# Patient Record
Sex: FEMALE | Race: WHITE | Employment: OTHER | URBAN - METROPOLITAN AREA
[De-identification: names, ages, dates, MRNs, and addresses within clinical notes are randomized per-mention and may not be internally consistent; named-entity substitution may affect disease eponyms.]

---

## 2017-02-06 ENCOUNTER — HOSPITAL ENCOUNTER (OUTPATIENT)
Dept: OTHER | Age: 77
Discharge: HOME | End: 2017-02-06
Attending: INTERNAL MEDICINE | Admitting: INTERNAL MEDICINE

## 2017-02-06 LAB
A/G RATIO: 1.5 (ref 1.1–2.2)
ALBUMIN SERPL-MCNC: 4 G/DL (ref 3.4–5)
ALP BLD-CCNC: 87 U/L (ref 40–129)
ALT SERPL-CCNC: 11 U/L (ref 10–40)
ANION GAP SERPL CALCULATED.3IONS-SCNC: 15 MMOL/L (ref 3–16)
AST SERPL-CCNC: 18 U/L (ref 15–37)
BILIRUB SERPL-MCNC: 0.5 MG/DL (ref 0–1)
BUN BLDV-MCNC: 17 MG/DL (ref 7–20)
CALCIUM SERPL-MCNC: 9.2 MG/DL (ref 8.3–10.6)
CHLORIDE BLD-SCNC: 97 MMOL/L (ref 99–110)
CHOLESTEROL, TOTAL: 186 MG/DL (ref 0–199)
CO2: 26 MMOL/L (ref 21–32)
CREAT SERPL-MCNC: 0.8 MG/DL (ref 0.6–1.2)
GFR AFRICAN AMERICAN: >60
GFR NON-AFRICAN AMERICAN: >60
GLOBULIN: 2.6 G/DL
GLUCOSE BLD-MCNC: 113 MG/DL (ref 70–99)
HDLC SERPL-MCNC: 78 MG/DL (ref 40–60)
LDL CHOLESTEROL CALCULATED: 90 MG/DL
POTASSIUM SERPL-SCNC: 4.1 MMOL/L (ref 3.5–5.1)
SODIUM BLD-SCNC: 138 MMOL/L (ref 136–145)
TOTAL PROTEIN: 6.6 G/DL (ref 6.4–8.2)
TRIGL SERPL-MCNC: 90 MG/DL (ref 0–150)
VLDLC SERPL CALC-MCNC: 18 MG/DL

## 2017-02-07 LAB
ESTIMATED AVERAGE GLUCOSE: 114 MG/DL
HBA1C MFR BLD: 5.6 %

## 2017-02-20 ENCOUNTER — HOSPITAL ENCOUNTER (OUTPATIENT)
Dept: MAMMOGRAPHY | Age: 77
Discharge: HOME | End: 2017-02-20
Attending: INTERNAL MEDICINE | Admitting: INTERNAL MEDICINE

## 2017-02-20 DIAGNOSIS — Z12.31 ENCOUNTER FOR SCREENING MAMMOGRAM FOR BREAST CANCER: ICD-10-CM

## 2017-02-20 DIAGNOSIS — Z80.3 FAMILY HISTORY OF MALIGNANT NEOPLASM OF BREAST: ICD-10-CM

## 2017-03-13 ENCOUNTER — OFFICE VISIT (OUTPATIENT)
Dept: NEUROLOGY | Age: 77
End: 2017-03-13

## 2017-03-13 VITALS
HEART RATE: 63 BPM | BODY MASS INDEX: 38.71 KG/M2 | WEIGHT: 192 LBS | HEIGHT: 59 IN | DIASTOLIC BLOOD PRESSURE: 82 MMHG | SYSTOLIC BLOOD PRESSURE: 128 MMHG

## 2017-03-13 DIAGNOSIS — G25.0 BENIGN ESSENTIAL TREMOR: Primary | ICD-10-CM

## 2017-03-13 PROCEDURE — G8427 DOCREV CUR MEDS BY ELIG CLIN: HCPCS | Performed by: PSYCHIATRY & NEUROLOGY

## 2017-03-13 PROCEDURE — 99213 OFFICE O/P EST LOW 20 MIN: CPT | Performed by: PSYCHIATRY & NEUROLOGY

## 2017-03-13 PROCEDURE — 4040F PNEUMOC VAC/ADMIN/RCVD: CPT | Performed by: PSYCHIATRY & NEUROLOGY

## 2017-03-13 PROCEDURE — G8400 PT W/DXA NO RESULTS DOC: HCPCS | Performed by: PSYCHIATRY & NEUROLOGY

## 2017-03-13 PROCEDURE — G8419 CALC BMI OUT NRM PARAM NOF/U: HCPCS | Performed by: PSYCHIATRY & NEUROLOGY

## 2017-03-13 PROCEDURE — 1036F TOBACCO NON-USER: CPT | Performed by: PSYCHIATRY & NEUROLOGY

## 2017-03-13 PROCEDURE — 1090F PRES/ABSN URINE INCON ASSESS: CPT | Performed by: PSYCHIATRY & NEUROLOGY

## 2017-03-13 PROCEDURE — 1123F ACP DISCUSS/DSCN MKR DOCD: CPT | Performed by: PSYCHIATRY & NEUROLOGY

## 2017-03-13 PROCEDURE — G8484 FLU IMMUNIZE NO ADMIN: HCPCS | Performed by: PSYCHIATRY & NEUROLOGY

## 2017-03-13 RX ORDER — PRIMIDONE 50 MG/1
TABLET ORAL
Qty: 60 TABLET | Refills: 5 | Status: SHIPPED | OUTPATIENT
Start: 2017-03-13 | End: 2017-09-05 | Stop reason: SDUPTHER

## 2017-06-12 ENCOUNTER — HOSPITAL ENCOUNTER (OUTPATIENT)
Dept: OTHER | Age: 77
Discharge: HOME | End: 2017-06-12
Attending: INTERNAL MEDICINE | Admitting: INTERNAL MEDICINE

## 2017-06-12 LAB
ANION GAP SERPL CALCULATED.3IONS-SCNC: 14 MMOL/L (ref 3–16)
BASOPHILS ABSOLUTE: 0.1 K/UL (ref 0–0.2)
BASOPHILS RELATIVE PERCENT: 0.9 %
BUN BLDV-MCNC: 21 MG/DL (ref 7–20)
CALCIUM SERPL-MCNC: 9.4 MG/DL (ref 8.3–10.6)
CHLORIDE BLD-SCNC: 100 MMOL/L (ref 99–110)
CHOLESTEROL, TOTAL: 188 MG/DL (ref 0–199)
CO2: 28 MMOL/L (ref 21–32)
CREAT SERPL-MCNC: 0.7 MG/DL (ref 0.6–1.2)
EOSINOPHILS ABSOLUTE: 0.4 K/UL (ref 0–0.6)
EOSINOPHILS RELATIVE PERCENT: 6.2 %
GFR AFRICAN AMERICAN: >60
GFR NON-AFRICAN AMERICAN: >60
GLUCOSE BLD-MCNC: 110 MG/DL (ref 70–99)
HCT VFR BLD CALC: 43.3 % (ref 36–48)
HDLC SERPL-MCNC: 73 MG/DL (ref 40–60)
HEMOGLOBIN: 14.3 G/DL (ref 12–16)
LDL CHOLESTEROL CALCULATED: 96 MG/DL
LYMPHOCYTES ABSOLUTE: 1.4 K/UL (ref 1–5.1)
LYMPHOCYTES RELATIVE PERCENT: 21.2 %
MCH RBC QN AUTO: 32.2 PG (ref 26–34)
MCHC RBC AUTO-ENTMCNC: 33.1 G/DL (ref 31–36)
MCV RBC AUTO: 97.3 FL (ref 80–100)
MONOCYTES ABSOLUTE: 0.5 K/UL (ref 0–1.3)
MONOCYTES RELATIVE PERCENT: 7.1 %
NEUTROPHILS ABSOLUTE: 4.4 K/UL (ref 1.7–7.7)
NEUTROPHILS RELATIVE PERCENT: 64.6 %
PDW BLD-RTO: 13.8 % (ref 12.4–15.4)
PLATELET # BLD: 215 K/UL (ref 135–450)
PMV BLD AUTO: 10 FL (ref 5–10.5)
POTASSIUM SERPL-SCNC: 4.2 MMOL/L (ref 3.5–5.1)
RBC # BLD: 4.46 M/UL (ref 4–5.2)
SODIUM BLD-SCNC: 142 MMOL/L (ref 136–145)
TRIGL SERPL-MCNC: 95 MG/DL (ref 0–150)
VLDLC SERPL CALC-MCNC: 19 MG/DL
WBC # BLD: 6.8 K/UL (ref 4–11)

## 2017-09-05 ENCOUNTER — OFFICE VISIT (OUTPATIENT)
Dept: NEUROLOGY | Age: 77
End: 2017-09-05

## 2017-09-05 ENCOUNTER — OFFICE VISIT (OUTPATIENT)
Dept: ENT CLINIC | Age: 77
End: 2017-09-05

## 2017-09-05 VITALS
WEIGHT: 192 LBS | HEART RATE: 66 BPM | BODY MASS INDEX: 38.71 KG/M2 | DIASTOLIC BLOOD PRESSURE: 77 MMHG | SYSTOLIC BLOOD PRESSURE: 143 MMHG | HEIGHT: 59 IN

## 2017-09-05 VITALS
DIASTOLIC BLOOD PRESSURE: 85 MMHG | SYSTOLIC BLOOD PRESSURE: 131 MMHG | HEART RATE: 68 BPM | HEIGHT: 59 IN | BODY MASS INDEX: 39.31 KG/M2 | WEIGHT: 195 LBS

## 2017-09-05 DIAGNOSIS — R20.0 NUMBNESS AND TINGLING: Primary | ICD-10-CM

## 2017-09-05 DIAGNOSIS — G56.21 ULNAR NEUROPATHY OF RIGHT UPPER EXTREMITY: ICD-10-CM

## 2017-09-05 DIAGNOSIS — R20.2 NUMBNESS AND TINGLING: Primary | ICD-10-CM

## 2017-09-05 DIAGNOSIS — J34.89 SINUS PRESSURE: Primary | ICD-10-CM

## 2017-09-05 DIAGNOSIS — G25.0 BENIGN ESSENTIAL TREMOR: ICD-10-CM

## 2017-09-05 PROCEDURE — 99213 OFFICE O/P EST LOW 20 MIN: CPT | Performed by: PSYCHIATRY & NEUROLOGY

## 2017-09-05 PROCEDURE — G8417 CALC BMI ABV UP PARAM F/U: HCPCS | Performed by: OTOLARYNGOLOGY

## 2017-09-05 PROCEDURE — 1123F ACP DISCUSS/DSCN MKR DOCD: CPT | Performed by: PSYCHIATRY & NEUROLOGY

## 2017-09-05 PROCEDURE — 1036F TOBACCO NON-USER: CPT | Performed by: OTOLARYNGOLOGY

## 2017-09-05 PROCEDURE — 99213 OFFICE O/P EST LOW 20 MIN: CPT | Performed by: OTOLARYNGOLOGY

## 2017-09-05 PROCEDURE — 4040F PNEUMOC VAC/ADMIN/RCVD: CPT | Performed by: PSYCHIATRY & NEUROLOGY

## 2017-09-05 PROCEDURE — G8427 DOCREV CUR MEDS BY ELIG CLIN: HCPCS | Performed by: OTOLARYNGOLOGY

## 2017-09-05 PROCEDURE — 1090F PRES/ABSN URINE INCON ASSESS: CPT | Performed by: OTOLARYNGOLOGY

## 2017-09-05 PROCEDURE — 4040F PNEUMOC VAC/ADMIN/RCVD: CPT | Performed by: OTOLARYNGOLOGY

## 2017-09-05 PROCEDURE — G8427 DOCREV CUR MEDS BY ELIG CLIN: HCPCS | Performed by: PSYCHIATRY & NEUROLOGY

## 2017-09-05 PROCEDURE — G8417 CALC BMI ABV UP PARAM F/U: HCPCS | Performed by: PSYCHIATRY & NEUROLOGY

## 2017-09-05 PROCEDURE — 1090F PRES/ABSN URINE INCON ASSESS: CPT | Performed by: PSYCHIATRY & NEUROLOGY

## 2017-09-05 PROCEDURE — G8400 PT W/DXA NO RESULTS DOC: HCPCS | Performed by: OTOLARYNGOLOGY

## 2017-09-05 PROCEDURE — 1036F TOBACCO NON-USER: CPT | Performed by: PSYCHIATRY & NEUROLOGY

## 2017-09-05 PROCEDURE — G8400 PT W/DXA NO RESULTS DOC: HCPCS | Performed by: PSYCHIATRY & NEUROLOGY

## 2017-09-05 PROCEDURE — 1123F ACP DISCUSS/DSCN MKR DOCD: CPT | Performed by: OTOLARYNGOLOGY

## 2017-09-05 RX ORDER — PRIMIDONE 50 MG/1
TABLET ORAL
Qty: 60 TABLET | Refills: 5 | Status: SHIPPED | OUTPATIENT
Start: 2017-09-05 | End: 2018-03-02 | Stop reason: SDUPTHER

## 2017-09-05 RX ORDER — MOMETASONE FUROATE 50 UG/1
1 SPRAY, METERED NASAL DAILY
Qty: 1 INHALER | Refills: 1 | Status: SHIPPED | OUTPATIENT
Start: 2017-09-05 | End: 2017-10-05

## 2017-10-13 ENCOUNTER — OFFICE VISIT (OUTPATIENT)
Dept: ENT CLINIC | Age: 77
End: 2017-10-13

## 2017-10-13 VITALS
HEART RATE: 68 BPM | WEIGHT: 192 LBS | SYSTOLIC BLOOD PRESSURE: 156 MMHG | DIASTOLIC BLOOD PRESSURE: 84 MMHG | BODY MASS INDEX: 38.71 KG/M2 | HEIGHT: 59 IN

## 2017-10-13 DIAGNOSIS — M26.69 TMJ CAPSULITIS: Primary | ICD-10-CM

## 2017-10-13 DIAGNOSIS — M79.18 MYOFASCIAL PAIN: Primary | ICD-10-CM

## 2017-10-13 PROCEDURE — 1036F TOBACCO NON-USER: CPT | Performed by: OTOLARYNGOLOGY

## 2017-10-13 PROCEDURE — 99213 OFFICE O/P EST LOW 20 MIN: CPT | Performed by: OTOLARYNGOLOGY

## 2017-10-13 PROCEDURE — G8427 DOCREV CUR MEDS BY ELIG CLIN: HCPCS | Performed by: OTOLARYNGOLOGY

## 2017-10-13 PROCEDURE — 4040F PNEUMOC VAC/ADMIN/RCVD: CPT | Performed by: OTOLARYNGOLOGY

## 2017-10-13 PROCEDURE — 1090F PRES/ABSN URINE INCON ASSESS: CPT | Performed by: OTOLARYNGOLOGY

## 2017-10-13 PROCEDURE — G8484 FLU IMMUNIZE NO ADMIN: HCPCS | Performed by: OTOLARYNGOLOGY

## 2017-10-13 PROCEDURE — 1036F TOBACCO NON-USER: CPT | Performed by: AUDIOLOGIST

## 2017-10-13 PROCEDURE — 1123F ACP DISCUSS/DSCN MKR DOCD: CPT | Performed by: OTOLARYNGOLOGY

## 2017-10-13 PROCEDURE — G8417 CALC BMI ABV UP PARAM F/U: HCPCS | Performed by: OTOLARYNGOLOGY

## 2017-10-13 PROCEDURE — G8400 PT W/DXA NO RESULTS DOC: HCPCS | Performed by: OTOLARYNGOLOGY

## 2017-10-13 NOTE — PROGRESS NOTES
distress    Impression:  No suggestion of current allergic rhinitis    Myofascial discomfort seems most likely    Plan:  Diclofenac 75 mg b.i.d.       Try to keep jaw and neck muscles relaxed  May apply warm compresses intermittently  Take the anti-inflammatory medication as prescribed  Consider a bite guard, either a sports model or a custom made device  Have your dentist check your jaw alignment  Ear drops are generally not helpful for the ear discomfort associated with your jaw joint

## 2017-12-12 ENCOUNTER — HOSPITAL ENCOUNTER (OUTPATIENT)
Dept: OTHER | Age: 77
Discharge: HOME | End: 2017-12-12
Attending: INTERNAL MEDICINE | Admitting: INTERNAL MEDICINE

## 2017-12-12 LAB
A/G RATIO: 1.8 (ref 1.1–2.2)
ALBUMIN SERPL-MCNC: 4.2 G/DL (ref 3.4–5)
ALP BLD-CCNC: 77 U/L (ref 40–129)
ALT SERPL-CCNC: 11 U/L (ref 10–40)
ANION GAP SERPL CALCULATED.3IONS-SCNC: 15 MMOL/L (ref 3–16)
AST SERPL-CCNC: 18 U/L (ref 15–37)
BASOPHILS ABSOLUTE: 0.1 K/UL (ref 0–0.2)
BASOPHILS RELATIVE PERCENT: 0.9 %
BILIRUB SERPL-MCNC: 0.7 MG/DL (ref 0–1)
BUN BLDV-MCNC: 21 MG/DL (ref 7–20)
CALCIUM SERPL-MCNC: 9.4 MG/DL (ref 8.3–10.6)
CHLORIDE BLD-SCNC: 98 MMOL/L (ref 99–110)
CHOLESTEROL, TOTAL: 183 MG/DL (ref 0–199)
CO2: 27 MMOL/L (ref 21–32)
CREAT SERPL-MCNC: 0.7 MG/DL (ref 0.6–1.2)
EOSINOPHILS ABSOLUTE: 0.4 K/UL (ref 0–0.6)
EOSINOPHILS RELATIVE PERCENT: 6 %
GFR AFRICAN AMERICAN: >60
GFR NON-AFRICAN AMERICAN: >60
GLOBULIN: 2.4 G/DL
GLUCOSE BLD-MCNC: 106 MG/DL (ref 70–99)
HCT VFR BLD CALC: 42 % (ref 36–48)
HDLC SERPL-MCNC: 72 MG/DL (ref 40–60)
HEMOGLOBIN: 14.1 G/DL (ref 12–16)
LDL CHOLESTEROL CALCULATED: 93 MG/DL
LYMPHOCYTES ABSOLUTE: 1.2 K/UL (ref 1–5.1)
LYMPHOCYTES RELATIVE PERCENT: 17.4 %
MCH RBC QN AUTO: 32.5 PG (ref 26–34)
MCHC RBC AUTO-ENTMCNC: 33.5 G/DL (ref 31–36)
MCV RBC AUTO: 97.1 FL (ref 80–100)
MONOCYTES ABSOLUTE: 0.5 K/UL (ref 0–1.3)
MONOCYTES RELATIVE PERCENT: 6.9 %
NEUTROPHILS ABSOLUTE: 4.7 K/UL (ref 1.7–7.7)
NEUTROPHILS RELATIVE PERCENT: 68.8 %
PDW BLD-RTO: 13.5 % (ref 12.4–15.4)
PLATELET # BLD: 196 K/UL (ref 135–450)
PMV BLD AUTO: 9.9 FL (ref 5–10.5)
POTASSIUM SERPL-SCNC: 3.8 MMOL/L (ref 3.5–5.1)
RBC # BLD: 4.33 M/UL (ref 4–5.2)
SODIUM BLD-SCNC: 140 MMOL/L (ref 136–145)
TOTAL PROTEIN: 6.6 G/DL (ref 6.4–8.2)
TRIGL SERPL-MCNC: 92 MG/DL (ref 0–150)
VLDLC SERPL CALC-MCNC: 18 MG/DL
WBC # BLD: 6.8 K/UL (ref 4–11)

## 2018-03-02 ENCOUNTER — OFFICE VISIT (OUTPATIENT)
Dept: NEUROLOGY | Age: 78
End: 2018-03-02

## 2018-03-02 VITALS
HEART RATE: 62 BPM | WEIGHT: 191.8 LBS | DIASTOLIC BLOOD PRESSURE: 67 MMHG | BODY MASS INDEX: 38.67 KG/M2 | SYSTOLIC BLOOD PRESSURE: 115 MMHG | HEIGHT: 59 IN

## 2018-03-02 DIAGNOSIS — G25.0 BENIGN ESSENTIAL TREMOR: ICD-10-CM

## 2018-03-02 PROCEDURE — 1090F PRES/ABSN URINE INCON ASSESS: CPT | Performed by: PSYCHIATRY & NEUROLOGY

## 2018-03-02 PROCEDURE — G8484 FLU IMMUNIZE NO ADMIN: HCPCS | Performed by: PSYCHIATRY & NEUROLOGY

## 2018-03-02 PROCEDURE — 4040F PNEUMOC VAC/ADMIN/RCVD: CPT | Performed by: PSYCHIATRY & NEUROLOGY

## 2018-03-02 PROCEDURE — 99213 OFFICE O/P EST LOW 20 MIN: CPT | Performed by: PSYCHIATRY & NEUROLOGY

## 2018-03-02 PROCEDURE — G8400 PT W/DXA NO RESULTS DOC: HCPCS | Performed by: PSYCHIATRY & NEUROLOGY

## 2018-03-02 PROCEDURE — 1123F ACP DISCUSS/DSCN MKR DOCD: CPT | Performed by: PSYCHIATRY & NEUROLOGY

## 2018-03-02 PROCEDURE — 1036F TOBACCO NON-USER: CPT | Performed by: PSYCHIATRY & NEUROLOGY

## 2018-03-02 PROCEDURE — G8427 DOCREV CUR MEDS BY ELIG CLIN: HCPCS | Performed by: PSYCHIATRY & NEUROLOGY

## 2018-03-02 PROCEDURE — G8417 CALC BMI ABV UP PARAM F/U: HCPCS | Performed by: PSYCHIATRY & NEUROLOGY

## 2018-03-02 RX ORDER — OMEPRAZOLE 20 MG/1
20 CAPSULE, DELAYED RELEASE ORAL DAILY
COMMUNITY

## 2018-03-02 RX ORDER — CELECOXIB 200 MG/1
200 CAPSULE ORAL
COMMUNITY

## 2018-03-02 RX ORDER — PRIMIDONE 50 MG/1
TABLET ORAL
Qty: 60 TABLET | Refills: 5 | Status: SHIPPED | OUTPATIENT
Start: 2018-03-02 | End: 2018-09-04 | Stop reason: SDUPTHER

## 2018-04-06 ENCOUNTER — HOSPITAL ENCOUNTER (OUTPATIENT)
Dept: GENERAL RADIOLOGY | Age: 78
Discharge: HOME | End: 2018-04-06
Admitting: INTERNAL MEDICINE

## 2018-04-06 DIAGNOSIS — Z80.3 FAMILY HISTORY OF BREAST CANCER IN MOTHER: ICD-10-CM

## 2018-04-06 DIAGNOSIS — Z12.31 ENCOUNTER FOR SCREENING MAMMOGRAM FOR BREAST CANCER: ICD-10-CM

## 2018-04-06 DIAGNOSIS — M81.0 AGE-RELATED OSTEOPOROSIS WITHOUT CURRENT PATHOLOGICAL FRACTURE: ICD-10-CM

## 2018-04-06 DIAGNOSIS — Z13.820 ENCOUNTER FOR IMAGING TO ASSESS OSTEOPOROSIS: ICD-10-CM

## 2018-05-08 ENCOUNTER — HOSPITAL ENCOUNTER (OUTPATIENT)
Dept: OTHER | Age: 78
Discharge: HOME | End: 2018-05-08
Attending: INTERNAL MEDICINE | Admitting: INTERNAL MEDICINE

## 2018-05-08 LAB
A/G RATIO: 1.8 (ref 1.1–2.2)
ALBUMIN SERPL-MCNC: 4.3 G/DL (ref 3.4–5)
ALP BLD-CCNC: 85 U/L (ref 40–129)
ALT SERPL-CCNC: 13 U/L (ref 10–40)
ANION GAP SERPL CALCULATED.3IONS-SCNC: 13 MMOL/L (ref 3–16)
AST SERPL-CCNC: 19 U/L (ref 15–37)
BASOPHILS ABSOLUTE: 0.1 K/UL (ref 0–0.2)
BASOPHILS RELATIVE PERCENT: 0.8 %
BILIRUB SERPL-MCNC: 0.6 MG/DL (ref 0–1)
BUN BLDV-MCNC: 23 MG/DL (ref 7–20)
CALCIUM SERPL-MCNC: 9.1 MG/DL (ref 8.3–10.6)
CHLORIDE BLD-SCNC: 96 MMOL/L (ref 99–110)
CHOLESTEROL, TOTAL: 196 MG/DL (ref 0–199)
CO2: 29 MMOL/L (ref 21–32)
CREAT SERPL-MCNC: 0.6 MG/DL (ref 0.6–1.2)
EOSINOPHILS ABSOLUTE: 0.3 K/UL (ref 0–0.6)
EOSINOPHILS RELATIVE PERCENT: 5.3 %
GFR AFRICAN AMERICAN: >60
GFR NON-AFRICAN AMERICAN: >60
GLOBULIN: 2.4 G/DL
GLUCOSE BLD-MCNC: 118 MG/DL (ref 70–99)
HCT VFR BLD CALC: 42.3 % (ref 36–48)
HDLC SERPL-MCNC: 73 MG/DL (ref 40–60)
HEMOGLOBIN: 14.5 G/DL (ref 12–16)
LDL CHOLESTEROL CALCULATED: 108 MG/DL
LYMPHOCYTES ABSOLUTE: 1 K/UL (ref 1–5.1)
LYMPHOCYTES RELATIVE PERCENT: 14.7 %
MCH RBC QN AUTO: 32.6 PG (ref 26–34)
MCHC RBC AUTO-ENTMCNC: 34.2 G/DL (ref 31–36)
MCV RBC AUTO: 95.4 FL (ref 80–100)
MONOCYTES ABSOLUTE: 0.4 K/UL (ref 0–1.3)
MONOCYTES RELATIVE PERCENT: 6.3 %
NEUTROPHILS ABSOLUTE: 4.8 K/UL (ref 1.7–7.7)
NEUTROPHILS RELATIVE PERCENT: 72.9 %
PDW BLD-RTO: 13.5 % (ref 12.4–15.4)
PLATELET # BLD: 216 K/UL (ref 135–450)
PMV BLD AUTO: 9.6 FL (ref 5–10.5)
POTASSIUM SERPL-SCNC: 4.1 MMOL/L (ref 3.5–5.1)
RBC # BLD: 4.44 M/UL (ref 4–5.2)
SODIUM BLD-SCNC: 138 MMOL/L (ref 136–145)
TOTAL PROTEIN: 6.7 G/DL (ref 6.4–8.2)
TRIGL SERPL-MCNC: 76 MG/DL (ref 0–150)
VLDLC SERPL CALC-MCNC: 15 MG/DL
WBC # BLD: 6.6 K/UL (ref 4–11)

## 2018-05-10 LAB
ESTIMATED AVERAGE GLUCOSE: 108.3 MG/DL
HBA1C MFR BLD: 5.4 %

## 2018-09-04 ENCOUNTER — OFFICE VISIT (OUTPATIENT)
Dept: NEUROLOGY | Age: 78
End: 2018-09-04

## 2018-09-04 VITALS
BODY MASS INDEX: 38.51 KG/M2 | SYSTOLIC BLOOD PRESSURE: 125 MMHG | HEART RATE: 62 BPM | DIASTOLIC BLOOD PRESSURE: 65 MMHG | HEIGHT: 59 IN | WEIGHT: 191 LBS

## 2018-09-04 DIAGNOSIS — G25.0 BENIGN ESSENTIAL TREMOR: ICD-10-CM

## 2018-09-04 PROCEDURE — 1123F ACP DISCUSS/DSCN MKR DOCD: CPT | Performed by: PSYCHIATRY & NEUROLOGY

## 2018-09-04 PROCEDURE — 4040F PNEUMOC VAC/ADMIN/RCVD: CPT | Performed by: PSYCHIATRY & NEUROLOGY

## 2018-09-04 PROCEDURE — 1090F PRES/ABSN URINE INCON ASSESS: CPT | Performed by: PSYCHIATRY & NEUROLOGY

## 2018-09-04 PROCEDURE — 99213 OFFICE O/P EST LOW 20 MIN: CPT | Performed by: PSYCHIATRY & NEUROLOGY

## 2018-09-04 PROCEDURE — G8427 DOCREV CUR MEDS BY ELIG CLIN: HCPCS | Performed by: PSYCHIATRY & NEUROLOGY

## 2018-09-04 PROCEDURE — G8399 PT W/DXA RESULTS DOCUMENT: HCPCS | Performed by: PSYCHIATRY & NEUROLOGY

## 2018-09-04 PROCEDURE — G8417 CALC BMI ABV UP PARAM F/U: HCPCS | Performed by: PSYCHIATRY & NEUROLOGY

## 2018-09-04 PROCEDURE — 1101F PT FALLS ASSESS-DOCD LE1/YR: CPT | Performed by: PSYCHIATRY & NEUROLOGY

## 2018-09-04 PROCEDURE — 1036F TOBACCO NON-USER: CPT | Performed by: PSYCHIATRY & NEUROLOGY

## 2018-09-04 RX ORDER — PRIMIDONE 50 MG/1
TABLET ORAL
Qty: 180 TABLET | Refills: 1 | Status: SHIPPED | OUTPATIENT
Start: 2018-09-04 | End: 2019-03-07 | Stop reason: SDUPTHER

## 2018-09-04 NOTE — PROGRESS NOTES
no acute distress  Patient is awake, alert and oriented x3. Speech is normal.  Pupils are equal round reacting to light. Extraocular movements intact. Face symmetrical. Tongue midline. Motor exam shows normal symmetrical strength. Deep tendon reflexes normal. Plantar reflexes downgoing. Sensory exam normal. Coordination normal. Gait normal. No carotid bruit. No neck stiffness. Data :  LABS:  General Labs:    CBC:   Lab Results   Component Value Date    WBC 6.6 05/08/2018    RBC 4.44 05/08/2018    HGB 14.5 05/08/2018    HCT 42.3 05/08/2018    MCV 95.4 05/08/2018    MCH 32.6 05/08/2018    MCHC 34.2 05/08/2018    RDW 13.5 05/08/2018     05/08/2018    MPV 9.6 05/08/2018     BMP:    Lab Results   Component Value Date     05/08/2018    K 4.1 05/08/2018    CL 96 05/08/2018    CO2 29 05/08/2018    BUN 23 05/08/2018    LABALBU 4.3 05/08/2018    CREATININE 0.6 05/08/2018    CALCIUM 9.1 05/08/2018    GFRAA >60 05/08/2018    GFRAA >60 03/29/2013    LABGLOM >60 05/08/2018    GLUCOSE 118 05/08/2018       Impression :  Benign essential tremor, Relatively stable  I suspect that patient may have an ulnar nerve entrapment at the Right elbow But this seems to be stable    Plan :  Continue primidone  50 mg by mouth twice a day  Discussed with patient about the possible ulnar nerve entrapment at the elbow  Recommended that she try to avoid putting pressure on the ulnar nerve at the elbow  If the symptoms get worse I will do EMG and nerve conduction study of the right arm  Return in 6 months    Please note a portion of  this chart was generated using dragon dictation software. Although every effort was made to ensure the accuracy of this automated transcription, some errors in transcription may have occurred.

## 2019-01-18 ENCOUNTER — HOSPITAL ENCOUNTER (OUTPATIENT)
Age: 79
Discharge: HOME | End: 2019-01-18
Payer: MEDICARE

## 2019-01-18 LAB
A/G RATIO: 2 (ref 1.1–2.2)
ALBUMIN SERPL-MCNC: 4.5 G/DL (ref 3.4–5)
ALP BLD-CCNC: 84 U/L (ref 40–129)
ALT SERPL-CCNC: 14 U/L (ref 10–40)
ANION GAP SERPL CALCULATED.3IONS-SCNC: 13 MMOL/L (ref 3–16)
AST SERPL-CCNC: 23 U/L (ref 15–37)
BASOPHILS ABSOLUTE: 0.1 K/UL (ref 0–0.2)
BASOPHILS RELATIVE PERCENT: 0.9 %
BILIRUB SERPL-MCNC: 0.6 MG/DL (ref 0–1)
BUN BLDV-MCNC: 19 MG/DL (ref 7–20)
CALCIUM SERPL-MCNC: 9.4 MG/DL (ref 8.3–10.6)
CHLORIDE BLD-SCNC: 100 MMOL/L (ref 99–110)
CHOLESTEROL, TOTAL: 173 MG/DL (ref 0–199)
CO2: 26 MMOL/L (ref 21–32)
CREAT SERPL-MCNC: 0.6 MG/DL (ref 0.6–1.2)
EOSINOPHILS ABSOLUTE: 0.3 K/UL (ref 0–0.6)
EOSINOPHILS RELATIVE PERCENT: 5.4 %
ESTIMATED AVERAGE GLUCOSE: 105.4 MG/DL
GFR AFRICAN AMERICAN: >60
GFR NON-AFRICAN AMERICAN: >60
GLOBULIN: 2.2 G/DL
GLUCOSE BLD-MCNC: 107 MG/DL (ref 70–99)
HBA1C MFR BLD: 5.3 %
HCT VFR BLD CALC: 41.4 % (ref 36–48)
HDLC SERPL-MCNC: 66 MG/DL (ref 40–60)
HEMOGLOBIN: 14.2 G/DL (ref 12–16)
LDL CHOLESTEROL CALCULATED: 93 MG/DL
LYMPHOCYTES ABSOLUTE: 1.2 K/UL (ref 1–5.1)
LYMPHOCYTES RELATIVE PERCENT: 20 %
MCH RBC QN AUTO: 31.9 PG (ref 26–34)
MCHC RBC AUTO-ENTMCNC: 34.2 G/DL (ref 31–36)
MCV RBC AUTO: 93.2 FL (ref 80–100)
MONOCYTES ABSOLUTE: 0.4 K/UL (ref 0–1.3)
MONOCYTES RELATIVE PERCENT: 7.3 %
NEUTROPHILS ABSOLUTE: 3.8 K/UL (ref 1.7–7.7)
NEUTROPHILS RELATIVE PERCENT: 66.4 %
PDW BLD-RTO: 13.5 % (ref 12.4–15.4)
PLATELET # BLD: 201 K/UL (ref 135–450)
PMV BLD AUTO: 9.7 FL (ref 5–10.5)
POTASSIUM SERPL-SCNC: 3.9 MMOL/L (ref 3.5–5.1)
RBC # BLD: 4.45 M/UL (ref 4–5.2)
SODIUM BLD-SCNC: 139 MMOL/L (ref 136–145)
TOTAL PROTEIN: 6.7 G/DL (ref 6.4–8.2)
TRIGL SERPL-MCNC: 72 MG/DL (ref 0–150)
VLDLC SERPL CALC-MCNC: 14 MG/DL
WBC # BLD: 5.8 K/UL (ref 4–11)

## 2019-01-18 PROCEDURE — 85025 COMPLETE CBC W/AUTO DIFF WBC: CPT

## 2019-01-18 PROCEDURE — 80061 LIPID PANEL: CPT

## 2019-01-18 PROCEDURE — 80053 COMPREHEN METABOLIC PANEL: CPT

## 2019-01-18 PROCEDURE — 36415 COLL VENOUS BLD VENIPUNCTURE: CPT

## 2019-01-18 PROCEDURE — 83036 HEMOGLOBIN GLYCOSYLATED A1C: CPT

## 2019-03-07 DIAGNOSIS — G25.0 BENIGN ESSENTIAL TREMOR: ICD-10-CM

## 2019-03-07 RX ORDER — PRIMIDONE 50 MG/1
TABLET ORAL
Qty: 180 TABLET | Refills: 0 | Status: SHIPPED | OUTPATIENT
Start: 2019-03-07 | End: 2019-04-15 | Stop reason: SDUPTHER

## 2019-04-15 ENCOUNTER — OFFICE VISIT (OUTPATIENT)
Dept: NEUROLOGY | Age: 79
End: 2019-04-15
Payer: MEDICARE

## 2019-04-15 VITALS
SYSTOLIC BLOOD PRESSURE: 119 MMHG | DIASTOLIC BLOOD PRESSURE: 69 MMHG | HEART RATE: 65 BPM | WEIGHT: 184 LBS | BODY MASS INDEX: 37.16 KG/M2

## 2019-04-15 DIAGNOSIS — G25.0 BENIGN ESSENTIAL TREMOR: ICD-10-CM

## 2019-04-15 PROCEDURE — 99213 OFFICE O/P EST LOW 20 MIN: CPT | Performed by: PSYCHIATRY & NEUROLOGY

## 2019-04-15 PROCEDURE — 1036F TOBACCO NON-USER: CPT | Performed by: PSYCHIATRY & NEUROLOGY

## 2019-04-15 PROCEDURE — 4040F PNEUMOC VAC/ADMIN/RCVD: CPT | Performed by: PSYCHIATRY & NEUROLOGY

## 2019-04-15 PROCEDURE — 1123F ACP DISCUSS/DSCN MKR DOCD: CPT | Performed by: PSYCHIATRY & NEUROLOGY

## 2019-04-15 PROCEDURE — G8417 CALC BMI ABV UP PARAM F/U: HCPCS | Performed by: PSYCHIATRY & NEUROLOGY

## 2019-04-15 PROCEDURE — 1090F PRES/ABSN URINE INCON ASSESS: CPT | Performed by: PSYCHIATRY & NEUROLOGY

## 2019-04-15 PROCEDURE — G8399 PT W/DXA RESULTS DOCUMENT: HCPCS | Performed by: PSYCHIATRY & NEUROLOGY

## 2019-04-15 PROCEDURE — G8427 DOCREV CUR MEDS BY ELIG CLIN: HCPCS | Performed by: PSYCHIATRY & NEUROLOGY

## 2019-04-15 RX ORDER — PRIMIDONE 50 MG/1
TABLET ORAL
Qty: 180 TABLET | Refills: 1 | Status: SHIPPED | OUTPATIENT
Start: 2019-04-15 | End: 2019-12-09 | Stop reason: SDUPTHER

## 2019-04-15 RX ORDER — GABAPENTIN 300 MG/1
100 CAPSULE ORAL NIGHTLY
COMMUNITY

## 2019-04-15 NOTE — PROGRESS NOTES
activity: None   Lifestyle    Physical activity:     Days per week: None     Minutes per session: None    Stress: None   Relationships    Social connections:     Talks on phone: None     Gets together: None     Attends Yarsani service: None     Active member of club or organization: None     Attends meetings of clubs or organizations: None     Relationship status: None    Intimate partner violence:     Fear of current or ex partner: None     Emotionally abused: None     Physically abused: None     Forced sexual activity: None   Other Topics Concern    None   Social History Narrative    None        Objective:  Exam:  /69   Pulse 65   Wt 184 lb (83.5 kg)   BMI 37.16 kg/m²   This is a well-nourished patient in no acute distress  Patient is awake, alert and oriented x3. Speech is normal.  Pupils are equal round reacting to light. Extraocular movements intact. Face symmetrical. Tongue midline. Motor exam shows normal symmetrical strength. Deep tendon reflexes normal. Plantar reflexes downgoing. Sensory exam normal. Coordination normal. Gait normal. No carotid bruit. No neck stiffness. Data :  LABS:  General Labs:    CBC:   Lab Results   Component Value Date    WBC 5.8 01/18/2019    RBC 4.45 01/18/2019    HGB 14.2 01/18/2019    HCT 41.4 01/18/2019    MCV 93.2 01/18/2019    MCH 31.9 01/18/2019    MCHC 34.2 01/18/2019    RDW 13.5 01/18/2019     01/18/2019    MPV 9.7 01/18/2019     BMP:    Lab Results   Component Value Date     01/18/2019    K 3.9 01/18/2019     01/18/2019    CO2 26 01/18/2019    BUN 19 01/18/2019    LABALBU 4.5 01/18/2019    CREATININE 0.6 01/18/2019    CALCIUM 9.4 01/18/2019    GFRAA >60 01/18/2019    GFRAA >60 03/29/2013    LABGLOM >60 01/18/2019    GLUCOSE 107 01/18/2019       Impression :  Benign essential tremor, Relatively stable  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     Plan :  Continue primidone  50 mg by mouth twice a day  Discussed with patient about the possible ulnar nerve entrapment at the elbow  Recommended that she try to avoid putting pressure on the ulnar nerve at the elbow  If the symptoms get worse I will do EMG and nerve conduction study of the right arm  Return in 6 months    Please note a portion of  this chart was generated using dragon dictation software. Although every effort was made to ensure the accuracy of this automated transcription, some errors in transcription may have occurred.

## 2019-04-15 NOTE — PATIENT INSTRUCTIONS
Please call with any questions or concerns:   SSJASMIN[facility]  @ [phone]. LAB RESULTS:  Please obtain any labs or diagnostic tests as discussed today. You should call the office to check the results. Please allow  3 to 7 days for us to get these results. MEDICATION LIST:  Please bring an accurate list of your medications to every visit. APPOINTMENT CONFIRMATION:  We will call you the day before your scheduled appointment to confirm. If we are unable to reach you, you MUST call back by the end of the day to confirm the appointment or we may be forced to cancel.

## 2019-05-06 ENCOUNTER — HOSPITAL ENCOUNTER (OUTPATIENT)
Dept: WOMENS IMAGING | Age: 79
Discharge: HOME | End: 2019-05-06
Payer: MEDICARE

## 2019-05-06 DIAGNOSIS — Z12.31 BREAST CANCER SCREENING BY MAMMOGRAM: ICD-10-CM

## 2019-05-06 PROCEDURE — 77063 BREAST TOMOSYNTHESIS BI: CPT

## 2019-05-31 ENCOUNTER — HOSPITAL ENCOUNTER (OUTPATIENT)
Age: 79
Discharge: HOME | End: 2019-05-31
Payer: MEDICARE

## 2019-05-31 LAB
A/G RATIO: 1.7 (ref 1.1–2.2)
ALBUMIN SERPL-MCNC: 4.5 G/DL (ref 3.4–5)
ALP BLD-CCNC: 89 U/L (ref 40–129)
ALT SERPL-CCNC: 14 U/L (ref 10–40)
ANION GAP SERPL CALCULATED.3IONS-SCNC: 14 MMOL/L (ref 3–16)
AST SERPL-CCNC: 20 U/L (ref 15–37)
BASOPHILS ABSOLUTE: 0.1 K/UL (ref 0–0.2)
BASOPHILS RELATIVE PERCENT: 0.9 %
BILIRUB SERPL-MCNC: 0.5 MG/DL (ref 0–1)
BUN BLDV-MCNC: 21 MG/DL (ref 7–20)
CALCIUM SERPL-MCNC: 9.6 MG/DL (ref 8.3–10.6)
CHLORIDE BLD-SCNC: 97 MMOL/L (ref 99–110)
CHOLESTEROL, TOTAL: 192 MG/DL (ref 0–199)
CO2: 27 MMOL/L (ref 21–32)
CREAT SERPL-MCNC: 0.7 MG/DL (ref 0.6–1.2)
EOSINOPHILS ABSOLUTE: 0.4 K/UL (ref 0–0.6)
EOSINOPHILS RELATIVE PERCENT: 6.3 %
ESTIMATED AVERAGE GLUCOSE: 114 MG/DL
GFR AFRICAN AMERICAN: >60
GFR NON-AFRICAN AMERICAN: >60
GLOBULIN: 2.6 G/DL
GLUCOSE BLD-MCNC: 115 MG/DL (ref 70–99)
HBA1C MFR BLD: 5.6 %
HCT VFR BLD CALC: 41.1 % (ref 36–48)
HDLC SERPL-MCNC: 72 MG/DL (ref 40–60)
HEMOGLOBIN: 13.9 G/DL (ref 12–16)
LDL CHOLESTEROL CALCULATED: 100 MG/DL
LYMPHOCYTES ABSOLUTE: 1.4 K/UL (ref 1–5.1)
LYMPHOCYTES RELATIVE PERCENT: 21.3 %
MCH RBC QN AUTO: 32.1 PG (ref 26–34)
MCHC RBC AUTO-ENTMCNC: 33.8 G/DL (ref 31–36)
MCV RBC AUTO: 94.9 FL (ref 80–100)
MONOCYTES ABSOLUTE: 0.4 K/UL (ref 0–1.3)
MONOCYTES RELATIVE PERCENT: 6.8 %
NEUTROPHILS ABSOLUTE: 4.1 K/UL (ref 1.7–7.7)
NEUTROPHILS RELATIVE PERCENT: 64.7 %
PDW BLD-RTO: 13.9 % (ref 12.4–15.4)
PLATELET # BLD: 223 K/UL (ref 135–450)
PMV BLD AUTO: 9.6 FL (ref 5–10.5)
POTASSIUM SERPL-SCNC: 4.1 MMOL/L (ref 3.5–5.1)
RBC # BLD: 4.33 M/UL (ref 4–5.2)
SODIUM BLD-SCNC: 138 MMOL/L (ref 136–145)
TOTAL PROTEIN: 7.1 G/DL (ref 6.4–8.2)
TRIGL SERPL-MCNC: 100 MG/DL (ref 0–150)
VLDLC SERPL CALC-MCNC: 20 MG/DL
WBC # BLD: 6.4 K/UL (ref 4–11)

## 2019-05-31 PROCEDURE — 36415 COLL VENOUS BLD VENIPUNCTURE: CPT

## 2019-05-31 PROCEDURE — 80061 LIPID PANEL: CPT

## 2019-05-31 PROCEDURE — 85025 COMPLETE CBC W/AUTO DIFF WBC: CPT

## 2019-05-31 PROCEDURE — 83036 HEMOGLOBIN GLYCOSYLATED A1C: CPT

## 2019-05-31 PROCEDURE — 80053 COMPREHEN METABOLIC PANEL: CPT

## 2019-10-07 ENCOUNTER — OFFICE VISIT (OUTPATIENT)
Dept: NEUROLOGY | Age: 79
End: 2019-10-07
Payer: MEDICARE

## 2019-10-07 VITALS
WEIGHT: 188 LBS | SYSTOLIC BLOOD PRESSURE: 107 MMHG | HEART RATE: 61 BPM | BODY MASS INDEX: 37.97 KG/M2 | DIASTOLIC BLOOD PRESSURE: 57 MMHG

## 2019-10-07 DIAGNOSIS — G25.0 BENIGN ESSENTIAL TREMOR: Primary | ICD-10-CM

## 2019-10-07 PROCEDURE — G8417 CALC BMI ABV UP PARAM F/U: HCPCS | Performed by: PSYCHIATRY & NEUROLOGY

## 2019-10-07 PROCEDURE — 99213 OFFICE O/P EST LOW 20 MIN: CPT | Performed by: PSYCHIATRY & NEUROLOGY

## 2019-10-07 PROCEDURE — G8484 FLU IMMUNIZE NO ADMIN: HCPCS | Performed by: PSYCHIATRY & NEUROLOGY

## 2019-10-07 PROCEDURE — 1090F PRES/ABSN URINE INCON ASSESS: CPT | Performed by: PSYCHIATRY & NEUROLOGY

## 2019-10-07 PROCEDURE — G8399 PT W/DXA RESULTS DOCUMENT: HCPCS | Performed by: PSYCHIATRY & NEUROLOGY

## 2019-10-07 PROCEDURE — G8427 DOCREV CUR MEDS BY ELIG CLIN: HCPCS | Performed by: PSYCHIATRY & NEUROLOGY

## 2019-10-07 PROCEDURE — 4040F PNEUMOC VAC/ADMIN/RCVD: CPT | Performed by: PSYCHIATRY & NEUROLOGY

## 2019-10-07 PROCEDURE — 1123F ACP DISCUSS/DSCN MKR DOCD: CPT | Performed by: PSYCHIATRY & NEUROLOGY

## 2019-10-07 PROCEDURE — 1036F TOBACCO NON-USER: CPT | Performed by: PSYCHIATRY & NEUROLOGY

## 2019-11-12 ENCOUNTER — PROCEDURE VISIT (OUTPATIENT)
Dept: AUDIOLOGY | Age: 79
End: 2019-11-12
Payer: MEDICARE

## 2019-11-12 ENCOUNTER — OFFICE VISIT (OUTPATIENT)
Dept: ENT CLINIC | Age: 79
End: 2019-11-12
Payer: MEDICARE

## 2019-11-12 VITALS — DIASTOLIC BLOOD PRESSURE: 72 MMHG | HEART RATE: 67 BPM | SYSTOLIC BLOOD PRESSURE: 122 MMHG | OXYGEN SATURATION: 95 %

## 2019-11-12 DIAGNOSIS — H90.3 SENSORINEURAL HEARING LOSS (SNHL) OF BOTH EARS: Primary | ICD-10-CM

## 2019-11-12 DIAGNOSIS — H90.3 BILATERAL SENSORINEURAL HEARING LOSS: Primary | ICD-10-CM

## 2019-11-12 PROCEDURE — 1090F PRES/ABSN URINE INCON ASSESS: CPT | Performed by: OTOLARYNGOLOGY

## 2019-11-12 PROCEDURE — 1123F ACP DISCUSS/DSCN MKR DOCD: CPT | Performed by: OTOLARYNGOLOGY

## 2019-11-12 PROCEDURE — G8484 FLU IMMUNIZE NO ADMIN: HCPCS | Performed by: OTOLARYNGOLOGY

## 2019-11-12 PROCEDURE — G8417 CALC BMI ABV UP PARAM F/U: HCPCS | Performed by: OTOLARYNGOLOGY

## 2019-11-12 PROCEDURE — 99213 OFFICE O/P EST LOW 20 MIN: CPT | Performed by: OTOLARYNGOLOGY

## 2019-11-12 PROCEDURE — G8399 PT W/DXA RESULTS DOCUMENT: HCPCS | Performed by: OTOLARYNGOLOGY

## 2019-11-12 PROCEDURE — 4040F PNEUMOC VAC/ADMIN/RCVD: CPT | Performed by: OTOLARYNGOLOGY

## 2019-11-12 PROCEDURE — 1036F TOBACCO NON-USER: CPT | Performed by: OTOLARYNGOLOGY

## 2019-11-12 PROCEDURE — 92556 SPEECH AUDIOMETRY COMPLETE: CPT | Performed by: AUDIOLOGIST

## 2019-11-12 PROCEDURE — G8427 DOCREV CUR MEDS BY ELIG CLIN: HCPCS | Performed by: OTOLARYNGOLOGY

## 2019-12-09 DIAGNOSIS — G25.0 BENIGN ESSENTIAL TREMOR: ICD-10-CM

## 2019-12-09 RX ORDER — PRIMIDONE 50 MG/1
TABLET ORAL
Qty: 180 TABLET | Refills: 2 | Status: SHIPPED | OUTPATIENT
Start: 2019-12-09 | End: 2019-12-11 | Stop reason: SDUPTHER

## 2019-12-11 DIAGNOSIS — G25.0 BENIGN ESSENTIAL TREMOR: ICD-10-CM

## 2019-12-11 RX ORDER — PRIMIDONE 50 MG/1
TABLET ORAL
Qty: 180 TABLET | Refills: 2 | Status: SHIPPED | OUTPATIENT
Start: 2019-12-11 | End: 2020-09-09

## 2020-02-06 ENCOUNTER — HOSPITAL ENCOUNTER (OUTPATIENT)
Age: 80
Discharge: HOME | End: 2020-02-06
Payer: MEDICARE

## 2020-02-06 LAB
A/G RATIO: 1.7 (ref 1.1–2.2)
ALBUMIN SERPL-MCNC: 4.3 G/DL (ref 3.4–5)
ALP BLD-CCNC: 80 U/L (ref 40–129)
ALT SERPL-CCNC: 14 U/L (ref 10–40)
ANION GAP SERPL CALCULATED.3IONS-SCNC: 13 MMOL/L (ref 3–16)
AST SERPL-CCNC: 20 U/L (ref 15–37)
BASOPHILS ABSOLUTE: 0.1 K/UL (ref 0–0.2)
BASOPHILS RELATIVE PERCENT: 0.9 %
BILIRUB SERPL-MCNC: 0.5 MG/DL (ref 0–1)
BUN BLDV-MCNC: 24 MG/DL (ref 7–20)
CALCIUM SERPL-MCNC: 9.6 MG/DL (ref 8.3–10.6)
CHLORIDE BLD-SCNC: 96 MMOL/L (ref 99–110)
CHOLESTEROL, TOTAL: 199 MG/DL (ref 0–199)
CO2: 29 MMOL/L (ref 21–32)
CREAT SERPL-MCNC: 0.7 MG/DL (ref 0.6–1.2)
EOSINOPHILS ABSOLUTE: 0.4 K/UL (ref 0–0.6)
EOSINOPHILS RELATIVE PERCENT: 6.9 %
GFR AFRICAN AMERICAN: >60
GFR NON-AFRICAN AMERICAN: >60
GLOBULIN: 2.5 G/DL
GLUCOSE BLD-MCNC: 111 MG/DL (ref 70–99)
HCT VFR BLD CALC: 41.4 % (ref 36–48)
HDLC SERPL-MCNC: 75 MG/DL (ref 40–60)
HEMOGLOBIN: 14.2 G/DL (ref 12–16)
LDL CHOLESTEROL CALCULATED: 102 MG/DL
LYMPHOCYTES ABSOLUTE: 1.1 K/UL (ref 1–5.1)
LYMPHOCYTES RELATIVE PERCENT: 19.3 %
MCH RBC QN AUTO: 32.6 PG (ref 26–34)
MCHC RBC AUTO-ENTMCNC: 34.4 G/DL (ref 31–36)
MCV RBC AUTO: 94.9 FL (ref 80–100)
MONOCYTES ABSOLUTE: 0.4 K/UL (ref 0–1.3)
MONOCYTES RELATIVE PERCENT: 7.4 %
NEUTROPHILS ABSOLUTE: 3.7 K/UL (ref 1.7–7.7)
NEUTROPHILS RELATIVE PERCENT: 65.5 %
PDW BLD-RTO: 13.2 % (ref 12.4–15.4)
PLATELET # BLD: 194 K/UL (ref 135–450)
PMV BLD AUTO: 9.6 FL (ref 5–10.5)
POTASSIUM SERPL-SCNC: 4.1 MMOL/L (ref 3.5–5.1)
RBC # BLD: 4.36 M/UL (ref 4–5.2)
SODIUM BLD-SCNC: 138 MMOL/L (ref 136–145)
TOTAL PROTEIN: 6.8 G/DL (ref 6.4–8.2)
TRIGL SERPL-MCNC: 109 MG/DL (ref 0–150)
VLDLC SERPL CALC-MCNC: 22 MG/DL
WBC # BLD: 5.7 K/UL (ref 4–11)

## 2020-02-06 PROCEDURE — 80061 LIPID PANEL: CPT

## 2020-02-06 PROCEDURE — 36415 COLL VENOUS BLD VENIPUNCTURE: CPT

## 2020-02-06 PROCEDURE — 80053 COMPREHEN METABOLIC PANEL: CPT

## 2020-02-06 PROCEDURE — 85025 COMPLETE CBC W/AUTO DIFF WBC: CPT

## 2020-07-06 ENCOUNTER — OFFICE VISIT (OUTPATIENT)
Dept: NEUROLOGY | Age: 80
End: 2020-07-06
Payer: MEDICARE

## 2020-07-06 VITALS
HEART RATE: 69 BPM | BODY MASS INDEX: 36.49 KG/M2 | DIASTOLIC BLOOD PRESSURE: 58 MMHG | WEIGHT: 181 LBS | SYSTOLIC BLOOD PRESSURE: 116 MMHG | HEIGHT: 59 IN

## 2020-07-06 PROCEDURE — 1090F PRES/ABSN URINE INCON ASSESS: CPT | Performed by: PSYCHIATRY & NEUROLOGY

## 2020-07-06 PROCEDURE — 99213 OFFICE O/P EST LOW 20 MIN: CPT | Performed by: PSYCHIATRY & NEUROLOGY

## 2020-07-06 PROCEDURE — 4040F PNEUMOC VAC/ADMIN/RCVD: CPT | Performed by: PSYCHIATRY & NEUROLOGY

## 2020-07-06 PROCEDURE — 1036F TOBACCO NON-USER: CPT | Performed by: PSYCHIATRY & NEUROLOGY

## 2020-07-06 PROCEDURE — 1123F ACP DISCUSS/DSCN MKR DOCD: CPT | Performed by: PSYCHIATRY & NEUROLOGY

## 2020-07-06 PROCEDURE — G8399 PT W/DXA RESULTS DOCUMENT: HCPCS | Performed by: PSYCHIATRY & NEUROLOGY

## 2020-07-06 PROCEDURE — G8417 CALC BMI ABV UP PARAM F/U: HCPCS | Performed by: PSYCHIATRY & NEUROLOGY

## 2020-07-06 PROCEDURE — G8427 DOCREV CUR MEDS BY ELIG CLIN: HCPCS | Performed by: PSYCHIATRY & NEUROLOGY

## 2020-07-06 RX ORDER — LORAZEPAM 0.5 MG/1
TABLET ORAL
COMMUNITY

## 2020-07-06 NOTE — PROGRESS NOTES
Eloy Edmondson   Neurology followup    Subjective:   CC/HP  History was obtained from the patient. Interval history:  Patient states that she is doing well. Her tremors are reasonably controlled. She still has occasional aching pain in both arms especially at night. However it is not any worse than before. Background history:  Patient has benign essential tremors  She has been under some stress as well   No major side effects of medication  Patient states that she gets numbness in the right hand especially in the fourth and fifth digits on and off. The symptoms awaken her up at night. This has been present for the last few months.       REVIEW OF SYSTEMS    Constitutional:  []   Chills   [x]  Fatigue   []  Fevers   []  Malaise   [x]  Weight loss     [] Denies all of the above    Respiratory:   []  Cough    [x]  Shortness of breath         [] Denies all of the above     Cardiovascular:   []  Chest pain    []  Exertional chest pressure/discomfort           [] Palpitations    []  Syncope     [x] Denies all of the above        Past Medical History:   Diagnosis Date    Arthritis     Easy bruising     Hyperlipidemia     Hypertension     Nausea & vomiting      Family History   Problem Relation Age of Onset    Cancer Mother     Cancer Father     Diabetes Father     Heart Disease Father     Diabetes Sister     Heart Disease Sister      Social History     Socioeconomic History    Marital status:      Spouse name: Not on file    Number of children: Not on file    Years of education: Not on file    Highest education level: Not on file   Occupational History    Not on file   Social Needs    Financial resource strain: Not on file    Food insecurity     Worry: Not on file     Inability: Not on file    Transportation needs     Medical: Not on file     Non-medical: Not on file   Tobacco Use    Smoking status: Passive Smoke Exposure - Never Smoker    Smokeless tobacco: Never Used   Substance and Sexual Activity    Alcohol use: Yes     Alcohol/week: 0.0 standard drinks     Comment: with meals 1-2 GLASSES OF WINE PER WEEK    Drug use: No    Sexual activity: Not on file   Lifestyle    Physical activity     Days per week: Not on file     Minutes per session: Not on file    Stress: Not on file   Relationships    Social connections     Talks on phone: Not on file     Gets together: Not on file     Attends [employer] service: Not on file     Active member of club or organization: Not on file     Attends meetings of clubs or organizations: Not on file     Relationship status: Not on file    Intimate partner violence     Fear of current or ex partner: Not on file     Emotionally abused: Not on file     Physically abused: Not on file     Forced sexual activity: Not on file   Other Topics Concern    Not on file   Social History Narrative    Not on file        Objective:  Exam:  Ht 4' 11\" (1.499 m)   Wt 181 lb (82.1 kg)   BMI 36.56 kg/m²   This is a well-nourished patient in no acute distress  Patient is awake, alert and oriented x3. Speech is normal.  Pupils are equal round reacting to light. Extraocular movements intact. Face symmetrical. Tongue midline. Motor exam shows normal symmetrical strength. Deep tendon reflexes normal. Plantar reflexes downgoing. Sensory exam normal. Coordination normal. Gait normal. No carotid bruit. No neck stiffness.         Data :  LABS:  General Labs:    CBC:   Lab Results   Component Value Date    WBC 5.7 02/06/2020    RBC 4.36 02/06/2020    HGB 14.2 02/06/2020    HCT 41.4 02/06/2020    MCV 94.9 02/06/2020    MCH 32.6 02/06/2020    MCHC 34.4 02/06/2020    RDW 13.2 02/06/2020     02/06/2020    MPV 9.6 02/06/2020     BMP:    Lab Results   Component Value Date     02/06/2020    K 4.1 02/06/2020    CL 96 02/06/2020    CO2 29 02/06/2020    BUN 24 02/06/2020    LABALBU 4.3 02/06/2020    CREATININE 0.7 02/06/2020    CALCIUM 9.6 02/06/2020    GFRAA >60 02/06/2020 GFRAA >60 03/29/2013    LABGLOM >60 02/06/2020    GLUCOSE 111 02/06/2020       Impression :  Benign essential tremor, stable and doing well. 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 Plan :  Continue primidone  50 mg by mouth twice a day  Discussed with patient about the possible ulnar nerve entrapment at the elbow  Recommended that she try to avoid putting pressure on the ulnar nerve at the elbow  If the symptoms get worse I will do EMG and nerve conduction study of the right arm  Return in 6 months    Please note a portion of  this chart was generated using dragon dictation software. Although every effort was made to ensure the accuracy of this automated transcription, some errors in transcription may have occurred.

## 2021-01-29 ENCOUNTER — IMMUNIZATION (OUTPATIENT)
Dept: PRIMARY CARE CLINIC | Age: 81
End: 2021-01-29
Payer: MEDICARE

## 2021-01-29 PROCEDURE — 0001A COVID-19, PFIZER VACCINE 30MCG/0.3ML DOSE: CPT | Performed by: NURSE PRACTITIONER

## 2021-01-29 PROCEDURE — 91300 COVID-19, PFIZER VACCINE 30MCG/0.3ML DOSE: CPT | Performed by: NURSE PRACTITIONER

## 2021-02-26 ENCOUNTER — IMMUNIZATION (OUTPATIENT)
Dept: PRIMARY CARE CLINIC | Age: 81
End: 2021-02-26
Payer: MEDICARE

## 2021-02-26 PROCEDURE — 91300 COVID-19, PFIZER VACCINE 30MCG/0.3ML DOSE: CPT | Performed by: FAMILY MEDICINE

## 2021-02-26 PROCEDURE — 0002A PR IMM ADMN SARSCOV2 30MCG/0.3ML DIL RECON 2ND DOSE: CPT | Performed by: FAMILY MEDICINE

## 2021-05-26 ENCOUNTER — OFFICE VISIT (OUTPATIENT)
Dept: ENT CLINIC | Age: 81
End: 2021-05-26
Payer: MEDICARE

## 2021-05-26 VITALS — HEART RATE: 71 BPM | TEMPERATURE: 89.9 F | DIASTOLIC BLOOD PRESSURE: 71 MMHG | SYSTOLIC BLOOD PRESSURE: 133 MMHG

## 2021-05-26 DIAGNOSIS — H69.93 DISORDER OF BOTH EUSTACHIAN TUBES: ICD-10-CM

## 2021-05-26 DIAGNOSIS — J30.9 ALLERGIC SINUSITIS: ICD-10-CM

## 2021-05-26 DIAGNOSIS — J32.9 RECURRENT SINUSITIS: Primary | ICD-10-CM

## 2021-05-26 PROCEDURE — G8427 DOCREV CUR MEDS BY ELIG CLIN: HCPCS | Performed by: OTOLARYNGOLOGY

## 2021-05-26 PROCEDURE — 1090F PRES/ABSN URINE INCON ASSESS: CPT | Performed by: OTOLARYNGOLOGY

## 2021-05-26 PROCEDURE — 4040F PNEUMOC VAC/ADMIN/RCVD: CPT | Performed by: OTOLARYNGOLOGY

## 2021-05-26 PROCEDURE — 99213 OFFICE O/P EST LOW 20 MIN: CPT | Performed by: OTOLARYNGOLOGY

## 2021-05-26 PROCEDURE — 1036F TOBACCO NON-USER: CPT | Performed by: OTOLARYNGOLOGY

## 2021-05-26 PROCEDURE — G8399 PT W/DXA RESULTS DOCUMENT: HCPCS | Performed by: OTOLARYNGOLOGY

## 2021-05-26 PROCEDURE — G8417 CALC BMI ABV UP PARAM F/U: HCPCS | Performed by: OTOLARYNGOLOGY

## 2021-05-26 PROCEDURE — 1123F ACP DISCUSS/DSCN MKR DOCD: CPT | Performed by: OTOLARYNGOLOGY

## 2021-05-26 RX ORDER — METHYLPREDNISOLONE 4 MG/1
4 TABLET ORAL SEE ADMIN INSTRUCTIONS
Qty: 1 KIT | Refills: 0 | Status: SHIPPED | OUTPATIENT
Start: 2021-05-26

## 2021-05-26 RX ORDER — AZITHROMYCIN 250 MG/1
TABLET, FILM COATED ORAL
Qty: 1 PACKET | Refills: 0 | Status: SHIPPED | OUTPATIENT
Start: 2021-05-26

## 2021-05-26 NOTE — PROGRESS NOTES
Over the course the past few weeks, patient has been noticing sinus congestion which is resulting in some pressure sensation under her right ear unassociated with actual further loss of hearing although she is having some loss on a chronic basis. No change in tinnitus is been noted. She does use a walker but is not having any true vertigo. She has had no fever. She has been using Flonase nasal spray on a almost regular basis. She has had no fever. Currently, she is in no obvious acute distress. Ear examination reveals normal-appearing tympanic membranes and ear canals bilaterally. There is no evidence of fluid or inflammation within the middle ear space on either side. Oral examination is unremarkable. The neck is free of adenopathy, mass, thyroid enlargement. Nasal mucosa treated with cotton pledgets  impregnated with Afrin and lidocaine placed in middle meatuses against turbinates. Pledgets left in place for five minutes and removed enabling enhanced visualization. The exam revealed positive edema of mucosa. it was positive for erythema indicative of infection. There was not evidence of deviation of the septum which was not significant. There were not polyps present. .There were not other masses present. The turbinates were not enlarged beyond normal.  Findings seem most indicative of sinus infection with associated allergy causing eustachian tube dysfunction to explain her ear problems. Currently, we will start her on azithromycin along with a Medrol Dosepak. She is instructed to use her Flonase on a daily basis for at least the next 2 weeks. She will contact the office if no improvement occurs. 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

## 2021-06-09 ENCOUNTER — OFFICE VISIT (OUTPATIENT)
Dept: ENT CLINIC | Age: 81
End: 2021-06-09
Payer: MEDICARE

## 2021-06-09 VITALS
SYSTOLIC BLOOD PRESSURE: 167 MMHG | BODY MASS INDEX: 36.49 KG/M2 | WEIGHT: 181 LBS | HEART RATE: 68 BPM | OXYGEN SATURATION: 94 % | HEIGHT: 59 IN | DIASTOLIC BLOOD PRESSURE: 77 MMHG

## 2021-06-09 DIAGNOSIS — M54.2 NECK PAIN: Primary | ICD-10-CM

## 2021-06-09 DIAGNOSIS — R22.1 NECK MASS: ICD-10-CM

## 2021-06-09 PROCEDURE — G8417 CALC BMI ABV UP PARAM F/U: HCPCS | Performed by: OTOLARYNGOLOGY

## 2021-06-09 PROCEDURE — G8399 PT W/DXA RESULTS DOCUMENT: HCPCS | Performed by: OTOLARYNGOLOGY

## 2021-06-09 PROCEDURE — 4040F PNEUMOC VAC/ADMIN/RCVD: CPT | Performed by: OTOLARYNGOLOGY

## 2021-06-09 PROCEDURE — 1090F PRES/ABSN URINE INCON ASSESS: CPT | Performed by: OTOLARYNGOLOGY

## 2021-06-09 PROCEDURE — G8427 DOCREV CUR MEDS BY ELIG CLIN: HCPCS | Performed by: OTOLARYNGOLOGY

## 2021-06-09 PROCEDURE — 99213 OFFICE O/P EST LOW 20 MIN: CPT | Performed by: OTOLARYNGOLOGY

## 2021-06-09 PROCEDURE — 1123F ACP DISCUSS/DSCN MKR DOCD: CPT | Performed by: OTOLARYNGOLOGY

## 2021-06-09 PROCEDURE — 1036F TOBACCO NON-USER: CPT | Performed by: OTOLARYNGOLOGY

## 2021-06-09 NOTE — PROGRESS NOTES
Patient relates that despite all therapeutic medications given, she continues to have discomfort in the neck area on the right side right overlying the sternocleidomastoid muscle. This does not seem to change with movement. She has had no fever. She denies any ear discomfort or trouble swallowing or pain in the throat. She has had no voice change. Currently, she appears in no obvious acute distress. Voice seems normal.  She is swallowing appropriately and has had no evidence of stridor. Ear examination continues to reveal normal-appearing tympanic membranes and ear canals bilaterally. No TMJ discomfort is noted and there is no mastoid tenderness present. Oral examination is unremarkable. The nasal mucosa is normal as well. Indirect laryngoscopy reveals normal appearance to the vocal cords and entire larynx as well as the hypopharynx. The neck is free of any obvious adenopathy or mass and there is no thyroid enlargement. In view of the continued complaint, the next best option is to have a sonogram to evaluate for possibility of nodes in the substernocleidomastoid muscle area.

## 2021-06-11 ENCOUNTER — HOSPITAL ENCOUNTER (OUTPATIENT)
Dept: ULTRASOUND IMAGING | Age: 81
Discharge: HOME | End: 2021-06-11
Payer: MEDICARE

## 2021-06-11 DIAGNOSIS — R22.1 NECK MASS: ICD-10-CM

## 2021-06-11 DIAGNOSIS — M54.2 NECK PAIN: ICD-10-CM

## 2021-06-11 PROCEDURE — 76536 US EXAM OF HEAD AND NECK: CPT

## 2021-06-14 ENCOUNTER — TELEPHONE (OUTPATIENT)
Dept: ENT CLINIC | Age: 81
End: 2021-06-14

## 2021-06-14 NOTE — TELEPHONE ENCOUNTER
I have explained the patient the negative findings on the ultrasound testing of the neck. I suggested maybe she will need to make another appointment.

## 2021-06-17 ENCOUNTER — OFFICE VISIT (OUTPATIENT)
Dept: ENT CLINIC | Age: 81
End: 2021-06-17
Payer: MEDICARE

## 2021-06-17 VITALS — DIASTOLIC BLOOD PRESSURE: 89 MMHG | TEMPERATURE: 97.1 F | SYSTOLIC BLOOD PRESSURE: 150 MMHG | HEART RATE: 62 BPM

## 2021-06-17 DIAGNOSIS — M26.609 TMJ (TEMPOROMANDIBULAR JOINT DISORDER): ICD-10-CM

## 2021-06-17 DIAGNOSIS — M79.2 NEURITIS: Primary | ICD-10-CM

## 2021-06-17 PROCEDURE — 99213 OFFICE O/P EST LOW 20 MIN: CPT | Performed by: OTOLARYNGOLOGY

## 2021-06-17 PROCEDURE — G8399 PT W/DXA RESULTS DOCUMENT: HCPCS | Performed by: OTOLARYNGOLOGY

## 2021-06-17 PROCEDURE — 1123F ACP DISCUSS/DSCN MKR DOCD: CPT | Performed by: OTOLARYNGOLOGY

## 2021-06-17 PROCEDURE — 1090F PRES/ABSN URINE INCON ASSESS: CPT | Performed by: OTOLARYNGOLOGY

## 2021-06-17 PROCEDURE — 1036F TOBACCO NON-USER: CPT | Performed by: OTOLARYNGOLOGY

## 2021-06-17 PROCEDURE — G8427 DOCREV CUR MEDS BY ELIG CLIN: HCPCS | Performed by: OTOLARYNGOLOGY

## 2021-06-17 PROCEDURE — G8417 CALC BMI ABV UP PARAM F/U: HCPCS | Performed by: OTOLARYNGOLOGY

## 2021-06-17 PROCEDURE — 4040F PNEUMOC VAC/ADMIN/RCVD: CPT | Performed by: OTOLARYNGOLOGY

## 2021-06-17 RX ORDER — MELOXICAM 15 MG/1
15 TABLET ORAL DAILY
Qty: 15 TABLET | Refills: 1 | Status: SHIPPED | OUTPATIENT
Start: 2021-06-17 | End: 2021-07-12

## 2021-06-17 RX ORDER — METHOCARBAMOL 750 MG/1
750 TABLET, FILM COATED ORAL 2 TIMES DAILY
Qty: 20 TABLET | Refills: 0 | Status: SHIPPED | OUTPATIENT
Start: 2021-06-17 | End: 2021-06-27

## 2021-06-17 NOTE — PROGRESS NOTES
Patient is still having a pain which she now describes as at the right edge of her auditory canal on the right extending into the area of the temporomandibular joint and around the right side of her face. It does not seem to be changing with opening and closing her mouth. She has had no fever. Sonogram of the neck was totally unremarkable. Currently, she is in no obvious acute distress. Ear examination reveals normal-appearing tympanic membranes and ear canals bilaterally. There is obvious tenderness to the temporomandibular joint area on the right side. No other abnormalities within the parotid or other areas of the neck is noted. The neck remains free of any adenopathy, mass, thyroid enlargement. The nasal mucosa is relatively normal other than some allergy. Oral examination is unremarkable but I can see some sliding of her mouth and she is upon closure. 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 We will continue her Robaxin on a twice daily dose basis and stop her Celebrex but try Mobic. She will apply local heat. She will contact the office in 2 weeks to determine her response.

## 2021-06-28 ENCOUNTER — TELEPHONE (OUTPATIENT)
Dept: ENT CLINIC | Age: 81
End: 2021-06-28

## 2021-06-28 NOTE — TELEPHONE ENCOUNTER
Patient calling and asking does Dr Gris Patel want her to take her methocarbamol 750  ,   Or to take meloxicam 15 mg ,   For her pain in her neck, and behind her ear    She takes Celebrex every day,   And she is asking if you want her to stop taking the Celebrex while she is on one of these medications  ,    Please advise      WG

## 2021-06-28 NOTE — TELEPHONE ENCOUNTER
If she takes the Celebrex, it is okay to take methocarbamol.   If she wishes to try the meloxicam, she will need to stop the Celebrex

## 2021-07-12 RX ORDER — MELOXICAM 15 MG/1
15 TABLET ORAL DAILY
Qty: 15 TABLET | Refills: 1 | Status: SHIPPED | OUTPATIENT
Start: 2021-07-12

## 2021-08-11 ENCOUNTER — HOSPITAL ENCOUNTER (OUTPATIENT)
Dept: WOMENS IMAGING | Age: 81
Discharge: HOME | End: 2021-08-11
Payer: MEDICARE

## 2021-08-11 VITALS — WEIGHT: 176 LBS | BODY MASS INDEX: 35.48 KG/M2 | HEIGHT: 59 IN

## 2021-08-11 DIAGNOSIS — Z12.31 BREAST CANCER SCREENING BY MAMMOGRAM: ICD-10-CM

## 2021-08-11 PROCEDURE — 77063 BREAST TOMOSYNTHESIS BI: CPT

## 2021-08-19 ENCOUNTER — HOSPITAL ENCOUNTER (OUTPATIENT)
Dept: PHYSICAL THERAPY | Age: 81
Setting detail: THERAPIES SERIES
Discharge: HOME | End: 2021-08-19
Payer: MEDICARE

## 2021-08-19 PROCEDURE — 97161 PT EVAL LOW COMPLEX 20 MIN: CPT

## 2021-08-19 PROCEDURE — 97110 THERAPEUTIC EXERCISES: CPT

## 2021-08-19 PROCEDURE — 97530 THERAPEUTIC ACTIVITIES: CPT

## 2021-08-19 NOTE — FLOWSHEET NOTE
[address] [facility]  Phone: [phone]   Fax: [phone]    Physical Therapy Daily Treatment Note  Date:  2021    Patient Name:  Montana Garza    :  1940  MRN: [id number]  Medical/Treatment Diagnosis Information:  Diagnosis: cervical disc disease, R TMJ  Treatment Diagnosis: pt with painful myofascial changes, impaired posture including forward head posture, reduced postural strengths. hypomobility t/o spine, especially at upper cerv spine  Insurance/Certification information:  PT Insurance Information: medicare  Physician Information:  Referring Practitioner: Dr. Aline Paula of care signed (Y/N): []  Yes [x]  No     Date of Patient follow up with Physician:      Progress Report: []  Yes  [x]  No     Progress report due (10 Rx/or 30 days whichever is less): visit #52 or     Recertification due (POC duration/ or 90 days whichever is less): visit #12 or     Visit # Insurance Allowable Auth required?  Date Range    Med nec []  Yes  [x]  No NA       Units approved Units used Date Range   NA NA NA       Latex Allergy:  [x]NO      []YES  Preferred Language for Healthcare:   [x]English       []other:    Functional Scale:        Date assessed:   NDI raw score = 18, dysfunction =36 %, taken at initial eval       Pain level: 3-4/10     SUBJECTIVE:  See eval    OBJECTIVE: See eval      RESTRICTIONS/PRECAUTIONS:osteopososis/osteopenia; has lost 3 friends and her  and brother int he last 15 months, joint replacements ( B knees, R ankle, B shoudlers): x 5  Spinal cord stimulator put in by Dr. Avani Finch - recently had new battery put in .        Exercises/Interventions:     Therapeutic Exercises (72820) Resistance / level Sets/sec Reps Notes                               Seated:  cerv AROM   SB  rot     10x5\" B  10x5\" B                                  Therapeutic Activities (41283)                                          Neuromuscular Re-ed (62917)       Postural ed Upright sitting                                         Manual Intervention (69206)       Gentle manual - STM/cerv distraction Next session                                              Modalities:     Pt. Education:  8/19patient educated on diagnosis, prognosis and expectations for rehab  -all patient questions were answered    Home Exercise Program:  8/19 cerv AROM SB/ROT      Therapeutic Exercise and NMR EXR  [] (20043) Provided verbal/tactile cueing for activities related to strengthening, flexibility, endurance, ROM for improvements in  [] LE / Lumbar: LE, proximal hip, and core control with self care, mobility, lifting, ambulation. [] UE / Cervical: cervical, postural, scapular, scapulothoracic and UE control with self care, reaching, carrying, lifting, house/yardwork, driving, computer work.  [] (53836) Provided verbal/tactile cueing for activities related to improving balance, coordination, kinesthetic sense, posture, motor skill, proprioception to assist with   [] LE / lumbar: LE, proximal hip, and core control in self care, mobility, lifting, ambulation and eccentric single leg control. [] UE / cervical: cervical, scapular, scapulothoracic and UE control with self care, reaching, carrying, lifting, house/yardwork, driving, computer work.   [] (36103) Therapist is in constant attendance of 2 or more patients providing skilled therapy interventions, but not providing any significant amount of measurable one-on-one time to either patient, for improvements in  [] LE / lumbar: LE, proximal hip, and core control in self care, mobility, lifting, ambulation and eccentric single leg control. [] UE / cervical: cervical, scapular, scapulothoracic and UE control with self care, reaching, carrying, lifting, house/yardwork, driving, computer work.      NMR and Therapeutic Activities:    [] (99010 or 23612) Provided verbal/tactile cueing for activities related to improving balance, coordination, kinesthetic sense, posture, motor skill, proprioception and motor activation to allow for proper function of   [] LE: / Lumbar core, proximal hip and LE with self care and ADLs  [] UE / Cervical: cervical, postural, scapular, scapulothoracic and UE control with self care, carrying, lifting, driving, computer work.   [] (48313) Gait Re-education- Provided training and instruction to the patient for proper LE, core and proximal hip recruitment and positioning and eccentric body weight control with ambulation re-education including up and down stairs     Home Management Training / Self Care:  [] (77002) Provided self-care/home management training related to activities of daily living and compensatory training, and/or use of adaptive equipment for improvement with: ADLs and compensatory training, meal preparation, safety procedures and instruction in use of adaptive equipment, including bathing, grooming, dressing, personal hygiene, basic household cleaning and chores.      Home Exercise Program:    [x] (29381) Reviewed/Progressed HEP activities related to strengthening, flexibility, endurance, ROM of   [] LE / Lumbar: core, proximal hip and LE for functional self-care, mobility, lifting and ambulation/stair navigation   [] UE / Cervical: cervical, postural, scapular, scapulothoracic and UE control with self care, reaching, carrying, lifting, house/yardwork, driving, computer work  [] (44324)Reviewed/Progressed HEP activities related to improving balance, coordination, kinesthetic sense, posture, motor skill, proprioception of   [] LE: core, proximal hip and LE for self care, mobility, lifting, and ambulation/stair navigation    [] UE / Cervical: cervical, postural,  scapular, scapulothoracic and UE control with self care, reaching, carrying, lifting, house/yardwork, driving, computer work    Manual Treatments:  PROM / STM / Oscillations-Mobs:  G-I, II, III, IV (PA's, Inf., Post.)  [] (82083) Provided score of 10% or less on the  NDI  to assist with reaching prior level of function. []? Progressing: []? Met: []? Not Met: []? Adjusted  2. Patient will demonstrate increased AROM  to [facility], to allow for proper joint functioning to allow pt to resume turning head while driving without increase in symptoms. []? Progressing: []? Met: []? Not Met: []? Adjusted  3. Patient will demonstrate increased Strength by 1/2 mmt grade  patients Functional Deficits to allow pt to resume amb with/without RW prn for amb in community without increase in symptoms. []? Progressing: []? Met: []? Not Met: []? Adjusted  4. Patient will return to functional activities including falling asleep without difficulty without increased symptoms or restriction. []? Progressing: []? Met: []? Not Met: []? Adjusted     Overall Progression Towards Functional goals/ Treatment Progress Update:  [] Patient is progressing as expected towards functional goals listed. [] Progression is slowed due to complexities/Impairments listed. [] Progression has been slowed due to co-morbidities.   [x] Plan just implemented, too soon to assess goals progression <30days   [] Goals require adjustment due to lack of progress  [] Patient is not progressing as expected and requires additional follow up with physician  [] Other    Persisting Functional Limitations/Impairments:  [x]Sleeping []Sitting               []Standing []Transfers        []Walking []Kneeling               []Stairs []Squatting / bending   [x]ADLs []Reaching  [x]Lifting  [x]Housework  [x]Driving []Job related tasks  []Sports/Recreation []Other:        ASSESSMENT:  See eval  Treatment/Activity Tolerance:  [] Patient able to complete tx [] Patient limited by fatigue  [] Patient limited by pain  [] Patient limited by other medical complications  [] Other:     Prognosis: [] Good [] Fair  [] Poor    Patient Requires Follow-up: [x] Yes  [] No    PLAN:  strength, ROM/flexibility, posture and body mechs, manual, MOC, HEP, pt education     Frequency/Duration: 2 days per week for 6  Weeks:  Interventions:  [x]? Therapeutic exercise including:strength, ROM, flexibility  [x]? NMR activation and proprioception including postural re-education  [x]? Manual therapy as indicated to include: IASTM, STM, PROM, Gr I-IV mobilizations, manipulation. [x]? Modalities as needed that may include: thermal agents, E-stim, Biofeedback, US, iontophoresis as indicated  [x]? Patient education on joint protection, postural re-education, activity modification, progression of HEP. Plan for next treatment session:    PLAN: See eval. PT 2x / week for6 weeks. [] Continue per plan of care [] Alter current plan (see comments)  [x] Plan of care initiated [] Hold pending MD visit [] Discharge    Electronically signed by: Brian King, PT PT, DPT    Note: If patient does not return for scheduled/ recommended follow up visits, this note will serve as a discharge from care along with most recent update on progress.

## 2021-08-19 NOTE — PLAN OF CARE
[facility]  Outpatient Physical Therapy, [address] [facility], [address]  Phone: [phone]   Fax: [phone]                                                       Physical Therapy Certification    Dear Referring Practitioner: Dr. Lolis Marie,    We had the pleasure of evaluating the following patient for physical therapy services at [address] [place] [address]. A summary of our findings can be found in the initial assessment below. This includes our plan of care. If you have any questions or concerns regarding these findings, please do not hesitate to contact me at the office phone number checked above. Thank you for the referral.       Physician Signature:_______________________________Date:__________________  By signing above (or electronic signature), therapists plan is approved by physician      Patient: Sandi Hemphill   : 1940   MRN: [id number]  Referring Physician: Referring Practitioner: Dr. Lolis Marie      Evaluation Date: 2021      Medical Diagnosis Information:  Diagnosis: cervical disc disease, R TMJ   Treatment Diagnosis: pt with painful myofascial changes, impaired posture including forward head posture, reduced postural strengths. hypomobility t/o spine, especially at upper cerv spine                                         Insurance information: PT Insurance Information: medicare      Preferred Language for Healthcare:   [x]English       []Other:    C-SSRS Triggered by Intake questionnaire (Past 2 wk assessment ):   [x] No, Questionnaire did not trigger screening.   [] Yes, Patient intake triggered C-SSRS Screening     [] Completed, no further action required.    [] Completed, PCP notified via Epic    SUBJECTIVE: wonders if it is a result of tripping over laundry basket, but not sure    ONSET: 21    Current Level of Function: pain with ADLs- trouble with sleeping, driving, turning head  Prior Level of Function: Prior to this injury / incident, pt was independent with ADLs and IADLs    Living Status: lives with her son who works long hours. 3 steps to navigate has chair lift  Occupation/School: retired    PAIN:  Pain Scale: 3-4/10 neck and R ear. Scalp gets itchy  Easing factors: avoid turning head  Provocative factors: turning head    Functional Outcome: NDI raw score = 18, dysfunction =36 %, taken at initial eval    Precautions/ Contra-indications: has lost 3 friends and her  and brother int he last 13 months, joint replacements ( B knees, R ankle, B shoudlers): x 5  Spinal cord stimulator put in by Dr. Angela Tobar - recently had new battery put in . Latex Allergy:  [x]NO      []YES  Relevant Medical History:    [x] Patient history, allergies, meds reviewed. Medical chart reviewed. See intake form. Review Of Systems (ROS):  [x]Performed Review of systems (Integumentary, CardioPulmonary, Neurological) by intake and observation. Intake form has been scanned into medical record. Patient has been instructed to contact their primary care physician regarding ROS issues if not already being addressed at this time.       Co-morbidities/Complexities (which will affect course of rehabilitation): vaccinated for covid  []None        []Hx of COVID   Arthritic conditions   []Rheumatoid arthritis (M05.9)  [x]Osteoarthritis (M19.91)  []Gout   Cardiovascular conditions   [x]Hypertension (I10)  []Hyperlipidemia (E78.5)  []Angina pectoris (I20)  []Atherosclerosis (I70)  []Pacemaker  []Hx of CABG/stent/  cardiac surgeries   Musculoskeletal conditions   []Disc pathology   []Congenital spine pathologies   [x]Osteoporosis (M81.8)  [x]Osteopenia (M85.8)  []Scoliosis       Endocrine conditions   []Hypothyroid (E03.9)  []Hyperthyroid Gastrointestinal conditions   []Constipation (M74.97)   Metabolic conditions   []Morbid obesity (E66.01)  []Diabetes type 1(E10.65) or 2 (E11.65)   []Neuropathy (G60.9) Cardio/Pulmonary conditions   []Asthma (J45)  []Coughing   []COPD (J44.9)  []CHF  []A-fib   Psychological Disorders  [x]Anxiety (F41.9)  [x]Depression (F32.9)   []Other:   Developmental Disorders  []Autism (F84.0)  []CP (G80)  []Down Syndrome (Q90.9)  []Developmental delay     Neurological conditions  []Prior Stroke (I69.30)  []Parkinson's (G20)  []Encephalopathy (G93.40)  []MS (G35)  []Post-polio (G14)  []SCI  []TBI  []ALS Other conditions  []Fibromyalgia (M79.7)  []Vertigo  []Syncope  []Kidney Failure  []Cancer      []currently undergoing                treatment  []Pregnancy  []Incontinence   Prior surgeries  []involved limb  []previous spinal surgery  [] section birth  []hysterectomy  []bowel / bladder surgery  []other relevant surgeries   []Other:                OBJECTIVE:   Posture: forward head, increased thoracic kyphosis    Gait: pt amb with RW    BALANCE: needs RW for amb longer than household distances    Palpation: painful myofascial changes R>L UT, SCM, levator, scalenes, supraspinatus, infraspinatus, teres minor    Dermatomes: WFL B      ROM  Comments   Lumbar Flex     Lumbar Ext     Lumbar SB     Lumbar Rot          Cervical flex WFL Pain at endrange   Cervical ext [facility]    Cervical SB R SB 7 deg   LSB 22 deg  Painful  painful   Cervical Rot R rot 40 deg painful  L rot 35 deg painful                ROM RIGHT AROM RIGHT  PROM LEFT AROM LEFT  PROM Comments           LE           Hip Flexion        Hip Abd        Hip ER        Hip IR        Hip Extension        Knee Ext        Knee Flex        DF        PF        Ankle INV        Ankle Ever                UE          Shoulder flex [facility]  WFL     Shoulder AB [facility]     Shoulder ER        Shoulder IR                                Flexibility        Upper trap stiff  stiff     Hamstring (90/90)        Gluteus param        Piriformis        Rectus femoris        Hip flexors  ITB        gastroc        soleus          Joint mobility: []Normal    [x]Hypo upper cerv   []Hyper        Strength / Myotomes RIGHT LEFT Comments   Multifidus      Transverse Ab      LE      Hip Flexors (L1-2)      Hip Abductors      Hip Extensors      Hip Internal Rotators      Hip External Rotators      Quads (L2-4)      Hamstrings             Ankle Plantarflexion (S1-2)      Ankle Dorsiflexion (L4-5)      Ankle Inversion      Ankle Eversion (S1-2)      Great Toe Extension (L5)                              Strength / Myotomes RIGHT LEFT    Cervical Flexion (C1-2)      Cervical Side-bending (C3)      Shoulder Shrug (C4)      Shoulder Flex 4 4    Shoulder Scap      Shoulder Abduction (C5) 4- 4    Shoulder ER      Shoulder IR      Biceps (C6)      Triceps (C7)      Wrist Extension (C6)      Wrist Flexion (C7)           Thumb Abduction (C8)     Finger Abduction (T1)                  Barriers to/and or personal factors that will affect rehab potential:              [x]Age  []Sex    []Smoker              [x]Motivation/Lack of Motivation                        []Co-Morbidities              []Cognitive Function, education/learning barriers              [x]Environmental, home barriers              []profession/work barriers  []past PT/medical experience  []other:  Justification:    Falls Risk Assessment (30 days):   [x] Falls Risk assessed and no intervention required. [] Falls Risk assessed and Patient requires intervention due to being higher risk   TUG score (>12s at risk):     [] Falls education provided, including         ASSESSMENT: pt with painful myofascial changes, impaired posture including forward head posture, reduced postural strengths. hypomobility t/o spine, especially at upper cerv spine. Pt will benefit from skilled PT services to restore PLOF.     Functional Impairments:  Cervical/upper quarter:   [x]Noted cervical/thoracic/GHJ joint hypomobility   []Noted cervical/thoracic/GHJ joint hypermobility   [x]Decreased cervical/UE functional ROM   [x]Noted Headache pain aggravated by neck movements with/without dizziness   []Abnormal reflexes/sensation/myotomal/dermatomal deficits   [x]Decreased DCF control or ability to hold head up   [x]Decreased RC/scapular/core strength and neuromuscular control    []Decreased UE functional strength   [x] Postural impairments:forward head position   []other:      Functional Activity Limitations (from functional questionnaire and intake)   []Reduced ability to tolerate prolonged functional positions   [x]Reduced ability or difficulty with changes of positions or transfers between positions   [x]Reduced ability to maintain good posture and demonstrate good body mechanics with sitting, bending, and lifting   [x]Reduced ability to sleep   [x] Reduced ability or tolerance with driving and/or computer work   []Reduced ability to perform lifting, reaching, carrying tasks   []Reduced ability to squat   []Reduced ability to forward bend   []Reduced ability to ambulate prolonged functional periods/distances/surfaces   []Reduced ability to ascend/descend stairs   []Reduced ability to concentrate    []Reduced ability to tolerate any impact through UE or spine   []other:     Participation Restrictions   [x]Reduced participation in self care activities   [x]Reduced participation in home management activities   [x]Reduced participation in work activities   [x]Reduced participation in social activities. [x]Reduced participation in sport/recreational activities.     Classification:    Cervical/ upper quarter  Classification/Subgrouping:   []signs/symptoms consistent with neck pain with mobility deficits     []signs/symptoms consistent with neck pain with movement coordinated impairments    []signs/symptoms consistent with neck pain with radiating pain    [x]signs/symptoms consistent with neck pain with headaches (cervicogenic)    []Signs/symptoms consistent with nerve root involvement including myotome & dermatome dysfunction   [x]sign/symptoms consistent with facet dysfunction of cervical and thoracic spine    []signs/symptoms consistent suggesting central cord compression/UMN syndromes   []signs/symptoms consistent with discogenic cervical pain   []signs/symptoms consistent with rib dysfunction   [x]signs/symptoms consistent with postural dysfunction   []signs/symptoms consistent with shoulder pathology    []signs/symptoms consistent with post-surgical status including decreased ROM, strength and function. []signs/symptoms consistent with pathology which may benefit from Dry Needling   []signs/symptoms which may limit the use of advanced manual therapy techniques: (Hypertension, recent trauma, intolerance to end range positions, prior TIA, visual issues, UE myotomes loss )       Prognosis/Rehab Potential:      []Excellent   [x]Good    []Fair   []Poor    Tolerance of evaluation/treatment:    []Excellent   [x]Good    []Fair   []Poor     Physical Therapy Evaluation Complexity Justification  [x] A history of present problem with:  [] no personal factors and/or comorbidities that impact the plan of care;  [x]1-2 personal factors and/or comorbidities that impact the plan of care  []3 personal factors and/or comorbidities that impact the plan of care  [x] An examination of body systems using standardized tests and measures addressing any of the following: body structures and functions (impairments), activity limitations, and/or participation restrictions;:  [x] a total of 1-2 or more elements   [] a total of 3 or more elements   [] a total of 4 or more elements   [x] A clinical presentation with:  [x] stable and/or uncomplicated characteristics   [] evolving clinical presentation with changing characteristics  [] unstable and unpredictable characteristics;   [x] Clinical decision making of [x] low [] moderate, [] high complexity using standardized patient assessment instrument and/or measurable assessment of functional outcome.     [x] EVAL (LOW) [address] (typically 15 minutes face-to-face)  [] EVAL (MOD) [address] (typically 30 minutes face-to-face)  [] EVAL (HIGH) [address] (typically 45 minutes face-to-face)  [] RE-EVAL     HEP instruction: Written HEP instructions provided and reviewed    PLAN:  strength, ROM/flexibility, posture and body mechs, manual, MOC, HEP, pt education    Frequency/Duration: 2 days per week for 6  Weeks:  Interventions:  [x]  Therapeutic exercise including:strength, ROM, flexibility  [x]  NMR activation and proprioception including postural re-education  [x]  Manual therapy as indicated to include: IASTM, STM, PROM, Gr I-IV mobilizations, manipulation. [x]  Modalities as needed that may include: thermal agents, E-stim, Biofeedback, US, iontophoresis as indicated  [x]  Patient education on joint protection, postural re-education, activity modification, progression of HEP. AQUATIC THERAPY    GOALS:  Patient stated goal: less pain  [] Progressing: [] Met: [] Not Met: [] Adjusted    Therapist goals for Patient:   Short Term Goals: To be achieved in: 2 weeks  1. Independent in HEP and progression per patient tolerance, in order to prevent re-injury. [] Progressing: [] Met: [] Not Met: [] Adjusted  2. Patient will have a decrease in pain to facilitate improvement in movement, function, and ADLs as indicated by improvement with respect to Functional Deficits. [] Progressing: [] Met: [] Not Met: [] Adjusted    Long Term Goals: To be achieved in: 6 weeks  1. Disability index score of 10% or less on the  NDI  to assist with reaching prior level of function. [] Progressing: [] Met: [] Not Met: [] Adjusted  2. Patient will demonstrate increased AROM  to [facility], to allow for proper joint functioning to allow pt to resume turning head while driving without increase in symptoms. [] Progressing: [] Met: [] Not Met: [] Adjusted  3.  Patient will demonstrate increased Strength by 1/2 mmt grade  patients Functional Deficits to allow pt to resume amb with/without RW prn for amb in community without increase in symptoms. [] Progressing: [] Met: [] Not Met: [] Adjusted  4. Patient will return to functional activities including falling asleep without difficulty without increased symptoms or restriction.    [] Progressing: [] Met: [] Not Met: [] Adjusted    Electronically signed by:  Cato Bamberger, PT  [id number] DPT

## 2021-08-25 ENCOUNTER — HOSPITAL ENCOUNTER (OUTPATIENT)
Dept: PHYSICAL THERAPY | Age: 81
Setting detail: THERAPIES SERIES
Discharge: HOME | End: 2021-08-25
Payer: MEDICARE

## 2021-08-25 PROCEDURE — 97110 THERAPEUTIC EXERCISES: CPT

## 2021-08-25 NOTE — FLOWSHEET NOTE
[facility] Therapy  Phone: [phone]   Fax: [phone]    Physical Therapy Daily Treatment Note  Date:  2021    Patient Name:  Montserrat Hauser    :  1940  MRN: [id number]  Medical/Treatment Diagnosis Information:  Diagnosis: cervical disc disease, R TMJ  Treatment Diagnosis: pt with painful myofascial changes, impaired posture including forward head posture, reduced postural strengths. hypomobility t/o spine, especially at upper cerv spine  Insurance/Certification information:  PT Insurance Information: medicare  Physician Information:  Referring Practitioner: Dr. Walker [facility] of care signed (Y/N): []  Yes [x]  No     Date of Patient follow up with Physician:      Progress Report: []  Yes  [x]  No     Progress report due (10 Rx/or 30 days whichever is less): visit #97 or     Recertification due (POC duration/ or 90 days whichever is less): visit #12 or     Visit # Insurance Allowable Auth required? Date Range    Med nec []  Yes  [x]  No NA       Units approved Units used Date Range   NA NA NA       Latex Allergy:  [x]NO      []YES  Preferred Language for Healthcare:   [x]English       []other:    Functional Scale:        Date assessed:   NDI raw score = 18, dysfunction =36 %, taken at initial eval       Pain level: 4/10 with cervical rotation, not constant pain     SUBJECTIVE:  Pt reports she had to carry her groceries into the house yesterday and it took a while but went pretty good. It took more energy than she thought, but she was able to use [employer] to order groceries. OBJECTIVE:  - amb with RW this date. Meenu Vogt is too tall, but it does not lower further. Increased tissue tension in R SCM and scalenes.       RESTRICTIONS/PRECAUTIONS:osteopososis/osteopenia; has lost 3 friends and her  and brother int he last 17 months, joint replacements ( B knees, R ankle, B shoudlers): x 5  Spinal cord stimulator (lumbar) put in by (28039) Therapist is in constant attendance of 2 or more patients providing skilled therapy interventions, but not providing any significant amount of measurable one-on-one time to either patient, for improvements in  [] LE / lumbar: LE, proximal hip, and core control in self care, mobility, lifting, ambulation and eccentric single leg control. [] UE / cervical: cervical, scapular, scapulothoracic and UE control with self care, reaching, carrying, lifting, house/yardwork, driving, computer work. NMR and Therapeutic Activities:    [] (39766 or 29400) Provided verbal/tactile cueing for activities related to improving balance, coordination, kinesthetic sense, posture, motor skill, proprioception and motor activation to allow for proper function of   [] LE: / Lumbar core, proximal hip and LE with self care and ADLs  [] UE / Cervical: cervical, postural, scapular, scapulothoracic and UE control with self care, carrying, lifting, driving, computer work.   [] (62658) Gait Re-education- Provided training and instruction to the patient for proper LE, core and proximal hip recruitment and positioning and eccentric body weight control with ambulation re-education including up and down stairs     Home Management Training / Self Care:  [] (25213) Provided self-care/home management training related to activities of daily living and compensatory training, and/or use of adaptive equipment for improvement with: ADLs and compensatory training, meal preparation, safety procedures and instruction in use of adaptive equipment, including bathing, grooming, dressing, personal hygiene, basic household cleaning and chores.      Home Exercise Program:    [x] (00196) Reviewed/Progressed HEP activities related to strengthening, flexibility, endurance, ROM of   [] LE / Lumbar: core, proximal hip and LE for functional self-care, mobility, lifting and ambulation/stair navigation   [] UE / Cervical: cervical, postural, scapular, scapulothoracic and UE control with self care, reaching, carrying, lifting, house/yardwork, driving, computer work  [] (01960)Reviewed/Progressed HEP activities related to improving balance, coordination, kinesthetic sense, posture, motor skill, proprioception of   [] LE: core, proximal hip and LE for self care, mobility, lifting, and ambulation/stair navigation    [] UE / Cervical: cervical, postural,  scapular, scapulothoracic and UE control with self care, reaching, carrying, lifting, house/yardwork, driving, computer work    Manual Treatments:  PROM / STM / Oscillations-Mobs:  G-I, II, III, IV (PA's, Inf., Post.)  [] (19982) Provided manual therapy to mobilize LE, proximal hip and/or LS spine soft tissue/joints for the purpose of modulating pain, promoting relaxation,  increasing ROM, reducing/eliminating soft tissue swelling/inflammation/restriction, improving soft tissue extensibility and allowing for proper ROM for normal function with   [] LE / lumbar: self care, mobility, lifting and ambulation. [] UE / Cervical: self care, reaching, carrying, lifting, house/yardwork, driving, computer work. Modalities:  [] (03967) Vasopneumatic compression: Utilized vasopneumatic compression to decrease edema / swelling for the purpose of improving mobility and quad tone / recruitment which will allow for increased overall function including but not limited to self-care, transfers, ambulation, and ascending / descending stairs.        Charges:  Timed Code Treatment Minutes: 40   Total Treatment Minutes: 50     [] EVAL - LOW (32378)   [] EVAL - MOD (28247)  [] EVAL - HIGH (06340)  [] RE-EVAL (77795)  [x] JESSICA(77613) x   3    [] Ionto  [] NMR (15793) x       [] Vaso  [] Manual (61535) x       [] Ultrasound  [] TA x  1      [] Mech Traction (66029)  [] Aquatic Therapy x     [] ES (un) (15763):   [] Home Management Training x  [] ES(attended) (30450)   [] Dry Needling 1-2 muscles (34967):  [] Dry Needling 3+ muscles (093072)  [] Group:      [] Other:          GOALS:  Patient stated goal: less pain  []? Progressing: []? Met: []? Not Met: []? Adjusted     Therapist goals for Patient:   Short Term Goals: To be achieved in: 2 weeks  1. Independent in HEP and progression per patient tolerance, in order to prevent re-injury. []? Progressing: []? Met: []? Not Met: []? Adjusted  2. Patient will have a decrease in pain to facilitate improvement in movement, function, and ADLs as indicated by improvement with respect to Functional Deficits. []? Progressing: []? Met: []? Not Met: []? Adjusted     Long Term Goals: To be achieved in: 6 weeks  1. Disability index score of 10% or less on the  NDI  to assist with reaching prior level of function. []? Progressing: []? Met: []? Not Met: []? Adjusted  2. Patient will demonstrate increased AROM  to [facility], to allow for proper joint functioning to allow pt to resume turning head while driving without increase in symptoms. []? Progressing: []? Met: []? Not Met: []? Adjusted  3. Patient will demonstrate increased Strength by 1/2 mmt grade  patients Functional Deficits to allow pt to resume amb with/without RW prn for amb in community without increase in symptoms. []? Progressing: []? Met: []? Not Met: []? Adjusted  4. Patient will return to functional activities including falling asleep without difficulty without increased symptoms or restriction. []? Progressing: []? Met: []? Not Met: []? Adjusted     Overall Progression Towards Functional goals/ Treatment Progress Update:  [] Patient is progressing as expected towards functional goals listed. [] Progression is slowed due to complexities/Impairments listed. [] Progression has been slowed due to co-morbidities.   [x] Plan just implemented, too soon to assess goals progression <30days   [] Goals require adjustment due to lack of progress  [] Patient is not progressing as expected and requires additional follow up with physician  [] Other    Persisting Functional Limitations/Impairments:  [x]Sleeping []Sitting               []Standing []Transfers        []Walking []Kneeling               []Stairs []Squatting / bending   [x]ADLs []Reaching  [x]Lifting  [x]Housework  [x]Driving []Job related tasks  []Sports/Recreation []Other:        ASSESSMENT:  Initiated arm bike, ROM, and scapular/postural strengthening this date. Pt educated on strengthening scapular mm to improve posture and decrease cervical pain. Pt with increased tissue tension in R SCM and scalenes and would benefit from manual STM NV to decrease pain and tissue tension and improve ROM. Treatment/Activity Tolerance:  [] Patient able to complete tx [] Patient limited by fatigue  [] Patient limited by pain  [] Patient limited by other medical complications  [] Other:     Prognosis: [] Good [] Fair  [] Poor    Patient Requires Follow-up: [x] Yes  [] No    PLAN:  strength, ROM/flexibility, posture and body mechs, manual, MOC, HEP, pt education     Frequency/Duration: 2 days per week for 6  Weeks:  Interventions:  [x]? Therapeutic exercise including:strength, ROM, flexibility  [x]? NMR activation and proprioception including postural re-education  [x]? Manual therapy as indicated to include: IASTM, STM, PROM, Gr I-IV mobilizations, manipulation. [x]? Modalities as needed that may include: thermal agents, E-stim, Biofeedback, US, iontophoresis as indicated  [x]? Patient education on joint protection, postural re-education, activity modification, progression of HEP. Plan for next treatment session:    PLAN: See eval. PT 2x / week for6 weeks.    [x] Continue per plan of care [] Alter current plan (see comments)  [] Plan of care initiated [] Hold pending MD visit [] Discharge    Electronically signed by: Shekhar Saini, PT, DPT    Note: If patient does not return for scheduled/ recommended follow up visits, this note will serve as a discharge from care along with most recent update on progress.

## 2021-08-27 ENCOUNTER — HOSPITAL ENCOUNTER (OUTPATIENT)
Dept: PHYSICAL THERAPY | Age: 81
Setting detail: THERAPIES SERIES
Discharge: HOME | End: 2021-08-27
Payer: MEDICARE

## 2021-08-27 PROCEDURE — 97140 MANUAL THERAPY 1/> REGIONS: CPT

## 2021-08-27 PROCEDURE — 97110 THERAPEUTIC EXERCISES: CPT

## 2021-08-27 NOTE — FLOWSHEET NOTE
[address] [facility]  Phone: [phone]   Fax: [phone]    Physical Therapy Daily Treatment Note  Date:  2021    Patient Name:  Erica Kent    :  1940  MRN: [id number]  Medical/Treatment Diagnosis Information:  Diagnosis: cervical disc disease, R TMJ  Treatment Diagnosis: pt with painful myofascial changes, impaired posture including forward head posture, reduced postural strengths. hypomobility t/o spine, especially at upper cerv spine  Insurance/Certification information:  PT Insurance Information: medicare  Physician Information:  Referring Practitioner: Dr. Aline Paula of care signed (Y/N): []  Yes [x]  No     Date of Patient follow up with Physician:      Progress Report: []  Yes  [x]  No     Progress report due (10 Rx/or 30 days whichever is less): visit #59 or     Recertification due (POC duration/ or 90 days whichever is less): visit #12 or     Visit # Insurance Allowable Auth required? Date Range   3/12 Med nec []  Yes  [x]  No NA       Units approved Units used Date Range   NA NA NA       Latex Allergy:  [x]NO      []YES  Preferred Language for Healthcare:   [x]English       []other:    Functional Scale:        Date assessed:   NDI raw score = 18, dysfunction =36 %, taken at initial eval       Pain level: 5-6/10     SUBJECTIVE:  Pt reports when laying down, the back of her head hasn't been as achy. Today she has pain in the L ear and down into the neck. OBJECTIVE:  - amb with RW this date. Derek Machuca is too tall, but it does not lower further. Increased tissue tension in R SCM and scalenes.       RESTRICTIONS/PRECAUTIONS:osteopososis/osteopenia; has lost 3 friends and her  and brother int he last 17 months, joint replacements ( B knees, R ankle, B shoudlers): x 5  Spinal cord stimulator (lumbar) put in by Dr. Anders Graf - recently had new battery put in.        Exercises/Interventions:     Therapeutic Exercises (31401) Resistance / level Sets/sec Reps Notes   Arm bike   5 min fwd      UT stretch      Chin tuck   Scap squeeze      Pulleys - flexion   5 sec  10 B  Completed to promote thoracic ext    Seated:  cerv AROM   SB  rot       10x5\" B      Seated TB  High row   Mid row  LPD    Talbot  Talbot  Peach   2   2  2   10  10   10                          Therapeutic Activities (77398)                                          Neuromuscular Re-ed (07009)       Postural ed                                          Manual Intervention (58226)       Gentle manual - STM/cerv distraction: gentle traction, STM to R UT (proximal), external TMJ mm   12 min  Done on reclined mat table                                          Modalities:   8/27- cervical MHP on reclined mat table x 15 min   8/25 - cervical MHP seated in chair at end of session x 10 min     Pt. Education:  8/19patient educated on diagnosis, prognosis and expectations for rehab  -all patient questions were answered    Home Exercise Program:  8/19 cerv AROM SB/ROT      Therapeutic Exercise and NMR EXR  [] (31080) Provided verbal/tactile cueing for activities related to strengthening, flexibility, endurance, ROM for improvements in  [] LE / Lumbar: LE, proximal hip, and core control with self care, mobility, lifting, ambulation. [] UE / Cervical: cervical, postural, scapular, scapulothoracic and UE control with self care, reaching, carrying, lifting, house/yardwork, driving, computer work.  [] (76869) Provided verbal/tactile cueing for activities related to improving balance, coordination, kinesthetic sense, posture, motor skill, proprioception to assist with   [] LE / lumbar: LE, proximal hip, and core control in self care, mobility, lifting, ambulation and eccentric single leg control.    [] UE / cervical: cervical, scapular, scapulothoracic and UE control with self care, reaching, carrying, lifting, house/yardwork, driving, computer work.   [] (96862) Therapist is in constant attendance of 2 or more patients providing skilled therapy interventions, but not providing any significant amount of measurable one-on-one time to either patient, for improvements in  [] LE / lumbar: LE, proximal hip, and core control in self care, mobility, lifting, ambulation and eccentric single leg control. [] UE / cervical: cervical, scapular, scapulothoracic and UE control with self care, reaching, carrying, lifting, house/yardwork, driving, computer work. NMR and Therapeutic Activities:    [] (31264 or 51361) Provided verbal/tactile cueing for activities related to improving balance, coordination, kinesthetic sense, posture, motor skill, proprioception and motor activation to allow for proper function of   [] LE: / Lumbar core, proximal hip and LE with self care and ADLs  [] UE / Cervical: cervical, postural, scapular, scapulothoracic and UE control with self care, carrying, lifting, driving, computer work.   [] (54191) Gait Re-education- Provided training and instruction to the patient for proper LE, core and proximal hip recruitment and positioning and eccentric body weight control with ambulation re-education including up and down stairs     Home Management Training / Self Care:  [] (05838) Provided self-care/home management training related to activities of daily living and compensatory training, and/or use of adaptive equipment for improvement with: ADLs and compensatory training, meal preparation, safety procedures and instruction in use of adaptive equipment, including bathing, grooming, dressing, personal hygiene, basic household cleaning and chores.      Home Exercise Program:    [x] (58122) Reviewed/Progressed HEP activities related to strengthening, flexibility, endurance, ROM of   [] LE / Lumbar: core, proximal hip and LE for functional self-care, mobility, lifting and ambulation/stair navigation   [] UE / Cervical: cervical, postural, scapular, scapulothoracic and UE control with self care, reaching, carrying, lifting, house/yardwork, driving, computer work  [] (60942)Reviewed/Progressed HEP activities related to improving balance, coordination, kinesthetic sense, posture, motor skill, proprioception of   [] LE: core, proximal hip and LE for self care, mobility, lifting, and ambulation/stair navigation    [] UE / Cervical: cervical, postural,  scapular, scapulothoracic and UE control with self care, reaching, carrying, lifting, house/yardwork, driving, computer work    Manual Treatments:  PROM / STM / Oscillations-Mobs:  G-I, II, III, IV (PA's, Inf., Post.)  [] (34978) Provided manual therapy to mobilize LE, proximal hip and/or LS spine soft tissue/joints for the purpose of modulating pain, promoting relaxation,  increasing ROM, reducing/eliminating soft tissue swelling/inflammation/restriction, improving soft tissue extensibility and allowing for proper ROM for normal function with   [] LE / lumbar: self care, mobility, lifting and ambulation. [] UE / Cervical: self care, reaching, carrying, lifting, house/yardwork, driving, computer work. Modalities:  [] (20115) Vasopneumatic compression: Utilized vasopneumatic compression to decrease edema / swelling for the purpose of improving mobility and quad tone / recruitment which will allow for increased overall function including but not limited to self-care, transfers, ambulation, and ascending / descending stairs.        Charges:  Timed Code Treatment Minutes: 42   Total Treatment Minutes: 57     [] EVAL - LOW (38293)   [] EVAL - MOD (06794)  [] EVAL - HIGH (41195)  [] RE-EVAL (99232)  [x] HJ(80321) x   2    [] Ionto  [] NMR (69361) x       [] Vaso  [x] Manual (71128) x 1      [] Ultrasound  [] TA x        [] Mech Traction (55959)  [] Aquatic Therapy x      [] ES (un) (09584):   [] Home Management Training x  [] ES(attended) (26452)   [] Dry Needling 1-2 muscles (34470):  [] Dry Needling 3+ muscles (216072)  [] Group:      [] Other:    GOALS:  Patient stated goal: less pain  []? Progressing: []? Met: []? Not Met: []? Adjusted     Therapist goals for Patient:   Short Term Goals: To be achieved in: 2 weeks  1. Independent in HEP and progression per patient tolerance, in order to prevent re-injury. []? Progressing: []? Met: []? Not Met: []? Adjusted  2. Patient will have a decrease in pain to facilitate improvement in movement, function, and ADLs as indicated by improvement with respect to Functional Deficits. []? Progressing: []? Met: []? Not Met: []? Adjusted     Long Term Goals: To be achieved in: 6 weeks  1. Disability index score of 10% or less on the  NDI  to assist with reaching prior level of function. []? Progressing: []? Met: []? Not Met: []? Adjusted  2. Patient will demonstrate increased AROM  to [facility], to allow for proper joint functioning to allow pt to resume turning head while driving without increase in symptoms. []? Progressing: []? Met: []? Not Met: []? Adjusted  3. Patient will demonstrate increased Strength by 1/2 mmt grade  patients Functional Deficits to allow pt to resume amb with/without RW prn for amb in community without increase in symptoms. []? Progressing: []? Met: []? Not Met: []? Adjusted  4. Patient will return to functional activities including falling asleep without difficulty without increased symptoms or restriction. []? Progressing: []? Met: []? Not Met: []? Adjusted     Overall Progression Towards Functional goals/ Treatment Progress Update:  [] Patient is progressing as expected towards functional goals listed. [] Progression is slowed due to complexities/Impairments listed. [] Progression has been slowed due to co-morbidities.   [x] Plan just implemented, too soon to assess goals progression <30days   [] Goals require adjustment due to lack of progress  [] Patient is not progressing as expected and requires additional follow up with physician  [] Other    Persisting Functional Limitations/Impairments:  [x]Sleeping []Sitting               []Standing []Transfers        []Walking []Kneeling               []Stairs []Squatting / bending   [x]ADLs []Reaching  [x]Lifting  [x]Housework  [x]Driving []Job related tasks  []Sports/Recreation []Other:        ASSESSMENT:   Pt with increased muscular tightness in the R UT and cervical paraspinals. Pt also tender to palpation along external TMJ joint line. Continue with postural strengthening and flexibility as tolerated. Treatment/Activity Tolerance:  [] Patient able to complete tx [] Patient limited by fatigue  [] Patient limited by pain  [] Patient limited by other medical complications  [] Other:     Prognosis: [] Good [] Fair  [] Poor    Patient Requires Follow-up: [x] Yes  [] No    PLAN:  strength, ROM/flexibility, posture and body mechs, manual, MOC, HEP, pt education     Frequency/Duration: 2 days per week for 6  Weeks:  Interventions:  [x]? Therapeutic exercise including:strength, ROM, flexibility  [x]? NMR activation and proprioception including postural re-education  [x]? Manual therapy as indicated to include: IASTM, STM, PROM, Gr I-IV mobilizations, manipulation. [x]? Modalities as needed that may include: thermal agents, E-stim, Biofeedback, US, iontophoresis as indicated  [x]? Patient education on joint protection, postural re-education, activity modification, progression of HEP. Plan for next treatment session:    PLAN: See eval. PT 2x / week for6 weeks. [x] Continue per plan of care [] Alter current plan (see comments)  [] Plan of care initiated [] Hold pending MD visit [] Discharge    Electronically signed by: Ute Sheets PT, DPT    Note: If patient does not return for scheduled/ recommended follow up visits, this note will serve as a discharge from care along with most recent update on progress.

## 2021-08-31 ENCOUNTER — HOSPITAL ENCOUNTER (OUTPATIENT)
Dept: PHYSICAL THERAPY | Age: 81
Setting detail: THERAPIES SERIES
Discharge: HOME | End: 2021-08-31
Payer: MEDICARE

## 2021-08-31 PROCEDURE — 97110 THERAPEUTIC EXERCISES: CPT

## 2021-08-31 PROCEDURE — 97140 MANUAL THERAPY 1/> REGIONS: CPT

## 2021-08-31 NOTE — FLOWSHEET NOTE
[address] [facility]  Phone: [phone]   Fax: [phone]    Physical Therapy Daily Treatment Note  Date:  2021    Patient Name:  Luke Tate    :  1940  MRN: [id number]  Medical/Treatment Diagnosis Information:  Diagnosis: cervical disc disease, R TMJ  Treatment Diagnosis: pt with painful myofascial changes, impaired posture including forward head posture, reduced postural strengths. hypomobility t/o spine, especially at upper cerv spine  Insurance/Certification information:  PT Insurance Information: medicare  Physician Information:  Referring Practitioner: Dr. Watts Na of care signed (Y/N): []  Yes [x]  No     Date of Patient follow up with Physician:      Progress Report: []  Yes  [x]  No     Progress report due (10 Rx/or 30 days whichever is less): visit #57 or 3/91    Recertification due (POC duration/ or 90 days whichever is less): visit #12 or     Visit # Insurance Allowable Auth required? Date Range    Med nec []  Yes  [x]  No NA       Units approved Units used Date Range   NA NA NA       Latex Allergy:  [x]NO      []YES  Preferred Language for Healthcare:   [x]English       []other:    Functional Scale:        Date assessed:   NDI raw score = 18, dysfunction =36 %, taken at initial eval       Pain level: 6/10 when she turns her head      SUBJECTIVE:  Pt reports soreness this date. Reports she felt good during therapy Friday, but had increased soreness Saturday. States no pain when she keeps her head straight ahead and increased pain when she turns her head. OBJECTIVE:    - pt reports she has a spot that is iching and burning on her back. Skin inspection - small, red oval on R scapular region with smooth borders. Pt reports she has a dermatology appointment within the next 2 weeks.  - amb with RW this date. Asa Randolph is too tall, but it does not lower further.  Increased tissue tension in R SCM and amelia. RESTRICTIONS/PRECAUTIONS:osteopososis/osteopenia; has lost 3 friends and her  and brother int he last 17 months, joint replacements ( B knees, R ankle, B shoudlers): x 5  Spinal cord stimulator (lumbar) put in by Dr. Fan Humphries - recently had new battery put in.        Exercises/Interventions:     Therapeutic Exercises (29251) Resistance / level Sets/sec Reps Notes   Arm bike   3 min fwd      UT stretch      Chin tuck   Scap squeeze   2 sec  10    Pulleys - flexion   5 sec  10 B  Completed to promote thoracic ext    Seated:  cerv AROM   SB  rot             Seated TB  High row   Mid row  LPD    Donley  Donley  Donley    2   2  2   10  10   10                          Therapeutic Activities (64521)                                          Neuromuscular Re-ed (67840)       Postural ed                                          Manual Intervention (01896)       Gentle manual -  STM to B UT (proximal and distal) and levator,    10 min  8/27 Done on reclined mat table  8/30 completed seated in chair                                           Modalities:   8/31- cervical MHP seated in chair at end of session x 12 min   8/27- cervical MHP on reclined mat table x 15 min   8/25 - cervical MHP seated in chair at end of session x 10 min     Pt. Education:  8/31 - Pt educated on ABCDs of melanoma and importance of skin checks as she reports concern about itching spot on her back. Pt has dermatology appointment in 2 wks. 8/19 patient educated on diagnosis, prognosis and expectations for rehab  -all patient questions were answered    Home Exercise Program:  8/19 cerv AROM SB/ROT      Therapeutic Exercise and NMR EXR  [] (07145) Provided verbal/tactile cueing for activities related to strengthening, flexibility, endurance, ROM for improvements in  [] LE / Lumbar: LE, proximal hip, and core control with self care, mobility, lifting, ambulation.   [] UE / Cervical: cervical, postural, scapular, scapulothoracic and UE control with self care, reaching, carrying, lifting, house/yardwork, driving, computer work.  [] (14371) Provided verbal/tactile cueing for activities related to improving balance, coordination, kinesthetic sense, posture, motor skill, proprioception to assist with   [] LE / lumbar: LE, proximal hip, and core control in self care, mobility, lifting, ambulation and eccentric single leg control. [] UE / cervical: cervical, scapular, scapulothoracic and UE control with self care, reaching, carrying, lifting, house/yardwork, driving, computer work.   [] (77883) Therapist is in constant attendance of 2 or more patients providing skilled therapy interventions, but not providing any significant amount of measurable one-on-one time to either patient, for improvements in  [] LE / lumbar: LE, proximal hip, and core control in self care, mobility, lifting, ambulation and eccentric single leg control. [] UE / cervical: cervical, scapular, scapulothoracic and UE control with self care, reaching, carrying, lifting, house/yardwork, driving, computer work.      NMR and Therapeutic Activities:    [] (61198 or 60160) Provided verbal/tactile cueing for activities related to improving balance, coordination, kinesthetic sense, posture, motor skill, proprioception and motor activation to allow for proper function of   [] LE: / Lumbar core, proximal hip and LE with self care and ADLs  [] UE / Cervical: cervical, postural, scapular, scapulothoracic and UE control with self care, carrying, lifting, driving, computer work.   [] (09778) Gait Re-education- Provided training and instruction to the patient for proper LE, core and proximal hip recruitment and positioning and eccentric body weight control with ambulation re-education including up and down stairs     Home Management Training / Self Care:  [] (48631) Provided self-care/home management training related to activities of daily living and compensatory training, and/or use of adaptive equipment for improvement with: ADLs and compensatory training, meal preparation, safety procedures and instruction in use of adaptive equipment, including bathing, grooming, dressing, personal hygiene, basic household cleaning and chores. Home Exercise Program:    [x] (56886) Reviewed/Progressed HEP activities related to strengthening, flexibility, endurance, ROM of   [] LE / Lumbar: core, proximal hip and LE for functional self-care, mobility, lifting and ambulation/stair navigation   [] UE / Cervical: cervical, postural, scapular, scapulothoracic and UE control with self care, reaching, carrying, lifting, house/yardwork, driving, computer work  [] (51805)Reviewed/Progressed HEP activities related to improving balance, coordination, kinesthetic sense, posture, motor skill, proprioception of   [] LE: core, proximal hip and LE for self care, mobility, lifting, and ambulation/stair navigation    [] UE / Cervical: cervical, postural,  scapular, scapulothoracic and UE control with self care, reaching, carrying, lifting, house/yardwork, driving, computer work    Manual Treatments:  PROM / STM / Oscillations-Mobs:  G-I, II, III, IV (PA's, Inf., Post.)  [] (83991) Provided manual therapy to mobilize LE, proximal hip and/or LS spine soft tissue/joints for the purpose of modulating pain, promoting relaxation,  increasing ROM, reducing/eliminating soft tissue swelling/inflammation/restriction, improving soft tissue extensibility and allowing for proper ROM for normal function with   [] LE / lumbar: self care, mobility, lifting and ambulation. [] UE / Cervical: self care, reaching, carrying, lifting, house/yardwork, driving, computer work.      Modalities:  [] (67965) Vasopneumatic compression: Utilized vasopneumatic compression to decrease edema / swelling for the purpose of improving mobility and quad tone / recruitment which will allow for increased overall function including but not limited to self-care, transfers, ambulation, and ascending / descending stairs. Charges:  Timed Code Treatment Minutes: 40   Total Treatment Minutes: 52     [] EVAL - LOW (33476)   [] EVAL - MOD (27991)  [] EVAL - HIGH (45947)  [] RE-EVAL (08124)  [x] QX(01800) x   2    [] Ionto  [] NMR (86723) x       [] Vaso  [x] Manual (97312) x 1      [] Ultrasound  [] TA x        [] Mech Traction (36172)  [] Aquatic Therapy x      [] ES (un) (13655):   [] Home Management Training x  [] ES(attended) (01432)   [] Dry Needling 1-2 muscles (58654):  [] Dry Needling 3+ muscles (646337)  [] Group:      [] Other:          GOALS:  Patient stated goal: less pain  []? Progressing: []? Met: []? Not Met: []? Adjusted     Therapist goals for Patient:   Short Term Goals: To be achieved in: 2 weeks  1. Independent in HEP and progression per patient tolerance, in order to prevent re-injury. []? Progressing: []? Met: []? Not Met: []? Adjusted  2. Patient will have a decrease in pain to facilitate improvement in movement, function, and ADLs as indicated by improvement with respect to Functional Deficits. []? Progressing: []? Met: []? Not Met: []? Adjusted     Long Term Goals: To be achieved in: 6 weeks  1. Disability index score of 10% or less on the  NDI  to assist with reaching prior level of function. []? Progressing: []? Met: []? Not Met: []? Adjusted  2. Patient will demonstrate increased AROM  to [facility], to allow for proper joint functioning to allow pt to resume turning head while driving without increase in symptoms. []? Progressing: []? Met: []? Not Met: []? Adjusted  3. Patient will demonstrate increased Strength by 1/2 mmt grade  patients Functional Deficits to allow pt to resume amb with/without RW prn for amb in community without increase in symptoms. []? Progressing: []? Met: []? Not Met: []? Adjusted  4. Patient will return to functional activities including falling asleep without difficulty without increased symptoms or restriction.    []? Progressing: []? Met: []? Not Met: []? Adjusted     Overall Progression Towards Functional goals/ Treatment Progress Update:  [] Patient is progressing as expected towards functional goals listed. [] Progression is slowed due to complexities/Impairments listed. [] Progression has been slowed due to co-morbidities. [x] Plan just implemented, too soon to assess goals progression <30days   [] Goals require adjustment due to lack of progress  [] Patient is not progressing as expected and requires additional follow up with physician  [] Other    Persisting Functional Limitations/Impairments:  [x]Sleeping []Sitting               []Standing []Transfers        []Walking []Kneeling               []Stairs []Squatting / bending   [x]ADLs []Reaching  [x]Lifting  [x]Housework  [x]Driving []Job related tasks  []Sports/Recreation []Other:        ASSESSMENT:   Gentle manual therapy completed seated in chair this date and she reports soreness after LV and dizziness after sitting up from reclined position. Increased resistance with TB exercises this date. Pt required less VC for appropriate exercise technique. Pt will continue to benefit from OP PT to improve posture and decrease pain with cervical AROM to return to PLOF without limitations or risk for further injury. Treatment/Activity Tolerance:  [x] Patient able to complete tx [] Patient limited by fatigue  [] Patient limited by pain  [] Patient limited by other medical complications  [] Other:     Prognosis: [x] Good [] Fair  [] Poor    Patient Requires Follow-up: [x] Yes  [] No    PLAN:  strength, ROM/flexibility, posture and body mechs, manual, MOC, HEP, pt education     Frequency/Duration: 2 days per week for 6  Weeks:  Interventions:  [x]? Therapeutic exercise including:strength, ROM, flexibility  [x]? NMR activation and proprioception including postural re-education  [x]?   Manual therapy as indicated to include: IASTM, STM, PROM, Gr I-IV mobilizations, manipulation. [x]? Modalities as needed that may include: thermal agents, E-stim, Biofeedback, US, iontophoresis as indicated  [x]? Patient education on joint protection, postural re-education, activity modification, progression of HEP. Plan for next treatment session:    PLAN: See eval. PT 2x / week for6 weeks. [x] Continue per plan of care [] Alter current plan (see comments)  [] Plan of care initiated [] Hold pending MD visit [] Discharge    Electronically signed by: Tari Santillan, PT, DPT    Note: If patient does not return for scheduled/ recommended follow up visits, this note will serve as a discharge from care along with most recent update on progress.

## 2021-09-02 ENCOUNTER — HOSPITAL ENCOUNTER (OUTPATIENT)
Dept: PHYSICAL THERAPY | Age: 81
Setting detail: THERAPIES SERIES
Discharge: HOME | End: 2021-09-02
Payer: MEDICARE

## 2021-09-02 PROCEDURE — 97140 MANUAL THERAPY 1/> REGIONS: CPT

## 2021-09-02 PROCEDURE — 97110 THERAPEUTIC EXERCISES: CPT

## 2021-09-02 NOTE — FLOWSHEET NOTE
[address] [facility] Physical Therapy  Phone: [phone]   Fax: [phone]    Physical Therapy Daily Treatment Note  Date:  2021    Patient Name:  Rachael Gruber    :  1940  MRN: [id number]  Medical/Treatment Diagnosis Information:  Diagnosis: cervical disc disease, R TMJ  Treatment Diagnosis: pt with painful myofascial changes, impaired posture including forward head posture, reduced postural strengths. hypomobility t/o spine, especially at upper cerv spine  Insurance/Certification information:  PT Insurance Information: medicare  Physician Information:  Referring Practitioner: Dr. Saeed Membreno of care signed (Y/N): []  Yes [x]  No     Date of Patient follow up with Physician:      Progress Report: []  Yes  [x]  No     Progress report due (10 Rx/or 30 days whichever is less): visit #41 or     Recertification due (POC duration/ or 90 days whichever is less): visit #12 or     Visit # Insurance Allowable Auth required? Date Range    Med nec []  Yes  [x]  No NA       Units approved Units used Date Range   NA NA NA       Latex Allergy:  [x]NO      []YES  Preferred Language for Healthcare:   [x]English       []other:    Functional Scale:        Date assessed:  NDI raw score = 18, dysfunction =36 %, taken at initial eval       Pain level: 6/10 when she turns her head      SUBJECTIVE: Pt reports pain in her R ear. States she is tired from having to park far and walking in. Reports she wasn't as sore after therapy Tuesday and feels she can turn her head further. OBJECTIVE:    - pt reports she has a spot that is iching and burning on her back. Skin inspection - small, red oval on R scapular region with smooth borders. Pt reports she has a dermatology appointment within the next 2 weeks.  - amb with RW this date. Billie Mckeon is too tall, but it does not lower further.  Increased tissue tension in R SCM and amelia. RESTRICTIONS/PRECAUTIONS:osteopososis/osteopenia; has lost 3 friends and her  and brother int he last 17 months, joint replacements ( B knees, R ankle, B shoudlers): x 5  Spinal cord stimulator (lumbar) put in by Dr. Mabel Correa - recently had new battery put in.        Exercises/Interventions:     Therapeutic Exercises (15824) Resistance / level Sets/sec Reps Notes   Arm bike   2.5 min fwd/2.5 min back      UT stretch   30 sec  2 B     Chin tuck   Scap squeeze        Pulleys - flexion   5 sec  10 B  Completed to promote thoracic ext    Seated:  cerv AROM   SB  rot       10x5\" B      Seated TB  High row   Mid row  LPD    Juab  Juab  Juab    2   2  2   10  10   10                          Therapeutic Activities (85154)                                          Neuromuscular Re-ed (80909)       Postural ed                                          Manual Intervention (47523)       Gentle manual - STM/cerv distraction: gentle traction, STM to B UT (proximal and distal) and levator, SOR    12 min  8/27 Done on reclined mat table  8/30 completed seated in chair                                           Modalities:   9/2 - cervical MHP seated in chair at end of session x 15 min   8/31- cervical MHP seated in chair at end of session x 12 min   8/27- cervical MHP on reclined mat table x 15 min   8/25 - cervical MHP seated in chair at end of session x 10 min     Pt. Education:  8/31 - Pt educated on ABCDs of melanoma and importance of skin checks as she reports concern about itching spot on her back. Pt has dermatology appointment in 2 wks.    8/19 patient educated on diagnosis, prognosis and expectations for rehab  -all patient questions were answered    Home Exercise Program:  8/19 cerv AROM SB/ROT      Therapeutic Exercise and NMR EXR  [] (56307) Provided verbal/tactile cueing for activities related to strengthening, flexibility, endurance, ROM for improvements in  [] LE / Lumbar: LE, proximal hip, and core control with self care, mobility, lifting, ambulation. [] UE / Cervical: cervical, postural, scapular, scapulothoracic and UE control with self care, reaching, carrying, lifting, house/yardwork, driving, computer work.  [] (46838) Provided verbal/tactile cueing for activities related to improving balance, coordination, kinesthetic sense, posture, motor skill, proprioception to assist with   [] LE / lumbar: LE, proximal hip, and core control in self care, mobility, lifting, ambulation and eccentric single leg control. [] UE / cervical: cervical, scapular, scapulothoracic and UE control with self care, reaching, carrying, lifting, house/yardwork, driving, computer work.   [] (07350) Therapist is in constant attendance of 2 or more patients providing skilled therapy interventions, but not providing any significant amount of measurable one-on-one time to either patient, for improvements in  [] LE / lumbar: LE, proximal hip, and core control in self care, mobility, lifting, ambulation and eccentric single leg control. [] UE / cervical: cervical, scapular, scapulothoracic and UE control with self care, reaching, carrying, lifting, house/yardwork, driving, computer work.      NMR and Therapeutic Activities:    [] (36326 or 41264) Provided verbal/tactile cueing for activities related to improving balance, coordination, kinesthetic sense, posture, motor skill, proprioception and motor activation to allow for proper function of   [] LE: / Lumbar core, proximal hip and LE with self care and ADLs  [] UE / Cervical: cervical, postural, scapular, scapulothoracic and UE control with self care, carrying, lifting, driving, computer work.   [] (18612) Gait Re-education- Provided training and instruction to the patient for proper LE, core and proximal hip recruitment and positioning and eccentric body weight control with ambulation re-education including up and down stairs     Home Management Training / Self Care:  [] (52768) Provided self-care/home management training related to activities of daily living and compensatory training, and/or use of adaptive equipment for improvement with: ADLs and compensatory training, meal preparation, safety procedures and instruction in use of adaptive equipment, including bathing, grooming, dressing, personal hygiene, basic household cleaning and chores. Home Exercise Program:    [x] (59400) Reviewed/Progressed HEP activities related to strengthening, flexibility, endurance, ROM of   [] LE / Lumbar: core, proximal hip and LE for functional self-care, mobility, lifting and ambulation/stair navigation   [] UE / Cervical: cervical, postural, scapular, scapulothoracic and UE control with self care, reaching, carrying, lifting, house/yardwork, driving, computer work  [] (40708)Reviewed/Progressed HEP activities related to improving balance, coordination, kinesthetic sense, posture, motor skill, proprioception of   [] LE: core, proximal hip and LE for self care, mobility, lifting, and ambulation/stair navigation    [] UE / Cervical: cervical, postural,  scapular, scapulothoracic and UE control with self care, reaching, carrying, lifting, house/yardwork, driving, computer work    Manual Treatments:  PROM / STM / Oscillations-Mobs:  G-I, II, III, IV (PA's, Inf., Post.)  [] (46294) Provided manual therapy to mobilize LE, proximal hip and/or LS spine soft tissue/joints for the purpose of modulating pain, promoting relaxation,  increasing ROM, reducing/eliminating soft tissue swelling/inflammation/restriction, improving soft tissue extensibility and allowing for proper ROM for normal function with   [] LE / lumbar: self care, mobility, lifting and ambulation. [] UE / Cervical: self care, reaching, carrying, lifting, house/yardwork, driving, computer work.      Modalities:  [] (43273) Vasopneumatic compression: Utilized vasopneumatic compression to decrease edema / swelling for the purpose of improving mobility and quad tone / recruitment which will allow for increased overall function including but not limited to self-care, transfers, ambulation, and ascending / descending stairs. Charges:  Timed Code Treatment Minutes: 43   Total Treatment Minutes: 58     [] EVAL - LOW (14052)   [] EVAL - MOD (82159)  [] EVAL - HIGH (74804)  [] RE-EVAL (16998)  [x] VU(79464) x   2    [] Ionto  [] NMR (49537) x       [] Vaso  [x] Manual (71414) x 1      [] Ultrasound  [] TA x        [] Mech Traction (89271)  [] Aquatic Therapy x      [] ES (un) (84541):   [] Home Management Training x  [] ES(attended) (82393)   [] Dry Needling 1-2 muscles (09390):  [] Dry Needling 3+ muscles (121426)  [] Group:      [] Other:          GOALS:  Patient stated goal: less pain  []? Progressing: []? Met: []? Not Met: []? Adjusted     Therapist goals for Patient:   Short Term Goals: To be achieved in: 2 weeks  1. Independent in HEP and progression per patient tolerance, in order to prevent re-injury. []? Progressing: []? Met: []? Not Met: []? Adjusted  2. Patient will have a decrease in pain to facilitate improvement in movement, function, and ADLs as indicated by improvement with respect to Functional Deficits. []? Progressing: []? Met: []? Not Met: []? Adjusted     Long Term Goals: To be achieved in: 6 weeks  1. Disability index score of 10% or less on the  NDI  to assist with reaching prior level of function. []? Progressing: []? Met: []? Not Met: []? Adjusted  2. Patient will demonstrate increased AROM  to [facility], to allow for proper joint functioning to allow pt to resume turning head while driving without increase in symptoms. []? Progressing: []? Met: []? Not Met: []? Adjusted  3. Patient will demonstrate increased Strength by 1/2 mmt grade  patients Functional Deficits to allow pt to resume amb with/without RW prn for amb in community without increase in symptoms. []? Progressing: []? Met: []? Not Met: []? Adjusted  4.  Patient will return to functional activities including falling asleep without difficulty without increased symptoms or restriction. []? Progressing: []? Met: []? Not Met: []? Adjusted     Overall Progression Towards Functional goals/ Treatment Progress Update:  [] Patient is progressing as expected towards functional goals listed. [] Progression is slowed due to complexities/Impairments listed. [] Progression has been slowed due to co-morbidities. [x] Plan just implemented, too soon to assess goals progression <30days   [] Goals require adjustment due to lack of progress  [] Patient is not progressing as expected and requires additional follow up with physician  [] Other    Persisting Functional Limitations/Impairments:  [x]Sleeping []Sitting               []Standing []Transfers        []Walking []Kneeling               []Stairs []Squatting / bending   [x]ADLs []Reaching  [x]Lifting  [x]Housework  [x]Driving []Job related tasks  []Sports/Recreation []Other:        ASSESSMENT:   Gentle STM, SOR, and traction completed this date. Pt reports feeling good at the end of the session and that her cervical rotation has increased and is less painful. Pt demonstrated good exercise technique with TB exercises without cues. Pt required verbal and tactile cues for appropriate technique with UT stretch. Plan to continue to progress postural strengthening per pt tolerance. Treatment/Activity Tolerance:  [x] Patient able to complete tx [] Patient limited by fatigue  [] Patient limited by pain  [] Patient limited by other medical complications  [] Other:     Prognosis: [x] Good [] Fair  [] Poor    Patient Requires Follow-up: [x] Yes  [] No    PLAN:  strength, ROM/flexibility, posture and body mechs, manual, MOC, HEP, pt education     Frequency/Duration: 2 days per week for 6  Weeks:  Interventions:  [x]? Therapeutic exercise including:strength, ROM, flexibility  [x]?   NMR activation and proprioception including postural re-education  [x]? Manual therapy as indicated to include: IASTM, STM, PROM, Gr I-IV mobilizations, manipulation. [x]? Modalities as needed that may include: thermal agents, E-stim, Biofeedback, US, iontophoresis as indicated  [x]? Patient education on joint protection, postural re-education, activity modification, progression of HEP. Plan for next treatment session:    PLAN: See eval. PT 2x / week for6 weeks. [x] Continue per plan of care [] Alter current plan (see comments)  [] Plan of care initiated [] Hold pending MD visit [] Discharge    Electronically signed by: Lashon Sanchez, PT, DPT    Note: If patient does not return for scheduled/ recommended follow up visits, this note will serve as a discharge from care along with most recent update on progress.

## 2021-09-07 ENCOUNTER — HOSPITAL ENCOUNTER (OUTPATIENT)
Dept: PHYSICAL THERAPY | Age: 81
Setting detail: THERAPIES SERIES
Discharge: HOME | End: 2021-09-07
Payer: MEDICARE

## 2021-09-07 PROCEDURE — 97140 MANUAL THERAPY 1/> REGIONS: CPT

## 2021-09-07 PROCEDURE — 97110 THERAPEUTIC EXERCISES: CPT

## 2021-09-07 NOTE — FLOWSHEET NOTE
[address] [facility] Physical Therapy  Phone: [phone]   Fax: [phone]    Physical Therapy Daily Treatment Note  Date:  2021    Patient Name:  Chuck Carias    :  1940  MRN: [id number]  Medical/Treatment Diagnosis Information:  Diagnosis: cervical disc disease, R TMJ  Treatment Diagnosis: pt with painful myofascial changes, impaired posture including forward head posture, reduced postural strengths. hypomobility t/o spine, especially at upper cerv spine  Insurance/Certification information:  PT Insurance Information: medicare  Physician Information:  Referring Practitioner: Dr. Yony Fisher of care signed (Y/N): []  Yes [x]  No     Date of Patient follow up with Physician:      Progress Report: []  Yes  [x]  No     Progress report due (10 Rx/or 30 days whichever is less): visit #91 or     Recertification due (POC duration/ or 90 days whichever is less): visit #12 or     Visit # Insurance Allowable Auth required? Date Range    Med nec []  Yes  [x]  No NA       Units approved Units used Date Range   NA NA NA       Latex Allergy:  [x]NO      []YES  Preferred Language for Healthcare:   [x]English       []other:    Functional Scale:        Date assessed:  NDI raw score = 18, dysfunction =36 %, taken at initial eval       Pain level: 4-5/10 R ear      SUBJECTIVE: Reports she continues to have pain in her R ear. She was stressed about her spinal cord stimulator running low on battery, but she charged it and is more relaxed, so the headache is going away. States she has been putting her purse over the side of her RW and it is making it easier to move around. OBJECTIVE:    - pt reports she has a spot that is iching and burning on her back. Skin inspection - small, red oval on R scapular region with smooth borders. Pt reports she has a dermatology appointment within the next 2 weeks.  - amb with RW this date.  Dixie Oakes is too tall, but it does not lower further. Increased tissue tension in R SCM and scalenes. RESTRICTIONS/PRECAUTIONS:osteopososis/osteopenia; has lost 3 friends and her  and brother int he last 17 months, joint replacements ( B knees, R ankle, B shoudlers): x 5  Spinal cord stimulator (lumbar) put in by Dr. Jatinder Delvalle - recently had new battery put in.        Exercises/Interventions:     Therapeutic Exercises (84761) Resistance / level Sets/sec Reps Notes   Arm bike   3 min fwd/2 min back      UT stretch   30 sec  2 B     Chin tuck   Scap squeeze   2 sec  2 sec  10   10     Pulleys - flexion   5 sec  10 B  Completed to promote thoracic ext    Seated:  cerv AROM   SB  rot       10x5\" B      Seated TB  High row   Mid row  LPD    [place]    2   2  2   10  10   10                          Therapeutic Activities (26600)                                          Neuromuscular Re-ed (66945)       Postural ed                                          Manual Intervention ([address] [facility])       Gentle manual - STMSTM to B UT (proximal and distal) and levator, STM to R SCM,     8 min  8/27 Done on reclined mat table  9/7, 8/30 completed seated in chair                                           Modalities:   9/7, 9/2 - cervical MHP seated in chair at end of session x 15 min   8/31- cervical MHP seated in chair at end of session x 12 min   8/27- cervical MHP on reclined mat table x 15 min   8/25 - cervical MHP seated in chair at end of session x 10 min     Pt. Education:  8/31 - Pt educated on ABCDs of melanoma and importance of skin checks as she reports concern about itching spot on her back. Pt has dermatology appointment in 2 wks.    8/19 patient educated on diagnosis, prognosis and expectations for rehab  -all patient questions were answered    Home Exercise Program:  8/19 cerv AROM SB/ROT      Therapeutic Exercise and NMR EXR  [] (78345) Provided verbal/tactile cueing for activities related to strengthening, flexibility, endurance, ROM for improvements in  [] LE / Lumbar: LE, proximal hip, and core control with self care, mobility, lifting, ambulation. [] UE / Cervical: cervical, postural, scapular, scapulothoracic and UE control with self care, reaching, carrying, lifting, house/yardwork, driving, computer work.  [] (55046) Provided verbal/tactile cueing for activities related to improving balance, coordination, kinesthetic sense, posture, motor skill, proprioception to assist with   [] LE / lumbar: LE, proximal hip, and core control in self care, mobility, lifting, ambulation and eccentric single leg control. [] UE / cervical: cervical, scapular, scapulothoracic and UE control with self care, reaching, carrying, lifting, house/yardwork, driving, computer work.   [] (60334) Therapist is in constant attendance of 2 or more patients providing skilled therapy interventions, but not providing any significant amount of measurable one-on-one time to either patient, for improvements in  [] LE / lumbar: LE, proximal hip, and core control in self care, mobility, lifting, ambulation and eccentric single leg control. [] UE / cervical: cervical, scapular, scapulothoracic and UE control with self care, reaching, carrying, lifting, house/yardwork, driving, computer work.      NMR and Therapeutic Activities:    [] (44971 or 93114) Provided verbal/tactile cueing for activities related to improving balance, coordination, kinesthetic sense, posture, motor skill, proprioception and motor activation to allow for proper function of   [] LE: / Lumbar core, proximal hip and LE with self care and ADLs  [] UE / Cervical: cervical, postural, scapular, scapulothoracic and UE control with self care, carrying, lifting, driving, computer work.   [] (54622) Gait Re-education- Provided training and instruction to the patient for proper LE, core and proximal hip recruitment and positioning and eccentric body weight control with ambulation re-education including up and down stairs     Home Management Training / Self Care:  [] (58892) Provided self-care/home management training related to activities of daily living and compensatory training, and/or use of adaptive equipment for improvement with: ADLs and compensatory training, meal preparation, safety procedures and instruction in use of adaptive equipment, including bathing, grooming, dressing, personal hygiene, basic household cleaning and chores. Home Exercise Program:    [x] (34289) Reviewed/Progressed HEP activities related to strengthening, flexibility, endurance, ROM of   [] LE / Lumbar: core, proximal hip and LE for functional self-care, mobility, lifting and ambulation/stair navigation   [] UE / Cervical: cervical, postural, scapular, scapulothoracic and UE control with self care, reaching, carrying, lifting, house/yardwork, driving, computer work  [] (08931)Reviewed/Progressed HEP activities related to improving balance, coordination, kinesthetic sense, posture, motor skill, proprioception of   [] LE: core, proximal hip and LE for self care, mobility, lifting, and ambulation/stair navigation    [] UE / Cervical: cervical, postural,  scapular, scapulothoracic and UE control with self care, reaching, carrying, lifting, house/yardwork, driving, computer work    Manual Treatments:  PROM / STM / Oscillations-Mobs:  G-I, II, III, IV (PA's, Inf., Post.)  [] (02081) Provided manual therapy to mobilize LE, proximal hip and/or LS spine soft tissue/joints for the purpose of modulating pain, promoting relaxation,  increasing ROM, reducing/eliminating soft tissue swelling/inflammation/restriction, improving soft tissue extensibility and allowing for proper ROM for normal function with   [] LE / lumbar: self care, mobility, lifting and ambulation. [] UE / Cervical: self care, reaching, carrying, lifting, house/yardwork, driving, computer work.      Modalities:  [] (60629) Vasopneumatic compression: Utilized vasopneumatic compression Progressing: []? Met: []? Not Met: []? Adjusted  4. Patient will return to functional activities including falling asleep without difficulty without increased symptoms or restriction. []? Progressing: []? Met: []? Not Met: []? Adjusted     Overall Progression Towards Functional goals/ Treatment Progress Update:  [] Patient is progressing as expected towards functional goals listed. [] Progression is slowed due to complexities/Impairments listed. [] Progression has been slowed due to co-morbidities. [x] Plan just implemented, too soon to assess goals progression <30days   [] Goals require adjustment due to lack of progress  [] Patient is not progressing as expected and requires additional follow up with physician  [] Other    Persisting Functional Limitations/Impairments:  [x]Sleeping []Sitting               []Standing []Transfers        []Walking []Kneeling               []Stairs []Squatting / bending   [x]ADLs []Reaching  [x]Lifting  [x]Housework  [x]Driving []Job related tasks  []Sports/Recreation []Other:        ASSESSMENT:   Pt continues to report R ear pain and states that she has increased soreness after therapy but then feels better. Pt states ear pain is reproduced with palpation of R SCM. Gentle STM completed this date. Plan to progress TB exercises NV per pt tolerance. Pt will continue to benefit from postural strengthening and education to decrease pain and tissue tension to return to PLOF without limitations. Treatment/Activity Tolerance:  [x] Patient able to complete tx [] Patient limited by fatigue  [] Patient limited by pain  [] Patient limited by other medical complications  [] Other:     Prognosis: [x] Good [] Fair  [] Poor    Patient Requires Follow-up: [x] Yes  [] No    PLAN:  strength, ROM/flexibility, posture and body mechs, manual, MOC, HEP, pt education     Frequency/Duration: 2 days per week for 6  Weeks:  Interventions:  [x]?   Therapeutic exercise including:strength, ROM, flexibility  [x]? NMR activation and proprioception including postural re-education  [x]? Manual therapy as indicated to include: IASTM, STM, PROM, Gr I-IV mobilizations, manipulation. [x]? Modalities as needed that may include: thermal agents, E-stim, Biofeedback, US, iontophoresis as indicated  [x]? Patient education on joint protection, postural re-education, activity modification, progression of HEP. Plan for next treatment session:    PLAN: See eval. PT 2x / week for6 weeks. [x] Continue per plan of care [] Alter current plan (see comments)  [] Plan of care initiated [] Hold pending MD visit [] Discharge    Electronically signed by: Trenton Cameron, PT, DPT    Note: If patient does not return for scheduled/ recommended follow up visits, this note will serve as a discharge from care along with most recent update on progress.

## 2021-09-09 ENCOUNTER — HOSPITAL ENCOUNTER (OUTPATIENT)
Dept: PHYSICAL THERAPY | Age: 81
Setting detail: THERAPIES SERIES
Discharge: HOME | End: 2021-09-09
Payer: MEDICARE

## 2021-09-09 PROCEDURE — 97140 MANUAL THERAPY 1/> REGIONS: CPT

## 2021-09-09 PROCEDURE — 97110 THERAPEUTIC EXERCISES: CPT

## 2021-09-09 NOTE — FLOWSHEET NOTE
[address] [facility] Physical Therapy  Phone: [phone]   Fax: [phone]    Physical Therapy Daily Treatment Note  Date:  2021    Patient Name:  Lanita Schwab    :  1940  MRN: [id number]  Medical/Treatment Diagnosis Information:  Diagnosis: cervical disc disease, R TMJ  Treatment Diagnosis: pt with painful myofascial changes, impaired posture including forward head posture, reduced postural strengths. hypomobility t/o spine, especially at upper cerv spine  Insurance/Certification information:  PT Insurance Information: medicare  Physician Information:  Referring Practitioner: Dr. Shlomo Ojeda of care signed (Y/N): []  Yes [x]  No     Date of Patient follow up with Physician:      Progress Report: []  Yes  [x]  No     Progress report due (10 Rx/or 30 days whichever is less): visit #28 or     Recertification due (POC duration/ or 90 days whichever is less): visit #12 or     Visit # Insurance Allowable Auth required? Date Range    Med nec []  Yes  [x]  No NA       Units approved Units used Date Range   NA NA NA       Latex Allergy:  [x]NO      []YES  Preferred Language for Healthcare:   [x]English       []other:    Functional Scale:        Date assessed:  NDI raw score = 18, dysfunction =36 %, taken at initial eval       Pain level: 4/10 R ear      SUBJECTIVE: Pt reports her ear pain is on and off. She had increased pain with chin tucks yesterday. Thinks she is more sore from carrying her groceries into her house yesterday. OBJECTIVE:    - Pt with no clicking or popping in TMJ, palpation of R SCM increases ear pain    - pt reports she has a spot that is iching and burning on her back. Skin inspection - small, red oval on R scapular region with smooth borders. Pt reports she has a dermatology appointment within the next 2 weeks.  - amb with RW this date. Jaelyn Marcial is too tall, but it does not lower further.  Increased tissue tension in R SCM and scalenes. RESTRICTIONS/PRECAUTIONS:osteopososis/osteopenia; has lost 3 friends and her  and brother int he last 17 months, joint replacements ( B knees, R ankle, B shoudlers): x 5  Spinal cord stimulator (lumbar) put in by Dr. Eleanor Mcgee - recently had new battery put in.        Exercises/Interventions:     Therapeutic Exercises (52294) Resistance / level Sets/sec Reps Notes   Arm bike   2.5 min fwd/2.5 min back      UT stretch   30 sec  2 B     Chin tuck   Scap squeeze      Pulleys - flexion   5 sec  10 B  Completed to promote thoracic ext    Seated:  cerv AROM   SB  rot       10x10\" B      Seated TB  High row   Mid row  LPD    Green   Green   Green   2   2  2   10  10   10                          Therapeutic Activities (64733)                                          Neuromuscular Re-ed (61696)       Postural ed                                          Manual Intervention ([address] [facility])       Gentle manual - STMSTM and TPR to B UT L>R and levator, L erector spinae, STM to R SCM and masseter,     10 min  8/27 Done on reclined mat table  9/7, 8/30 completed seated in chair                                           Modalities:   9/9, 9/7, 9/2 - cervical MHP seated in chair at end of session x 15 min   8/31- cervical MHP seated in chair at end of session x 12 min   8/27- cervical MHP on reclined mat table x 15 min   8/25 - cervical MHP seated in chair at end of session x 10 min     Pt. Education:  8/31 - Pt educated on ABCDs of melanoma and importance of skin checks as she reports concern about itching spot on her back. Pt has dermatology appointment in 2 wks.    8/19 patient educated on diagnosis, prognosis and expectations for rehab  -all patient questions were answered    Home Exercise Program:  8/19 cerv AROM SB/ROT      Therapeutic Exercise and NMR EXR  [] (16284) Provided verbal/tactile cueing for activities related to strengthening, flexibility, endurance, ROM for improvements in  [] LE / Lumbar: LE, proximal hip, and core control with self care, mobility, lifting, ambulation. [] UE / Cervical: cervical, postural, scapular, scapulothoracic and UE control with self care, reaching, carrying, lifting, house/yardwork, driving, computer work.  [] (55779) Provided verbal/tactile cueing for activities related to improving balance, coordination, kinesthetic sense, posture, motor skill, proprioception to assist with   [] LE / lumbar: LE, proximal hip, and core control in self care, mobility, lifting, ambulation and eccentric single leg control. [] UE / cervical: cervical, scapular, scapulothoracic and UE control with self care, reaching, carrying, lifting, house/yardwork, driving, computer work.   [] (13376) Therapist is in constant attendance of 2 or more patients providing skilled therapy interventions, but not providing any significant amount of measurable one-on-one time to either patient, for improvements in  [] LE / lumbar: LE, proximal hip, and core control in self care, mobility, lifting, ambulation and eccentric single leg control. [] UE / cervical: cervical, scapular, scapulothoracic and UE control with self care, reaching, carrying, lifting, house/yardwork, driving, computer work.      NMR and Therapeutic Activities:    [] (45144 or 55868) Provided verbal/tactile cueing for activities related to improving balance, coordination, kinesthetic sense, posture, motor skill, proprioception and motor activation to allow for proper function of   [] LE: / Lumbar core, proximal hip and LE with self care and ADLs  [] UE / Cervical: cervical, postural, scapular, scapulothoracic and UE control with self care, carrying, lifting, driving, computer work.   [] (21100) Gait Re-education- Provided training and instruction to the patient for proper LE, core and proximal hip recruitment and positioning and eccentric body weight control with ambulation re-education including up and down stairs     Home Management Training / Self Care:  [] (22671) Provided self-care/home management training related to activities of daily living and compensatory training, and/or use of adaptive equipment for improvement with: ADLs and compensatory training, meal preparation, safety procedures and instruction in use of adaptive equipment, including bathing, grooming, dressing, personal hygiene, basic household cleaning and chores. Home Exercise Program:    [x] (60388) Reviewed/Progressed HEP activities related to strengthening, flexibility, endurance, ROM of   [] LE / Lumbar: core, proximal hip and LE for functional self-care, mobility, lifting and ambulation/stair navigation   [] UE / Cervical: cervical, postural, scapular, scapulothoracic and UE control with self care, reaching, carrying, lifting, house/yardwork, driving, computer work  [] (40602)Reviewed/Progressed HEP activities related to improving balance, coordination, kinesthetic sense, posture, motor skill, proprioception of   [] LE: core, proximal hip and LE for self care, mobility, lifting, and ambulation/stair navigation    [] UE / Cervical: cervical, postural,  scapular, scapulothoracic and UE control with self care, reaching, carrying, lifting, house/yardwork, driving, computer work    Manual Treatments:  PROM / STM / Oscillations-Mobs:  G-I, II, III, IV (PA's, Inf., Post.)  [] (94387) Provided manual therapy to mobilize LE, proximal hip and/or LS spine soft tissue/joints for the purpose of modulating pain, promoting relaxation,  increasing ROM, reducing/eliminating soft tissue swelling/inflammation/restriction, improving soft tissue extensibility and allowing for proper ROM for normal function with   [] LE / lumbar: self care, mobility, lifting and ambulation. [] UE / Cervical: self care, reaching, carrying, lifting, house/yardwork, driving, computer work.      Modalities:  [] (44229) Vasopneumatic compression: Utilized vasopneumatic compression to decrease edema / swelling for the purpose Patient will return to functional activities including falling asleep without difficulty without increased symptoms or restriction. []? Progressing: []? Met: []? Not Met: []? Adjusted     Overall Progression Towards Functional goals/ Treatment Progress Update:  [] Patient is progressing as expected towards functional goals listed. [] Progression is slowed due to complexities/Impairments listed. [] Progression has been slowed due to co-morbidities. [x] Plan just implemented, too soon to assess goals progression <30days   [] Goals require adjustment due to lack of progress  [] Patient is not progressing as expected and requires additional follow up with physician  [] Other    Persisting Functional Limitations/Impairments:  [x]Sleeping []Sitting               []Standing []Transfers        []Walking []Kneeling               []Stairs []Squatting / bending   [x]ADLs []Reaching  [x]Lifting  [x]Housework  [x]Driving []Job related tasks  []Sports/Recreation []Other:        ASSESSMENT:  Pt with decreased pain and tissue tension this date. STM primarily on L side this date and pain decreased after manual therapy. Pt reports ear pain is on and off and currently has no ear pain. Increased resistance with TB exercises this date. Pt reported increased difficulty but no increased pain. Plan to continue to progress postural strengthening and cervical ROM per pt tolerance. Treatment/Activity Tolerance:  [x] Patient able to complete tx [] Patient limited by fatigue  [] Patient limited by pain  [] Patient limited by other medical complications  [] Other:     Prognosis: [x] Good [] Fair  [] Poor    Patient Requires Follow-up: [x] Yes  [] No    PLAN:  strength, ROM/flexibility, posture and body mechs, manual, MOC, HEP, pt education     Frequency/Duration: 2 days per week for 6  Weeks:  Interventions:  [x]? Therapeutic exercise including:strength, ROM, flexibility  [x]?   NMR activation and proprioception including postural re-education  [x]? Manual therapy as indicated to include: IASTM, STM, PROM, Gr I-IV mobilizations, manipulation. [x]? Modalities as needed that may include: thermal agents, E-stim, Biofeedback, US, iontophoresis as indicated  [x]? Patient education on joint protection, postural re-education, activity modification, progression of HEP. Plan for next treatment session:    PLAN: See eval. PT 2x / week for 6 weeks. [x] Continue per plan of care [] Alter current plan (see comments)  [] Plan of care initiated [] Hold pending MD visit [] Discharge    Electronically signed by: Jose Miguel Nowak, PT, DPT    Note: If patient does not return for scheduled/ recommended follow up visits, this note will serve as a discharge from care along with most recent update on progress.

## 2021-09-14 ENCOUNTER — HOSPITAL ENCOUNTER (OUTPATIENT)
Dept: PHYSICAL THERAPY | Age: 81
Setting detail: THERAPIES SERIES
Discharge: HOME | End: 2021-09-14
Payer: MEDICARE

## 2021-09-14 PROCEDURE — 97110 THERAPEUTIC EXERCISES: CPT

## 2021-09-14 PROCEDURE — 97530 THERAPEUTIC ACTIVITIES: CPT

## 2021-09-14 PROCEDURE — 97140 MANUAL THERAPY 1/> REGIONS: CPT

## 2021-09-14 NOTE — FLOWSHEET NOTE
[address] [facility] Physical Therapy  Phone: [phone]   Fax: [phone]    Physical Therapy Daily Treatment Note  Date:  2021    Patient Name:  Susana Diaz    :  1940  MRN: [id number]  Medical/Treatment Diagnosis Information:  Diagnosis: cervical disc disease, R TMJ  Treatment Diagnosis: pt with painful myofascial changes, impaired posture including forward head posture, reduced postural strengths. hypomobility t/o spine, especially at upper cerv spine  Insurance/Certification information:  PT Insurance Information: medicare  Physician Information:  Referring Practitioner: Dr. Mukund Champagne of care signed (Y/N): []  Yes [x]  No     Date of Patient follow up with Physician:      Progress Report: []  Yes  [x]  No     Progress report due (10 Rx/or 30 days whichever is less): visit #64 or     Recertification due (POC duration/ or 90 days whichever is less): visit #12 or     Visit # Insurance Allowable Auth required? Date Range    Med nec []  Yes  [x]  No NA       Units approved Units used Date Range   NA NA NA       Latex Allergy:  [x]NO      []YES  Preferred Language for Healthcare:   [x]English       []other:    Functional Scale:        Date assessed:  NDI raw score = 18, dysfunction =36 %, taken at initial eval       Pain level: 3-4/10 R ear      SUBJECTIVE:  Intermittent pain still gets pain in neck and face/ear. Thinks PT tx is helping. Reports sleeping is good. Cn turn  to the right better    OBJECTIVE:    pt reports slouched posture with excessive cerv flex when she watches TV. Palpation: Myofascial pain R>L UT, scalenes. SCM and masseter WFL B.    cerv AROM  R L  SB  40 25  Rot  60 50     - Pt with no clicking or popping in TMJ, palpation of R SCM increases ear pain    - pt reports she has a spot that is iching and burning on her back. Skin inspection - small, red oval on R scapular region with smooth borders.  Pt reports she has a dermatology appointment within the next 2 weeks. 8/25 - amb with RW this date. Carlito Laurent is too tall, but it does not lower further. Increased tissue tension in R SCM and scalenes. RESTRICTIONS/PRECAUTIONS:osteopososis/osteopenia; has lost 3 friends and her  and brother int he last 17 months, joint replacements ( B knees, R ankle, B shoudlers): x 5  Spinal cord stimulator (lumbar) put in by Dr. Avani Finch - recently had new battery put in.        Exercises/Interventions:     Therapeutic Exercises (93381) Resistance / level Sets/sec Reps Notes   Arm bike   2.5 min fwd/2.5 min back      UT stretch   30 sec  2 B     Chin tuck   Scap squeeze      Pulleys - flexion   5 sec  10 B  Completed to promote thoracic ext    Seated:    cerv AROM   SB  Rot  UT stretches  scalene stretches Sitting with pillows and towel roll at lumbar spine to provide upright sitting posture with good support    10x10\" B  3x30\"  3x30\"      Seated TB  High row   Mid row  LPD    Green   Green   Green   2   2  2   10  10   10                          Therapeutic Activities (66720)       Instruct in upright sitting posture for watching TV 10'                                  Neuromuscular Re-ed (66129)       Postural ed                                          Manual Intervention (38374)       Gentle manual - STM IASTM and TPR to B UT L>R and levator, L erector spinae, STM to R SCM and masseter,     10 min  8/27 Done on reclined mat table  9/7, 8/30 completed seated in chair                                           Modalities:   9/14 9/9, 9/7, 9/2 - cervical MHP seated in chair at end of session x 15 min   8/31- cervical MHP seated in chair at end of session x 12 min   8/27- cervical MHP on reclined mat table x 15 min   8/25 - cervical MHP seated in chair at end of session x 10 min     Pt. Education:  8/31 - Pt educated on ABCDs of melanoma and importance of skin checks as she reports concern about itching spot on her back.  Pt has dermatology ADLs  [] UE / Cervical: cervical, postural, scapular, scapulothoracic and UE control with self care, carrying, lifting, driving, computer work.   [] (47568) Gait Re-education- Provided training and instruction to the patient for proper LE, core and proximal hip recruitment and positioning and eccentric body weight control with ambulation re-education including up and down stairs     Home Management Training / Self Care:  [] (80067) Provided self-care/home management training related to activities of daily living and compensatory training, and/or use of adaptive equipment for improvement with: ADLs and compensatory training, meal preparation, safety procedures and instruction in use of adaptive equipment, including bathing, grooming, dressing, personal hygiene, basic household cleaning and chores.      Home Exercise Program:    [x] (15951) Reviewed/Progressed HEP activities related to strengthening, flexibility, endurance, ROM of   [] LE / Lumbar: core, proximal hip and LE for functional self-care, mobility, lifting and ambulation/stair navigation   [] UE / Cervical: cervical, postural, scapular, scapulothoracic and UE control with self care, reaching, carrying, lifting, house/yardwork, driving, computer work  [] (19894)Reviewed/Progressed HEP activities related to improving balance, coordination, kinesthetic sense, posture, motor skill, proprioception of   [] LE: core, proximal hip and LE for self care, mobility, lifting, and ambulation/stair navigation    [] UE / Cervical: cervical, postural,  scapular, scapulothoracic and UE control with self care, reaching, carrying, lifting, house/yardwork, driving, computer work    Manual Treatments:  PROM / STM / Oscillations-Mobs:  G-I, II, III, IV (PA's, Inf., Post.)  [] (67297) Provided manual therapy to mobilize LE, proximal hip and/or LS spine soft tissue/joints for the purpose of modulating pain, promoting relaxation,  increasing ROM, reducing/eliminating soft tissue swelling/inflammation/restriction, improving soft tissue extensibility and allowing for proper ROM for normal function with   [] LE / lumbar: self care, mobility, lifting and ambulation. [] UE / Cervical: self care, reaching, carrying, lifting, house/yardwork, driving, computer work. Modalities:  [] (98635) Vasopneumatic compression: Utilized vasopneumatic compression to decrease edema / swelling for the purpose of improving mobility and quad tone / recruitment which will allow for increased overall function including but not limited to self-care, transfers, ambulation, and ascending / descending stairs. Charges:  Timed Code Treatment Minutes: 45   Total Treatment Minutes: 55     [] EVAL - LOW (66097)   [] EVAL - MOD (42444)  [] EVAL - HIGH (39224)  [] RE-EVAL (34222)  [x] KZ(33958) x   1    [] Ionto  [] NMR (53821) x       [] Vaso  [x] Manual (81021) x 1      [] Ultrasound  [x] TA x   1     [] Mech Traction (84771)  [] Aquatic Therapy x      [] ES (un) (42192):   [] Home Management Training x  [] ES(attended) (53581)   [] Dry Needling 1-2 muscles (27786):  [] Dry Needling 3+ muscles (118903)  [] Group:      [] Other:          GOALS:  Patient stated goal: less pain  []? Progressing: []? Met: []? Not Met: []? Adjusted     Therapist goals for Patient:   Short Term Goals: To be achieved in: 2 weeks  1. Independent in HEP and progression per patient tolerance, in order to prevent re-injury. []? Progressing: []? Met: []? Not Met: []? Adjusted  2. Patient will have a decrease in pain to facilitate improvement in movement, function, and ADLs as indicated by improvement with respect to Functional Deficits. []? Progressing: []? Met: []? Not Met: []? Adjusted     Long Term Goals: To be achieved in: 6 weeks  1. Disability index score of 10% or less on the  NDI  to assist with reaching prior level of function. []? Progressing: []? Met: []? Not Met: []? Adjusted  2.  Patient will demonstrate increased AROM  to WFL, to allow for proper joint functioning to allow pt to resume turning head while driving without increase in symptoms. []? Progressing: []? Met: []? Not Met: []? Adjusted  3. Patient will demonstrate increased Strength by 1/2 mmt grade  patients Functional Deficits to allow pt to resume amb with/without RW prn for amb in community without increase in symptoms. []? Progressing: []? Met: []? Not Met: []? Adjusted  4. Patient will return to functional activities including falling asleep without difficulty without increased symptoms or restriction. []? Progressing: []? Met: []? Not Met: []? Adjusted     Overall Progression Towards Functional goals/ Treatment Progress Update:  [] Patient is progressing as expected towards functional goals listed. [] Progression is slowed due to complexities/Impairments listed. [] Progression has been slowed due to co-morbidities. [x] Plan just implemented, too soon to assess goals progression <30days   [] Goals require adjustment due to lack of progress  [] Patient is not progressing as expected and requires additional follow up with physician  [] Other    Persisting Functional Limitations/Impairments:  [x]Sleeping []Sitting               []Standing []Transfers        []Walking []Kneeling               []Stairs []Squatting / bending   [x]ADLs []Reaching  [x]Lifting  [x]Housework  [x]Driving []Job related tasks  []Sports/Recreation []Other:        ASSESSMENT:   9/14 needs improved postures, especially for TV watching   9/9 Pt with decreased pain and tissue tension this date. STM primarily on L side this date and pain decreased after manual therapy. Pt reports ear pain is on and off and currently has no ear pain. Increased resistance with TB exercises this date. Pt reported increased difficulty but no increased pain. Plan to continue to progress postural strengthening and cervical ROM per pt tolerance.      Treatment/Activity Tolerance:  [x] Patient able to complete tx [] Patient limited by fatigue  [] Patient limited by pain  [] Patient limited by other medical complications  [] Other:     Prognosis: [x] Good [] Fair  [] Poor    Patient Requires Follow-up: [x] Yes  [] No    PLAN:  strength, ROM/flexibility, posture and body mechs, manual, MOC, HEP, pt education     Frequency/Duration: 2 days per week for 6  Weeks:  Interventions:  [x]? Therapeutic exercise including:strength, ROM, flexibility  [x]? NMR activation and proprioception including postural re-education  [x]? Manual therapy as indicated to include: IASTM, STM, PROM, Gr I-IV mobilizations, manipulation. [x]? Modalities as needed that may include: thermal agents, E-stim, Biofeedback, US, iontophoresis as indicated  [x]? Patient education on joint protection, postural re-education, activity modification, progression of HEP. Plan for next treatment session:    PLAN: See eval. PT 2x / week for 6 weeks. [x] Continue per plan of care [] Alter current plan (see comments)  [] Plan of care initiated [] Hold pending MD visit [] Discharge    Electronically signed by: Kenna Tellez, PT, DPT    Note: If patient does not return for scheduled/ recommended follow up visits, this note will serve as a discharge from care along with most recent update on progress.

## 2021-09-21 ENCOUNTER — HOSPITAL ENCOUNTER (OUTPATIENT)
Dept: PHYSICAL THERAPY | Age: 81
Setting detail: THERAPIES SERIES
Discharge: HOME | End: 2021-09-21
Payer: MEDICARE

## 2021-09-21 PROCEDURE — 97530 THERAPEUTIC ACTIVITIES: CPT

## 2021-09-21 PROCEDURE — 97110 THERAPEUTIC EXERCISES: CPT

## 2021-09-21 PROCEDURE — 97140 MANUAL THERAPY 1/> REGIONS: CPT

## 2021-09-21 NOTE — PLAN OF CARE
[facility]  Phone: [phone]   Fax: [phone]  Physical Therapy Re-Certification Plan of Care    Dear Dr. Mulugeta Washington  ,    We had the pleasure of treating the following patient for physical therapy services at [facility]. A summary of our findings can be found in the updated assessment below. This includes our plan of care. If you have any questions or concerns regarding these findings, please do not hesitate to contact me at the office phone number checked above. Thank you for the referral.     Physician Signature:________________________________Date:__________________  By signing above (or electronic signature), therapist's plan is approved by physician      Functional Outcome:                                         Neck Disability Index Raw Score: 6  Neck Disability Score %: 12        Overall Response to Treatment:   [x]Patient is responding well to treatment and improvement is noted with regards  to goals   []Patient should continue to improve in reasonable time if they continue HEP   []Patient has plateaued and is no longer responding to skilled PT intervention    []Patient is getting worse and would benefit from return to referring MD   []Patient unable to adhere to initial POC   [x]Other:  O:   Posture: improving but cont to need improved computer ergonomics and increased consistency of upright sitting with appropriate support - avoiding prolonged cerv flex. Patient reports significant eye strain wit reading and computer work. Palpate: myofascial pain/tenderness: R>L UT, proximal SCM, scalenes, levator, rhomboids  Observe: pt has difficulty keeping UT/levator relaxed with OH activities     cerv AROM  R L  SB   20 21  Rot   53 48    A: pt improving - less pain, less disability, improved mobility.  Postures have improved but pt will cont to benefit from improved postures, especially for reading and computer work. Eye strain may contribute to some of pt's neck/face/head pain when she reads and works on computer. Date range of Visits 21 - 21  Total Visits:     Recommendation:    [x]Continue PT2x / wk for 5 weeks additional 10 visits               []Hold PT, pending MD visit      Physical Therapy Daily Treatment Note  Date:  2021    Patient Name:  Macy Groves    :  1940  MRN: [id number]  Medical/Treatment Diagnosis Information:  Diagnosis: cervical disc disease, R TMJ  Treatment Diagnosis: pt with painful myofascial changes, impaired posture including forward head posture, reduced postural strengths. hypomobility t/o spine, especially at upper cerv spine  Insurance/Certification information:  PT Insurance Information: medicare  Physician Information:  Referring Practitioner: Dr. Mando Siddiqui of care signed (Y/N): []  Yes [x]  No     Date of Patient follow up with Physician:      Progress Report: []  Yes  [x]  No     Progress report due (10 Rx/or 30 days whichever is less): visit #88 or     Recertification due (POC duration/ or 90 days whichever is less): visit #12 or     Visit # Insurance Allowable Auth required? Date Range    Med nec []  Yes  [x]  No NA       Units approved Units used Date Range   NA NA NA       Latex Allergy:  [x]NO      []YES  Preferred Language for Healthcare:   [x]English       []other:    Functional Scale:        Date assessed:  NDI raw score = 18, dysfunction =36 %, taken at initial eval       Pain level: 3-4/10 R ear      SUBJECTIVE:   increasing mobility without increased pain. Reports she is still taking the mm relaxer and reports she took Mikhail Rockers when she was stressed and noticed increased neck mm pain And it seemed to help. States her son hopes to get hernia surgery soon. Intermittent R ear pain with combing her hair      Intermittent pain still gets pain in neck and face/ear. Thinks PT tx is helping.   Reports sleeping is good.  Cn turn  to the right better    OBJECTIVE:   9/14 pt reports slouched posture with excessive cerv flex when she watches TV. Palpation: Myofascial pain R>L UT, scalenes. SCM and masseter WFL B.    cerv AROM  R L  SB  40 25  Rot  60 50    9/9 - Pt with no clicking or popping in TMJ, palpation of R SCM increases ear pain   8/31 - pt reports she has a spot that is iching and burning on her back. Skin inspection - small, red oval on R scapular region with smooth borders. Pt reports she has a dermatology appointment within the next 2 weeks. 8/25 - amb with RW this date. Velasquez Mccollum is too tall, but it does not lower further. Increased tissue tension in R SCM and scalenes. RESTRICTIONS/PRECAUTIONS:osteopososis/osteopenia; has lost 3 friends and her  and brother int he last 17 months, joint replacements ( B knees, R ankle, B shoudlers): x 5. Lives with her son who has multiple medical issues including COPD and heart issues.   Spinal cord stimulator (lumbar) put in by Dr. George Soto - recently had new battery put in.        Exercises/Interventions:     Therapeutic Exercises (34106) Resistance / level Sets/sec Reps Notes   Arm bike   2.5 min fwd/2.5 min back      UT stretch   30 sec  2 B     Chin tuck   Scap squeeze   2 sec 10    Pulleys - flexion   5 sec  10 B  Completed to promote thoracic ext    Seated:    cerv AROM   SB  Rot  UT stretches  scalene stretches Sitting with pillows and towel roll at lumbar spine to provide upright sitting posture with good support    10x10\" B  3x30\"  3x30\"      Seated TB  High row   Mid row  LPD    Green   Green   Green   2   2  2   10  10   10                          Therapeutic Activities (84004)       Instruct in upright sitting posture for watching TV Review and instruct in computer ergonomics  10'                                  Neuromuscular Re-ed (28087)       Postural ed                                          Manual Intervention (94182)       Gentle manual - STM IASTM to B UT, levator, rhomboids, scalenes, SCM        8 min  8/27 Done on reclined mat table  9/21, 9/7, 8/30 completed seated in chair                                           Modalities:   9/14 9/9, 9/7, 9/2 - cervical MHP seated in chair at end of session x 15 min   8/31- cervical MHP seated in chair at end of session x 12 min   8/27- cervical MHP on reclined mat table x 15 min   8/25 - cervical MHP seated in chair at end of session x 10 min     Pt. Education:  8/31 - Pt educated on ABCDs of melanoma and importance of skin checks as she reports concern about itching spot on her back. Pt has dermatology appointment in 2 wks. 8/19 patient educated on diagnosis, prognosis and expectations for rehab  -all patient questions were answered    Home Exercise Program:  9/21  computer ergonomics  9/14 upright psoture for TV viewing  8/19 cerv AROM SB/ROT      Therapeutic Exercise and NMR EXR  [] (94962) Provided verbal/tactile cueing for activities related to strengthening, flexibility, endurance, ROM for improvements in  [] LE / Lumbar: LE, proximal hip, and core control with self care, mobility, lifting, ambulation. [] UE / Cervical: cervical, postural, scapular, scapulothoracic and UE control with self care, reaching, carrying, lifting, house/yardwork, driving, computer work.  [] (06085) Provided verbal/tactile cueing for activities related to improving balance, coordination, kinesthetic sense, posture, motor skill, proprioception to assist with   [] LE / lumbar: LE, proximal hip, and core control in self care, mobility, lifting, ambulation and eccentric single leg control.    [] UE / cervical: cervical, scapular, scapulothoracic and UE control with self care, reaching, carrying, lifting, house/yardwork, driving, computer work.   [] (43387) Therapist is in constant attendance of 2 or more patients providing skilled therapy interventions, but not providing any significant amount of measurable one-on-one time to either patient, for improvements in  [] LE / lumbar: LE, proximal hip, and core control in self care, mobility, lifting, ambulation and eccentric single leg control. [] UE / cervical: cervical, scapular, scapulothoracic and UE control with self care, reaching, carrying, lifting, house/yardwork, driving, computer work. NMR and Therapeutic Activities:    [] (38973 or 50211) Provided verbal/tactile cueing for activities related to improving balance, coordination, kinesthetic sense, posture, motor skill, proprioception and motor activation to allow for proper function of   [] LE: / Lumbar core, proximal hip and LE with self care and ADLs  [] UE / Cervical: cervical, postural, scapular, scapulothoracic and UE control with self care, carrying, lifting, driving, computer work.   [] (86086) Gait Re-education- Provided training and instruction to the patient for proper LE, core and proximal hip recruitment and positioning and eccentric body weight control with ambulation re-education including up and down stairs     Home Management Training / Self Care:  [] (23094) Provided self-care/home management training related to activities of daily living and compensatory training, and/or use of adaptive equipment for improvement with: ADLs and compensatory training, meal preparation, safety procedures and instruction in use of adaptive equipment, including bathing, grooming, dressing, personal hygiene, basic household cleaning and chores.      Home Exercise Program:    [x] (19993) Reviewed/Progressed HEP activities related to strengthening, flexibility, endurance, ROM of   [] LE / Lumbar: core, proximal hip and LE for functional self-care, mobility, lifting and ambulation/stair navigation   [] UE / Cervical: cervical, postural, scapular, scapulothoracic and UE control with self care, reaching, carrying, lifting, house/yardwork, driving, computer work  [] (21078)Reviewed/Progressed HEP activities related to improving balance, coordination, kinesthetic sense, posture, motor skill, proprioception of   [] LE: core, proximal hip and LE for self care, mobility, lifting, and ambulation/stair navigation    [] UE / Cervical: cervical, postural,  scapular, scapulothoracic and UE control with self care, reaching, carrying, lifting, house/yardwork, driving, computer work    Manual Treatments:  PROM / STM / Oscillations-Mobs:  G-I, II, III, IV (PA's, Inf., Post.)  [] (15287) Provided manual therapy to mobilize LE, proximal hip and/or LS spine soft tissue/joints for the purpose of modulating pain, promoting relaxation,  increasing ROM, reducing/eliminating soft tissue swelling/inflammation/restriction, improving soft tissue extensibility and allowing for proper ROM for normal function with   [] LE / lumbar: self care, mobility, lifting and ambulation. [] UE / Cervical: self care, reaching, carrying, lifting, house/yardwork, driving, computer work. Modalities:  [] (79696) Vasopneumatic compression: Utilized vasopneumatic compression to decrease edema / swelling for the purpose of improving mobility and quad tone / recruitment which will allow for increased overall function including but not limited to self-care, transfers, ambulation, and ascending / descending stairs. Charges:  Timed Code Treatment Minutes: 45   Total Treatment Minutes: 45     [] EVAL - LOW (02463)   [] EVAL - MOD (23491)  [] EVAL - HIGH (99151)  [] RE-EVAL (23838)  [x] WX(52125) x   1    [] Ionto  [] NMR (68339) x       [] Vaso  [x] Manual (33255) x 1      [] Ultrasound  [x] TA x   1     [] Mech Traction (96790)  [] Aquatic Therapy x      [] ES (un) (43929):   [] Home Management Training x  [] ES(attended) (33663)   [] Dry Needling 1-2 muscles (19315):  [] Dry Needling 3+ muscles (074834)  [] Group:      [] Other:          GOALS:  Patient stated goal: less pain  []? Progressing: []? Met: []? Not Met: []? Adjusted     Therapist goals for Patient:   Short Term Goals:  To be achieved in: 2 weeks  1. Independent in HEP and progression per patient tolerance, in order to prevent re-injury. []? Progressing: []? Met: []? Not Met: []? Adjusted  2. Patient will have a decrease in pain to facilitate improvement in movement, function, and ADLs as indicated by improvement with respect to Functional Deficits. []? Progressing: []? Met: []? Not Met: []? Adjusted     Long Term Goals: To be achieved in: 6 weeks  1. Disability index score of 10% or less on the  NDI  to assist with reaching prior level of function. [x]? Progressing: []? Met: []? Not Met: []? Adjusted  2. Patient will demonstrate increased AROM  to [email or web address], to allow for proper joint functioning to allow pt to resume turning head while driving without increase in symptoms. [x]? Progressing: []? Met: []? Not Met: []? Adjusted  3. Patient will demonstrate increased Strength by 1/2 mmt grade  patients Functional Deficits to allow pt to resume amb with/without RW prn for amb in community without increase in symptoms. [x]? Progressing: []? Met: []? Not Met: []? Adjusted  4. Patient will return to functional activities including falling asleep without difficulty without increased symptoms or restriction. [x]? Progressing: []? Met: []? Not Met: []? Adjusted     Overall Progression Towards Functional goals/ Treatment Progress Update:  [] Patient is progressing as expected towards functional goals listed. [] Progression is slowed due to complexities/Impairments listed. [] Progression has been slowed due to co-morbidities.   [x] Plan just implemented, too soon to assess goals progression <30days   [] Goals require adjustment due to lack of progress  [] Patient is not progressing as expected and requires additional follow up with physician  [] Other    Persisting Functional Limitations/Impairments:  [x]Sleeping []Sitting               []Standing []Transfers        []Walking []Kneeling               []Stairs []Squatting / bending   [x]ADLs []Reaching  [x]Lifting  [x]Housework  [x]Driving []Job related tasks  []Sports/Recreation []Other:        ASSESSMENT: 9/21 see PT POC above    9/14 needs improved postures, especially for TV watching   9/9 Pt with decreased pain and tissue tension this date. STM primarily on L side this date and pain decreased after manual therapy. Pt reports ear pain is on and off and currently has no ear pain. Increased resistance with TB exercises this date. Pt reported increased difficulty but no increased pain. Plan to continue to progress postural strengthening and cervical ROM per pt tolerance. Treatment/Activity Tolerance:  [x] Patient able to complete tx [] Patient limited by fatigue  [] Patient limited by pain  [] Patient limited by other medical complications  [] Other:     Prognosis: [x] Good [] Fair  [] Poor    Patient Requires Follow-up: [x] Yes  [] No    PLAN:  strength, ROM/flexibility, posture and body mechs, manual, MOC, HEP, pt education     Frequency/Duration: 2 days per week for 6  Weeks:  Interventions:  [x]? Therapeutic exercise including:strength, ROM, flexibility  [x]? NMR activation and proprioception including postural re-education  [x]? Manual therapy as indicated to include: IASTM, STM, PROM, Gr I-IV mobilizations, manipulation. [x]? Modalities as needed that may include: thermal agents, E-stim, Biofeedback, US, iontophoresis as indicated  [x]? Patient education on joint protection, postural re-education, activity modification, progression of HEP. Plan for next treatment session:    PLAN: See eval. PT 2x / week for 6 weeks.    [x] Continue per plan of care [] Alter current plan (see comments)  [] Plan of care initiated [] Hold pending MD visit [] Discharge    Electronically signed by: Nathan Liu, PT, DPT    Note: If patient does not return for scheduled/ recommended follow up visits, this note will serve as a discharge from care along with most recent update on progress.

## 2021-09-23 ENCOUNTER — HOSPITAL ENCOUNTER (OUTPATIENT)
Dept: PHYSICAL THERAPY | Age: 81
Setting detail: THERAPIES SERIES
Discharge: HOME | End: 2021-09-23
Payer: MEDICARE

## 2021-09-23 PROCEDURE — 97110 THERAPEUTIC EXERCISES: CPT

## 2021-09-23 PROCEDURE — 97140 MANUAL THERAPY 1/> REGIONS: CPT

## 2021-09-23 PROCEDURE — 97530 THERAPEUTIC ACTIVITIES: CPT

## 2021-09-23 NOTE — FLOWSHEET NOTE
[address] [facility] Physical Therapy  Phone: [phone]   Fax: [phone]  Physical Therapy Re-Certification Plan of Care    Physical Therapy Daily Treatment Note  Date:  2021    Patient Name:  Sandi Hemphill    :  1940  MRN: [id number]  Medical/Treatment Diagnosis Information:  Diagnosis: cervical disc disease, R TMJ  Treatment Diagnosis: pt with painful myofascial changes, impaired posture including forward head posture, reduced postural strengths. hypomobility t/o spine, especially at upper cerv spine  Insurance/Certification information:  PT Insurance Information: medicare  Physician Information:  Referring Practitioner: Dr. Reagan Waterman of care signed (Y/N): []  Yes [x]  No     Date of Patient follow up with Physician:      Progress Report: []  Yes  [x]  No     Progress report due (10 Rx/or 30 days whichever is less): visit #67 or 3/48    Recertification due (POC duration/ or 90 days whichever is less): visit #12 or     Visit # Insurance Allowable Auth required? Date Range   10/12 Med nec []  Yes  [x]  No NA       Units approved Units used Date Range   NA NA NA       Latex Allergy:  [x]NO      []YES  Preferred Language for Healthcare:   [x]English       []other:    Functional Scale:        Date assessed:  NDI raw score = 18, dysfunction =36 %, taken at initial eval       Pain level: 3-4/10 R ear      SUBJECTIVE:  : Patient reports that she is feeling well today and not having too much pain. Patient states that she is trying her best to create a more ergonomic home desk environment for her laptop. Patient states that she has not found a solution that she prefers yet and will coninue to try and find a solution that works for her. Pt asks re life alert systems for home use     increasing mobility without increased pain.  Reports she is still taking the mm relaxer and reports she took Noralee Roers when she was stressed and noticed increased neck mm pain And it seemed to help. States her son hopes to get hernia surgery soon. Intermittent R ear pain with combing her hair     9/14 Intermittent pain still gets pain in neck and face/ear. Thinks PT tx is helping. Reports sleeping is good. Cn turn  to the right better    OBJECTIVE: 9/23 Patient ambulates with AD 7VA, Patient ambulates with forward head, rounded shoulders, and kyphotic curve in T-spine. 9/14 pt reports slouched posture with excessive cerv flex when she watches TV. Palpation: Myofascial pain R>L UT, scalenes. SCM and masseter WFL B.    cerv AROM  R L  SB  40 25  Rot  60 50    9/9 - Pt with no clicking or popping in TMJ, palpation of R SCM increases ear pain   8/31 - pt reports she has a spot that is iching and burning on her back. Skin inspection - small, red oval on R scapular region with smooth borders. Pt reports she has a dermatology appointment within the next 2 weeks. 8/25 - amb with RW this date. Meenu Vogt is too tall, but it does not lower further. Increased tissue tension in R SCM and scalenes. RESTRICTIONS/PRECAUTIONS:osteopososis/osteopenia; has lost 3 friends and her  and brother int he last 17 months, joint replacements ( B knees, R ankle, B shoudlers): x 5. Lives with her son who has multiple medical issues including COPD and heart issues.   Spinal cord stimulator (lumbar) put in by Dr. Gladis Donovan - recently had new battery put in.        Exercises/Interventions:     Therapeutic Exercises (61405) Resistance / level Sets/sec Reps Notes   Arm bike   2.5 min fwd/2.5 min back      UT stretch      Chin tuck   Scap squeeze- T band peach   2 sec 10    Pulleys - flexion   5 sec  10 B  Completed to promote thoracic ext    Seated:    cerv AROM   SB  Rot  UT stretches  scalene stretches       Seated TB  High row   Mid row  LPD    Green   Green   Green   2   2  2   10  10   10                          Therapeutic Activities (85123)       Instruct in upright sitting posture for watching TV Review and instruct in computer ergonomics, appropriate desk set up x 5'                                    Neuromuscular Re-ed (10097)       Postural ed                                          Manual Intervention ([phone])       Gentle manual - STM   IASTM to B UT, levator, rhomboids, scalenes, SCM        8 min  8/27 Done on reclined mat table  9/21, 9/7, 8/30 completed seated in chair                                           Modalities:   9/23, 9/14 9/9, 9/7, 9/2 - cervical MHP seated in chair at end of session x 15 min   8/31- cervical MHP seated in chair at end of session x 12 min   8/27- cervical MHP on reclined mat table x 15 min   8/25 - cervical MHP seated in chair at end of session x 10 min     Pt. Education:  9/23: pt Educated re safety in home /fall risk reduction. At pt request d/w pt re life alert systems. Patient ed  on option of obtaining an apple watch to use as a \"life alert\" system. Patient likes this idea and state she will have her son help her to look into it.   8/31 - Pt educated on ABCDs of melanoma and importance of skin checks as she reports concern about itching spot on her back. Pt has dermatology appointment in 2 wks. 8/19 patient educated on diagnosis, prognosis and expectations for rehab  -all patient questions were answered    Home Exercise Program:  9/23 computer ergonomics and apple watch for fall risk reduction  9/21  computer ergonomics  9/14 upright psoture for TV viewing  8/19 cerv AROM SB/ROT      Therapeutic Exercise and NMR EXR  [] [phone]) Provided verbal/tactile cueing for activities related to strengthening, flexibility, endurance, ROM for improvements in  [] LE / Lumbar: LE, proximal hip, and core control with self care, mobility, lifting, ambulation.   [] UE / Cervical: cervical, postural, scapular, scapulothoracic and UE control with self care, reaching, carrying, lifting, house/yardwork, driving, computer work.  [] (48125) Provided verbal/tactile cueing for activities related to improving balance, coordination, kinesthetic sense, posture, motor skill, proprioception to assist with   [] LE / lumbar: LE, proximal hip, and core control in self care, mobility, lifting, ambulation and eccentric single leg control. [] UE / cervical: cervical, scapular, scapulothoracic and UE control with self care, reaching, carrying, lifting, house/yardwork, driving, computer work.   [] (02854) Therapist is in constant attendance of 2 or more patients providing skilled therapy interventions, but not providing any significant amount of measurable one-on-one time to either patient, for improvements in  [] LE / lumbar: LE, proximal hip, and core control in self care, mobility, lifting, ambulation and eccentric single leg control. [] UE / cervical: cervical, scapular, scapulothoracic and UE control with self care, reaching, carrying, lifting, house/yardwork, driving, computer work.       NMR and Therapeutic Activities:    [] (91065 or 64290) Provided verbal/tactile cueing for activities related to improving balance, coordination, kinesthetic sense, posture, motor skill, proprioception and motor activation to allow for proper function of   [] LE: / Lumbar core, proximal hip and LE with self care and ADLs  [] UE / Cervical: cervical, postural, scapular, scapulothoracic and UE control with self care, carrying, lifting, driving, computer work.   [] (37848) Gait Re-education- Provided training and instruction to the patient for proper LE, core and proximal hip recruitment and positioning and eccentric body weight control with ambulation re-education including up and down stairs     Home Management Training / Self Care:  [] (22470) Provided self-care/home management training related to activities of daily living and compensatory training, and/or use of adaptive equipment for improvement with: ADLs and compensatory training, meal preparation, safety procedures and instruction in use of adaptive equipment, including bathing, grooming, dressing, personal hygiene, basic household cleaning and chores. Home Exercise Program:    [x] (81602) Reviewed/Progressed HEP activities related to strengthening, flexibility, endurance, ROM of   [] LE / Lumbar: core, proximal hip and LE for functional self-care, mobility, lifting and ambulation/stair navigation   [] UE / Cervical: cervical, postural, scapular, scapulothoracic and UE control with self care, reaching, carrying, lifting, house/yardwork, driving, computer work  [] (53643)Reviewed/Progressed HEP activities related to improving balance, coordination, kinesthetic sense, posture, motor skill, proprioception of   [] LE: core, proximal hip and LE for self care, mobility, lifting, and ambulation/stair navigation    [] UE / Cervical: cervical, postural,  scapular, scapulothoracic and UE control with self care, reaching, carrying, lifting, house/yardwork, driving, computer work    Manual Treatments:  PROM / STM / Oscillations-Mobs:  G-I, II, III, IV (PA's, Inf., Post.)  [] (83522) Provided manual therapy to mobilize LE, proximal hip and/or LS spine soft tissue/joints for the purpose of modulating pain, promoting relaxation,  increasing ROM, reducing/eliminating soft tissue swelling/inflammation/restriction, improving soft tissue extensibility and allowing for proper ROM for normal function with   [] LE / lumbar: self care, mobility, lifting and ambulation. [] UE / Cervical: self care, reaching, carrying, lifting, house/yardwork, driving, computer work. Modalities:  [] (00747) Vasopneumatic compression: Utilized vasopneumatic compression to decrease edema / swelling for the purpose of improving mobility and quad tone / recruitment which will allow for increased overall function including but not limited to self-care, transfers, ambulation, and ascending / descending stairs.        Charges:  Timed Code Treatment Minutes: 45   Total Treatment Minutes: 45     [] EVAL - LOW (31185)   [] EVAL - MOD (56589)  [] EVAL - HIGH (08364)  [] RE-EVAL (90454)  [x] UC(42343) x   1    [] Ionto  [] NMR (50624) x       [] Vaso  [x] Manual (22211) x 1      [] Ultrasound  [x] TA x   1     [] Mech Traction (67074)  [] Aquatic Therapy x      [] ES (un) (70906):   [] Home Management Training x  [] ES(attended) (01783)   [] Dry Needling 1-2 muscles (12972):  [] Dry Needling 3+ muscles (191070)  [] Group:      [] Other:          GOALS:  Patient stated goal: less pain  []? Progressing: []? Met: []? Not Met: []? Adjusted     Therapist goals for Patient:   Short Term Goals: To be achieved in: 2 weeks  1. Independent in HEP and progression per patient tolerance, in order to prevent re-injury. []? Progressing: []? Met: []? Not Met: []? Adjusted  2. Patient will have a decrease in pain to facilitate improvement in movement, function, and ADLs as indicated by improvement with respect to Functional Deficits. []? Progressing: []? Met: []? Not Met: []? Adjusted     Long Term Goals: To be achieved in: 6 weeks  1. Disability index score of 10% or less on the  NDI  to assist with reaching prior level of function. [x]? Progressing: []? Met: []? Not Met: []? Adjusted  2. Patient will demonstrate increased AROM  to [facility], to allow for proper joint functioning to allow pt to resume turning head while driving without increase in symptoms. [x]? Progressing: []? Met: []? Not Met: []? Adjusted  3. Patient will demonstrate increased Strength by 1/2 mmt grade  patients Functional Deficits to allow pt to resume amb with/without RW prn for amb in community without increase in symptoms. [x]? Progressing: []? Met: []? Not Met: []? Adjusted  4. Patient will return to functional activities including falling asleep without difficulty without increased symptoms or restriction. [x]? Progressing: []? Met: []? Not Met: []?  Adjusted     Overall Progression Towards Functional goals/ Treatment Progress Update:  [] Patient is progressing as expected towards functional goals listed. [] Progression is slowed due to complexities/Impairments listed. [] Progression has been slowed due to co-morbidities. [x] Plan just implemented, too soon to assess goals progression <30days   [] Goals require adjustment due to lack of progress  [] Patient is not progressing as expected and requires additional follow up with physician  [] Other    Persisting Functional Limitations/Impairments:  [x]Sleeping []Sitting               []Standing []Transfers        []Walking []Kneeling               []Stairs []Squatting / bending   [x]ADLs []Reaching  [x]Lifting  [x]Housework  [x]Driving []Job related tasks  []Sports/Recreation []Other:        ASSESSMENT: 9/23: Patient continues to require VC and TC during exercises in order to squeeze scapula together. Patient has difficulty maintaining posture and is struggling to find home solutions to help her adjust her body mechanics to incorporate improved posture habits during her ADL's and IADLs. In addition, pt concerned re safety at home. Patient has noted tenderness and tightness in bilat UT, levator, rhomboids, scalenes, SCM. Patient reports pain relief with STM. Plan to continue to progress appropriate postural strengthening and appropriate motor programming to increase cervical ROM. 9/21 see PT POC above    9/14 needs improved postures, especially for TV watching   9/9 Pt with decreased pain and tissue tension this date. STM primarily on L side this date and pain decreased after manual therapy. Pt reports ear pain is on and off and currently has no ear pain. Increased resistance with TB exercises this date. Pt reported increased difficulty but no increased pain. Plan to continue to progress postural strengthening and cervical ROM per pt tolerance.      Treatment/Activity Tolerance:  [x] Patient able to complete tx [] Patient limited by fatigue  [] Patient limited by pain  [] Patient limited by other medical complications  [] Other:     Prognosis: [x] Good [] Fair  [] Poor    Patient Requires Follow-up: [x] Yes  [] No    PLAN:  strength, ROM/flexibility, posture and body mechs, manual, MOC, HEP, pt education     Frequency/Duration: 2 days per week for 6  Weeks:  Interventions:  [x]? Therapeutic exercise including:strength, ROM, flexibility  [x]? NMR activation and proprioception including postural re-education  [x]? Manual therapy as indicated to include: IASTM, STM, PROM, Gr I-IV mobilizations, manipulation. [x]? Modalities as needed that may include: thermal agents, E-stim, Biofeedback, US, iontophoresis as indicated  [x]? Patient education on joint protection, postural re-education, activity modification, progression of HEP. Plan for next treatment session:    PLAN: See eval. PT 2x / week for 6 weeks. [x] Continue per plan of care [] Alter current plan (see comments)  [] Plan of care initiated [] Hold pending MD visit [] Discharge    Electronically signed by: Bobby Barrientos, PT, DPT  Therapist was present, directed the patient's care, made skilled judgement, and was responsible for assessment and treatment of the patient. Jose Molina, SPT      Note: If patient does not return for scheduled/ recommended follow up visits, this note will serve as a discharge from care along with most recent update on progress.

## 2021-09-28 ENCOUNTER — HOSPITAL ENCOUNTER (OUTPATIENT)
Dept: PHYSICAL THERAPY | Age: 81
Setting detail: THERAPIES SERIES
Discharge: HOME | End: 2021-09-28
Payer: MEDICARE

## 2021-09-28 PROCEDURE — 97110 THERAPEUTIC EXERCISES: CPT

## 2021-09-28 PROCEDURE — 97140 MANUAL THERAPY 1/> REGIONS: CPT

## 2021-09-28 NOTE — FLOWSHEET NOTE
[facility]  Phone: [phone]   Fax: [phone]  Physical Therapy Re-Certification Plan of Care    Physical Therapy Daily Treatment Note  Date:  2021    Patient Name:  Manny Robledo    :  1940  MRN: [id number]  Medical/Treatment Diagnosis Information:  Diagnosis: cervical disc disease, R TMJ  Treatment Diagnosis: pt with painful myofascial changes, impaired posture including forward head posture, reduced postural strengths. hypomobility t/o spine, especially at upper cerv spine  Insurance/Certification information:  PT Insurance Information: medicare  Physician Information:  Referring Practitioner: Dr. Jamie Singh of care signed (Y/N): []  Yes [x]  No     Date of Patient follow up with Physician:      Progress Report: []  Yes  [x]  No     Progress report due (10 Rx/or 30 days whichever is less): visit #72 or     Recertification due (POC duration/ or 90 days whichever is less): visit #12 or     Visit # Insurance Allowable Auth required? Date Range    Med nec []  Yes  [x]  No NA       Units approved Units used Date Range   NA NA NA       Latex Allergy:  [x]NO      []YES  Preferred Language for Healthcare:   [x]English       []other:    Functional Scale:        Date assessed:  NDI raw score = 18, dysfunction =36 %, taken at initial eval       Pain level: 2/10      SUBJECTIVE:     - Pt states she has a lot going on with her son's health. He is supposed to get a stress test Saturday. Reports some ear pain when she woke up, but feels good now.   : Patient reports that she is feeling well today and not having too much pain. Patient states that she is trying her best to create a more ergonomic home desk environment for her laptop. Patient states that she has not found a solution that she prefers yet and will coninue to try and find a solution that works for her.  Pt asks re life alert systems for home use     increasing row   Mid row  LPD   Horizontal abd    Green   Green   Green  [place]    2   2  2     10  10   10   12                          Therapeutic Activities (95998)       Instruct in upright sitting posture for watching TV                                   Neuromuscular Re-ed (64964)       Postural ed                                          Manual Intervention (55680)       Gentle manual - STM   STM to L UT and rhomboids        10 min  8/27 Done on reclined mat table  9/21, 9/7, 8/30 completed seated in chair                                           Modalities:   9/28 - cervical MHP seated in chair at end of session x 10 min   9/23, 9/14 9/9, 9/7, 9/2 - cervical MHP seated in chair at end of session x 15 min   8/31- cervical MHP seated in chair at end of session x 12 min   8/27- cervical MHP on reclined mat table x 15 min   8/25 - cervical MHP seated in chair at end of session x 10 min     Pt. Education:  9/23: pt Educated re safety in home /fall risk reduction. At pt request d/w pt re life alert systems. Patient ed  on option of obtaining an apple watch to use as a \"life alert\" system. Patient likes this idea and state she will have her son help her to look into it.   8/31 - Pt educated on ABCDs of melanoma and importance of skin checks as she reports concern about itching spot on her back. Pt has dermatology appointment in 2 wks. 8/19 patient educated on diagnosis, prognosis and expectations for rehab  -all patient questions were answered    Home Exercise Program:  9/23 computer ergonomics and apple watch for fall risk reduction  9/21  computer ergonomics  9/14 upright psoture for TV viewing  8/19 cerv AROM SB/ROT      Therapeutic Exercise and NMR EXR  [] [phone]) Provided verbal/tactile cueing for activities related to strengthening, flexibility, endurance, ROM for improvements in  [] LE / Lumbar: LE, proximal hip, and core control with self care, mobility, lifting, ambulation.   [] UE / Cervical: cervical, postural, scapular, scapulothoracic and UE control with self care, reaching, carrying, lifting, house/yardwork, driving, computer work.  [] (54487) Provided verbal/tactile cueing for activities related to improving balance, coordination, kinesthetic sense, posture, motor skill, proprioception to assist with   [] LE / lumbar: LE, proximal hip, and core control in self care, mobility, lifting, ambulation and eccentric single leg control. [] UE / cervical: cervical, scapular, scapulothoracic and UE control with self care, reaching, carrying, lifting, house/yardwork, driving, computer work.   [] (10854) Therapist is in constant attendance of 2 or more patients providing skilled therapy interventions, but not providing any significant amount of measurable one-on-one time to either patient, for improvements in  [] LE / lumbar: LE, proximal hip, and core control in self care, mobility, lifting, ambulation and eccentric single leg control. [] UE / cervical: cervical, scapular, scapulothoracic and UE control with self care, reaching, carrying, lifting, house/yardwork, driving, computer work.       NMR and Therapeutic Activities:    [] (01364 or 34974) Provided verbal/tactile cueing for activities related to improving balance, coordination, kinesthetic sense, posture, motor skill, proprioception and motor activation to allow for proper function of   [] LE: / Lumbar core, proximal hip and LE with self care and ADLs  [] UE / Cervical: cervical, postural, scapular, scapulothoracic and UE control with self care, carrying, lifting, driving, computer work.   [] (19284) Gait Re-education- Provided training and instruction to the patient for proper LE, core and proximal hip recruitment and positioning and eccentric body weight control with ambulation re-education including up and down stairs     Home Management Training / Self Care:  [] (35071) Provided self-care/home management training related to activities of daily living and compensatory training, and/or use of adaptive equipment for improvement with: ADLs and compensatory training, meal preparation, safety procedures and instruction in use of adaptive equipment, including bathing, grooming, dressing, personal hygiene, basic household cleaning and chores. Home Exercise Program:    [x] (98211) Reviewed/Progressed HEP activities related to strengthening, flexibility, endurance, ROM of   [] LE / Lumbar: core, proximal hip and LE for functional self-care, mobility, lifting and ambulation/stair navigation   [] UE / Cervical: cervical, postural, scapular, scapulothoracic and UE control with self care, reaching, carrying, lifting, house/yardwork, driving, computer work  [] (70675)Reviewed/Progressed HEP activities related to improving balance, coordination, kinesthetic sense, posture, motor skill, proprioception of   [] LE: core, proximal hip and LE for self care, mobility, lifting, and ambulation/stair navigation    [] UE / Cervical: cervical, postural,  scapular, scapulothoracic and UE control with self care, reaching, carrying, lifting, house/yardwork, driving, computer work    Manual Treatments:  PROM / STM / Oscillations-Mobs:  G-I, II, III, IV (PA's, Inf., Post.)  [] (06960) Provided manual therapy to mobilize LE, proximal hip and/or LS spine soft tissue/joints for the purpose of modulating pain, promoting relaxation,  increasing ROM, reducing/eliminating soft tissue swelling/inflammation/restriction, improving soft tissue extensibility and allowing for proper ROM for normal function with   [] LE / lumbar: self care, mobility, lifting and ambulation. [] UE / Cervical: self care, reaching, carrying, lifting, house/yardwork, driving, computer work.      Modalities:  [] (01105) Vasopneumatic compression: Utilized vasopneumatic compression to decrease edema / swelling for the purpose of improving mobility and quad tone / recruitment which will allow for increased overall function including but not limited to self-care, transfers, ambulation, and ascending / descending stairs. Charges:  Timed Code Treatment Minutes: 43   Total Treatment Minutes: 53     [] EVAL - LOW (51937)   [] EVAL - MOD (35071)  [] EVAL - HIGH (78658)  [] RE-EVAL (04676)  [x] AI(25240) x   2    [] Ionto  [] NMR (87776) x       [] Vaso  [x] Manual (86478) x 1      [] Ultrasound  [] TA x   1     [] Mech Traction (81147)  [] Aquatic Therapy x      [] ES (un) (00076):   [] Home Management Training x  [] ES(attended) (45018)   [] Dry Needling 1-2 muscles (34337):  [] Dry Needling 3+ muscles (281707)  [] Group:      [] Other:          GOALS:  Patient stated goal: less pain  []? Progressing: []? Met: []? Not Met: []? Adjusted     Therapist goals for Patient:   Short Term Goals: To be achieved in: 2 weeks  1. Independent in HEP and progression per patient tolerance, in order to prevent re-injury. []? Progressing: []? Met: []? Not Met: []? Adjusted  2. Patient will have a decrease in pain to facilitate improvement in movement, function, and ADLs as indicated by improvement with respect to Functional Deficits. []? Progressing: []? Met: []? Not Met: []? Adjusted     Long Term Goals: To be achieved in: 6 weeks  1. Disability index score of 10% or less on the  NDI  to assist with reaching prior level of function. [x]? Progressing: []? Met: []? Not Met: []? Adjusted  2. Patient will demonstrate increased AROM  to [facility], to allow for proper joint functioning to allow pt to resume turning head while driving without increase in symptoms. [x]? Progressing: []? Met: []? Not Met: []? Adjusted  3. Patient will demonstrate increased Strength by 1/2 mmt grade  patients Functional Deficits to allow pt to resume amb with/without RW prn for amb in community without increase in symptoms. [x]? Progressing: []? Met: []? Not Met: []? Adjusted  4.  Patient will return to functional activities including falling asleep without difficulty without increased symptoms or restriction. [x]? Progressing: []? Met: []? Not Met: []? Adjusted     Overall Progression Towards Functional goals/ Treatment Progress Update:  [] Patient is progressing as expected towards functional goals listed. [] Progression is slowed due to complexities/Impairments listed. [] Progression has been slowed due to co-morbidities. [x] Plan just implemented, too soon to assess goals progression <30days   [] Goals require adjustment due to lack of progress  [] Patient is not progressing as expected and requires additional follow up with physician  [] Other    Persisting Functional Limitations/Impairments:  [x]Sleeping []Sitting               []Standing []Transfers        []Walking []Kneeling               []Stairs []Squatting / bending   [x]ADLs []Reaching  [x]Lifting  [x]Housework  [x]Driving []Job related tasks  []Sports/Recreation []Other:        ASSESSMENT:   9/28 - Pt with decreased pain and tissue tension this date. Pt requires VC for appropriate form with chin tuck. Pt demonstrates improved posture with TB exercises. Pt reports she is more aware of her posture during the day, but has pain when she sits on a pillow. Plan to continue to progress postural education and scapular strengthening to further decrease pain and return to PLOF without pain or limitations. 9/23: Patient continues to require VC and TC during exercises in order to squeeze scapula together. Patient has difficulty maintaining posture and is struggling to find home solutions to help her adjust her body mechanics to incorporate improved posture habits during her ADL's and IADLs. In addition, pt concerned re safety at home. Patient has noted tenderness and tightness in bilat UT, levator, rhomboids, scalenes, SCM. Patient reports pain relief with STM. Plan to continue to progress appropriate postural strengthening and appropriate motor programming to increase cervical ROM.        9/21 see PT POC above    9/14 needs improved postures, especially for TV watching   9/9 Pt with decreased pain and tissue tension this date. STM primarily on L side this date and pain decreased after manual therapy. Pt reports ear pain is on and off and currently has no ear pain. Increased resistance with TB exercises this date. Pt reported increased difficulty but no increased pain. Plan to continue to progress postural strengthening and cervical ROM per pt tolerance. Treatment/Activity Tolerance:  [x] Patient able to complete tx [] Patient limited by fatigue  [] Patient limited by pain  [] Patient limited by other medical complications  [] Other:     Prognosis: [x] Good [] Fair  [] Poor    Patient Requires Follow-up: [x] Yes  [] No    PLAN:  strength, ROM/flexibility, posture and body mechs, manual, MOC, HEP, pt education     Frequency/Duration: 2 days per week for 6  Weeks:  Interventions:  [x]? Therapeutic exercise including:strength, ROM, flexibility  [x]? NMR activation and proprioception including postural re-education  [x]? Manual therapy as indicated to include: IASTM, STM, PROM, Gr I-IV mobilizations, manipulation. [x]? Modalities as needed that may include: thermal agents, E-stim, Biofeedback, US, iontophoresis as indicated  [x]? Patient education on joint protection, postural re-education, activity modification, progression of HEP. Plan for next treatment session:    PLAN: See eval. PT 2x / week for 6 weeks. [x] Continue per plan of care [] Alter current plan (see comments)  [] Plan of care initiated [] Hold pending MD visit [] Discharge    Electronically signed by: Tito Soto PT, DPT  Therapist was present, directed the patient's care, made skilled judgement, and was responsible for assessment and treatment of the patient.        Jose Molina, SPT      Note: If patient does not return for scheduled/ recommended follow up visits, this note will serve as a discharge from care along with most recent update on progress.

## 2021-10-01 ENCOUNTER — HOSPITAL ENCOUNTER (OUTPATIENT)
Dept: PHYSICAL THERAPY | Age: 81
Setting detail: THERAPIES SERIES
Discharge: HOME | End: 2021-10-01
Payer: MEDICARE

## 2021-10-01 PROCEDURE — 97110 THERAPEUTIC EXERCISES: CPT

## 2021-10-01 PROCEDURE — 97140 MANUAL THERAPY 1/> REGIONS: CPT

## 2021-10-01 NOTE — FLOWSHEET NOTE
[address] [facility] Therapy  Phone: [phone]   Fax: [phone]  Physical Therapy Re-Certification Plan of Care    Physical Therapy Daily Treatment Note  Date:  10/1/2021    Patient Name:  Pepito Spaulding    :  1940  MRN: [id number]  Medical/Treatment Diagnosis Information:  Diagnosis: cervical disc disease, R TMJ  Treatment Diagnosis: pt with painful myofascial changes, impaired posture including forward head posture, reduced postural strengths. hypomobility t/o spine, especially at upper cerv spine  Insurance/Certification information:  PT Insurance Information: medicare  Physician Information:  Referring Practitioner: Dr. Reina Barton of care signed (Y/N): []  Yes [x]  No     Date of Patient follow up with Physician:      Progress Report: []  Yes  [x]  No     Progress report due (10 Rx/or 30 days whichever is less): visit #04 or     Recertification due (POC duration/ or 90 days whichever is less): visit #12 or     Visit # Insurance Allowable Auth required? Date Range    + 0/10 Med nec []  Yes  [x]  No NA       Units approved Units used Date Range   NA NA NA       Latex Allergy:  [x]NO      []YES  Preferred Language for Healthcare:   [x]English       []other:    Functional Scale:        Date assessed:  NDI raw score = 18, dysfunction =36 %, taken at initial eval       Pain level: 3/10      SUBJECTIVE:    10/1 pt reports her pain comes and goes but she continues to feel better. States her son is getting his stress test currently. Brought her calendar to schedule additional visits.  - Pt states she has a lot going on with her son's health. He is supposed to get a stress test Saturday. Reports some ear pain when she woke up, but feels good now.   : Patient reports that she is feeling well today and not having too much pain. Patient states that she is trying her best to create a more ergonomic home desk environment for her laptop.  Patient states that she has not found a solution that she prefers yet and will coninue to try and find a solution that works for her. Pt asks re life alert systems for home use    9/21 increasing mobility without increased pain. Reports she is still taking the mm relaxer and reports she took Imkala Battiest when she was stressed and noticed increased neck mm pain And it seemed to help. States her son hopes to get hernia surgery soon. Intermittent R ear pain with combing her hair     9/14 Intermittent pain still gets pain in neck and face/ear. Thinks PT tx is helping. Reports sleeping is good. Cn turn  to the right better    OBJECTIVE: 9/23 Patient ambulates with AD 2LP, Patient ambulates with forward head, rounded shoulders, and kyphotic curve in T-spine. 9/14 pt reports slouched posture with excessive cerv flex when she watches TV. Palpation: Myofascial pain R>L UT, scalenes. SCM and masseter WFL B.    cerv AROM  R L  SB  40 25  Rot  60 50    9/9 - Pt with no clicking or popping in TMJ, palpation of R SCM increases ear pain   8/31 - pt reports she has a spot that is iching and burning on her back. Skin inspection - small, red oval on R scapular region with smooth borders. Pt reports she has a dermatology appointment within the next 2 weeks. 8/25 - amb with RW this date. Katie Ware is too tall, but it does not lower further. Increased tissue tension in R SCM and scalenes. RESTRICTIONS/PRECAUTIONS:osteopososis/osteopenia; has lost 3 friends and her  and brother int he last 17 months, joint replacements ( B knees, R ankle, B shoudlers): x 5. Lives with her son who has multiple medical issues including COPD and heart issues.   Spinal cord stimulator (lumbar) put in by Dr. Opal Benton - recently had new battery put in.        Exercises/Interventions:     Therapeutic Exercises (06063) Resistance / level Sets/sec Reps Notes   Arm bike   2.5 min fwd/2.5 min back      UT stretch      Chin tuck - ball behind head   Scap squeeze   2 sec2 sec 10 10    Pulleys - flexion and and  5 sec  10 B each Completed to promote thoracic ext    Seated:    cerv AROM   SB  Rot  UT stretches  scalene stretches       Seated TB  High row   Mid row  LPD   Horizontal abd    Green   Green   Green  Peach    2   2  2  2   10  10   10   10                         Therapeutic Activities (95566)       Instruct in upright sitting posture for watching TV                                   Neuromuscular Re-ed (55654)       Postural ed                                          Manual Intervention (52686)       Gentle manual - STM   STM to R rhomboids       8 min  8/27 Done on reclined mat table  9/21, 9/7, 8/30 completed seated in chair                                           Modalities:   10/1, 9/28 - cervical MHP seated in chair at end of session x 10 min   9/23, 9/14 9/9, 9/7, 9/2 - cervical MHP seated in chair at end of session x 15 min   8/31- cervical MHP seated in chair at end of session x 12 min   8/27- cervical MHP on reclined mat table x 15 min   8/25 - cervical MHP seated in chair at end of session x 10 min     Pt. Education:  9/23: pt Educated re safety in home /fall risk reduction. At pt request d/w pt re life alert systems. Patient ed  on option of obtaining an apple watch to use as a \"life alert\" system. Patient likes this idea and state she will have her son help her to look into it.   8/31 - Pt educated on ABCDs of melanoma and importance of skin checks as she reports concern about itching spot on her back. Pt has dermatology appointment in 2 wks.    8/19 patient educated on diagnosis, prognosis and expectations for rehab  -all patient questions were answered    Home Exercise Program:  9/23 computer ergonomics and apple watch for fall risk reduction  9/21  computer ergonomics  9/14 upright psoture for TV viewing  8/19 cerv AROM SB/ROT      Therapeutic Exercise and NMR EXR  [] [phone]) Provided verbal/tactile cueing for activities related to strengthening, flexibility, endurance, ROM for improvements in  [] LE / Lumbar: LE, proximal hip, and core control with self care, mobility, lifting, ambulation. [] UE / Cervical: cervical, postural, scapular, scapulothoracic and UE control with self care, reaching, carrying, lifting, house/yardwork, driving, computer work.  [] (01867) Provided verbal/tactile cueing for activities related to improving balance, coordination, kinesthetic sense, posture, motor skill, proprioception to assist with   [] LE / lumbar: LE, proximal hip, and core control in self care, mobility, lifting, ambulation and eccentric single leg control. [] UE / cervical: cervical, scapular, scapulothoracic and UE control with self care, reaching, carrying, lifting, house/yardwork, driving, computer work.   [] (33240) Therapist is in constant attendance of 2 or more patients providing skilled therapy interventions, but not providing any significant amount of measurable one-on-one time to either patient, for improvements in  [] LE / lumbar: LE, proximal hip, and core control in self care, mobility, lifting, ambulation and eccentric single leg control. [] UE / cervical: cervical, scapular, scapulothoracic and UE control with self care, reaching, carrying, lifting, house/yardwork, driving, computer work.       NMR and Therapeutic Activities:    [] (79982 or 07442) Provided verbal/tactile cueing for activities related to improving balance, coordination, kinesthetic sense, posture, motor skill, proprioception and motor activation to allow for proper function of   [] LE: / Lumbar core, proximal hip and LE with self care and ADLs  [] UE / Cervical: cervical, postural, scapular, scapulothoracic and UE control with self care, carrying, lifting, driving, computer work.   [] (96376) Gait Re-education- Provided training and instruction to the patient for proper LE, core and proximal hip recruitment and positioning and eccentric body weight control with ambulation re-education including up and down stairs     Home Management Training / Self Care:  [] (52229) Provided self-care/home management training related to activities of daily living and compensatory training, and/or use of adaptive equipment for improvement with: ADLs and compensatory training, meal preparation, safety procedures and instruction in use of adaptive equipment, including bathing, grooming, dressing, personal hygiene, basic household cleaning and chores. Home Exercise Program:    [x] (77700) Reviewed/Progressed HEP activities related to strengthening, flexibility, endurance, ROM of   [] LE / Lumbar: core, proximal hip and LE for functional self-care, mobility, lifting and ambulation/stair navigation   [] UE / Cervical: cervical, postural, scapular, scapulothoracic and UE control with self care, reaching, carrying, lifting, house/yardwork, driving, computer work  [] (09867)Reviewed/Progressed HEP activities related to improving balance, coordination, kinesthetic sense, posture, motor skill, proprioception of   [] LE: core, proximal hip and LE for self care, mobility, lifting, and ambulation/stair navigation    [] UE / Cervical: cervical, postural,  scapular, scapulothoracic and UE control with self care, reaching, carrying, lifting, house/yardwork, driving, computer work    Manual Treatments:  PROM / STM / Oscillations-Mobs:  G-I, II, III, IV (PA's, Inf., Post.)  [] (69644) Provided manual therapy to mobilize LE, proximal hip and/or LS spine soft tissue/joints for the purpose of modulating pain, promoting relaxation,  increasing ROM, reducing/eliminating soft tissue swelling/inflammation/restriction, improving soft tissue extensibility and allowing for proper ROM for normal function with   [] LE / lumbar: self care, mobility, lifting and ambulation. [] UE / Cervical: self care, reaching, carrying, lifting, house/yardwork, driving, computer work.      Modalities:  [] (44390) Vasopneumatic compression: Utilized vasopneumatic compression to decrease edema / swelling for the purpose of improving mobility and quad tone / recruitment which will allow for increased overall function including but not limited to self-care, transfers, ambulation, and ascending / descending stairs. Charges:  Timed Code Treatment Minutes: 42   Total Treatment Minutes: 52     [] EVAL - LOW (31735)   [] EVAL - MOD (20168)  [] EVAL - HIGH (50039)  [] RE-EVAL (32028)  [x] OR(40951) x   2    [] Ionto  [] NMR (16444) x       [] Vaso  [x] Manual (60694) x 1      [] Ultrasound  [] TA x   1     [] Mech Traction (87331)  [] Aquatic Therapy x      [] ES (un) (52075):   [] Home Management Training x  [] ES(attended) (44462)   [] Dry Needling 1-2 muscles (27716):  [] Dry Needling 3+ muscles (694923)  [] Group:      [] Other:          GOALS:  Patient stated goal: less pain  []? Progressing: []? Met: []? Not Met: []? Adjusted     Therapist goals for Patient:   Short Term Goals: To be achieved in: 2 weeks  1. Independent in HEP and progression per patient tolerance, in order to prevent re-injury. []? Progressing: []? Met: []? Not Met: []? Adjusted  2. Patient will have a decrease in pain to facilitate improvement in movement, function, and ADLs as indicated by improvement with respect to Functional Deficits. []? Progressing: []? Met: []? Not Met: []? Adjusted     Long Term Goals: To be achieved in: 6 weeks  1. Disability index score of 10% or less on the  NDI  to assist with reaching prior level of function. [x]? Progressing: []? Met: []? Not Met: []? Adjusted  2. Patient will demonstrate increased AROM  to [facility], to allow for proper joint functioning to allow pt to resume turning head while driving without increase in symptoms. [x]? Progressing: []? Met: []? Not Met: []? Adjusted  3.  Patient will demonstrate increased Strength by 1/2 mmt grade  patients Functional Deficits to allow pt to resume amb with/without RW prn for amb in community without increase in symptoms. [x]? Progressing: []? Met: []? Not Met: []? Adjusted  4. Patient will return to functional activities including falling asleep without difficulty without increased symptoms or restriction. [x]? Progressing: []? Met: []? Not Met: []? Adjusted     Overall Progression Towards Functional goals/ Treatment Progress Update:  [] Patient is progressing as expected towards functional goals listed. [] Progression is slowed due to complexities/Impairments listed. [] Progression has been slowed due to co-morbidities. [x] Plan just implemented, too soon to assess goals progression <30days   [] Goals require adjustment due to lack of progress  [] Patient is not progressing as expected and requires additional follow up with physician  [] Other    Persisting Functional Limitations/Impairments:  [x]Sleeping []Sitting               []Standing []Transfers        []Walking []Kneeling               []Stairs []Squatting / bending   [x]ADLs []Reaching  [x]Lifting  [x]Housework  [x]Driving []Job related tasks  []Sports/Recreation []Other:        ASSESSMENT:   10/1 - Pt required verbal and tactile cues to deactivate UT with TB exercises this date. Pt reports no pain throughout treatment session. Pt reports she has been more aware of her posture throughout the day. Pt with less TP and tissue tension in UT mm this date. 9/28 - Pt with decreased pain and tissue tension this date. Pt requires VC for appropriate form with chin tuck. Pt demonstrates improved posture with TB exercises. Pt reports she is more aware of her posture during the day, but has pain when she sits on a pillow. Plan to continue to progress postural education and scapular strengthening to further decrease pain and return to PLOF without pain or limitations. 9/23: Patient continues to require VC and TC during exercises in order to squeeze scapula together.  Patient has difficulty maintaining posture and is struggling to find home solutions to help her adjust her body mechanics to incorporate improved posture habits during her ADL's and IADLs. In addition, pt concerned re safety at home. Patient has noted tenderness and tightness in bilat UT, levator, rhomboids, scalenes, SCM. Patient reports pain relief with STM. Plan to continue to progress appropriate postural strengthening and appropriate motor programming to increase cervical ROM. 9/21 see PT POC above    9/14 needs improved postures, especially for TV watching   9/9 Pt with decreased pain and tissue tension this date. STM primarily on L side this date and pain decreased after manual therapy. Pt reports ear pain is on and off and currently has no ear pain. Increased resistance with TB exercises this date. Pt reported increased difficulty but no increased pain. Plan to continue to progress postural strengthening and cervical ROM per pt tolerance. Treatment/Activity Tolerance:  [x] Patient able to complete tx [] Patient limited by fatigue  [] Patient limited by pain  [] Patient limited by other medical complications  [] Other:     Prognosis: [x] Good [] Fair  [] Poor    Patient Requires Follow-up: [x] Yes  [] No    PLAN:  strength, ROM/flexibility, posture and body mechs, manual, MOC, HEP, pt education     Frequency/Duration: 2 days per week for 6  Weeks:  Interventions:  [x]? Therapeutic exercise including:strength, ROM, flexibility  [x]? NMR activation and proprioception including postural re-education  [x]? Manual therapy as indicated to include: IASTM, STM, PROM, Gr I-IV mobilizations, manipulation. [x]? Modalities as needed that may include: thermal agents, E-stim, Biofeedback, US, iontophoresis as indicated  [x]? Patient education on joint protection, postural re-education, activity modification, progression of HEP. Plan for next treatment session:    PLAN: See eval. PT 2x / week for 6 weeks.    [x] Continue per plan of care [] Alter current plan (see comments)  [] Plan of care initiated [] Hold pending MD visit [] Discharge    Electronically signed by: Ghazala Weaver, PT, DPT  Therapist was present, directed the patient's care, made skilled judgement, and was responsible for assessment and treatment of the patient. Jose Molina, SPT      Note: If patient does not return for scheduled/ recommended follow up visits, this note will serve as a discharge from care along with most recent update on progress.

## 2021-10-04 ENCOUNTER — HOSPITAL ENCOUNTER (OUTPATIENT)
Dept: PHYSICAL THERAPY | Age: 81
Setting detail: THERAPIES SERIES
Discharge: HOME | End: 2021-10-04
Payer: MEDICARE

## 2021-10-04 PROCEDURE — 97110 THERAPEUTIC EXERCISES: CPT

## 2021-10-04 PROCEDURE — 97140 MANUAL THERAPY 1/> REGIONS: CPT

## 2021-10-04 NOTE — FLOWSHEET NOTE
[facility] Physical Therapy  Phone: [phone]   Fax: [phone]  Physical Therapy Re-Certification Plan of Care    Physical Therapy Daily Treatment Note  Date:  10/4/2021    Patient Name:  Lloyd Falcon    :  1940  MRN: [id number]  Medical/Treatment Diagnosis Information:  Diagnosis: cervical disc disease, R TMJ  Treatment Diagnosis: pt with painful myofascial changes, impaired posture including forward head posture, reduced postural strengths. hypomobility t/o spine, especially at upper cerv spine  Insurance/Certification information:  PT Insurance Information: medicare  Physician Information:  Referring Practitioner: Dr. [facility] of care signed (Y/N): []  Yes [x]  No     Date of Patient follow up with Physician:      Progress Report: []  Yes  [x]  No     Progress report due (10 Rx/or 30 days whichever is less): visit #51 or     Recertification due (POC duration/ or 90 days whichever is less): visit #12 or     Visit # Insurance Allowable Auth required? Date Range    + 1/10 Med nec []  Yes  [x]  No NA       Units approved Units used Date Range   NA NA NA       Latex Allergy:  [x]NO      []YES  Preferred Language for Healthcare:   [x]English       []other:    Functional Scale:        Date assessed:  NDI raw score = 18, dysfunction =36 %, taken at initial eval       Pain level: 4-5/10      SUBJECTIVE:    10/4 Pt reports she is more sore today. She cooked a pot of chili yesterday and thinks she may have over done it. 10/1 pt reports her pain comes and goes but she continues to feel better. States her son is getting his stress test currently. Brought her calendar to schedule additional visits.  - Pt states she has a lot going on with her son's health. He is supposed to get a stress test Saturday. Reports some ear pain when she woke up, but feels good now.   : Patient reports that she is feeling well today and not having too much pain. Patient states that she is trying her best to create a more ergonomic home desk environment for her laptop. Patient states that she has not found a solution that she prefers yet and will coninue to try and find a solution that works for her. Pt asks re life alert systems for home use    9/21 increasing mobility without increased pain. Reports she is still taking the mm relaxer and reports she took Lexx Freda when she was stressed and noticed increased neck mm pain And it seemed to help. States her son hopes to get hernia surgery soon. Intermittent R ear pain with combing her hair     9/14 Intermittent pain still gets pain in neck and face/ear. Thinks PT tx is helping. Reports sleeping is good. Cn turn  to the right better    OBJECTIVE: 9/23 Patient ambulates with AD 9MA, Patient ambulates with forward head, rounded shoulders, and kyphotic curve in T-spine. 9/14 pt reports slouched posture with excessive cerv flex when she watches TV. Palpation: Myofascial pain R>L UT, scalenes. SCM and masseter WFL B.    cerv AROM  R L  SB  40 25  Rot  60 50    9/9 - Pt with no clicking or popping in TMJ, palpation of R SCM increases ear pain   8/31 - pt reports she has a spot that is iching and burning on her back. Skin inspection - small, red oval on R scapular region with smooth borders. Pt reports she has a dermatology appointment within the next 2 weeks. 8/25 - amb with RW this date. Jose Ventura is too tall, but it does not lower further. Increased tissue tension in R SCM and scalenes. RESTRICTIONS/PRECAUTIONS:osteopososis/osteopenia; has lost 3 friends and her  and brother int he last 17 months, joint replacements ( B knees, R ankle, B shoudlers): x 5. Lives with her son who has multiple medical issues including COPD and heart issues.   Spinal cord stimulator (lumbar) put in by Dr. Mayra Bacbock - recently had new battery put in.        Exercises/Interventions:     Therapeutic Exercises (09553) Resistance / level Sets/sec Reps Notes   Arm bike   2.5 min fwd/2.5 min back      UT stretch   30 sec  2 B     Chin tuck -   Scap squeeze   2 sec2 sec 10 10    Pulleys - flexion and and  5 sec  10 B each Completed to promote thoracic ext    Seated:    cerv AROM   SB  Rot  UT stretches  scalene stretches 2 x 10x3\" B      Standing TB  High row   Mid row  LPD   Horizontal abd    Green   Green   Green  Peach    1   1  1  1   10  10   10   10    Seated thoracic extension   3 sec 10                   Therapeutic Activities (38103)       Instruct in upright sitting posture for watching TV                                   Neuromuscular Re-ed (79947)       Postural ed                                          Manual Intervention (23302)       Gentle manual - STM BUT mm and R SCM        8 min  8/27 Done on reclined mat table  9/21, 9/7, 8/30 completed seated in chair                                           Modalities:   10/4, 10/1, 9/28 - cervical MHP seated in chair at end of session x 10 min   9/23, 9/14 9/9, 9/7, 9/2 - cervical MHP seated in chair at end of session x 15 min   8/31- cervical MHP seated in chair at end of session x 12 min   8/27- cervical MHP on reclined mat table x 15 min   8/25 - cervical MHP seated in chair at end of session x 10 min     Pt. Education:  9/23: pt Educated re safety in home /fall risk reduction. At pt request d/w pt re life alert systems. Patient ed  on option of obtaining an apple watch to use as a \"life alert\" system. Patient likes this idea and state she will have her son help her to look into it.   8/31 - Pt educated on ABCDs of melanoma and importance of skin checks as she reports concern about itching spot on her back. Pt has dermatology appointment in 2 wks.    8/19 patient educated on diagnosis, prognosis and expectations for rehab  -all patient questions were answered    Home Exercise Program:  9/23 computer ergonomics and apple watch for fall risk reduction  9/21  computer ergonomics  9/14 upright psoture for TV viewing  8/19 cerv AROM SB/ROT      Therapeutic Exercise and NMR EXR  [] (22775) Provided verbal/tactile cueing for activities related to strengthening, flexibility, endurance, ROM for improvements in  [] LE / Lumbar: LE, proximal hip, and core control with self care, mobility, lifting, ambulation. [] UE / Cervical: cervical, postural, scapular, scapulothoracic and UE control with self care, reaching, carrying, lifting, house/yardwork, driving, computer work.  [] (21554) Provided verbal/tactile cueing for activities related to improving balance, coordination, kinesthetic sense, posture, motor skill, proprioception to assist with   [] LE / lumbar: LE, proximal hip, and core control in self care, mobility, lifting, ambulation and eccentric single leg control. [] UE / cervical: cervical, scapular, scapulothoracic and UE control with self care, reaching, carrying, lifting, house/yardwork, driving, computer work.   [] (51942) Therapist is in constant attendance of 2 or more patients providing skilled therapy interventions, but not providing any significant amount of measurable one-on-one time to either patient, for improvements in  [] LE / lumbar: LE, proximal hip, and core control in self care, mobility, lifting, ambulation and eccentric single leg control. [] UE / cervical: cervical, scapular, scapulothoracic and UE control with self care, reaching, carrying, lifting, house/yardwork, driving, computer work.       NMR and Therapeutic Activities:    [] (55923 or 97687) Provided verbal/tactile cueing for activities related to improving balance, coordination, kinesthetic sense, posture, motor skill, proprioception and motor activation to allow for proper function of   [] LE: / Lumbar core, proximal hip and LE with self care and ADLs  [] UE / Cervical: cervical, postural, scapular, scapulothoracic and UE control with self care, carrying, lifting, driving, computer work.   [] (16272) Gait Re-education- Provided training and instruction to the patient for proper LE, core and proximal hip recruitment and positioning and eccentric body weight control with ambulation re-education including up and down stairs     Home Management Training / Self Care:  [] (52334) Provided self-care/home management training related to activities of daily living and compensatory training, and/or use of adaptive equipment for improvement with: ADLs and compensatory training, meal preparation, safety procedures and instruction in use of adaptive equipment, including bathing, grooming, dressing, personal hygiene, basic household cleaning and chores.      Home Exercise Program:    [x] (14358) Reviewed/Progressed HEP activities related to strengthening, flexibility, endurance, ROM of   [] LE / Lumbar: core, proximal hip and LE for functional self-care, mobility, lifting and ambulation/stair navigation   [] UE / Cervical: cervical, postural, scapular, scapulothoracic and UE control with self care, reaching, carrying, lifting, house/yardwork, driving, computer work  [] (32963)Reviewed/Progressed HEP activities related to improving balance, coordination, kinesthetic sense, posture, motor skill, proprioception of   [] LE: core, proximal hip and LE for self care, mobility, lifting, and ambulation/stair navigation    [] UE / Cervical: cervical, postural,  scapular, scapulothoracic and UE control with self care, reaching, carrying, lifting, house/yardwork, driving, computer work    Manual Treatments:  PROM / STM / Oscillations-Mobs:  G-I, II, III, IV (PA's, Inf., Post.)  [] (35476) Provided manual therapy to mobilize LE, proximal hip and/or LS spine soft tissue/joints for the purpose of modulating pain, promoting relaxation,  increasing ROM, reducing/eliminating soft tissue swelling/inflammation/restriction, improving soft tissue extensibility and allowing for proper ROM for normal function with   [] LE / lumbar: self care, mobility, []? Adjusted  3. Patient will demonstrate increased Strength by 1/2 mmt grade  patients Functional Deficits to allow pt to resume amb with/without RW prn for amb in community without increase in symptoms. [x]? Progressing: []? Met: []? Not Met: []? Adjusted  4. Patient will return to functional activities including falling asleep without difficulty without increased symptoms or restriction. [x]? Progressing: []? Met: []? Not Met: []? Adjusted     Overall Progression Towards Functional goals/ Treatment Progress Update:  [] Patient is progressing as expected towards functional goals listed. [] Progression is slowed due to complexities/Impairments listed. [] Progression has been slowed due to co-morbidities. [x] Plan just implemented, too soon to assess goals progression <30days   [] Goals require adjustment due to lack of progress  [] Patient is not progressing as expected and requires additional follow up with physician  [] Other    Persisting Functional Limitations/Impairments:  [x]Sleeping []Sitting               []Standing []Transfers        []Walking []Kneeling               []Stairs []Squatting / bending   [x]ADLs []Reaching  [x]Lifting  [x]Housework  [x]Driving []Job related tasks  []Sports/Recreation []Other:        ASSESSMENT:   10/4 - Progressed TB exercises to standing this date. Decreased to 1 set of 10 due to pt fatigue in standing. Initiated thoracic ext over chair with tactile cue to ext through thoracic spine vs cervical spine. Pt required VC for chin tuck with cervical rotation. Reports decreased pain with chin tuck + rotation. Continue to progress as tolerated. 10/1 - Pt required verbal and tactile cues to deactivate UT with TB exercises this date. Pt reports no pain throughout treatment session. Pt reports she has been more aware of her posture throughout the day. Pt with less TP and tissue tension in UT mm this date. 9/28 - Pt with decreased pain and tissue tension this date.  Pt requires VC for appropriate form with chin tuck. Pt demonstrates improved posture with TB exercises. Pt reports she is more aware of her posture during the day, but has pain when she sits on a pillow. Plan to continue to progress postural education and scapular strengthening to further decrease pain and return to PLOF without pain or limitations. 9/23: Patient continues to require VC and TC during exercises in order to squeeze scapula together. Patient has difficulty maintaining posture and is struggling to find home solutions to help her adjust her body mechanics to incorporate improved posture habits during her ADL's and IADLs. In addition, pt concerned re safety at home. Patient has noted tenderness and tightness in bilat UT, levator, rhomboids, scalenes, SCM. Patient reports pain relief with STM. Plan to continue to progress appropriate postural strengthening and appropriate motor programming to increase cervical ROM. 9/21 see PT POC above    9/14 needs improved postures, especially for TV watching   9/9 Pt with decreased pain and tissue tension this date. STM primarily on L side this date and pain decreased after manual therapy. Pt reports ear pain is on and off and currently has no ear pain. Increased resistance with TB exercises this date. Pt reported increased difficulty but no increased pain. Plan to continue to progress postural strengthening and cervical ROM per pt tolerance. Treatment/Activity Tolerance:  [x] Patient able to complete tx [] Patient limited by fatigue  [] Patient limited by pain  [] Patient limited by other medical complications  [] Other:     Prognosis: [x] Good [] Fair  [] Poor    Patient Requires Follow-up: [x] Yes  [] No    PLAN:  strength, ROM/flexibility, posture and body mechs, manual, MOC, HEP, pt education     Frequency/Duration: 2 days per week for 6  Weeks:  Interventions:  [x]? Therapeutic exercise including:strength, ROM, flexibility  [x]?   NMR activation and proprioception including postural re-education  [x]? Manual therapy as indicated to include: IASTM, STM, PROM, Gr I-IV mobilizations, manipulation. [x]? Modalities as needed that may include: thermal agents, E-stim, Biofeedback, US, iontophoresis as indicated  [x]? Patient education on joint protection, postural re-education, activity modification, progression of HEP. Plan for next treatment session:    PLAN: See eval. PT 2x / week for 6 weeks. + 2x/wk for 5 wks  [x] Continue per plan of care [] Alter current plan (see comments)  [] Plan of care initiated [] Hold pending MD visit [] Discharge    Electronically signed by: Annette Amado PT, DPT      Note: If patient does not return for scheduled/ recommended follow up visits, this note will serve as a discharge from care along with most recent update on progress.

## 2021-10-06 ENCOUNTER — HOSPITAL ENCOUNTER (OUTPATIENT)
Dept: PHYSICAL THERAPY | Age: 81
Setting detail: THERAPIES SERIES
Discharge: HOME | End: 2021-10-06
Payer: MEDICARE

## 2021-10-06 PROCEDURE — 97140 MANUAL THERAPY 1/> REGIONS: CPT

## 2021-10-06 PROCEDURE — 97530 THERAPEUTIC ACTIVITIES: CPT

## 2021-10-06 PROCEDURE — 97110 THERAPEUTIC EXERCISES: CPT

## 2021-10-06 NOTE — FLOWSHEET NOTE
[facility] Therapy  Phone: [phone]   Fax: [phone]  Physical Therapy Re-Certification Plan of Care    Physical Therapy Daily Treatment Note  Date:  10/6/2021    Patient Name:  Olinda Khan    :  1940  MRN: [id number]  Medical/Treatment Diagnosis Information:  Diagnosis: cervical disc disease, R TMJ  Treatment Diagnosis: pt with painful myofascial changes, impaired posture including forward head posture, reduced postural strengths. hypomobility t/o spine, especially at upper cerv spine  Insurance/Certification information:  PT Insurance Information: medicare  Physician Information:  Referring Practitioner: Dr. Sharon Moran of care signed (Y/N): []  Yes [x]  No     Date of Patient follow up with Physician:      Progress Report: []  Yes  [x]  No     Progress report due (10 Rx/or 30 days whichever is less): visit #99 or     Recertification due (POC duration/ or 90 days whichever is less): visit #12 or     Visit # Insurance Allowable Auth required? Date Range    + 2/10 Med nec []  Yes  [x]  No NA       Units approved Units used Date Range   NA NA NA       Latex Allergy:  [x]NO      []YES  Preferred Language for Healthcare:   [x]English       []other:    Functional Scale:        Date assessed:  NDI raw score = 18, dysfunction =36 %, taken at initial eval       Pain level: 5-6/10      SUBJECTIVE:    10/6 - Pt reports more pain today in her ear and neck. States she carried her groceries on the opposite side and isn't sure what increased her pain. 10/4 Pt reports she is more sore today. She cooked a pot of chili yesterday and thinks she may have over done it. 10/1 pt reports her pain comes and goes but she continues to feel better. States her son is getting his stress test currently. Brought her calendar to schedule additional visits.  - Pt states she has a lot going on with her son's health.  He is supposed to get a stress test Saturday. Reports some ear pain when she woke up, but feels good now.   9/23: Patient reports that she is feeling well today and not having too much pain. Patient states that she is trying her best to create a more ergonomic home desk environment for her laptop. Patient states that she has not found a solution that she prefers yet and will coninue to try and find a solution that works for her. Pt asks re life alert systems for home use    9/21 increasing mobility without increased pain. Reports she is still taking the mm relaxer and reports she took Eamon Kym when she was stressed and noticed increased neck mm pain And it seemed to help. States her son hopes to get hernia surgery soon. Intermittent R ear pain with combing her hair     9/14 Intermittent pain still gets pain in neck and face/ear. Thinks PT tx is helping. Reports sleeping is good. Cn turn  to the right better    OBJECTIVE: 9/23 Patient ambulates with AD 2KS, Patient ambulates with forward head, rounded shoulders, and kyphotic curve in T-spine. 9/14 pt reports slouched posture with excessive cerv flex when she watches TV. Palpation: Myofascial pain R>L UT, scalenes. SCM and masseter WFL B.    cerv AROM  R L  SB  40 25  Rot  60 50    9/9 - Pt with no clicking or popping in TMJ, palpation of R SCM increases ear pain   8/31 - pt reports she has a spot that is iching and burning on her back. Skin inspection - small, red oval on R scapular region with smooth borders. Pt reports she has a dermatology appointment within the next 2 weeks. 8/25 - amb with RW this date. Keiko Rivas is too tall, but it does not lower further. Increased tissue tension in R SCM and scalenes. RESTRICTIONS/PRECAUTIONS:osteopososis/osteopenia; has lost 3 friends and her  and brother int he last 17 months, joint replacements ( B knees, R ankle, B shoudlers): x 5. Lives with her son who has multiple medical issues including COPD and heart issues.   Spinal cord stimulator (lumbar) put in by Dr. Diamond Post - recently had new battery put in.        Exercises/Interventions:     Therapeutic Exercises (98945) Resistance / level Sets/sec Reps Notes   Arm bike   2.5 min fwd/2.5 min back      UT stretch   30 sec  2 B     Chin tuck -   Scap squeeze   2 sec10    Pulleys - flexion and and  5 sec  10 B each Completed to promote thoracic ext    Seated:    cerv AROM   SB  Rot  UT stretches  scalene stretches  10x3\" B      Standing TB  High row   Mid row  LPD   Horizontal abd    Green   Green   Green     1   1  1     10  10   10       Seated thoracic extension   3 sec 10     SCM stretch   30 sec  2 R  L increased pain on R side           Therapeutic Activities (96474)       Instruct in upright sitting posture for watching TV       Standing reaching to shelf - first shelf  - flexion  - abd   1 pound  1 pound       10 B  10 B  Tactile and verbal cues to deactivate UT and use scapular mm to reach. CGA                         Neuromuscular Re-ed (24828)       Postural ed                                          Manual Intervention (74995)       Gentle manual - STM R UT mm and R SCM        8 min  8/27 Done on reclined mat table  9/21, 9/7, 8/30 completed seated in chair                                           Modalities:   10/6 - 1 cervical cold pack to cspine x 10 min at end of session   10/4, 10/1, 9/28 - cervical MHP seated in chair at end of session x 10 min   9/23, 9/14 9/9, 9/7, 9/2 - cervical MHP seated in chair at end of session x 15 min   8/31- cervical MHP seated in chair at end of session x 12 min   8/27- cervical MHP on reclined mat table x 15 min   8/25 - cervical MHP seated in chair at end of session x 10 min     Pt. Education:  9/23: pt Educated re safety in home /fall risk reduction. At pt request d/w pt re life alert systems. Patient ed  on option of obtaining an apple watch to use as a \"life alert\" system. Patient likes this idea and state she will have her son help her to look into it. 8/31 - Pt educated on ABCDs of melanoma and importance of skin checks as she reports concern about itching spot on her back. Pt has dermatology appointment in 2 wks. 8/19 patient educated on diagnosis, prognosis and expectations for rehab  -all patient questions were answered    Home Exercise Program:  9/23 computer ergonomics and apple watch for fall risk reduction  9/21  computer ergonomics  9/14 upright psoture for TV viewing  8/19 cerv AROM SB/ROT      Therapeutic Exercise and NMR EXR  [] ([id number]) Provided verbal/tactile cueing for activities related to strengthening, flexibility, endurance, ROM for improvements in  [] LE / Lumbar: LE, proximal hip, and core control with self care, mobility, lifting, ambulation. [] UE / Cervical: cervical, postural, scapular, scapulothoracic and UE control with self care, reaching, carrying, lifting, house/yardwork, driving, computer work.  [] (14408) Provided verbal/tactile cueing for activities related to improving balance, coordination, kinesthetic sense, posture, motor skill, proprioception to assist with   [] LE / lumbar: LE, proximal hip, and core control in self care, mobility, lifting, ambulation and eccentric single leg control. [] UE / cervical: cervical, scapular, scapulothoracic and UE control with self care, reaching, carrying, lifting, house/yardwork, driving, computer work.   [] (13948) Therapist is in constant attendance of 2 or more patients providing skilled therapy interventions, but not providing any significant amount of measurable one-on-one time to either patient, for improvements in  [] LE / lumbar: LE, proximal hip, and core control in self care, mobility, lifting, ambulation and eccentric single leg control. [] UE / cervical: cervical, scapular, scapulothoracic and UE control with self care, reaching, carrying, lifting, house/yardwork, driving, computer work.       NMR and Therapeutic Activities:    [] (53747 or 17679) Provided verbal/tactile cueing for activities related to improving balance, coordination, kinesthetic sense, posture, motor skill, proprioception and motor activation to allow for proper function of   [] LE: / Lumbar core, proximal hip and LE with self care and ADLs  [] UE / Cervical: cervical, postural, scapular, scapulothoracic and UE control with self care, carrying, lifting, driving, computer work.   [] (26492) Gait Re-education- Provided training and instruction to the patient for proper LE, core and proximal hip recruitment and positioning and eccentric body weight control with ambulation re-education including up and down stairs     Home Management Training / Self Care:  [] (46321) Provided self-care/home management training related to activities of daily living and compensatory training, and/or use of adaptive equipment for improvement with: ADLs and compensatory training, meal preparation, safety procedures and instruction in use of adaptive equipment, including bathing, grooming, dressing, personal hygiene, basic household cleaning and chores.      Home Exercise Program:    [x] (96068) Reviewed/Progressed HEP activities related to strengthening, flexibility, endurance, ROM of   [] LE / Lumbar: core, proximal hip and LE for functional self-care, mobility, lifting and ambulation/stair navigation   [] UE / Cervical: cervical, postural, scapular, scapulothoracic and UE control with self care, reaching, carrying, lifting, house/yardwork, driving, computer work  [] (00854)Reviewed/Progressed HEP activities related to improving balance, coordination, kinesthetic sense, posture, motor skill, proprioception of   [] LE: core, proximal hip and LE for self care, mobility, lifting, and ambulation/stair navigation    [] UE / Cervical: cervical, postural,  scapular, scapulothoracic and UE control with self care, reaching, carrying, lifting, house/yardwork, driving, computer work    Manual Treatments:  PROM / STM / Oscillations-Mobs:  G-I, II, III, IV (Philip, Inf., Post.)  [] (79355) Provided manual therapy to mobilize LE, proximal hip and/or LS spine soft tissue/joints for the purpose of modulating pain, promoting relaxation,  increasing ROM, reducing/eliminating soft tissue swelling/inflammation/restriction, improving soft tissue extensibility and allowing for proper ROM for normal function with   [] LE / lumbar: self care, mobility, lifting and ambulation. [] UE / Cervical: self care, reaching, carrying, lifting, house/yardwork, driving, computer work. Modalities:  [] (36202) Vasopneumatic compression: Utilized vasopneumatic compression to decrease edema / swelling for the purpose of improving mobility and quad tone / recruitment which will allow for increased overall function including but not limited to self-care, transfers, ambulation, and ascending / descending stairs. Charges:  Timed Code Treatment Minutes: 44   Total Treatment Minutes: 54     [] EVAL - LOW (52104)   [] EVAL - MOD (84346)  [] EVAL - HIGH (48857)  [] RE-EVAL (28369)  [x] ZS(72769) x   1    [] Ionto  [] NMR (82352) x       [] Vaso  [x] Manual (73566) x 1      [] Ultrasound  [x] TA x   1     [] Mech Traction (69360)  [] Aquatic Therapy x      [] ES (un) (05348):   [] Home Management Training x  [] ES(attended) (53401)   [] Dry Needling 1-2 muscles (60712):  [] Dry Needling 3+ muscles (051798)  [] Group:      [] Other:          GOALS:  Patient stated goal: less pain  []? Progressing: []? Met: []? Not Met: []? Adjusted     Therapist goals for Patient:   Short Term Goals: To be achieved in: 2 weeks  1. Independent in HEP and progression per patient tolerance, in order to prevent re-injury. []? Progressing: []? Met: []? Not Met: []? Adjusted  2. Patient will have a decrease in pain to facilitate improvement in movement, function, and ADLs as indicated by improvement with respect to Functional Deficits. []? Progressing: []? Met: []? Not Met: []?  Adjusted     Long Term Goals: To be achieved in: 6 weeks  1. Disability index score of 10% or less on the  NDI  to assist with reaching prior level of function. [x]? Progressing: []? Met: []? Not Met: []? Adjusted  2. Patient will demonstrate increased AROM  to [facility], to allow for proper joint functioning to allow pt to resume turning head while driving without increase in symptoms. [x]? Progressing: []? Met: []? Not Met: []? Adjusted  3. Patient will demonstrate increased Strength by 1/2 mmt grade  patients Functional Deficits to allow pt to resume amb with/without RW prn for amb in community without increase in symptoms. [x]? Progressing: []? Met: []? Not Met: []? Adjusted  4. Patient will return to functional activities including falling asleep without difficulty without increased symptoms or restriction. [x]? Progressing: []? Met: []? Not Met: []? Adjusted     Overall Progression Towards Functional goals/ Treatment Progress Update:  [] Patient is progressing as expected towards functional goals listed. [] Progression is slowed due to complexities/Impairments listed. [] Progression has been slowed due to co-morbidities. [x] Plan just implemented, too soon to assess goals progression <30days   [] Goals require adjustment due to lack of progress  [] Patient is not progressing as expected and requires additional follow up with physician  [] Other    Persisting Functional Limitations/Impairments:  [x]Sleeping []Sitting               []Standing []Transfers        []Walking []Kneeling               []Stairs []Squatting / bending   [x]ADLs []Reaching  [x]Lifting  [x]Housework  [x]Driving []Job related tasks  []Sports/Recreation []Other:        ASSESSMENT:   10/6 - Initiated reaching with 1 pound weight this date. Pt required verbal and tactile cues to deactivate UT and recruit scapular mm. Pt required less frequent rest breaks throughout treatment session this date.  Pt requested ice this date as she states sometimes it decreases her pain at home. Continue to progress standing postural strengthening and re-ed exercises per pt tolerance. 10/4 - Progressed TB exercises to standing this date. Decreased to 1 set of 10 due to pt fatigue in standing. Initiated thoracic ext over chair with tactile cue to ext through thoracic spine vs cervical spine. Pt required VC for chin tuck with cervical rotation. Reports decreased pain with chin tuck + rotation. Continue to progress as tolerated. 10/1 - Pt required verbal and tactile cues to deactivate UT with TB exercises this date. Pt reports no pain throughout treatment session. Pt reports she has been more aware of her posture throughout the day. Pt with less TP and tissue tension in UT mm this date. 9/28 - Pt with decreased pain and tissue tension this date. Pt requires VC for appropriate form with chin tuck. Pt demonstrates improved posture with TB exercises. Pt reports she is more aware of her posture during the day, but has pain when she sits on a pillow. Plan to continue to progress postural education and scapular strengthening to further decrease pain and return to PLOF without pain or limitations. 9/23: Patient continues to require VC and TC during exercises in order to squeeze scapula together. Patient has difficulty maintaining posture and is struggling to find home solutions to help her adjust her body mechanics to incorporate improved posture habits during her ADL's and IADLs. In addition, pt concerned re safety at home. Patient has noted tenderness and tightness in bilat UT, levator, rhomboids, scalenes, SCM. Patient reports pain relief with STM. Plan to continue to progress appropriate postural strengthening and appropriate motor programming to increase cervical ROM. 9/21 see PT POC above    9/14 needs improved postures, especially for TV watching   9/9 Pt with decreased pain and tissue tension this date.  STM primarily on L side this date and pain decreased after manual therapy. Pt reports ear pain is on and off and currently has no ear pain. Increased resistance with TB exercises this date. Pt reported increased difficulty but no increased pain. Plan to continue to progress postural strengthening and cervical ROM per pt tolerance. Treatment/Activity Tolerance:  [x] Patient able to complete tx [] Patient limited by fatigue  [] Patient limited by pain  [] Patient limited by other medical complications  [] Other:     Prognosis: [x] Good [] Fair  [] Poor    Patient Requires Follow-up: [x] Yes  [] No    PLAN:  strength, ROM/flexibility, posture and body mechs, manual, MOC, HEP, pt education     Frequency/Duration: 2 days per week for 6  Weeks:  Interventions:  [x]? Therapeutic exercise including:strength, ROM, flexibility  [x]? NMR activation and proprioception including postural re-education  [x]? Manual therapy as indicated to include: IASTM, STM, PROM, Gr I-IV mobilizations, manipulation. [x]? Modalities as needed that may include: thermal agents, E-stim, Biofeedback, US, iontophoresis as indicated  [x]? Patient education on joint protection, postural re-education, activity modification, progression of HEP. Plan for next treatment session:    PLAN: See eval. PT 2x / week for 6 weeks. + 2x/wk for 5 wks  [x] Continue per plan of care [] Alter current plan (see comments)  [] Plan of care initiated [] Hold pending MD visit [] Discharge    Electronically signed by: Alexandra Simental, PT, DPT      Note: If patient does not return for scheduled/ recommended follow up visits, this note will serve as a discharge from care along with most recent update on progress.

## 2021-10-12 ENCOUNTER — HOSPITAL ENCOUNTER (OUTPATIENT)
Dept: PHYSICAL THERAPY | Age: 81
Setting detail: THERAPIES SERIES
Discharge: HOME | End: 2021-10-12
Payer: MEDICARE

## 2021-10-12 PROCEDURE — 97140 MANUAL THERAPY 1/> REGIONS: CPT

## 2021-10-12 PROCEDURE — 97530 THERAPEUTIC ACTIVITIES: CPT

## 2021-10-12 PROCEDURE — 97110 THERAPEUTIC EXERCISES: CPT

## 2021-10-12 NOTE — FLOWSHEET NOTE
schedule additional visits. 9/28 - Pt states she has a lot going on with her son's health. He is supposed to get a stress test Saturday. Reports some ear pain when she woke up, but feels good now.   9/23: Patient reports that she is feeling well today and not having too much pain. Patient states that she is trying her best to create a more ergonomic home desk environment for her laptop. Patient states that she has not found a solution that she prefers yet and will coninue to try and find a solution that works for her. Pt asks re life alert systems for home use    9/21 increasing mobility without increased pain. Reports she is still taking the mm relaxer and reports she took Merla Furl when she was stressed and noticed increased neck mm pain And it seemed to help. States her son hopes to get hernia surgery soon. Intermittent R ear pain with combing her hair     9/14 Intermittent pain still gets pain in neck and face/ear. Thinks PT tx is helping. Reports sleeping is good. Cn turn  to the right better    OBJECTIVE: 9/23 Patient ambulates with AD 2PG, Patient ambulates with forward head, rounded shoulders, and kyphotic curve in T-spine. 9/14 pt reports slouched posture with excessive cerv flex when she watches TV. Palpation: Myofascial pain R>L UT, scalenes. SCM and masseter WFL B.    cerv AROM  R L  SB  40 25  Rot  60 50    9/9 - Pt with no clicking or popping in TMJ, palpation of R SCM increases ear pain   8/31 - pt reports she has a spot that is iching and burning on her back. Skin inspection - small, red oval on R scapular region with smooth borders. Pt reports she has a dermatology appointment within the next 2 weeks. 8/25 - amb with RW this date. Alma Rosa Luis is too tall, but it does not lower further. Increased tissue tension in R SCM and scalenes.       RESTRICTIONS/PRECAUTIONS:osteopososis/osteopenia; has lost 3 friends and her  and brother int he last 17 months, joint replacements ( B knees, R ankle, B shoudlers): x 5. Lives with her son who has multiple medical issues including COPD and heart issues. Spinal cord stimulator (lumbar) put in by Dr. Hugo Mckeon - recently had new battery put in.        Exercises/Interventions:     Therapeutic Exercises (53409) Resistance / level Sets/sec Reps Notes   Arm bike   2.5 min fwd/2.5 min back      UT stretch   30 sec  2 B     Chin tuck -   Scap squeeze   2 sec10    Pulleys - flexion and and  5 sec  10 B each Completed to promote thoracic ext    Seated:    cerv AROM   SB  Rot  UT stretches  scalene stretches  10x5\" B      Standing TB  High row   Mid row  LPD   Horizontal abd     Seated thoracic extension   3 sec 10     SCM stretch   L increased pain on R side           Therapeutic Activities (27292)       Instruct in upright sitting posture for watching TV       Standing reaching to shelf - second shelf  - flexion  - abd   1 pound R, no weight L  1 pound R, no weight L      10 B  10 B  Tactile and verbal cues to deactivate UT and use scapular mm to reach. CGA                         Neuromuscular Re-ed (57928)       Postural ed                                          Manual Intervention (00998)       Gentle manual - STM L UT mm and R SCM and L infraspinatus        12 min  8/27 Done on reclined mat table  9/21, 9/7, 8/30 completed seated in chair                                           Modalities:   10/12, 10/6 - 1 cervical cold pack to cspine x 10 min at end of session   10/4, 10/1, 9/28 - cervical MHP seated in chair at end of session x 10 min   9/23, 9/14 9/9, 9/7, 9/2 - cervical MHP seated in chair at end of session x 15 min   8/31- cervical MHP seated in chair at end of session x 12 min   8/27- cervical MHP on reclined mat table x 15 min   8/25 - cervical MHP seated in chair at end of session x 10 min     Pt. Education:  9/23: pt Educated re safety in home /fall risk reduction. At pt request d/w pt re life alert systems.   Patient ed  on option of obtaining an apple watch to use as a \"life alert\" system. Patient likes this idea and state she will have her son help her to look into it.   8/31 - Pt educated on ABCDs of melanoma and importance of skin checks as she reports concern about itching spot on her back. Pt has dermatology appointment in 2 wks. 8/19 patient educated on diagnosis, prognosis and expectations for rehab  -all patient questions were answered    Home Exercise Program:  9/23 computer ergonomics and apple watch for fall risk reduction  9/21  computer ergonomics  9/14 upright psoture for TV viewing  8/19 cerv AROM SB/ROT      Therapeutic Exercise and NMR EXR  [] ([id number]) Provided verbal/tactile cueing for activities related to strengthening, flexibility, endurance, ROM for improvements in  [] LE / Lumbar: LE, proximal hip, and core control with self care, mobility, lifting, ambulation. [] UE / Cervical: cervical, postural, scapular, scapulothoracic and UE control with self care, reaching, carrying, lifting, house/yardwork, driving, computer work.  [] (23371) Provided verbal/tactile cueing for activities related to improving balance, coordination, kinesthetic sense, posture, motor skill, proprioception to assist with   [] LE / lumbar: LE, proximal hip, and core control in self care, mobility, lifting, ambulation and eccentric single leg control. [] UE / cervical: cervical, scapular, scapulothoracic and UE control with self care, reaching, carrying, lifting, house/yardwork, driving, computer work.   [] (03925) Therapist is in constant attendance of 2 or more patients providing skilled therapy interventions, but not providing any significant amount of measurable one-on-one time to either patient, for improvements in  [] LE / lumbar: LE, proximal hip, and core control in self care, mobility, lifting, ambulation and eccentric single leg control.    [] UE / cervical: cervical, scapular, scapulothoracic and UE control with self care, reaching, carrying, lifting, house/yardwork, driving, computer work. NMR and Therapeutic Activities:    [] (33535 or 28879) Provided verbal/tactile cueing for activities related to improving balance, coordination, kinesthetic sense, posture, motor skill, proprioception and motor activation to allow for proper function of   [] LE: / Lumbar core, proximal hip and LE with self care and ADLs  [] UE / Cervical: cervical, postural, scapular, scapulothoracic and UE control with self care, carrying, lifting, driving, computer work.   [] (21092) Gait Re-education- Provided training and instruction to the patient for proper LE, core and proximal hip recruitment and positioning and eccentric body weight control with ambulation re-education including up and down stairs     Home Management Training / Self Care:  [] (76384) Provided self-care/home management training related to activities of daily living and compensatory training, and/or use of adaptive equipment for improvement with: ADLs and compensatory training, meal preparation, safety procedures and instruction in use of adaptive equipment, including bathing, grooming, dressing, personal hygiene, basic household cleaning and chores.      Home Exercise Program:    [x] (29728) Reviewed/Progressed HEP activities related to strengthening, flexibility, endurance, ROM of   [] LE / Lumbar: core, proximal hip and LE for functional self-care, mobility, lifting and ambulation/stair navigation   [] UE / Cervical: cervical, postural, scapular, scapulothoracic and UE control with self care, reaching, carrying, lifting, house/yardwork, driving, computer work  [] (87885)Reviewed/Progressed HEP activities related to improving balance, coordination, kinesthetic sense, posture, motor skill, proprioception of   [] LE: core, proximal hip and LE for self care, mobility, lifting, and ambulation/stair navigation    [] UE / Cervical: cervical, postural,  scapular, scapulothoracic and UE control with self care, reaching, carrying, lifting, house/yardwork, driving, computer work    Manual Treatments:  PROM / STM / Oscillations-Mobs:  G-I, II, III, IV (PA's, Inf., Post.)  [] (91041) Provided manual therapy to mobilize LE, proximal hip and/or LS spine soft tissue/joints for the purpose of modulating pain, promoting relaxation,  increasing ROM, reducing/eliminating soft tissue swelling/inflammation/restriction, improving soft tissue extensibility and allowing for proper ROM for normal function with   [] LE / lumbar: self care, mobility, lifting and ambulation. [] UE / Cervical: self care, reaching, carrying, lifting, house/yardwork, driving, computer work. Modalities:  [] (18386) Vasopneumatic compression: Utilized vasopneumatic compression to decrease edema / swelling for the purpose of improving mobility and quad tone / recruitment which will allow for increased overall function including but not limited to self-care, transfers, ambulation, and ascending / descending stairs. Charges:  Timed Code Treatment Minutes: 40   Total Treatment Minutes: 50     [] EVAL - LOW (99051)   [] EVAL - MOD (10772)  [] EVAL - HIGH (93732)  [] RE-EVAL (88623)  [x] RX(72825) x   1    [] Ionto  [] NMR (96299) x       [] Vaso  [x] Manual (93150) x 1      [] Ultrasound  [x] TA x   1     [] Mech Traction (33058)  [] Aquatic Therapy x      [] ES (un) (52571):   [] Home Management Training x  [] ES(attended) (96944)   [] Dry Needling 1-2 muscles (34616):  [] Dry Needling 3+ muscles (502680)  [] Group:      [] Other:          GOALS:  Patient stated goal: less pain  []? Progressing: []? Met: []? Not Met: []? Adjusted     Therapist goals for Patient:   Short Term Goals: To be achieved in: 2 weeks  1. Independent in HEP and progression per patient tolerance, in order to prevent re-injury. []? Progressing: []? Met: []? Not Met: []? Adjusted  2.  Patient will have a decrease in pain to facilitate improvement in movement, function, and ADLs as indicated by improvement with respect to Functional Deficits. []? Progressing: []? Met: []? Not Met: []? Adjusted     Long Term Goals: To be achieved in: 6 weeks  1. Disability index score of 10% or less on the  NDI  to assist with reaching prior level of function. [x]? Progressing: []? Met: []? Not Met: []? Adjusted  2. Patient will demonstrate increased AROM  to [facility], to allow for proper joint functioning to allow pt to resume turning head while driving without increase in symptoms. [x]? Progressing: []? Met: []? Not Met: []? Adjusted  3. Patient will demonstrate increased Strength by 1/2 mmt grade  patients Functional Deficits to allow pt to resume amb with/without RW prn for amb in community without increase in symptoms. [x]? Progressing: []? Met: []? Not Met: []? Adjusted  4. Patient will return to functional activities including falling asleep without difficulty without increased symptoms or restriction. [x]? Progressing: []? Met: []? Not Met: []? Adjusted     Overall Progression Towards Functional goals/ Treatment Progress Update:  [] Patient is progressing as expected towards functional goals listed. [] Progression is slowed due to complexities/Impairments listed. [] Progression has been slowed due to co-morbidities. [x] Plan just implemented, too soon to assess goals progression <30days   [] Goals require adjustment due to lack of progress  [] Patient is not progressing as expected and requires additional follow up with physician  [] Other    Persisting Functional Limitations/Impairments:  [x]Sleeping []Sitting               []Standing []Transfers        []Walking []Kneeling               []Stairs []Squatting / bending   [x]ADLs []Reaching  [x]Lifting  [x]Housework  [x]Driving []Job related tasks  []Sports/Recreation []Other:        ASSESSMENT:   10/12 - progressed reaching to second shelf this date.  Pt unable to reach second shelf with 1 pound weight, but demonstrated ability to touch shelf without weight. Pt with TP in L UT, L infraspinatus, and R SCM. Pt demonstrates improved posture with exercises and increased exercise tolerance. Continues to require seated rest breaks throughout session. 10/6 - Initiated reaching with 1 pound weight this date. Pt required verbal and tactile cues to deactivate UT and recruit scapular mm. Pt required less frequent rest breaks throughout treatment session this date. Pt requested ice this date as she states sometimes it decreases her pain at home. Continue to progress standing postural strengthening and re-ed exercises per pt tolerance. 10/4 - Progressed TB exercises to standing this date. Decreased to 1 set of 10 due to pt fatigue in standing. Initiated thoracic ext over chair with tactile cue to ext through thoracic spine vs cervical spine. Pt required VC for chin tuck with cervical rotation. Reports decreased pain with chin tuck + rotation. Continue to progress as tolerated. 10/1 - Pt required verbal and tactile cues to deactivate UT with TB exercises this date. Pt reports no pain throughout treatment session. Pt reports she has been more aware of her posture throughout the day. Pt with less TP and tissue tension in UT mm this date. 9/28 - Pt with decreased pain and tissue tension this date. Pt requires VC for appropriate form with chin tuck. Pt demonstrates improved posture with TB exercises. Pt reports she is more aware of her posture during the day, but has pain when she sits on a pillow. Plan to continue to progress postural education and scapular strengthening to further decrease pain and return to PLOF without pain or limitations. 9/23: Patient continues to require VC and TC during exercises in order to squeeze scapula together. Patient has difficulty maintaining posture and is struggling to find home solutions to help her adjust her body mechanics to incorporate improved posture habits during her ADL's and IADLs.  In addition, pt concerned re If patient does not return for scheduled/ recommended follow up visits, this note will serve as a discharge from care along with most recent update on progress.

## 2021-10-15 ENCOUNTER — HOSPITAL ENCOUNTER (OUTPATIENT)
Dept: PHYSICAL THERAPY | Age: 81
Setting detail: THERAPIES SERIES
Discharge: HOME | End: 2021-10-15
Payer: MEDICARE

## 2021-10-15 NOTE — PROGRESS NOTES
[address] [facility]    Physical Therapy  Cancellation/No-show Note  Patient Name:  Ronny Burns  :  1940   Date:  10/15/2021    Cancelled visits to date: 1  No-shows to date: 0    For today's appointment patient:  []  Cancelled   [x]  Rescheduled appointment - 10/15  []  No-show     Reason given by patient:  []  Patient ill  []  Conflicting appointment  []  No transportation    []  Conflict with work  []  No reason given  [x]  Other:     Comments:  Pt arrived 30 min late as she thought her appointment was at 3:00. Appointment rescheduled. Phone call information:   []  Phone call made today to patient at _ time at number provided:      []  Patient answered, conversation as follows:    []  Patient did not answer, message left as follows:  []  Phone call not made today  [x]  Phone call not needed - pt contacted us to cancel and provided reason for cancellation.      Electronically signed by:  James Rojas, PT, DPT

## 2021-10-19 ENCOUNTER — HOSPITAL ENCOUNTER (OUTPATIENT)
Dept: PHYSICAL THERAPY | Age: 81
Setting detail: THERAPIES SERIES
Discharge: HOME | End: 2021-10-19
Payer: MEDICARE

## 2021-10-19 PROCEDURE — 97140 MANUAL THERAPY 1/> REGIONS: CPT

## 2021-10-19 PROCEDURE — 97110 THERAPEUTIC EXERCISES: CPT

## 2021-10-19 NOTE — FLOWSHEET NOTE
[facility]  Outpatient Physical Therapy  Phone: [phone]   Fax: [phone]  Physical Therapy Re-Certification Plan of Care    Physical Therapy Daily Treatment Note  Date:  10/19/2021    Patient Name:  Leona Nelson    :  1940  MRN: [id number]  Medical/Treatment Diagnosis Information:  Diagnosis: cervical disc disease, R TMJ  Treatment Diagnosis: pt with painful myofascial changes, impaired posture including forward head posture, reduced postural strengths. hypomobility t/o spine, especially at upper cerv spine  Insurance/Certification information:  PT Insurance Information: medicare  Physician Information:  Referring Practitioner: Dr. Gladis Cruz of care signed (Y/N): []  Yes [x]  No     Date of Patient follow up with Physician:      Progress Report: []  Yes  [x]  No     Progress report due (10 Rx/or 30 days whichever is less): visit #35 or     Recertification due (POC duration/ or 90 days whichever is less): visit #12 or     Visit # Insurance Allowable Auth required? Date Range    + 4/10 Med nec []  Yes  [x]  No NA       Units approved Units used Date Range   NA NA NA       Latex Allergy:  [x]NO      []YES  Preferred Language for Healthcare:   [x]English       []other:    Functional Scale:        Date assessed:  NDI raw score = 18, dysfunction =36 %, taken at initial eval       Pain level: 4/10      SUBJECTIVE:    10/19 - Pt reports she almost fell this morning. She was reading the paper and went to sit in a rolling chair and the chair slipped. She caught herself but thinks this could be attributing to her pain. 10/12 - pt reports she is a little sore today. States her head and ear were bothering her  but that has since gone away. 10/6 - Pt reports more pain today in her ear and neck. States she carried her groceries on the opposite side and isn't sure what increased her pain. 10/4 Pt reports she is more sore today.  She cooked a pot of chili yesterday and thinks she may have over done it. 10/1 pt reports her pain comes and goes but she continues to feel better. States her son is getting his stress test currently. Brought her calendar to schedule additional visits. 9/28 - Pt states she has a lot going on with her son's health. He is supposed to get a stress test Saturday. Reports some ear pain when she woke up, but feels good now.   9/23: Patient reports that she is feeling well today and not having too much pain. Patient states that she is trying her best to create a more ergonomic home desk environment for her laptop. Patient states that she has not found a solution that she prefers yet and will coninue to try and find a solution that works for her. Pt asks re life alert systems for home use    9/21 increasing mobility without increased pain. Reports she is still taking the mm relaxer and reports she took Austine Comas when she was stressed and noticed increased neck mm pain And it seemed to help. States her son hopes to get hernia surgery soon. Intermittent R ear pain with combing her hair     9/14 Intermittent pain still gets pain in neck and face/ear. Thinks PT tx is helping. Reports sleeping is good. Cn turn  to the right better    OBJECTIVE: 9/23 Patient ambulates with AD 6PQ, Patient ambulates with forward head, rounded shoulders, and kyphotic curve in T-spine. 9/14 pt reports slouched posture with excessive cerv flex when she watches TV. Palpation: Myofascial pain R>L UT, scalenes. SCM and masseter WFL B.    cerv AROM  R L  SB  40 25  Rot  60 50    9/9 - Pt with no clicking or popping in TMJ, palpation of R SCM increases ear pain   8/31 - pt reports she has a spot that is iching and burning on her back. Skin inspection - small, red oval on R scapular region with smooth borders. Pt reports she has a dermatology appointment within the next 2 weeks. 8/25 - amb with RW this date. MuseAmi is too tall, but it does not lower further. Increased tissue tension in R SCM and scalenes. RESTRICTIONS/PRECAUTIONS:osteopososis/osteopenia; has lost 3 friends and her  and brother int he last 17 months, joint replacements ( B knees, R ankle, B shoudlers): x 5. Lives with her son who has multiple medical issues including COPD and heart issues. Spinal cord stimulator (lumbar) put in by Dr. Elaine Randall - recently had new battery put in.        Exercises/Interventions:     Therapeutic Exercises (92171) Resistance / level Sets/sec Reps Notes   Arm bike   2.5 min fwd/2.5 min back      UT stretch - HAND HOLDING CHAIR   30 sec  2 B     Chin tuck -   Scap squeeze   2 sec  2 sec  10   10     Pulleys - flexion and abd  5 sec  10 B each Completed to promote thoracic ext    Seated:    cerv AROM   SB  Rot  UT stretches  scalene stretches  10x5\" B      Standing TB  High row   Mid row  LPD   Horizontal abd - seated [place] [address]   Seated thoracic extension      SCM stretch   L increased pain on R side    LT wall slides  -flexion   - abd     10 B  10 B Manual cues to deactivate UT    Therapeutic Activities (33502)       Instruct in upright sitting posture for watching TV       Standing reaching to shelf - second shelf  - flexion  - abd Tactile and verbal cues to deactivate UT and use scapular mm to reach.  CGA                         Neuromuscular Re-ed (61020)       Postural ed                                          Manual Intervention (31164)       Gentle manual - STM B UT mm and R SCM        12 min  8/27 Done on reclined mat table  9/21, 9/7, 8/30 completed seated in chair                                           Modalities:   10/19, 10/12, 10/6 - 1 cervical cold pack to cspine x 10 min at end of session   10/4, 10/1, 9/28 - cervical MHP seated in chair at end of session x 10 min   9/23, 9/14 9/9, 9/7, 9/2 - cervical MHP seated in chair at end of session x 15 min   8/31- cervical MHP seated in chair at end of session x 12 min   8/27- cervical MHP on reclined mat table x 15 min   8/25 - cervical MHP seated in chair at end of session x 10 min     Pt. Education:  9/23: pt Educated re safety in home /fall risk reduction. At pt request d/w pt re life alert systems. Patient ed  on option of obtaining an apple watch to use as a \"life alert\" system. Patient likes this idea and state she will have her son help her to look into it.   8/31 - Pt educated on ABCDs of melanoma and importance of skin checks as she reports concern about itching spot on her back. Pt has dermatology appointment in 2 wks. 8/19 patient educated on diagnosis, prognosis and expectations for rehab  -all patient questions were answered    Home Exercise Program:  9/23 computer ergonomics and apple watch for fall risk reduction  9/21  computer ergonomics  9/14 upright psoture for TV viewing  8/19 cerv AROM SB/ROT      Therapeutic Exercise and NMR EXR  [] [phone]) Provided verbal/tactile cueing for activities related to strengthening, flexibility, endurance, ROM for improvements in  [] LE / Lumbar: LE, proximal hip, and core control with self care, mobility, lifting, ambulation. [] UE / Cervical: cervical, postural, scapular, scapulothoracic and UE control with self care, reaching, carrying, lifting, house/yardwork, driving, computer work.  [] (27082) Provided verbal/tactile cueing for activities related to improving balance, coordination, kinesthetic sense, posture, motor skill, proprioception to assist with   [] LE / lumbar: LE, proximal hip, and core control in self care, mobility, lifting, ambulation and eccentric single leg control.    [] UE / cervical: cervical, scapular, scapulothoracic and UE control with self care, reaching, carrying, lifting, house/yardwork, driving, computer work.   [] (41510) Therapist is in constant attendance of 2 or more patients providing skilled therapy interventions, but not providing any significant amount of measurable one-on-one time to either patient, for improvements in  [] motor skill, proprioception of   [] LE: core, proximal hip and LE for self care, mobility, lifting, and ambulation/stair navigation    [] UE / Cervical: cervical, postural,  scapular, scapulothoracic and UE control with self care, reaching, carrying, lifting, house/yardwork, driving, computer work    Manual Treatments:  PROM / STM / Oscillations-Mobs:  G-I, II, III, IV (PA's, Inf., Post.)  [] (25316) Provided manual therapy to mobilize LE, proximal hip and/or LS spine soft tissue/joints for the purpose of modulating pain, promoting relaxation,  increasing ROM, reducing/eliminating soft tissue swelling/inflammation/restriction, improving soft tissue extensibility and allowing for proper ROM for normal function with   [] LE / lumbar: self care, mobility, lifting and ambulation. [] UE / Cervical: self care, reaching, carrying, lifting, house/yardwork, driving, computer work. Modalities:  [] (02904) Vasopneumatic compression: Utilized vasopneumatic compression to decrease edema / swelling for the purpose of improving mobility and quad tone / recruitment which will allow for increased overall function including but not limited to self-care, transfers, ambulation, and ascending / descending stairs. Charges:  Timed Code Treatment Minutes: 42   Total Treatment Minutes: 52     [] EVAL - LOW (45235)   [] EVAL - MOD (93808)  [] EVAL - HIGH (13757)  [] RE-EVAL (55024)  [x] ZM(34419) x   2    [] Ionto  [] NMR (38714) x       [] Vaso  [x] Manual (86914) x 1      [] Ultrasound  [] TA x   1     [] Mech Traction (32660)  [] Aquatic Therapy x      [] ES (un) (75012):   [] Home Management Training x  [] ES(attended) (25766)   [] Dry Needling 1-2 muscles (32077):  [] Dry Needling 3+ muscles (537130)  [] Group:      [] Other:          GOALS:  Patient stated goal: less pain  []? Progressing: []? Met: []? Not Met: []? Adjusted     Therapist goals for Patient:   Short Term Goals: To be achieved in: 2 weeks  1.  Independent in HEP and progression per patient tolerance, in order to prevent re-injury. []? Progressing: []? Met: []? Not Met: []? Adjusted  2. Patient will have a decrease in pain to facilitate improvement in movement, function, and ADLs as indicated by improvement with respect to Functional Deficits. []? Progressing: []? Met: []? Not Met: []? Adjusted     Long Term Goals: To be achieved in: 6 weeks  1. Disability index score of 10% or less on the  NDI  to assist with reaching prior level of function. [x]? Progressing: []? Met: []? Not Met: []? Adjusted  2. Patient will demonstrate increased AROM  [facility], to allow for proper joint functioning to allow pt to resume turning head while driving without increase in symptoms. [x]? Progressing: []? Met: []? Not Met: []? Adjusted  3. Patient will demonstrate increased Strength by 1/2 mmt grade  patients Functional Deficits to allow pt to resume amb with/without RW prn for amb in community without increase in symptoms. [x]? Progressing: []? Met: []? Not Met: []? Adjusted  4. Patient will return to functional activities including falling asleep without difficulty without increased symptoms or restriction. [x]? Progressing: []? Met: []? Not Met: []? Adjusted     Overall Progression Towards Functional goals/ Treatment Progress Update:  [x] Patient is progressing as expected towards functional goals listed. [] Progression is slowed due to complexities/Impairments listed. [] Progression has been slowed due to co-morbidities.   [] Plan just implemented, too soon to assess goals progression <30days   [] Goals require adjustment due to lack of progress  [] Patient is not progressing as expected and requires additional follow up with physician  [] Other    Persisting Functional Limitations/Impairments:  [x]Sleeping []Sitting               []Standing []Transfers        []Walking []Kneeling               []Stairs []Squatting / bending   [x]ADLs []Reaching  [x]Lifting [x]Housework  [x]Driving []Job related tasks  []Sports/Recreation []Other:        ASSESSMENT:   10/19 - progressed TB shoulder ABD to orange band this date. Pt with TP in R SCM which decreased with manual therapy. Pt reports decreased pain at end of session. 10/12 - progressed reaching to second shelf this date. Pt unable to reach second shelf with 1 pound weight, but demonstrated ability to touch shelf without weight. Pt with TP in L UT, L infraspinatus, and R SCM. Pt demonstrates improved posture with exercises and increased exercise tolerance. Continues to require seated rest breaks throughout session. 10/6 - Initiated reaching with 1 pound weight this date. Pt required verbal and tactile cues to deactivate UT and recruit scapular mm. Pt required less frequent rest breaks throughout treatment session this date. Pt requested ice this date as she states sometimes it decreases her pain at home. Continue to progress standing postural strengthening and re-ed exercises per pt tolerance. 10/4 - Progressed TB exercises to standing this date. Decreased to 1 set of 10 due to pt fatigue in standing. Initiated thoracic ext over chair with tactile cue to ext through thoracic spine vs cervical spine. Pt required VC for chin tuck with cervical rotation. Reports decreased pain with chin tuck + rotation. Continue to progress as tolerated. 10/1 - Pt required verbal and tactile cues to deactivate UT with TB exercises this date. Pt reports no pain throughout treatment session. Pt reports she has been more aware of her posture throughout the day. Pt with less TP and tissue tension in UT mm this date. 9/28 - Pt with decreased pain and tissue tension this date. Pt requires VC for appropriate form with chin tuck. Pt demonstrates improved posture with TB exercises. Pt reports she is more aware of her posture during the day, but has pain when she sits on a pillow.  Plan to continue to progress postural education and scapular strengthening to further decrease pain and return to PLOF without pain or limitations. 9/23: Patient continues to require VC and TC during exercises in order to squeeze scapula together. Patient has difficulty maintaining posture and is struggling to find home solutions to help her adjust her body mechanics to incorporate improved posture habits during her ADL's and IADLs. In addition, pt concerned re safety at home. Patient has noted tenderness and tightness in bilat UT, levator, rhomboids, scalenes, SCM. Patient reports pain relief with STM. Plan to continue to progress appropriate postural strengthening and appropriate motor programming to increase cervical ROM. 9/21 see PT POC above    9/14 needs improved postures, especially for TV watching   9/9 Pt with decreased pain and tissue tension this date. STM primarily on L side this date and pain decreased after manual therapy. Pt reports ear pain is on and off and currently has no ear pain. Increased resistance with TB exercises this date. Pt reported increased difficulty but no increased pain. Plan to continue to progress postural strengthening and cervical ROM per pt tolerance. Treatment/Activity Tolerance:  [x] Patient able to complete tx  [] Patient limited by fatigue  [] Patient limited by pain  [] Patient limited by other medical complications  [] Other:     Prognosis: [x] Good [] Fair  [] Poor    Patient Requires Follow-up: [x] Yes  [] No    PLAN:  strength, ROM/flexibility, posture and body mechs, manual, MOC, HEP, pt education     Frequency/Duration: 2 days per week for 6  Weeks:  Interventions:  [x]? Therapeutic exercise including:strength, ROM, flexibility  [x]? NMR activation and proprioception including postural re-education  [x]? Manual therapy as indicated to include: IASTM, STM, PROM, Gr I-IV mobilizations, manipulation. [x]?   Modalities as needed that may include: thermal agents, E-stim, Biofeedback, US, iontophoresis as indicated  [x]? Patient education on joint protection, postural re-education, activity modification, progression of HEP. Plan for next treatment session:    PLAN: See eval. PT 2x / week for 6 weeks. + 2x/wk for 5 wks  [x] Continue per plan of care [] Alter current plan (see comments)  [] Plan of care initiated [] Hold pending MD visit [] Discharge    Electronically signed by: Michelle Martinez PT, DPT      Note: If patient does not return for scheduled/ recommended follow up visits, this note will serve as a discharge from care along with most recent update on progress.

## 2021-10-22 ENCOUNTER — HOSPITAL ENCOUNTER (OUTPATIENT)
Dept: PHYSICAL THERAPY | Age: 81
Setting detail: THERAPIES SERIES
Discharge: HOME | End: 2021-10-22
Payer: MEDICARE

## 2021-10-22 PROCEDURE — 97140 MANUAL THERAPY 1/> REGIONS: CPT

## 2021-10-22 PROCEDURE — 97110 THERAPEUTIC EXERCISES: CPT

## 2021-10-22 PROCEDURE — 97530 THERAPEUTIC ACTIVITIES: CPT

## 2021-10-22 NOTE — FLOWSHEET NOTE
[facility]  Outpatient Physical Therapy  Phone: [phone]   Fax: [phone]  Physical Therapy Re-Certification Plan of Care    Physical Therapy Daily Treatment Note  Date:  10/22/2021    Patient Name:  Everlena Litten    :  1940  MRN: [id number]  Medical/Treatment Diagnosis Information:  Diagnosis: cervical disc disease, R TMJ  Treatment Diagnosis: pt with painful myofascial changes, impaired posture including forward head posture, reduced postural strengths. hypomobility t/o spine, especially at upper cerv spine  Insurance/Certification information:  PT Insurance Information: medicare  Physician Information:  Referring Practitioner: Dr. Aline Paula of care signed (Y/N): []  Yes [x]  No     Date of Patient follow up with Physician:      Progress Report: []  Yes  [x]  No     Progress report due (10 Rx/or 30 days whichever is less): visit #02 or     Recertification due (POC duration/ or 90 days whichever is less): visit # or     Visit # Insurance Allowable Auth required? Date Range    + 5/10 Med nec []  Yes  [x]  No NA       Units approved Units used Date Range   NA NA NA       Latex Allergy:  [x]NO      []YES  Preferred Language for Healthcare:   [x]English       []other:    Functional Scale:        Date assessed:  NDI raw score = 18, dysfunction =36 %, taken at initial eval       Pain level: 5/10      SUBJECTIVE:    10/22 - Pt reports her pain typically gets better throughout the day. States she has some ear pain today. 10/19 - Pt reports she almost fell this morning. She was reading the paper and went to sit in a rolling chair and the chair slipped. She caught herself but thinks this could be attributing to her pain. 10/12 - pt reports she is a little sore today. States her head and ear were bothering her  but that has since gone away. 10/6 - Pt reports more pain today in her ear and neck.  States she carried her groceries on the opposite side and isn't sure what increased her pain. 10/4 Pt reports she is more sore today. She cooked a pot of chili yesterday and thinks she may have over done it. 10/1 pt reports her pain comes and goes but she continues to feel better. States her son is getting his stress test currently. Brought her calendar to schedule additional visits. 9/28 - Pt states she has a lot going on with her son's health. He is supposed to get a stress test Saturday. Reports some ear pain when she woke up, but feels good now.   9/23: Patient reports that she is feeling well today and not having too much pain. Patient states that she is trying her best to create a more ergonomic home desk environment for her laptop. Patient states that she has not found a solution that she prefers yet and will coninue to try and find a solution that works for her. Pt asks re life alert systems for home use    9/21 increasing mobility without increased pain. Reports she is still taking the mm relaxer and reports she took Scarlet Curio when she was stressed and noticed increased neck mm pain And it seemed to help. States her son hopes to get hernia surgery soon. Intermittent R ear pain with combing her hair     9/14 Intermittent pain still gets pain in neck and face/ear. Thinks PT tx is helping. Reports sleeping is good. Cn turn  to the right better    OBJECTIVE: 9/23 Patient ambulates with AD 4KO, Patient ambulates with forward head, rounded shoulders, and kyphotic curve in T-spine. 9/14 pt reports slouched posture with excessive cerv flex when she watches TV. Palpation: Myofascial pain R>L UT, scalenes. SCM and masseter WFL B.    cerv AROM  R L  SB  40 25  Rot  60 50    9/9 - Pt with no clicking or popping in TMJ, palpation of R SCM increases ear pain   8/31 - pt reports she has a spot that is iching and burning on her back. Skin inspection - small, red oval on R scapular region with smooth borders.  Pt reports she has a dermatology appointment within the next 2 weeks. 8/25 - amb with RW this date. Elvie Forrest is too tall, but it does not lower further. Increased tissue tension in R SCM and scalenes. RESTRICTIONS/PRECAUTIONS:osteopososis/osteopenia; has lost 3 friends and her  and brother int he last 17 months, joint replacements ( B knees, R ankle, B shoudlers): x 5. Lives with her son who has multiple medical issues including COPD and heart issues. Spinal cord stimulator (lumbar) put in by Dr. En Wallace - recently had new battery put in.        Exercises/Interventions:     Therapeutic Exercises (55459) Resistance / level Sets/sec Reps Notes   Arm bike   2 min fwd/2 min back      UT stretch - HAND HOLDING CHAIR   30 sec  2 B     Chin tuck -   Scap squeeze   2 sec  2 sec  10   10     Pulleys - flexion and abd  5 sec  10 B each Completed to promote thoracic ext    Seated:    cerv AROM   SB  Rot  UT stretches  scalene stretches  10x5\" B      Standing TB  High row   Mid row  LPD   Horizontal abd - seated  B ER - seated    [place] 2  10   Seated thoracic extension      SCM stretch   L increased pain on R side    LT wall slides  -flexion   - abd     10 B  10 B Manual cues to deactivate UT    Therapeutic Activities (46733)       Instruct in upright sitting posture for watching TV       Standing reaching to shelf - second shelf  - flexion  - abd 1 pound R, no weight L  1 pound R, no weight LTactile and verbal cues to deactivate UT and use scapular mm to reach.  CGA                         Neuromuscular Re-ed (19717)       Postural ed                                          Manual Intervention (33525)       Gentle manual - STM R UT mm and R SCM, and suboccipitals        10 min  8/27 Done on reclined mat table  9/21, 9/7, 8/30 completed seated in chair                                           Modalities:   10/22 - cervical MHP seated in chair at end of session x 10 min    10/19, 10/12, 10/6 - 1 cervical cold pack to cspine x 10 min at end of session   10/4, 10/1, 9/28 - cervical MHP seated in chair at end of session x 10 min   9/23, 9/14 9/9, 9/7, 9/2 - cervical MHP seated in chair at end of session x 15 min   8/31- cervical MHP seated in chair at end of session x 12 min   8/27- cervical MHP on reclined mat table x 15 min   8/25 - cervical MHP seated in chair at end of session x 10 min     Pt. Education:  9/23: pt Educated re safety in home /fall risk reduction. At pt request d/w pt re life alert systems. Patient ed  on option of obtaining an apple watch to use as a \"life alert\" system. Patient likes this idea and state she will have her son help her to look into it.   8/31 - Pt educated on ABCDs of melanoma and importance of skin checks as she reports concern about itching spot on her back. Pt has dermatology appointment in 2 wks. 8/19 patient educated on diagnosis, prognosis and expectations for rehab  -all patient questions were answered    Home Exercise Program:  9/23 computer ergonomics and apple watch for fall risk reduction  9/21  computer ergonomics  9/14 upright psoture for TV viewing  8/19 cerv AROM SB/ROT      Therapeutic Exercise and NMR EXR  [] ([address]) Provided verbal/tactile cueing for activities related to strengthening, flexibility, endurance, ROM for improvements in  [] LE / Lumbar: LE, proximal hip, and core control with self care, mobility, lifting, ambulation. [] UE / Cervical: cervical, postural, scapular, scapulothoracic and UE control with self care, reaching, carrying, lifting, house/yardwork, driving, computer work.  [] (36766) Provided verbal/tactile cueing for activities related to improving balance, coordination, kinesthetic sense, posture, motor skill, proprioception to assist with   [] LE / lumbar: LE, proximal hip, and core control in self care, mobility, lifting, ambulation and eccentric single leg control.    [] UE / cervical: cervical, scapular, scapulothoracic and UE control with self care, reaching, carrying, lifting, house/yardwork, driving, computer work.   [] (05466) Therapist is in constant attendance of 2 or more patients providing skilled therapy interventions, but not providing any significant amount of measurable one-on-one time to either patient, for improvements in  [] LE / lumbar: LE, proximal hip, and core control in self care, mobility, lifting, ambulation and eccentric single leg control. [] UE / cervical: cervical, scapular, scapulothoracic and UE control with self care, reaching, carrying, lifting, house/yardwork, driving, computer work. NMR and Therapeutic Activities:    [] (65771 or 08072) Provided verbal/tactile cueing for activities related to improving balance, coordination, kinesthetic sense, posture, motor skill, proprioception and motor activation to allow for proper function of   [] LE: / Lumbar core, proximal hip and LE with self care and ADLs  [] UE / Cervical: cervical, postural, scapular, scapulothoracic and UE control with self care, carrying, lifting, driving, computer work.   [] (72267) Gait Re-education- Provided training and instruction to the patient for proper LE, core and proximal hip recruitment and positioning and eccentric body weight control with ambulation re-education including up and down stairs     Home Management Training / Self Care:  [] (79237) Provided self-care/home management training related to activities of daily living and compensatory training, and/or use of adaptive equipment for improvement with: ADLs and compensatory training, meal preparation, safety procedures and instruction in use of adaptive equipment, including bathing, grooming, dressing, personal hygiene, basic household cleaning and chores.      Home Exercise Program:    [x] (21522) Reviewed/Progressed HEP activities related to strengthening, flexibility, endurance, ROM of   [] LE / Lumbar: core, proximal hip and LE for functional self-care, mobility, lifting and ambulation/stair navigation   [] UE / Cervical: cervical, postural, scapular, scapulothoracic and UE control with self care, reaching, carrying, lifting, house/yardwork, driving, computer work  [] (44247)Reviewed/Progressed HEP activities related to improving balance, coordination, kinesthetic sense, posture, motor skill, proprioception of   [] LE: core, proximal hip and LE for self care, mobility, lifting, and ambulation/stair navigation    [] UE / Cervical: cervical, postural,  scapular, scapulothoracic and UE control with self care, reaching, carrying, lifting, house/yardwork, driving, computer work    Manual Treatments:  PROM / STM / Oscillations-Mobs:  G-I, II, III, IV (PA's, Inf., Post.)  [] (92618) Provided manual therapy to mobilize LE, proximal hip and/or LS spine soft tissue/joints for the purpose of modulating pain, promoting relaxation,  increasing ROM, reducing/eliminating soft tissue swelling/inflammation/restriction, improving soft tissue extensibility and allowing for proper ROM for normal function with   [] LE / lumbar: self care, mobility, lifting and ambulation. [] UE / Cervical: self care, reaching, carrying, lifting, house/yardwork, driving, computer work. Modalities:  [] (16051) Vasopneumatic compression: Utilized vasopneumatic compression to decrease edema / swelling for the purpose of improving mobility and quad tone / recruitment which will allow for increased overall function including but not limited to self-care, transfers, ambulation, and ascending / descending stairs.        Charges:  Timed Code Treatment Minutes: 38   Total Treatment Minutes: 48     [] EVAL - LOW (70823)   [] EVAL - MOD (10757)  [] EVAL - HIGH (70644)  [] RE-EVAL (09549)  [x] MN(66656) x   1    [] Ionto  [] NMR (97858) x       [] Vaso  [x] Manual (86163) x 1      [] Ultrasound  [x] TA x   1     [] Mech Traction (28127)  [] Aquatic Therapy x      [] ES (un) (84602):   [] Home Management Training x  [] ES(attended) (37686)   [] Dry Needling 1-2 muscles (10092):  [] Dry with physician  [] Other    Persisting Functional Limitations/Impairments:  [x]Sleeping []Sitting               []Standing []Transfers        []Walking []Kneeling               []Stairs []Squatting / bending   [x]ADLs []Reaching  [x]Lifting  [x]Housework  [x]Driving []Job related tasks  []Sports/Recreation []Other:        ASSESSMENT:   10/22 - Pt with TP and increased tissue tension in suboccipitals, R UT, and R SCM. Pt reports feeling a good stretch with UT stretch. Initiated B ER with TB this date. Pt required VC to keep elbows bent. Pt required less frequent VC for upright trunk posture and demonstrated improved technique with chin tuck. Continue to progress per pt tolerance. 10/19 - progressed TB shoulder ABD to orange band this date. Pt with TP in R SCM which decreased with manual therapy. Pt reports decreased pain at end of session. 10/12 - progressed reaching to second shelf this date. Pt unable to reach second shelf with 1 pound weight, but demonstrated ability to touch shelf without weight. Pt with TP in L UT, L infraspinatus, and R SCM. Pt demonstrates improved posture with exercises and increased exercise tolerance. Continues to require seated rest breaks throughout session. 10/6 - Initiated reaching with 1 pound weight this date. Pt required verbal and tactile cues to deactivate UT and recruit scapular mm. Pt required less frequent rest breaks throughout treatment session this date. Pt requested ice this date as she states sometimes it decreases her pain at home. Continue to progress standing postural strengthening and re-ed exercises per pt tolerance. 10/4 - Progressed TB exercises to standing this date. Decreased to 1 set of 10 due to pt fatigue in standing. Initiated thoracic ext over chair with tactile cue to ext through thoracic spine vs cervical spine. Pt required VC for chin tuck with cervical rotation. Reports decreased pain with chin tuck + rotation.  Continue to progress as tolerated. 10/1 - Pt required verbal and tactile cues to deactivate UT with TB exercises this date. Pt reports no pain throughout treatment session. Pt reports she has been more aware of her posture throughout the day. Pt with less TP and tissue tension in UT mm this date. 9/28 - Pt with decreased pain and tissue tension this date. Pt requires VC for appropriate form with chin tuck. Pt demonstrates improved posture with TB exercises. Pt reports she is more aware of her posture during the day, but has pain when she sits on a pillow. Plan to continue to progress postural education and scapular strengthening to further decrease pain and return to PLOF without pain or limitations. 9/23: Patient continues to require VC and TC during exercises in order to squeeze scapula together. Patient has difficulty maintaining posture and is struggling to find home solutions to help her adjust her body mechanics to incorporate improved posture habits during her ADL's and IADLs. In addition, pt concerned re safety at home. Patient has noted tenderness and tightness in bilat UT, levator, rhomboids, scalenes, SCM. Patient reports pain relief with STM. Plan to continue to progress appropriate postural strengthening and appropriate motor programming to increase cervical ROM. 9/21 see PT POC above    9/14 needs improved postures, especially for TV watching   9/9 Pt with decreased pain and tissue tension this date. STM primarily on L side this date and pain decreased after manual therapy. Pt reports ear pain is on and off and currently has no ear pain. Increased resistance with TB exercises this date. Pt reported increased difficulty but no increased pain. Plan to continue to progress postural strengthening and cervical ROM per pt tolerance.      Treatment/Activity Tolerance:  [x] Patient able to complete tx  [] Patient limited by fatigue  [] Patient limited by pain  [] Patient limited by other medical complications  [] Other:     Prognosis: [x] Good [] Fair  [] Poor    Patient Requires Follow-up: [x] Yes  [] No    PLAN:  strength, ROM/flexibility, posture and body mechs, manual, MOC, HEP, pt education     Frequency/Duration: 2 days per week for 6  Weeks:  Interventions:  [x]? Therapeutic exercise including:strength, ROM, flexibility  [x]? NMR activation and proprioception including postural re-education  [x]? Manual therapy as indicated to include: IASTM, STM, PROM, Gr I-IV mobilizations, manipulation. [x]? Modalities as needed that may include: thermal agents, E-stim, Biofeedback, US, iontophoresis as indicated  [x]? Patient education on joint protection, postural re-education, activity modification, progression of HEP. Plan for next treatment session:    PLAN: See eval. PT 2x / week for 6 weeks. + 2x/wk for 5 wks  [x] Continue per plan of care [] Alter current plan (see comments)  [] Plan of care initiated [] Hold pending MD visit [] Discharge    Electronically signed by: Adrian Boyle, PT, DPT      Note: If patient does not return for scheduled/ recommended follow up visits, this note will serve as a discharge from care along with most recent update on progress.

## 2021-10-26 ENCOUNTER — HOSPITAL ENCOUNTER (OUTPATIENT)
Dept: PHYSICAL THERAPY | Age: 81
Setting detail: THERAPIES SERIES
Discharge: HOME | End: 2021-10-26
Payer: MEDICARE

## 2021-10-26 PROCEDURE — 97110 THERAPEUTIC EXERCISES: CPT

## 2021-10-26 PROCEDURE — 97530 THERAPEUTIC ACTIVITIES: CPT

## 2021-10-26 NOTE — PROGRESS NOTES
[facility]  Outpatient Physical Therapy  Phone: [phone]   Fax: [phone]  Physical Therapy Re-Certification Plan of Care    Physical Therapy Daily Treatment Note  Date:  10/26/2021    Patient Name:  Katlyn Morris    :  1940  MRN: [id number]  Medical/Treatment Diagnosis Information:  Diagnosis: cervical disc disease, R TMJ  Treatment Diagnosis: pt with painful myofascial changes, impaired posture including forward head posture, reduced postural strengths. hypomobility t/o spine, especially at upper cerv spine  Insurance/Certification information:  PT Insurance Information: medicare  Physician Information:  Referring Practitioner: Dr. Ana Osborne of care signed (Y/N): []  Yes [x]  No     Date of Patient follow up with Physician:      Progress Report: [x]  Yes  []  No     Progress report due (10 Rx/or 30 days whichever is less): visit #41 or     Recertification due (POC duration/ or 90 days whichever is less): visit #12 or     Visit # Insurance Allowable Auth required? Date Range    + 6/10 Med nec []  Yes  [x]  No NA       Units approved Units used Date Range   NA NA NA       Latex Allergy:  [x]NO      []YES  Preferred Language for Healthcare:   [x]English       []other:    Functional Scale:        Date assessed:  NDI raw score = 18, dysfunction =36 %, taken at initial eval  NDI raw score = 20; dysfunction = 40%    10/26/21       Pain level: 3/10      SUBJECTIVE:    10/26 - Pt reports she had no pain yesterday. States pain is a little higher today after driving a lot today. Had to park far away and she is tired. 10/22 - Pt reports her pain typically gets better throughout the day. States she has some ear pain today. 10/19 - Pt reports she almost fell this morning. She was reading the paper and went to sit in a rolling chair and the chair slipped. She caught herself but thinks this could be attributing to her pain.    10/12 - pt reports she is a little sore today. States her head and ear were bothering her Sunday but that has since gone away. 10/6 - Pt reports more pain today in her ear and neck. States she carried her groceries on the opposite side and isn't sure what increased her pain. 10/4 Pt reports she is more sore today. She cooked a pot of chili yesterday and thinks she may have over done it. 10/1 pt reports her pain comes and goes but she continues to feel better. States her son is getting his stress test currently. Brought her calendar to schedule additional visits. 9/28 - Pt states she has a lot going on with her son's health. He is supposed to get a stress test Saturday. Reports some ear pain when she woke up, but feels good now.   9/23: Patient reports that she is feeling well today and not having too much pain. Patient states that she is trying her best to create a more ergonomic home desk environment for her laptop. Patient states that she has not found a solution that she prefers yet and will coninue to try and find a solution that works for her. Pt asks re life alert systems for home use    9/21 increasing mobility without increased pain. Reports she is still taking the mm relaxer and reports she took Austine Comas when she was stressed and noticed increased neck mm pain And it seemed to help. States her son hopes to get hernia surgery soon. Intermittent R ear pain with combing her hair     9/14 Intermittent pain still gets pain in neck and face/ear. Thinks PT tx is helping. Reports sleeping is good. Cn turn  to the right better    OBJECTIVE:   10/26  cerv AROM  R L  SB  40 35  Rot  60 59    Strength / Myotomes RIGHT LEFT     Shoulder Flex 4+ 4+     Shoulder Abduction (C5) 4+ 4        9/23 Patient ambulates with AD 9XY, Patient ambulates with forward head, rounded shoulders, and kyphotic curve in T-spine. 9/14 pt reports slouched posture with excessive cerv flex when she watches TV. Palpation: Myofascial pain R>L UT, scalenes.   SCM and masseter WFL B.    cerv AROM  R L  SB  40 25  Rot  60 50    9/9 - Pt with no clicking or popping in TMJ, palpation of R SCM increases ear pain   8/31 - pt reports she has a spot that is iching and burning on her back. Skin inspection - small, red oval on R scapular region with smooth borders. Pt reports she has a dermatology appointment within the next 2 weeks. 8/25 - amb with RW this date. CME is too tall, but it does not lower further. Increased tissue tension in R SCM and scalenes. RESTRICTIONS/PRECAUTIONS:osteopososis/osteopenia; has lost 3 friends and her  and brother int he last 17 months, joint replacements ( B knees, R ankle, B shoudlers): x 5. Lives with her son who has multiple medical issues including COPD and heart issues. Spinal cord stimulator (lumbar) put in by Dr. Felicitas Kauffman - recently had new battery put in.        Exercises/Interventions:     Therapeutic Exercises (24205) Resistance / level Sets/sec Reps Notes   Arm bike        UT stretch - HAND HOLDING CHAIR   30 sec  2 B     Chin tuck -   Scap squeeze   Chin tuck + scap squeeze  2 sec  2 sec   10  10   10     Pulleys - flexion and abd  5 sec  10 B each Completed to promote thoracic ext    Seated:    cerv AROM   SB  Rot  UT stretches  scalene stretches       Standing TB  High row   Mid row  LPD   Horizontal abd - seated  B ER - seated    Green 1  10   Seated thoracic extension      SCM stretch   L increased pain on R side    LT wall slides  -flexion   - abd     10 B  10 B Manual cues to deactivate UT    Therapeutic Activities (19270)       Instruct in upright sitting posture for watching TV       Standing reaching to shelf - second shelf  - flexion  - abd 1 pound R, no weight L  1 pound R, no weight LTactile and verbal cues to deactivate UT and use scapular mm to reach.  CGA           Progress note completed this date - objective measures taken and discussed goals and progress made with therapy and plan for remaining visits  X 15 min Neuromuscular Re-ed (82269)       Postural ed                                          Manual Intervention (01468)       Gentle manual - STM B UT mm and R SCM,       5 min  8/27 Done on reclined mat table  9/21, 9/7, 8/30 completed seated in chair                                           Modalities:   10/26, 10/22 - cervical MHP seated in chair at end of session x 10 min    10/19, 10/12, 10/6 - 1 cervical cold pack to cspine x 10 min at end of session   10/4, 10/1, 9/28 - cervical MHP seated in chair at end of session x 10 min   9/23, 9/14 9/9, 9/7, 9/2 - cervical MHP seated in chair at end of session x 15 min   8/31- cervical MHP seated in chair at end of session x 12 min   8/27- cervical MHP on reclined mat table x 15 min   8/25 - cervical MHP seated in chair at end of session x 10 min     Pt. Education:  9/23: pt Educated re safety in home /fall risk reduction. At pt request d/w pt re life alert systems. Patient ed  on option of obtaining an apple watch to use as a \"life alert\" system. Patient likes this idea and state she will have her son help her to look into it.   8/31 - Pt educated on ABCDs of melanoma and importance of skin checks as she reports concern about itching spot on her back. Pt has dermatology appointment in 2 wks. 8/19 patient educated on diagnosis, prognosis and expectations for rehab  -all patient questions were answered    Home Exercise Program:  9/23 computer ergonomics and apple watch for fall risk reduction  9/21  computer ergonomics  9/14 upright psoture for TV viewing  8/19 cerv AROM SB/ROT      Therapeutic Exercise and NMR EXR  [] [phone]) Provided verbal/tactile cueing for activities related to strengthening, flexibility, endurance, ROM for improvements in  [] LE / Lumbar: LE, proximal hip, and core control with self care, mobility, lifting, ambulation.   [] UE / Cervical: cervical, postural, scapular, scapulothoracic and UE control with self care, reaching, carrying, lifting, house/yardwork, driving, computer work.  [] (33526) Provided verbal/tactile cueing for activities related to improving balance, coordination, kinesthetic sense, posture, motor skill, proprioception to assist with   [] LE / lumbar: LE, proximal hip, and core control in self care, mobility, lifting, ambulation and eccentric single leg control. [] UE / cervical: cervical, scapular, scapulothoracic and UE control with self care, reaching, carrying, lifting, house/yardwork, driving, computer work.   [] (68339) Therapist is in constant attendance of 2 or more patients providing skilled therapy interventions, but not providing any significant amount of measurable one-on-one time to either patient, for improvements in  [] LE / lumbar: LE, proximal hip, and core control in self care, mobility, lifting, ambulation and eccentric single leg control. [] UE / cervical: cervical, scapular, scapulothoracic and UE control with self care, reaching, carrying, lifting, house/yardwork, driving, computer work.       NMR and Therapeutic Activities:    [] (92665 or 51721) Provided verbal/tactile cueing for activities related to improving balance, coordination, kinesthetic sense, posture, motor skill, proprioception and motor activation to allow for proper function of   [] LE: / Lumbar core, proximal hip and LE with self care and ADLs  [] UE / Cervical: cervical, postural, scapular, scapulothoracic and UE control with self care, carrying, lifting, driving, computer work.   [] (27801) Gait Re-education- Provided training and instruction to the patient for proper LE, core and proximal hip recruitment and positioning and eccentric body weight control with ambulation re-education including up and down stairs     Home Management Training / Self Care:  [] (01641) Provided self-care/home management training related to activities of daily living and compensatory training, and/or use of adaptive equipment for improvement with: ADLs and compensatory training, meal preparation, safety procedures and instruction in use of adaptive equipment, including bathing, grooming, dressing, personal hygiene, basic household cleaning and chores. Home Exercise Program:    [x] (22537) Reviewed/Progressed HEP activities related to strengthening, flexibility, endurance, ROM of   [] LE / Lumbar: core, proximal hip and LE for functional self-care, mobility, lifting and ambulation/stair navigation   [] UE / Cervical: cervical, postural, scapular, scapulothoracic and UE control with self care, reaching, carrying, lifting, house/yardwork, driving, computer work  [] (56907)Reviewed/Progressed HEP activities related to improving balance, coordination, kinesthetic sense, posture, motor skill, proprioception of   [] LE: core, proximal hip and LE for self care, mobility, lifting, and ambulation/stair navigation    [] UE / Cervical: cervical, postural,  scapular, scapulothoracic and UE control with self care, reaching, carrying, lifting, house/yardwork, driving, computer work    Manual Treatments:  PROM / STM / Oscillations-Mobs:  G-I, II, III, IV (PA's, Inf., Post.)  [] (92296) Provided manual therapy to mobilize LE, proximal hip and/or LS spine soft tissue/joints for the purpose of modulating pain, promoting relaxation,  increasing ROM, reducing/eliminating soft tissue swelling/inflammation/restriction, improving soft tissue extensibility and allowing for proper ROM for normal function with   [] LE / lumbar: self care, mobility, lifting and ambulation. [] UE / Cervical: self care, reaching, carrying, lifting, house/yardwork, driving, computer work.      Modalities:  [] (70562) Vasopneumatic compression: Utilized vasopneumatic compression to decrease edema / swelling for the purpose of improving mobility and quad tone / recruitment which will allow for increased overall function including but not limited to self-care, transfers, ambulation, and ascending / descending stairs. Charges:  Timed Code Treatment Minutes: 40   Total Treatment Minutes: 50     [] EVAL - LOW (36327)   [] EVAL - MOD (31940)  [] EVAL - HIGH (26957)  [] RE-EVAL (31401)  [x] GL(88121) x   1    [] Ionto  [] NMR (96199) x       [] Vaso  [] Manual (78610) x 1      [] Ultrasound  [x] TA x   2     [] Mech Traction (18544)  [] Aquatic Therapy x      [] ES (un) (32681):   [] Home Management Training x  [] ES(attended) (89978)   [] Dry Needling 1-2 muscles (56055):  [] Dry Needling 3+ muscles (210910)  [] Group:      [] Other:          GOALS:  Patient stated goal: less pain  [x]? Progressing: []? Met: []? Not Met: []? Adjusted     Therapist goals for Patient:   Short Term Goals: To be achieved in: 2 weeks  1. Independent in HEP and progression per patient tolerance, in order to prevent re-injury. []? Progressing: [x]? Met: []? Not Met: []? Adjusted  2. Patient will have a decrease in pain to facilitate improvement in movement, function, and ADLs as indicated by improvement with respect to Functional Deficits. []? Progressing: [x]? Met: []? Not Met: []? Adjusted     Long Term Goals: To be achieved in: 6 weeks  1. Disability index score of 10% or less on the  NDI  to assist with reaching prior level of function. [x]? Progressing: []? Met: []? Not Met: []? Adjusted  2. Patient will demonstrate increased AROM  to [facility], to allow for proper joint functioning to allow pt to resume turning head while driving without increase in symptoms. [x]? Progressing: []? Met: []? Not Met: []? Adjusted  3. Patient will demonstrate increased Strength by 1/2 mmt grade  patients Functional Deficits to allow pt to resume amb with/without RW prn for amb in community without increase in symptoms. []? Progressing: [x]? Met: []? Not Met: []? Adjusted  4. Patient will return to functional activities including falling asleep without difficulty without increased symptoms or restriction. []? Progressing: [x]? Met: []?  Not Met: []? Adjusted     Overall Progression Towards Functional goals/ Treatment Progress Update:  [x] Patient is progressing as expected towards functional goals listed. [] Progression is slowed due to complexities/Impairments listed. [] Progression has been slowed due to co-morbidities. [] Plan just implemented, too soon to assess goals progression <30days   [] Goals require adjustment due to lack of progress  [] Patient is not progressing as expected and requires additional follow up with physician  [] Other    Persisting Functional Limitations/Impairments:  [x]Sleeping []Sitting               []Standing []Transfers        []Walking []Kneeling               []Stairs []Squatting / bending   [x]ADLs []Reaching  [x]Lifting  [x]Housework  [x]Driving []Job related tasks  []Sports/Recreation []Other:        ASSESSMENT:   10/26 - Progress note completed this date. Pt demonstrates improvements in L cervical SB and rotation AROM. Pt with increased B shoulder flexion and abd strength. Pt reports decreased pain and increased ability to sleep without pain or discomfort. Plan to focus remaining treatment sessions on postural strengthening and STM to return to PLOF without limitations or risk for further injury. 10/22 - Pt with TP and increased tissue tension in suboccipitals, R UT, and R SCM. Pt reports feeling a good stretch with UT stretch. Initiated B ER with TB this date. Pt required VC to keep elbows bent. Pt required less frequent VC for upright trunk posture and demonstrated improved technique with chin tuck. Continue to progress per pt tolerance. 10/19 - progressed TB shoulder ABD to orange band this date. Pt with TP in R SCM which decreased with manual therapy. Pt reports decreased pain at end of session. 10/12 - progressed reaching to second shelf this date. Pt unable to reach second shelf with 1 pound weight, but demonstrated ability to touch shelf without weight. Pt with TP in L UT, L infraspinatus, and R SCM. Pt demonstrates improved posture with exercises and increased exercise tolerance. Continues to require seated rest breaks throughout session. 10/6 - Initiated reaching with 1 pound weight this date. Pt required verbal and tactile cues to deactivate UT and recruit scapular mm. Pt required less frequent rest breaks throughout treatment session this date. Pt requested ice this date as she states sometimes it decreases her pain at home. Continue to progress standing postural strengthening and re-ed exercises per pt tolerance. 10/4 - Progressed TB exercises to standing this date. Decreased to 1 set of 10 due to pt fatigue in standing. Initiated thoracic ext over chair with tactile cue to ext through thoracic spine vs cervical spine. Pt required VC for chin tuck with cervical rotation. Reports decreased pain with chin tuck + rotation. Continue to progress as tolerated. 10/1 - Pt required verbal and tactile cues to deactivate UT with TB exercises this date. Pt reports no pain throughout treatment session. Pt reports she has been more aware of her posture throughout the day. Pt with less TP and tissue tension in UT mm this date. 9/28 - Pt with decreased pain and tissue tension this date. Pt requires VC for appropriate form with chin tuck. Pt demonstrates improved posture with TB exercises. Pt reports she is more aware of her posture during the day, but has pain when she sits on a pillow. Plan to continue to progress postural education and scapular strengthening to further decrease pain and return to PLOF without pain or limitations. 9/23: Patient continues to require VC and TC during exercises in order to squeeze scapula together. Patient has difficulty maintaining posture and is struggling to find home solutions to help her adjust her body mechanics to incorporate improved posture habits during her ADL's and IADLs. In addition, pt concerned re safety at home.   Patient has noted tenderness and tightness in bilat UT, levator, rhomboids, scalenes, SCM. Patient reports pain relief with STM. Plan to continue to progress appropriate postural strengthening and appropriate motor programming to increase cervical ROM. 9/21 see PT POC above    9/14 needs improved postures, especially for TV watching   9/9 Pt with decreased pain and tissue tension this date. STM primarily on L side this date and pain decreased after manual therapy. Pt reports ear pain is on and off and currently has no ear pain. Increased resistance with TB exercises this date. Pt reported increased difficulty but no increased pain. Plan to continue to progress postural strengthening and cervical ROM per pt tolerance. Treatment/Activity Tolerance:  [x] Patient able to complete tx  [] Patient limited by fatigue  [] Patient limited by pain  [] Patient limited by other medical complications  [] Other:     Prognosis: [x] Good [] Fair  [] Poor    Patient Requires Follow-up: [x] Yes  [] No    PLAN:  strength, ROM/flexibility, posture and body mechs, manual, MOC, HEP, pt education     Frequency/Duration: 2 days per week for 6  Weeks:  Interventions:  [x]? Therapeutic exercise including:strength, ROM, flexibility  [x]? NMR activation and proprioception including postural re-education  [x]? Manual therapy as indicated to include: IASTM, STM, PROM, Gr I-IV mobilizations, manipulation. [x]? Modalities as needed that may include: thermal agents, E-stim, Biofeedback, US, iontophoresis as indicated  [x]? Patient education on joint protection, postural re-education, activity modification, progression of HEP. Plan for next treatment session:    PLAN: See eval. PT 2x / week for 6 weeks.  + 2x/wk for 5 wks  [x] Continue per plan of care [] Alter current plan (see comments)  [] Plan of care initiated [] Hold pending MD visit [] Discharge    Electronically signed by: Scooby Recinos, PT, DPT      Note: If patient does not return for scheduled/ recommended follow up visits, this note will serve as a discharge from care along with most recent update on progress.

## 2021-10-29 ENCOUNTER — HOSPITAL ENCOUNTER (OUTPATIENT)
Dept: PHYSICAL THERAPY | Age: 81
Setting detail: THERAPIES SERIES
Discharge: HOME | End: 2021-10-29
Payer: MEDICARE

## 2021-10-29 PROCEDURE — 97110 THERAPEUTIC EXERCISES: CPT

## 2021-10-29 PROCEDURE — 97140 MANUAL THERAPY 1/> REGIONS: CPT

## 2021-10-29 NOTE — FLOWSHEET NOTE
[facility]  Outpatient Physical Therapy  Phone: [phone]   Fax: [phone]  Physical Therapy Re-Certification Plan of Care    Physical Therapy Daily Treatment Note  Date:  10/29/2021    Patient Name:  Cesar Coronado    :  1940  MRN: [id number]  Medical/Treatment Diagnosis Information:  Diagnosis: cervical disc disease, R TMJ  Treatment Diagnosis: pt with painful myofascial changes, impaired posture including forward head posture, reduced postural strengths. hypomobility t/o spine, especially at upper cerv spine  Insurance/Certification information:  PT Insurance Information: medicare  Physician Information:  Referring Practitioner: Dr. Ranjit Arteaga of care signed (Y/N): []  Yes [x]  No     Date of Patient follow up with Physician:      Progress Report: [x]  Yes  []  No     Progress report due (10 Rx/or 30 days whichever is less): visit #15 or     Recertification due (POC duration/ or 90 days whichever is less): visit #12 or     Visit # Insurance Allowable Auth required? Date Range    + 7/10 Med nec []  Yes  [x]  No NA       Units approved Units used Date Range   NA NA NA       Latex Allergy:  [x]NO      []YES  Preferred Language for Healthcare:   [x]English       []other:    Functional Scale:        Date assessed:  NDI raw score = 18, dysfunction =36 %, taken at initial eval  NDI raw score = 20; dysfunction = 40%    10/26/21       Pain level: 3/10      SUBJECTIVE:    10/29 - Pt reports her pain is less today. States she feels she can move her neck further. 10/26 - Pt reports she had no pain yesterday. States pain is a little higher today after driving a lot today. Had to park far away and she is tired. 10/22 - Pt reports her pain typically gets better throughout the day. States she has some ear pain today. 10/19 - Pt reports she almost fell this morning. She was reading the paper and went to sit in a rolling chair and the chair slipped.  She caught herself but thinks this could be attributing to her pain. 10/12 - pt reports she is a little sore today. States her head and ear were bothering her Sunday but that has since gone away. 10/6 - Pt reports more pain today in her ear and neck. States she carried her groceries on the opposite side and isn't sure what increased her pain. 10/4 Pt reports she is more sore today. She cooked a pot of chili yesterday and thinks she may have over done it. 10/1 pt reports her pain comes and goes but she continues to feel better. States her son is getting his stress test currently. Brought her calendar to schedule additional visits. 9/28 - Pt states she has a lot going on with her son's health. He is supposed to get a stress test Saturday. Reports some ear pain when she woke up, but feels good now.   9/23: Patient reports that she is feeling well today and not having too much pain. Patient states that she is trying her best to create a more ergonomic home desk environment for her laptop. Patient states that she has not found a solution that she prefers yet and will coninue to try and find a solution that works for her. Pt asks re life alert systems for home use    9/21 increasing mobility without increased pain. Reports she is still taking the mm relaxer and reports she took Jelly Host when she was stressed and noticed increased neck mm pain And it seemed to help. States her son hopes to get hernia surgery soon. Intermittent R ear pain with combing her hair     9/14 Intermittent pain still gets pain in neck and face/ear. Thinks PT tx is helping. Reports sleeping is good. Cn turn  to the right better    OBJECTIVE:   10/26  cerv AROM  R L  SB  40 35  Rot  60 59    Strength / Myotomes RIGHT LEFT     Shoulder Flex 4+ 4+     Shoulder Abduction (C5) 4+ 4        9/23 Patient ambulates with AD 5MD, Patient ambulates with forward head, rounded shoulders, and kyphotic curve in T-spine.      9/14 pt reports slouched posture with excessive cerv flex when she watches TV. Palpation: Myofascial pain R>L UT, scalenes. SCM and masseter WFL B.    cerv AROM  R L  SB  40 25  Rot  60 50    9/9 - Pt with no clicking or popping in TMJ, palpation of R SCM increases ear pain   8/31 - pt reports she has a spot that is iching and burning on her back. Skin inspection - small, red oval on R scapular region with smooth borders. Pt reports she has a dermatology appointment within the next 2 weeks. 8/25 - amb with RW this date. Lin Barcenas is too tall, but it does not lower further. Increased tissue tension in R SCM and scalenes. RESTRICTIONS/PRECAUTIONS:osteopososis/osteopenia; has lost 3 friends and her  and brother int he last 17 months, joint replacements ( B knees, R ankle, B shoudlers): x 5. Lives with her son who has multiple medical issues including COPD and heart issues. Spinal cord stimulator (lumbar) put in by Dr. Rustam Cortes - recently had new battery put in.        Exercises/Interventions:     Therapeutic Exercises (31497) Resistance / level Sets/sec Reps Notes   Arm bike   2 min fwd/2 min back      UT stretch - HAND HOLDING CHAIR   30 sec  2 B     Chin tuck -   Scap squeeze   Chin tuck + scap squeeze  2 sec  2 sec   10  10   10     Pulleys - flexion and abd  5 sec  10 B each Completed to promote thoracic ext    Seated:    cerv AROM   SB  Rot  UT stretches  scalene stretches       Standing TB  High row   Mid row  LPD   Horizontal abd - seated  B ER - seated    Green 2  10   Seated thoracic extension      SCM stretch   L increased pain on R side    LT wall slides  -flexion   - abd     10 B  10 B Manual cues to deactivate UT    Therapeutic Activities (08348)       Instruct in upright sitting posture for watching TV       Standing reaching to shelf - second shelf  - flexion  - abd Tactile and verbal cues to deactivate UT and use scapular mm to reach.  CGA          Progress note completed this date - objective measures taken and discussed goals and progress made with therapy and plan for remaining visits              Neuromuscular Re-ed (65943)       Postural ed                                          Manual Intervention (65057)       Gentle manual - STM B UT mm and R SCM, and R scalenes       9 min  8/27 Done on reclined mat table  9/21, 9/7, 8/30 completed seated in chair                                           Modalities:   10/29, 10/26, 10/22 - cervical MHP seated in chair at end of session x 10 min    10/19, 10/12, 10/6 - 1 cervical cold pack to cspine x 10 min at end of session   10/4, 10/1, 9/28 - cervical MHP seated in chair at end of session x 10 min   9/23, 9/14 9/9, 9/7, 9/2 - cervical MHP seated in chair at end of session x 15 min   8/31- cervical MHP seated in chair at end of session x 12 min   8/27- cervical MHP on reclined mat table x 15 min   8/25 - cervical MHP seated in chair at end of session x 10 min     Pt. Education:  9/23: pt Educated re safety in home /fall risk reduction. At pt request d/w pt re life alert systems. Patient ed  on option of obtaining an apple watch to use as a \"life alert\" system. Patient likes this idea and state she will have her son help her to look into it.   8/31 - Pt educated on ABCDs of melanoma and importance of skin checks as she reports concern about itching spot on her back. Pt has dermatology appointment in 2 wks. 8/19 patient educated on diagnosis, prognosis and expectations for rehab  -all patient questions were answered    Home Exercise Program:  9/23 computer ergonomics and apple watch for fall risk reduction  9/21  computer ergonomics  9/14 upright psoture for TV viewing  8/19 cerv AROM SB/ROT      Therapeutic Exercise and NMR EXR  [] ([id number]) Provided verbal/tactile cueing for activities related to strengthening, flexibility, endurance, ROM for improvements in  [] LE / Lumbar: LE, proximal hip, and core control with self care, mobility, lifting, ambulation.   [] UE / Cervical: cervical, postural, scapular, scapulothoracic and UE control with self care, reaching, carrying, lifting, house/yardwork, driving, computer work.  [] (44474) Provided verbal/tactile cueing for activities related to improving balance, coordination, kinesthetic sense, posture, motor skill, proprioception to assist with   [] LE / lumbar: LE, proximal hip, and core control in self care, mobility, lifting, ambulation and eccentric single leg control. [] UE / cervical: cervical, scapular, scapulothoracic and UE control with self care, reaching, carrying, lifting, house/yardwork, driving, computer work.   [] (64311) Therapist is in constant attendance of 2 or more patients providing skilled therapy interventions, but not providing any significant amount of measurable one-on-one time to either patient, for improvements in  [] LE / lumbar: LE, proximal hip, and core control in self care, mobility, lifting, ambulation and eccentric single leg control. [] UE / cervical: cervical, scapular, scapulothoracic and UE control with self care, reaching, carrying, lifting, house/yardwork, driving, computer work.       NMR and Therapeutic Activities:    [] (18962 or 28729) Provided verbal/tactile cueing for activities related to improving balance, coordination, kinesthetic sense, posture, motor skill, proprioception and motor activation to allow for proper function of   [] LE: / Lumbar core, proximal hip and LE with self care and ADLs  [] UE / Cervical: cervical, postural, scapular, scapulothoracic and UE control with self care, carrying, lifting, driving, computer work.   [] (61416) Gait Re-education- Provided training and instruction to the patient for proper LE, core and proximal hip recruitment and positioning and eccentric body weight control with ambulation re-education including up and down stairs     Home Management Training / Self Care:  [] (65302) Provided self-care/home management training related to activities of daily living and compensatory training, and/or use of adaptive equipment for improvement with: ADLs and compensatory training, meal preparation, safety procedures and instruction in use of adaptive equipment, including bathing, grooming, dressing, personal hygiene, basic household cleaning and chores. Home Exercise Program:    [x] (72557) Reviewed/Progressed HEP activities related to strengthening, flexibility, endurance, ROM of   [] LE / Lumbar: core, proximal hip and LE for functional self-care, mobility, lifting and ambulation/stair navigation   [] UE / Cervical: cervical, postural, scapular, scapulothoracic and UE control with self care, reaching, carrying, lifting, house/yardwork, driving, computer work  [] (44168)Reviewed/Progressed HEP activities related to improving balance, coordination, kinesthetic sense, posture, motor skill, proprioception of   [] LE: core, proximal hip and LE for self care, mobility, lifting, and ambulation/stair navigation    [] UE / Cervical: cervical, postural,  scapular, scapulothoracic and UE control with self care, reaching, carrying, lifting, house/yardwork, driving, computer work    Manual Treatments:  PROM / STM / Oscillations-Mobs:  G-I, II, III, IV (PA's, Inf., Post.)  [] (04537) Provided manual therapy to mobilize LE, proximal hip and/or LS spine soft tissue/joints for the purpose of modulating pain, promoting relaxation,  increasing ROM, reducing/eliminating soft tissue swelling/inflammation/restriction, improving soft tissue extensibility and allowing for proper ROM for normal function with   [] LE / lumbar: self care, mobility, lifting and ambulation. [] UE / Cervical: self care, reaching, carrying, lifting, house/yardwork, driving, computer work.      Modalities:  [] (43182) Vasopneumatic compression: Utilized vasopneumatic compression to decrease edema / swelling for the purpose of improving mobility and quad tone / recruitment which will allow for increased overall function including but not limited to self-care, transfers, ambulation, and ascending / descending stairs. Charges:  Timed Code Treatment Minutes: 42   Total Treatment Minutes: 52     [] EVAL - LOW (39904)   [] EVAL - MOD (13610)  [] EVAL - HIGH (82693)  [] RE-EVAL (37159)  [x] XP(58901) x   2    [] Ionto  [] NMR (34141) x       [] Vaso  [x] Manual (20756) x 1      [] Ultrasound  [] TA x   1     [] Mech Traction (82946)  [] Aquatic Therapy x      [] ES (un) (01782):   [] Home Management Training x  [] ES(attended) (02644)   [] Dry Needling 1-2 muscles (63870):  [] Dry Needling 3+ muscles (830159)  [] Group:      [] Other:          GOALS:  Patient stated goal: less pain  [x]? Progressing: []? Met: []? Not Met: []? Adjusted     Therapist goals for Patient:   Short Term Goals: To be achieved in: 2 weeks  1. Independent in HEP and progression per patient tolerance, in order to prevent re-injury. []? Progressing: [x]? Met: []? Not Met: []? Adjusted  2. Patient will have a decrease in pain to facilitate improvement in movement, function, and ADLs as indicated by improvement with respect to Functional Deficits. []? Progressing: [x]? Met: []? Not Met: []? Adjusted     Long Term Goals: To be achieved in: 6 weeks  1. Disability index score of 10% or less on the  NDI  to assist with reaching prior level of function. [x]? Progressing: []? Met: []? Not Met: []? Adjusted  2. Patient will demonstrate increased AROM  to [facility], to allow for proper joint functioning to allow pt to resume turning head while driving without increase in symptoms. [x]? Progressing: []? Met: []? Not Met: []? Adjusted  3. Patient will demonstrate increased Strength by 1/2 mmt grade  patients Functional Deficits to allow pt to resume amb with/without RW prn for amb in community without increase in symptoms. []? Progressing: [x]? Met: []? Not Met: []? Adjusted  4.  Patient will return to functional activities including falling asleep without difficulty without increased symptoms or restriction. []? Progressing: [x]? Met: []? Not Met: []? Adjusted     Overall Progression Towards Functional goals/ Treatment Progress Update:  [x] Patient is progressing as expected towards functional goals listed. [] Progression is slowed due to complexities/Impairments listed. [] Progression has been slowed due to co-morbidities. [] Plan just implemented, too soon to assess goals progression <30days   [] Goals require adjustment due to lack of progress  [] Patient is not progressing as expected and requires additional follow up with physician  [] Other    Persisting Functional Limitations/Impairments:  [x]Sleeping []Sitting               []Standing []Transfers        []Walking []Kneeling               []Stairs []Squatting / bending   [x]ADLs []Reaching  [x]Lifting  [x]Housework  [x]Driving []Job related tasks  []Sports/Recreation []Other:        ASSESSMENT:   10/29 - pt required VC to relax UT with B ER this date. Increased to 2 sets of 10 with green TB. Pt with increased tissue tension in R scalenes this date which decreased with gentle STM. Pt reports decreased pain and increased mobility with OP PT. Continue to progress postural strengthening/stretching per pt tolerance. 10/26 - Progress note completed this date. Pt demonstrates improvements in L cervical SB and rotation AROM. Pt with increased B shoulder flexion and abd strength. Pt reports decreased pain and increased ability to sleep without pain or discomfort. Plan to focus remaining treatment sessions on postural strengthening and STM to return to PLOF without limitations or risk for further injury. 10/22 - Pt with TP and increased tissue tension in suboccipitals, R UT, and R SCM. Pt reports feeling a good stretch with UT stretch. Initiated B ER with TB this date. Pt required VC to keep elbows bent. Pt required less frequent VC for upright trunk posture and demonstrated improved technique with chin tuck.  Continue to progress per pt tolerance. 10/19 - progressed TB shoulder ABD to orange band this date. Pt with TP in R SCM which decreased with manual therapy. Pt reports decreased pain at end of session. 10/12 - progressed reaching to second shelf this date. Pt unable to reach second shelf with 1 pound weight, but demonstrated ability to touch shelf without weight. Pt with TP in L UT, L infraspinatus, and R SCM. Pt demonstrates improved posture with exercises and increased exercise tolerance. Continues to require seated rest breaks throughout session. 10/6 - Initiated reaching with 1 pound weight this date. Pt required verbal and tactile cues to deactivate UT and recruit scapular mm. Pt required less frequent rest breaks throughout treatment session this date. Pt requested ice this date as she states sometimes it decreases her pain at home. Continue to progress standing postural strengthening and re-ed exercises per pt tolerance. 10/4 - Progressed TB exercises to standing this date. Decreased to 1 set of 10 due to pt fatigue in standing. Initiated thoracic ext over chair with tactile cue to ext through thoracic spine vs cervical spine. Pt required VC for chin tuck with cervical rotation. Reports decreased pain with chin tuck + rotation. Continue to progress as tolerated. 10/1 - Pt required verbal and tactile cues to deactivate UT with TB exercises this date. Pt reports no pain throughout treatment session. Pt reports she has been more aware of her posture throughout the day. Pt with less TP and tissue tension in UT mm this date. 9/28 - Pt with decreased pain and tissue tension this date. Pt requires VC for appropriate form with chin tuck. Pt demonstrates improved posture with TB exercises. Pt reports she is more aware of her posture during the day, but has pain when she sits on a pillow.  Plan to continue to progress postural education and scapular strengthening to further decrease pain and return to PLOF without pain or limitations. 9/23: Patient continues to require VC and TC during exercises in order to squeeze scapula together. Patient has difficulty maintaining posture and is struggling to find home solutions to help her adjust her body mechanics to incorporate improved posture habits during her ADL's and IADLs. In addition, pt concerned re safety at home. Patient has noted tenderness and tightness in bilat UT, levator, rhomboids, scalenes, SCM. Patient reports pain relief with STM. Plan to continue to progress appropriate postural strengthening and appropriate motor programming to increase cervical ROM. 9/21 see PT POC above    9/14 needs improved postures, especially for TV watching   9/9 Pt with decreased pain and tissue tension this date. STM primarily on L side this date and pain decreased after manual therapy. Pt reports ear pain is on and off and currently has no ear pain. Increased resistance with TB exercises this date. Pt reported increased difficulty but no increased pain. Plan to continue to progress postural strengthening and cervical ROM per pt tolerance. Treatment/Activity Tolerance:  [x] Patient able to complete tx  [] Patient limited by fatigue  [] Patient limited by pain  [] Patient limited by other medical complications  [] Other:     Prognosis: [x] Good [] Fair  [] Poor    Patient Requires Follow-up: [x] Yes  [] No    PLAN:  strength, ROM/flexibility, posture and body mechs, manual, MOC, HEP, pt education     Frequency/Duration: 2 days per week for 6  Weeks:  Interventions:  [x]? Therapeutic exercise including:strength, ROM, flexibility  [x]? NMR activation and proprioception including postural re-education  [x]? Manual therapy as indicated to include: IASTM, STM, PROM, Gr I-IV mobilizations, manipulation. [x]? Modalities as needed that may include: thermal agents, E-stim, Biofeedback, US, iontophoresis as indicated  [x]?   Patient education on joint protection, postural re-education, activity modification, progression of HEP. Plan for next treatment session:    PLAN: See eval. PT 2x / week for 6 weeks. + 2x/wk for 5 wks  [x] Continue per plan of care [] Alter current plan (see comments)  [] Plan of care initiated [] Hold pending MD visit [] Discharge    Electronically signed by: Kelsey Ser, PT, DPT      Note: If patient does not return for scheduled/ recommended follow up visits, this note will serve as a discharge from care along with most recent update on progress.

## 2021-11-03 ENCOUNTER — HOSPITAL ENCOUNTER (OUTPATIENT)
Dept: PHYSICAL THERAPY | Age: 81
Setting detail: THERAPIES SERIES
Discharge: HOME | End: 2021-11-03
Payer: MEDICARE

## 2021-11-03 PROCEDURE — 97140 MANUAL THERAPY 1/> REGIONS: CPT

## 2021-11-03 PROCEDURE — 97110 THERAPEUTIC EXERCISES: CPT

## 2021-11-03 NOTE — FLOWSHEET NOTE
[facility]  Outpatient Physical Therapy  Phone: [phone]   Fax: [phone]  Physical Therapy Re-Certification Plan of Care    Physical Therapy Daily Treatment Note  Date:  11/3/2021    Patient Name:  Kosta Coker    :  1940  MRN: [id number]  Medical/Treatment Diagnosis Information:  Diagnosis: cervical disc disease, R TMJ  Treatment Diagnosis: pt with painful myofascial changes, impaired posture including forward head posture, reduced postural strengths. hypomobility t/o spine, especially at upper cerv spine  Insurance/Certification information:  PT Insurance Information: medicare  Physician Information:  Referring Practitioner: Dr. Magdalene Ren of care signed (Y/N): []  Yes [x]  No     Date of Patient follow up with Physician:      Progress Report: [x]  Yes  []  No     Progress report due (10 Rx/or 30 days whichever is less): visit #53 or     Recertification due (POC duration/ or 90 days whichever is less): visit #12 or     Visit # Insurance Allowable Auth required? Date Range    + 8/10 Med nec []  Yes  [x]  No NA       Units approved Units used Date Range   NA NA NA       Latex Allergy:  [x]NO      []YES  Preferred Language for Healthcare:   [x]English       []other:    Functional Scale:        Date assessed:  NDI raw score = 18, dysfunction =36 %, taken at initial eval  NDI raw score = 20; dysfunction = 40%    10/26/21       Pain level: 3/10      SUBJECTIVE:    11/3 - Pt reports she had some tingling in her head a couple days ago but it went away. Reports pain in her ear has decreased. 10/29 - Pt reports her pain is less today. States she feels she can move her neck further. 10/26 - Pt reports she had no pain yesterday. States pain is a little higher today after driving a lot today. Had to park far away and she is tired. 10/22 - Pt reports her pain typically gets better throughout the day. States she has some ear pain today.    10/19 - Pt reports she almost fell this morning. She was reading the paper and went to sit in a rolling chair and the chair slipped. She caught herself but thinks this could be attributing to her pain. 10/12 - pt reports she is a little sore today. States her head and ear were bothering her Sunday but that has since gone away. 10/6 - Pt reports more pain today in her ear and neck. States she carried her groceries on the opposite side and isn't sure what increased her pain. 10/4 Pt reports she is more sore today. She cooked a pot of chili yesterday and thinks she may have over done it. 10/1 pt reports her pain comes and goes but she continues to feel better. States her son is getting his stress test currently. Brought her calendar to schedule additional visits. 9/28 - Pt states she has a lot going on with her son's health. He is supposed to get a stress test Saturday. Reports some ear pain when she woke up, but feels good now.   9/23: Patient reports that she is feeling well today and not having too much pain. Patient states that she is trying her best to create a more ergonomic home desk environment for her laptop. Patient states that she has not found a solution that she prefers yet and will coninue to try and find a solution that works for her. Pt asks re life alert systems for home use    9/21 increasing mobility without increased pain. Reports she is still taking the mm relaxer and reports she took Indy Marcio when she was stressed and noticed increased neck mm pain And it seemed to help. States her son hopes to get hernia surgery soon. Intermittent R ear pain with combing her hair     9/14 Intermittent pain still gets pain in neck and face/ear. Thinks PT tx is helping. Reports sleeping is good.   Cn turn  to the right better    OBJECTIVE:   10/26  cerv AROM  R L  SB  40 35  Rot  60 59    Strength / Myotomes RIGHT LEFT     Shoulder Flex 4+ 4+     Shoulder Abduction (C5) 4+ 4        9/23 Patient ambulates with AD 1RZ, Patient ambulates with forward head, rounded shoulders, and kyphotic curve in T-spine. 9/14 pt reports slouched posture with excessive cerv flex when she watches TV. Palpation: Myofascial pain R>L UT, scalenes. SCM and masseter WFL B.    cerv AROM  R L  SB  40 25  Rot  60 50    9/9 - Pt with no clicking or popping in TMJ, palpation of R SCM increases ear pain   8/31 - pt reports she has a spot that is iching and burning on her back. Skin inspection - small, red oval on R scapular region with smooth borders. Pt reports she has a dermatology appointment within the next 2 weeks. 8/25 - amb with RW this date. Keiko Rivas is too tall, but it does not lower further. Increased tissue tension in R SCM and scalenes. RESTRICTIONS/PRECAUTIONS:osteopososis/osteopenia; has lost 3 friends and her  and brother int he last 17 months, joint replacements ( B knees, R ankle, B shoudlers): x 5. Lives with her son who has multiple medical issues including COPD and heart issues.   Spinal cord stimulator (lumbar) put in by Dr. Brian Ngo - recently had new battery put in.        Exercises/Interventions:     Therapeutic Exercises (78805) Resistance / level Sets/sec Reps Notes   Arm bike   2 min fwd/2 min back      UT stretch - HAND HOLDING CHAIR   30 sec  2 B     Chin tuck -   Scap squeeze   Chin tuck + scap squeeze     10  10   10     Pulleys - flexion and abd  5 sec  10 B each Completed to promote thoracic ext    Seated:    cerv AROM   SB  Rot  UT stretches  scalene stretches  10x5\" B      Standing TB  High row   Mid row  LPD   Horizontal abd - seated  B ER - seated  Green   Thurmon Nett  Green 1   1  12  10   Seated thoracic extension   3 sec10    SCM stretch   L increased pain on R side    LT wall slides  -flexion   - abd     10 B  10 B Manual cues to deactivate UT    Therapeutic Activities (62022)       Instruct in upright sitting posture for watching TV       Standing reaching to shelf - second shelf  - flexion  - abd Tactile and verbal cues to deactivate UT and use scapular mm to reach. CGA          Progress note completed this date - objective measures taken and discussed goals and progress made with therapy and plan for remaining visits              Neuromuscular Re-ed (57866)       Postural ed                                          Manual Intervention (47535)       Gentle manual - STM and TPR B UT mm        8 min  8/27 Done on reclined mat table  9/21, 9/7, 8/30 completed seated in chair                                           Modalities:   11/3, 10/29, 10/26, 10/22 - cervical MHP seated in chair at end of session x 10 min    10/19, 10/12, 10/6 - 1 cervical cold pack to cspine x 10 min at end of session   10/4, 10/1, 9/28 - cervical MHP seated in chair at end of session x 10 min   9/23, 9/14 9/9, 9/7, 9/2 - cervical MHP seated in chair at end of session x 15 min   8/31- cervical MHP seated in chair at end of session x 12 min   8/27- cervical MHP on reclined mat table x 15 min   8/25 - cervical MHP seated in chair at end of session x 10 min     Pt. Education:  9/23: pt Educated re safety in home /fall risk reduction. At pt request d/w pt re life alert systems. Patient ed  on option of obtaining an apple watch to use as a \"life alert\" system. Patient likes this idea and state she will have her son help her to look into it.   8/31 - Pt educated on ABCDs of melanoma and importance of skin checks as she reports concern about itching spot on her back. Pt has dermatology appointment in 2 wks.    8/19 patient educated on diagnosis, prognosis and expectations for rehab  -all patient questions were answered    Home Exercise Program:  9/23 computer ergonomics and apple watch for fall risk reduction  9/21  computer ergonomics  9/14 upright psoture for TV viewing  8/19 cerv AROM SB/ROT      Therapeutic Exercise and NMR EXR  [] ([id number]) Provided verbal/tactile cueing for activities related to strengthening, flexibility, endurance, ROM for improvements in  [] LE / Lumbar: LE, proximal hip, and core control with self care, mobility, lifting, ambulation. [] UE / Cervical: cervical, postural, scapular, scapulothoracic and UE control with self care, reaching, carrying, lifting, house/yardwork, driving, computer work.  [] (45933) Provided verbal/tactile cueing for activities related to improving balance, coordination, kinesthetic sense, posture, motor skill, proprioception to assist with   [] LE / lumbar: LE, proximal hip, and core control in self care, mobility, lifting, ambulation and eccentric single leg control. [] UE / cervical: cervical, scapular, scapulothoracic and UE control with self care, reaching, carrying, lifting, house/yardwork, driving, computer work.   [] (17793) Therapist is in constant attendance of 2 or more patients providing skilled therapy interventions, but not providing any significant amount of measurable one-on-one time to either patient, for improvements in  [] LE / lumbar: LE, proximal hip, and core control in self care, mobility, lifting, ambulation and eccentric single leg control. [] UE / cervical: cervical, scapular, scapulothoracic and UE control with self care, reaching, carrying, lifting, house/yardwork, driving, computer work.       NMR and Therapeutic Activities:    [] (22456 or 10382) Provided verbal/tactile cueing for activities related to improving balance, coordination, kinesthetic sense, posture, motor skill, proprioception and motor activation to allow for proper function of   [] LE: / Lumbar core, proximal hip and LE with self care and ADLs  [] UE / Cervical: cervical, postural, scapular, scapulothoracic and UE control with self care, carrying, lifting, driving, computer work.   [] (28565) Gait Re-education- Provided training and instruction to the patient for proper LE, core and proximal hip recruitment and positioning and eccentric body weight control with ambulation re-education including up and down stairs     Home Management Training / Self Care:  [] (66978) Provided self-care/home management training related to activities of daily living and compensatory training, and/or use of adaptive equipment for improvement with: ADLs and compensatory training, meal preparation, safety procedures and instruction in use of adaptive equipment, including bathing, grooming, dressing, personal hygiene, basic household cleaning and chores. Home Exercise Program:    [x] (38850) Reviewed/Progressed HEP activities related to strengthening, flexibility, endurance, ROM of   [] LE / Lumbar: core, proximal hip and LE for functional self-care, mobility, lifting and ambulation/stair navigation   [] UE / Cervical: cervical, postural, scapular, scapulothoracic and UE control with self care, reaching, carrying, lifting, house/yardwork, driving, computer work  [] (11188)Reviewed/Progressed HEP activities related to improving balance, coordination, kinesthetic sense, posture, motor skill, proprioception of   [] LE: core, proximal hip and LE for self care, mobility, lifting, and ambulation/stair navigation    [] UE / Cervical: cervical, postural,  scapular, scapulothoracic and UE control with self care, reaching, carrying, lifting, house/yardwork, driving, computer work    Manual Treatments:  PROM / STM / Oscillations-Mobs:  G-I, II, III, IV (PA's, Inf., Post.)  [] (67627) Provided manual therapy to mobilize LE, proximal hip and/or LS spine soft tissue/joints for the purpose of modulating pain, promoting relaxation,  increasing ROM, reducing/eliminating soft tissue swelling/inflammation/restriction, improving soft tissue extensibility and allowing for proper ROM for normal function with   [] LE / lumbar: self care, mobility, lifting and ambulation. [] UE / Cervical: self care, reaching, carrying, lifting, house/yardwork, driving, computer work.      Modalities:  [] (47769) Vasopneumatic compression: Utilized vasopneumatic compression to decrease edema / swelling for the purpose of improving mobility and quad tone / recruitment which will allow for increased overall function including but not limited to self-care, transfers, ambulation, and ascending / descending stairs. Charges:  Timed Code Treatment Minutes: 41   Total Treatment Minutes: 51     [] EVAL - LOW (70957)   [] EVAL - MOD (14534)  [] EVAL - HIGH (12231)  [] RE-EVAL (01677)  [x] UU(89718) x   2    [] Ionto  [] NMR (56081) x       [] Vaso  [x] Manual (46948) x 1      [] Ultrasound  [] TA x   1     [] Mech Traction (76152)  [] Aquatic Therapy x      [] ES (un) (61986):   [] Home Management Training x  [] ES(attended) (55897)   [] Dry Needling 1-2 muscles (06216):  [] Dry Needling 3+ muscles (276453)  [] Group:      [] Other:          GOALS:  Patient stated goal: less pain  [x]? Progressing: []? Met: []? Not Met: []? Adjusted     Therapist goals for Patient:   Short Term Goals: To be achieved in: 2 weeks  1. Independent in HEP and progression per patient tolerance, in order to prevent re-injury. []? Progressing: [x]? Met: []? Not Met: []? Adjusted  2. Patient will have a decrease in pain to facilitate improvement in movement, function, and ADLs as indicated by improvement with respect to Functional Deficits. []? Progressing: [x]? Met: []? Not Met: []? Adjusted     Long Term Goals: To be achieved in: 6 weeks  1. Disability index score of 10% or less on the  NDI  to assist with reaching prior level of function. [x]? Progressing: []? Met: []? Not Met: []? Adjusted  2. Patient will demonstrate increased AROM  to [facility], to allow for proper joint functioning to allow pt to resume turning head while driving without increase in symptoms. [x]? Progressing: []? Met: []? Not Met: []? Adjusted  3. Patient will demonstrate increased Strength by 1/2 mmt grade  patients Functional Deficits to allow pt to resume amb with/without RW prn for amb in community without increase in symptoms.     []? Progressing: [x]? Met: []? Not Met: []? Adjusted  4. Patient will return to functional activities including falling asleep without difficulty without increased symptoms or restriction. []? Progressing: [x]? Met: []? Not Met: []? Adjusted     Overall Progression Towards Functional goals/ Treatment Progress Update:  [x] Patient is progressing as expected towards functional goals listed. [] Progression is slowed due to complexities/Impairments listed. [] Progression has been slowed due to co-morbidities. [] Plan just implemented, too soon to assess goals progression <30days   [] Goals require adjustment due to lack of progress  [] Patient is not progressing as expected and requires additional follow up with physician  [] Other    Persisting Functional Limitations/Impairments:  [x]Sleeping []Sitting               []Standing []Transfers        []Walking []Kneeling               []Stairs []Squatting / bending   [x]ADLs []Reaching  [x]Lifting  [x]Housework  [x]Driving []Job related tasks  []Sports/Recreation []Other:        ASSESSMENT:   11/3 - Pt demonstrates improved posture with TB exercises this date. Pt with improved awareness of her posture with ADLs. Pt with TP and increased tissue tension in BUT L>R this date. Continue to progress per pt tolerance. 10/29 - pt required VC to relax UT with B ER this date. Increased to 2 sets of 10 with green TB. Pt with increased tissue tension in R scalenes this date which decreased with gentle STM. Pt reports decreased pain and increased mobility with OP PT. Continue to progress postural strengthening/stretching per pt tolerance. 10/26 - Progress note completed this date. Pt demonstrates improvements in L cervical SB and rotation AROM. Pt with increased B shoulder flexion and abd strength. Pt reports decreased pain and increased ability to sleep without pain or discomfort.  Plan to focus remaining treatment sessions on postural strengthening and STM to return to PLOF without limitations or risk for further injury. 10/22 - Pt with TP and increased tissue tension in suboccipitals, R UT, and R SCM. Pt reports feeling a good stretch with UT stretch. Initiated B ER with TB this date. Pt required VC to keep elbows bent. Pt required less frequent VC for upright trunk posture and demonstrated improved technique with chin tuck. Continue to progress per pt tolerance. 10/19 - progressed TB shoulder ABD to orange band this date. Pt with TP in R SCM which decreased with manual therapy. Pt reports decreased pain at end of session. 10/12 - progressed reaching to second shelf this date. Pt unable to reach second shelf with 1 pound weight, but demonstrated ability to touch shelf without weight. Pt with TP in L UT, L infraspinatus, and R SCM. Pt demonstrates improved posture with exercises and increased exercise tolerance. Continues to require seated rest breaks throughout session. 10/6 - Initiated reaching with 1 pound weight this date. Pt required verbal and tactile cues to deactivate UT and recruit scapular mm. Pt required less frequent rest breaks throughout treatment session this date. Pt requested ice this date as she states sometimes it decreases her pain at home. Continue to progress standing postural strengthening and re-ed exercises per pt tolerance. 10/4 - Progressed TB exercises to standing this date. Decreased to 1 set of 10 due to pt fatigue in standing. Initiated thoracic ext over chair with tactile cue to ext through thoracic spine vs cervical spine. Pt required VC for chin tuck with cervical rotation. Reports decreased pain with chin tuck + rotation. Continue to progress as tolerated. 10/1 - Pt required verbal and tactile cues to deactivate UT with TB exercises this date. Pt reports no pain throughout treatment session. Pt reports she has been more aware of her posture throughout the day. Pt with less TP and tissue tension in UT mm this date.      9/28 - Pt with decreased pain and tissue tension this date. Pt requires VC for appropriate form with chin tuck. Pt demonstrates improved posture with TB exercises. Pt reports she is more aware of her posture during the day, but has pain when she sits on a pillow. Plan to continue to progress postural education and scapular strengthening to further decrease pain and return to PLOF without pain or limitations. 9/23: Patient continues to require VC and TC during exercises in order to squeeze scapula together. Patient has difficulty maintaining posture and is struggling to find home solutions to help her adjust her body mechanics to incorporate improved posture habits during her ADL's and IADLs. In addition, pt concerned re safety at home. Patient has noted tenderness and tightness in bilat UT, levator, rhomboids, scalenes, SCM. Patient reports pain relief with STM. Plan to continue to progress appropriate postural strengthening and appropriate motor programming to increase cervical ROM. 9/21 see PT POC above    9/14 needs improved postures, especially for TV watching   9/9 Pt with decreased pain and tissue tension this date. STM primarily on L side this date and pain decreased after manual therapy. Pt reports ear pain is on and off and currently has no ear pain. Increased resistance with TB exercises this date. Pt reported increased difficulty but no increased pain. Plan to continue to progress postural strengthening and cervical ROM per pt tolerance. Treatment/Activity Tolerance:  [x] Patient able to complete tx  [] Patient limited by fatigue  [] Patient limited by pain  [] Patient limited by other medical complications  [] Other:     Prognosis: [x] Good [] Fair  [] Poor    Patient Requires Follow-up: [x] Yes  [] No    PLAN:  strength, ROM/flexibility, posture and body mechs, manual, MOC, HEP, pt education     Frequency/Duration: 2 days per week for 6  Weeks:  Interventions:  [x]?   Therapeutic exercise including:strength, ROM, flexibility  [x]? NMR activation and proprioception including postural re-education  [x]? Manual therapy as indicated to include: IASTM, STM, PROM, Gr I-IV mobilizations, manipulation. [x]? Modalities as needed that may include: thermal agents, E-stim, Biofeedback, US, iontophoresis as indicated  [x]? Patient education on joint protection, postural re-education, activity modification, progression of HEP. Plan for next treatment session:    PLAN: See eval. PT 2x / week for 6 weeks. + 2x/wk for 5 wks  [x] Continue per plan of care [] Alter current plan (see comments)  [] Plan of care initiated [] Hold pending MD visit [] Discharge    Electronically signed by: Balaji Chapman, PT, DPT      Note: If patient does not return for scheduled/ recommended follow up visits, this note will serve as a discharge from care along with most recent update on progress.

## 2021-11-05 ENCOUNTER — HOSPITAL ENCOUNTER (OUTPATIENT)
Dept: PHYSICAL THERAPY | Age: 81
Setting detail: THERAPIES SERIES
Discharge: HOME | End: 2021-11-05
Payer: MEDICARE

## 2021-11-05 PROCEDURE — 97110 THERAPEUTIC EXERCISES: CPT

## 2021-11-05 NOTE — FLOWSHEET NOTE
[facility]  Outpatient Physical Therapy  Phone: [phone]   Fax: [phone]  Physical Therapy Re-Certification Plan of Care    Physical Therapy Daily Treatment Note  Date:  2021    Patient Name:  Sharda Durbin    :  1940  MRN: [id number]  Medical/Treatment Diagnosis Information:  Diagnosis: cervical disc disease, R TMJ  Treatment Diagnosis: pt with painful myofascial changes, impaired posture including forward head posture, reduced postural strengths. hypomobility t/o spine, especially at upper cerv spine  Insurance/Certification information:  PT Insurance Information: medicare  Physician Information:  Referring Practitioner: Dr. Yue Butt of care signed (Y/N): []  Yes [x]  No     Date of Patient follow up with Physician:      Progress Report: [x]  Yes  []  No     Progress report due (10 Rx/or 30 days whichever is less): visit #72 or     Recertification due (POC duration/ or 90 days whichever is less): visit #12 or     Visit # Insurance Allowable Auth required? Date Range    + 9/10 Med nec []  Yes  [x]  No NA       Units approved Units used Date Range   NA NA NA       Latex Allergy:  [x]NO      []YES  Preferred Language for Healthcare:   [x]English       []other:    Functional Scale:        Date assessed:  NDI raw score = 18, dysfunction =36 %, taken at initial eval  NDI raw score = 20; dysfunction = 40%    10/26/21       Pain level: 2/10      SUBJECTIVE:     - Pt reports she felt sore this morning but has decreased pain now. Reports she felt tired after LV.  11/3 - Pt reports she had some tingling in her head a couple days ago but it went away. Reports pain in her ear has decreased. 10/29 - Pt reports her pain is less today. States she feels she can move her neck further. 10/26 - Pt reports she had no pain yesterday. States pain is a little higher today after driving a lot today. Had to park far away and she is tired.    10/22 - Pt reports her pain typically gets better throughout the day. States she has some ear pain today. 10/19 - Pt reports she almost fell this morning. She was reading the paper and went to sit in a rolling chair and the chair slipped. She caught herself but thinks this could be attributing to her pain. 10/12 - pt reports she is a little sore today. States her head and ear were bothering her Sunday but that has since gone away. 10/6 - Pt reports more pain today in her ear and neck. States she carried her groceries on the opposite side and isn't sure what increased her pain. 10/4 Pt reports she is more sore today. She cooked a pot of chili yesterday and thinks she may have over done it. 10/1 pt reports her pain comes and goes but she continues to feel better. States her son is getting his stress test currently. Brought her calendar to schedule additional visits. 9/28 - Pt states she has a lot going on with her son's health. He is supposed to get a stress test Saturday. Reports some ear pain when she woke up, but feels good now.   9/23: Patient reports that she is feeling well today and not having too much pain. Patient states that she is trying her best to create a more ergonomic home desk environment for her laptop. Patient states that she has not found a solution that she prefers yet and will coninue to try and find a solution that works for her. Pt asks re life alert systems for home use    9/21 increasing mobility without increased pain. Reports she is still taking the mm relaxer and reports she took [place] when she was stressed and noticed increased neck mm pain And it seemed to help. States her son hopes to get hernia surgery soon. Intermittent R ear pain with combing her hair     9/14 Intermittent pain still gets pain in neck and face/ear. Thinks PT tx is helping. Reports sleeping is good.   Cn turn  to the right better    OBJECTIVE:   10/26  cerv AROM  R L  SB  40 35  Rot  60 59    Strength / Myotomes RIGHT LEFT     Shoulder Flex 4+ 4+     Shoulder Abduction (C5) 4+ 4        9/23 Patient ambulates with AD 0ER, Patient ambulates with forward head, rounded shoulders, and kyphotic curve in T-spine. 9/14 pt reports slouched posture with excessive cerv flex when she watches TV. Palpation: Myofascial pain R>L UT, scalenes. SCM and masseter WFL B.    cerv AROM  R L  SB  40 25  Rot  60 50    9/9 - Pt with no clicking or popping in TMJ, palpation of R SCM increases ear pain   8/31 - pt reports she has a spot that is iching and burning on her back. Skin inspection - small, red oval on R scapular region with smooth borders. Pt reports she has a dermatology appointment within the next 2 weeks. 8/25 - amb with RW this date. [place] is too tall, but it does not lower further. Increased tissue tension in R SCM and scalenes. RESTRICTIONS/PRECAUTIONS:osteopososis/osteopenia; has lost 3 friends and her  and brother int he last 17 months, joint replacements ( B knees, R ankle, B shoudlers): x 5. Lives with her son who has multiple medical issues including COPD and heart issues.   Spinal cord stimulator (lumbar) put in by Dr. Elaine Randall - recently had new battery put in.        Exercises/Interventions:     Therapeutic Exercises (14810) Resistance / level Sets/sec Reps Notes   Arm bike   2.5 min fwd/2.5 min back      UT stretch - HAND HOLDING CHAIR   30 sec  2 B     Chin tuck -   Scap squeeze   Chin tuck + scap squeeze           Pulleys - flexion and abd  5 sec  10 B each Completed to promote thoracic ext    Seated:    cerv AROM   SB  Rot  UT stretches  scalene stretches  10x5\" B      Standing TB  High row   Mid row  LPD   Horizontal abd - seated  B ER - seated  Green   Ever Nickels  Green 2   2  22  10   Seated thoracic extension   3 sec10    SCM stretch   L increased pain on R side    LT wall slides  -flexion   - abd     10 B  10 B Manual cues to deactivate UT    Therapeutic Activities (44999)       Instruct in upright sitting posture for watching TV       Standing reaching to shelf - second shelf  - flexion  - abd Tactile and verbal cues to deactivate UT and use scapular mm to reach. CGA          Progress note completed this date - objective measures taken and discussed goals and progress made with therapy and plan for remaining visits              Neuromuscular Re-ed (73602)       Postural ed                                          Manual Intervention (60881)       Gentle manual - STM and TPR B UT mm          8/27 Done on reclined mat table  9/21, 9/7, 8/30 completed seated in chair                                           Modalities:   11/5, 11/3, 10/29, 10/26, 10/22 - cervical MHP seated in chair at end of session x 10 min    10/19, 10/12, 10/6 - 1 cervical cold pack to cspine x 10 min at end of session   10/4, 10/1, 9/28 - cervical MHP seated in chair at end of session x 10 min   9/23, 9/14 9/9, 9/7, 9/2 - cervical MHP seated in chair at end of session x 15 min   8/31- cervical MHP seated in chair at end of session x 12 min   8/27- cervical MHP on reclined mat table x 15 min   8/25 - cervical MHP seated in chair at end of session x 10 min     Pt. Education:  9/23: pt Educated re safety in home /fall risk reduction. At pt request d/w pt re life alert systems. Patient ed  on option of obtaining an apple watch to use as a \"life alert\" system. Patient likes this idea and state she will have her son help her to look into it.   8/31 - Pt educated on ABCDs of melanoma and importance of skin checks as she reports concern about itching spot on her back. Pt has dermatology appointment in 2 wks.    8/19 patient educated on diagnosis, prognosis and expectations for rehab  -all patient questions were answered    Home Exercise Program:  9/23 computer ergonomics and apple watch for fall risk reduction  9/21  computer ergonomics  9/14 upright psoture for TV viewing  8/19 cerv AROM SB/ROT      Therapeutic Exercise and NMR EXR  [] ([address]) Provided positioning and eccentric body weight control with ambulation re-education including up and down stairs     Home Management Training / Self Care:  [] (05094) Provided self-care/home management training related to activities of daily living and compensatory training, and/or use of adaptive equipment for improvement with: ADLs and compensatory training, meal preparation, safety procedures and instruction in use of adaptive equipment, including bathing, grooming, dressing, personal hygiene, basic household cleaning and chores. Home Exercise Program:    [x] (45070) Reviewed/Progressed HEP activities related to strengthening, flexibility, endurance, ROM of   [] LE / Lumbar: core, proximal hip and LE for functional self-care, mobility, lifting and ambulation/stair navigation   [] UE / Cervical: cervical, postural, scapular, scapulothoracic and UE control with self care, reaching, carrying, lifting, house/yardwork, driving, computer work  [] (84693)Reviewed/Progressed HEP activities related to improving balance, coordination, kinesthetic sense, posture, motor skill, proprioception of   [] LE: core, proximal hip and LE for self care, mobility, lifting, and ambulation/stair navigation    [] UE / Cervical: cervical, postural,  scapular, scapulothoracic and UE control with self care, reaching, carrying, lifting, house/yardwork, driving, computer work    Manual Treatments:  PROM / STM / Oscillations-Mobs:  G-I, II, III, IV (PA's, Inf., Post.)  [] (96826) Provided manual therapy to mobilize LE, proximal hip and/or LS spine soft tissue/joints for the purpose of modulating pain, promoting relaxation,  increasing ROM, reducing/eliminating soft tissue swelling/inflammation/restriction, improving soft tissue extensibility and allowing for proper ROM for normal function with   [] LE / lumbar: self care, mobility, lifting and ambulation.     [] UE / Cervical: self care, reaching, carrying, lifting, house/yardwork, driving, computer work.     Modalities:  [] (23894) Vasopneumatic compression: Utilized vasopneumatic compression to decrease edema / swelling for the purpose of improving mobility and quad tone / recruitment which will allow for increased overall function including but not limited to self-care, transfers, ambulation, and ascending / descending stairs. Charges:  Timed Code Treatment Minutes: 41   Total Treatment Minutes: 51     [] EVAL - LOW (97002)   [] EVAL - MOD (34625)  [] EVAL - HIGH (79942)  [] RE-EVAL (23791)  [x] JG(30855) x   3    [] Ionto  [] NMR (85908) x       [] Vaso  [] Manual (97961) x 1      [] Ultrasound  [] TA x   1     [] Mech Traction (55446)  [] Aquatic Therapy x      [] ES (un) (25806):   [] Home Management Training x  [] ES(attended) (12756)   [] Dry Needling 1-2 muscles (37115):  [] Dry Needling 3+ muscles (285056)  [] Group:      [] Other:          GOALS:  Patient stated goal: less pain  [x]? Progressing: []? Met: []? Not Met: []? Adjusted     Therapist goals for Patient:   Short Term Goals: To be achieved in: 2 weeks  1. Independent in HEP and progression per patient tolerance, in order to prevent re-injury. []? Progressing: [x]? Met: []? Not Met: []? Adjusted  2. Patient will have a decrease in pain to facilitate improvement in movement, function, and ADLs as indicated by improvement with respect to Functional Deficits. []? Progressing: [x]? Met: []? Not Met: []? Adjusted     Long Term Goals: To be achieved in: 6 weeks  1. Disability index score of 10% or less on the  NDI  to assist with reaching prior level of function. [x]? Progressing: []? Met: []? Not Met: []? Adjusted  2. Patient will demonstrate increased AROM  [facility], to allow for proper joint functioning to allow pt to resume turning head while driving without increase in symptoms. [x]? Progressing: []? Met: []? Not Met: []? Adjusted  3.  Patient will demonstrate increased Strength by 1/2 mmt grade  patients Functional Deficits to allow pt to resume amb with/without RW prn for amb in community without increase in symptoms. []? Progressing: [x]? Met: []? Not Met: []? Adjusted  4. Patient will return to functional activities including falling asleep without difficulty without increased symptoms or restriction. []? Progressing: [x]? Met: []? Not Met: []? Adjusted     Overall Progression Towards Functional goals/ Treatment Progress Update:  [x] Patient is progressing as expected towards functional goals listed. [] Progression is slowed due to complexities/Impairments listed. [] Progression has been slowed due to co-morbidities. [] Plan just implemented, too soon to assess goals progression <30days   [] Goals require adjustment due to lack of progress  [] Patient is not progressing as expected and requires additional follow up with physician  [] Other    Persisting Functional Limitations/Impairments:  [x]Sleeping []Sitting               []Standing []Transfers        []Walking []Kneeling               []Stairs []Squatting / bending   [x]ADLs []Reaching  [x]Lifting  [x]Housework  [x]Driving []Job related tasks  []Sports/Recreation []Other:        ASSESSMENT:   11/5 - Pt reports decreased pain in neck this date. Pt demonstrates improved cervical rotation B this date with AROM and improved shoulder abd and flexion with wall slides. Plan to d/c to HEP NV.     11/3 - Pt demonstrates improved posture with TB exercises this date. Pt with improved awareness of her posture with ADLs. Pt with TP and increased tissue tension in BUT L>R this date. Continue to progress per pt tolerance. 10/29 - pt required VC to relax UT with B ER this date. Increased to 2 sets of 10 with green TB. Pt with increased tissue tension in R scalenes this date which decreased with gentle STM. Pt reports decreased pain and increased mobility with OP PT. Continue to progress postural strengthening/stretching per pt tolerance. 10/26 - Progress note completed this date.  Pt demonstrates improvements in L cervical SB and rotation AROM. Pt with increased B shoulder flexion and abd strength. Pt reports decreased pain and increased ability to sleep without pain or discomfort. Plan to focus remaining treatment sessions on postural strengthening and STM to return to PLOF without limitations or risk for further injury. 10/22 - Pt with TP and increased tissue tension in suboccipitals, R UT, and R SCM. Pt reports feeling a good stretch with UT stretch. Initiated B ER with TB this date. Pt required VC to keep elbows bent. Pt required less frequent VC for upright trunk posture and demonstrated improved technique with chin tuck. Continue to progress per pt tolerance. 10/19 - progressed TB shoulder ABD to orange band this date. Pt with TP in R SCM which decreased with manual therapy. Pt reports decreased pain at end of session. 10/12 - progressed reaching to second shelf this date. Pt unable to reach second shelf with 1 pound weight, but demonstrated ability to touch shelf without weight. Pt with TP in L UT, L infraspinatus, and R SCM. Pt demonstrates improved posture with exercises and increased exercise tolerance. Continues to require seated rest breaks throughout session. 10/6 - Initiated reaching with 1 pound weight this date. Pt required verbal and tactile cues to deactivate UT and recruit scapular mm. Pt required less frequent rest breaks throughout treatment session this date. Pt requested ice this date as she states sometimes it decreases her pain at home. Continue to progress standing postural strengthening and re-ed exercises per pt tolerance. 10/4 - Progressed TB exercises to standing this date. Decreased to 1 set of 10 due to pt fatigue in standing. Initiated thoracic ext over chair with tactile cue to ext through thoracic spine vs cervical spine. Pt required VC for chin tuck with cervical rotation. Reports decreased pain with chin tuck + rotation.  Continue to progress as tolerated. 10/1 - Pt required verbal and tactile cues to deactivate UT with TB exercises this date. Pt reports no pain throughout treatment session. Pt reports she has been more aware of her posture throughout the day. Pt with less TP and tissue tension in UT mm this date. 9/28 - Pt with decreased pain and tissue tension this date. Pt requires VC for appropriate form with chin tuck. Pt demonstrates improved posture with TB exercises. Pt reports she is more aware of her posture during the day, but has pain when she sits on a pillow. Plan to continue to progress postural education and scapular strengthening to further decrease pain and return to PLOF without pain or limitations. 9/23: Patient continues to require VC and TC during exercises in order to squeeze scapula together. Patient has difficulty maintaining posture and is struggling to find home solutions to help her adjust her body mechanics to incorporate improved posture habits during her ADL's and IADLs. In addition, pt concerned re safety at home. Patient has noted tenderness and tightness in bilat UT, levator, rhomboids, scalenes, SCM. Patient reports pain relief with STM. Plan to continue to progress appropriate postural strengthening and appropriate motor programming to increase cervical ROM. 9/21 see PT POC above    9/14 needs improved postures, especially for TV watching   9/9 Pt with decreased pain and tissue tension this date. STM primarily on L side this date and pain decreased after manual therapy. Pt reports ear pain is on and off and currently has no ear pain. Increased resistance with TB exercises this date. Pt reported increased difficulty but no increased pain. Plan to continue to progress postural strengthening and cervical ROM per pt tolerance.      Treatment/Activity Tolerance:  [x] Patient able to complete tx  [] Patient limited by fatigue  [] Patient limited by pain  [] Patient limited by other medical complications  [] Other:     Prognosis: [x] Good [] Fair  [] Poor    Patient Requires Follow-up: [x] Yes  [] No    PLAN:  strength, ROM/flexibility, posture and body mechs, manual, MOC, HEP, pt education     Frequency/Duration: 2 days per week for 6  Weeks:  Interventions:  [x]? Therapeutic exercise including:strength, ROM, flexibility  [x]? NMR activation and proprioception including postural re-education  [x]? Manual therapy as indicated to include: IASTM, STM, PROM, Gr I-IV mobilizations, manipulation. [x]? Modalities as needed that may include: thermal agents, E-stim, Biofeedback, US, iontophoresis as indicated  [x]? Patient education on joint protection, postural re-education, activity modification, progression of HEP. Plan for next treatment session:    PLAN: See eval. PT 2x / week for 6 weeks. + 2x/wk for 5 wks  [x] Continue per plan of care [] Alter current plan (see comments)  [] Plan of care initiated [] Hold pending MD visit [] Discharge    Electronically signed by: Michelle Martinez PT, DPT      Note: If patient does not return for scheduled/ recommended follow up visits, this note will serve as a discharge from care along with most recent update on progress.

## 2021-11-10 ENCOUNTER — HOSPITAL ENCOUNTER (OUTPATIENT)
Dept: PHYSICAL THERAPY | Age: 81
Setting detail: THERAPIES SERIES
Discharge: HOME | End: 2021-11-10
Payer: MEDICARE

## 2021-11-10 PROCEDURE — 97530 THERAPEUTIC ACTIVITIES: CPT

## 2021-11-10 PROCEDURE — 97110 THERAPEUTIC EXERCISES: CPT

## 2021-11-10 PROCEDURE — 97140 MANUAL THERAPY 1/> REGIONS: CPT

## 2021-11-10 NOTE — PLAN OF CARE
[facility]  Outpatient Physical Therapy  Phone: [phone]   Fax: [phone]    Physical Therapy Re-Certification Plan of Care    Dear Dr. Paige Boggs,    We had the pleasure of treating the following patient for physical therapy services at [facility] Outpatient Physical Therapy. A summary of our findings can be found in the updated assessment below. This includes our plan of care. If you have any questions or concerns regarding these findings, please do not hesitate to contact me at the office phone number checked above. Thank you for the referral.     Physician Signature:________________________________Date:__________________  By signing above (or electronic signature), therapist's plan is approved by physician      Functional Outcome:   NDI raw score = 18, dysfunction =36 %, taken at initial eval  NDI raw score = 20; dysfunction = 40%    10/26/21  NDI raw score = 17; dysfunction = 34%    11/10/21     Overall Response to Treatment:   [x]Patient is responding well to treatment and improvement is noted with regards  to goals   []Patient should continue to improve in reasonable time if they continue HEP   []Patient has plateaued and is no longer responding to skilled PT intervention    []Patient is getting worse and would benefit from return to referring MD   []Patient unable to adhere to initial POC   [x]Other: Pt continues to present with decreased cervical AROM in SB and rotation. Pt with decreased  strength B and increased tissue tension in cervical mm. Pt demonstrates improved posture and exercise technique with therapy and improved function as noted by decreased dysfunction on the NDI. Pt continues to be limited in endurance with home management and driving secondary to decreased cervical rotation.  Pt to be scheduled with therapist certified in DN as dry needling was added to MD referral. Plan to continue to progress postural re-ed and strengthening, cervical ROM, and manual therapy to decrease pain and tissue tension and improve strength, ROM, and endurance to return to PLOF without limitations. Date range of Visits:  - 11/10  Total Visits: 22    Recommendation:    [x]Continue PT 2x / wk for 6 weeks. []Hold PT, pending MD visit        Physical Therapy Daily Treatment Note  Date:  11/10/2021    Patient Name:  Jan Yu    :  1940  MRN: [id number]  Medical/Treatment Diagnosis Information:  Diagnosis: cervical disc disease, R TMJ  Treatment Diagnosis: pt with painful myofascial changes, impaired posture including forward head posture, reduced postural strengths. hypomobility t/o spine, especially at upper cerv spine  Insurance/Certification information:  PT Insurance Information: medicare  Physician Information:  Referring Practitioner: Dr. Zeyad Fowler of care signed (Y/N): []  Yes [x]  No     Date of Patient follow up with Physician:     POC: 11/10      Progress Report: [x]  Yes  []  No     Progress report due (10 Rx/or 30 days whichever is less): visit #41 or     Recertification due (POC duration/ or 90 days whichever is less): visit #12 or     Visit # Insurance Allowable Auth required? Date Range    + 10/10 +   Med nec []  Yes  [x]  No NA       Units approved Units used Date Range   NA NA NA       Latex Allergy:  [x]NO      []YES  Preferred Language for Healthcare:   [x]English       []other:    Functional Scale:        Date assessed:  NDI raw score = 18, dysfunction =36 %, taken at initial eval  NDI raw score = 20; dysfunction = 40%    10/26/21  NDI raw score = 17; dysfunction = 34%    11/10/21       Pain level: 2/10      SUBJECTIVE:    11/10 - Pt reports she went to see the MD yesterday and she thought she would benefit from further therapy. Reports MD asked if anyone here did needling as it helped her with her shoulder. Pt brought referral for additional PT 2x/wk for 6 wks.     - Pt reports she felt sore this morning but has decreased pain now. Reports she felt tired after LV.  11/3 - Pt reports she had some tingling in her head a couple days ago but it went away. Reports pain in her ear has decreased. 10/29 - Pt reports her pain is less today. States she feels she can move her neck further. 10/26 - Pt reports she had no pain yesterday. States pain is a little higher today after driving a lot today. Had to park far away and she is tired. 10/22 - Pt reports her pain typically gets better throughout the day. States she has some ear pain today. 10/19 - Pt reports she almost fell this morning. She was reading the paper and went to sit in a rolling chair and the chair slipped. She caught herself but thinks this could be attributing to her pain. 10/12 - pt reports she is a little sore today. States her head and ear were bothering her Sunday but that has since gone away. 10/6 - Pt reports more pain today in her ear and neck. States she carried her groceries on the opposite side and isn't sure what increased her pain. 10/4 Pt reports she is more sore today. She cooked a pot of chili yesterday and thinks she may have over done it. 10/1 pt reports her pain comes and goes but she continues to feel better. States her son is getting his stress test currently. Brought her calendar to schedule additional visits. 9/28 - Pt states she has a lot going on with her son's health. He is supposed to get a stress test Saturday. Reports some ear pain when she woke up, but feels good now.   9/23: Patient reports that she is feeling well today and not having too much pain. Patient states that she is trying her best to create a more ergonomic home desk environment for her laptop. Patient states that she has not found a solution that she prefers yet and will coninue to try and find a solution that works for her. Pt asks re life alert systems for home use    9/21 increasing mobility without increased pain.  Reports she is still taking the mm relaxer and reports she took Malinda Settler when she was stressed and noticed increased neck mm pain And it seemed to help. States her son hopes to get hernia surgery soon. Intermittent R ear pain with combing her hair     9/14 Intermittent pain still gets pain in neck and face/ear. Thinks PT tx is helping. Reports sleeping is good. Cn turn  to the right better    OBJECTIVE:   11/10  cerv AROM  R L  SB  35 38  Rot  61 50    Strength / Myotomes RIGHT LEFT   Shoulder Flex 4+ 4+   Shoulder Abduction (C5) 4+ 4      stretch   L: 28#  R: 30#    Increased tissue tension and TP in B cervical paraspinal mm, B UT, B rhomboids, and B SCM     10/26  cerv AROM  R L  SB  40 35  Rot  60 59    Strength / Myotomes RIGHT LEFT   Shoulder Flex 4+ 4+   Shoulder Abduction (C5) 4+ 4       9/23 Patient ambulates with AD 2PK, Patient ambulates with forward head, rounded shoulders, and kyphotic curve in T-spine. 9/14 pt reports slouched posture with excessive cerv flex when she watches TV. Palpation: Myofascial pain R>L UT, scalenes. SCM and masseter WFL B.    cerv AROM  R L  SB  40 25  Rot  60 50    9/9 - Pt with no clicking or popping in TMJ, palpation of R SCM increases ear pain   8/31 - pt reports she has a spot that is iching and burning on her back. Skin inspection - small, red oval on R scapular region with smooth borders. Pt reports she has a dermatology appointment within the next 2 weeks. 8/25 - amb with RW this date. Meredith Marcelino is too tall, but it does not lower further. Increased tissue tension in R SCM and scalenes. RESTRICTIONS/PRECAUTIONS:osteopososis/osteopenia; has lost 3 friends and her  and brother int he last 17 months, joint replacements ( B knees, R ankle, B shoudlers): x 5. Lives with her son who has multiple medical issues including COPD and heart issues.   Spinal cord stimulator (lumbar) put in by Dr. Diamond Post - recently had new battery put in.        Exercises/Interventions: Therapeutic Exercises (20424) Resistance / level Sets/sec Reps Notes   Arm bike   2 min fwd/2 min back      UT stretch - HAND HOLDING CHAIR      Chin tuck -   Scap squeeze   Chin tuck + scap squeeze           Pulleys - flexion and abd  5 sec  10 B each Completed to promote thoracic ext    Seated:    cerv AROM   SB  Rot  UT stretches  scalene stretches  10x5\" B      Standing TB  High row   Mid row  LPD   Horizontal abd - seated  B ER - seated     Seated thoracic extension      SCM stretch   L increased pain on R side    LT wall slides  -flexion   - abd      Manual cues to deactivate UT    Therapeutic Activities (89467)       Instruct in upright sitting posture for watching TV       Standing reaching to shelf - second shelf  - flexion  - abd Tactile and verbal cues to deactivate UT and use scapular mm to reach. CGA          Progress note completed this date - objective measures taken and discussed goals and progress made with therapy. Educated on [address]. Pt to be scheduled with PT that dry needles as was on MD order  X 15 min             Neuromuscular Re-ed (21012)       Postural ed                                          Manual Intervention ([phone])       manual - STM and TPR B UT mm and B SCM,  suboccipitals and B rhomboids        8 min  8/27 Done on reclined mat table  9/21, 9/7, 8/30 completed seated in chair                                           Modalities:   11/10, 11/5, 11/3, 10/29, 10/26, 10/22 - cervical MHP seated in chair at end of session x 10 min    10/19, 10/12, 10/6 - 1 cervical cold pack to cspine x 10 min at end of session   10/4, 10/1, 9/28 - cervical MHP seated in chair at end of session x 10 min   9/23, 9/14 9/9, 9/7, 9/2 - cervical MHP seated in chair at end of session x 15 min   8/31- cervical MHP seated in chair at end of session x 12 min   8/27- cervical MHP on reclined mat table x 15 min   8/25 - cervical MHP seated in chair at end of session x 10 min     Pt.  Education:  9/23: pt Educated re safety in home /fall risk reduction. At pt request d/w pt re life alert systems. Patient ed  on option of obtaining an apple watch to use as a \"life alert\" system. Patient likes this idea and state she will have her son help her to look into it.   8/31 - Pt educated on ABCDs of melanoma and importance of skin checks as she reports concern about itching spot on her back. Pt has dermatology appointment in 2 wks. 8/19 patient educated on diagnosis, prognosis and expectations for rehab  -all patient questions were answered    Home Exercise Program:  9/23 computer ergonomics and apple watch for fall risk reduction  9/21  computer ergonomics  9/14 upright psoture for TV viewing  8/19 cerv AROM SB/ROT      Therapeutic Exercise and NMR EXR  [] [phone]) Provided verbal/tactile cueing for activities related to strengthening, flexibility, endurance, ROM for improvements in  [] LE / Lumbar: LE, proximal hip, and core control with self care, mobility, lifting, ambulation. [] UE / Cervical: cervical, postural, scapular, scapulothoracic and UE control with self care, reaching, carrying, lifting, house/yardwork, driving, computer work.  [] (53371) Provided verbal/tactile cueing for activities related to improving balance, coordination, kinesthetic sense, posture, motor skill, proprioception to assist with   [] LE / lumbar: LE, proximal hip, and core control in self care, mobility, lifting, ambulation and eccentric single leg control.    [] UE / cervical: cervical, scapular, scapulothoracic and UE control with self care, reaching, carrying, lifting, house/yardwork, driving, computer work.   [] (62943) Therapist is in constant attendance of 2 or more patients providing skilled therapy interventions, but not providing any significant amount of measurable one-on-one time to either patient, for improvements in  [] LE / lumbar: LE, proximal hip, and core control in self care, mobility, lifting, ambulation and eccentric single leg control. [] UE / cervical: cervical, scapular, scapulothoracic and UE control with self care, reaching, carrying, lifting, house/yardwork, driving, computer work. NMR and Therapeutic Activities:    [] (84698 or 39783) Provided verbal/tactile cueing for activities related to improving balance, coordination, kinesthetic sense, posture, motor skill, proprioception and motor activation to allow for proper function of   [] LE: / Lumbar core, proximal hip and LE with self care and ADLs  [] UE / Cervical: cervical, postural, scapular, scapulothoracic and UE control with self care, carrying, lifting, driving, computer work.   [] (05454) Gait Re-education- Provided training and instruction to the patient for proper LE, core and proximal hip recruitment and positioning and eccentric body weight control with ambulation re-education including up and down stairs     Home Management Training / Self Care:  [] (93474) Provided self-care/home management training related to activities of daily living and compensatory training, and/or use of adaptive equipment for improvement with: ADLs and compensatory training, meal preparation, safety procedures and instruction in use of adaptive equipment, including bathing, grooming, dressing, personal hygiene, basic household cleaning and chores.      Home Exercise Program:    [x] (13781) Reviewed/Progressed HEP activities related to strengthening, flexibility, endurance, ROM of   [] LE / Lumbar: core, proximal hip and LE for functional self-care, mobility, lifting and ambulation/stair navigation   [] UE / Cervical: cervical, postural, scapular, scapulothoracic and UE control with self care, reaching, carrying, lifting, house/yardwork, driving, computer work  [] (89608)Reviewed/Progressed HEP activities related to improving balance, coordination, kinesthetic sense, posture, motor skill, proprioception of   [] LE: core, proximal hip and LE for self care, mobility, lifting, and ambulation/stair navigation    [] UE / Cervical: cervical, postural,  scapular, scapulothoracic and UE control with self care, reaching, carrying, lifting, house/yardwork, driving, computer work    Manual Treatments:  PROM / STM / Oscillations-Mobs:  G-I, II, III, IV (PA's, Inf., Post.)  [] (71915) Provided manual therapy to mobilize LE, proximal hip and/or LS spine soft tissue/joints for the purpose of modulating pain, promoting relaxation,  increasing ROM, reducing/eliminating soft tissue swelling/inflammation/restriction, improving soft tissue extensibility and allowing for proper ROM for normal function with   [] LE / lumbar: self care, mobility, lifting and ambulation. [] UE / Cervical: self care, reaching, carrying, lifting, house/yardwork, driving, computer work. Modalities:  [] (74314) Vasopneumatic compression: Utilized vasopneumatic compression to decrease edema / swelling for the purpose of improving mobility and quad tone / recruitment which will allow for increased overall function including but not limited to self-care, transfers, ambulation, and ascending / descending stairs. Charges:  Timed Code Treatment Minutes: 40   Total Treatment Minutes: 50     [] EVAL - LOW (04584)   [] EVAL - MOD (16349)  [] EVAL - HIGH (80039)  [] RE-EVAL (59437)  [x] YB(73072) x   1    [] Ionto  [] NMR (17867) x       [] Vaso  [x] Manual (89491) x 1      [] Ultrasound  [x] TA x   1     [] Mech Traction (59645)  [] Aquatic Therapy x      [] ES (un) (67872):   [] Home Management Training x  [] ES(attended) (75330)   [] Dry Needling 1-2 muscles (79427):  [] Dry Needling 3+ muscles (623252)  [] Group:      [] Other:          GOALS:  Patient stated goal: less pain  [x]? Progressing: []? Met: []? Not Met: []? Adjusted     Therapist goals for Patient:   Short Term Goals: To be achieved in: 2 weeks  1. Independent in HEP and progression per patient tolerance, in order to prevent re-injury. []? Progressing: [x]?  Met: []? Not Met: []? Adjusted  2. Patient will have a decrease in pain to facilitate improvement in movement, function, and ADLs as indicated by improvement with respect to Functional Deficits. []? Progressing: [x]? Met: []? Not Met: []? Adjusted     Long Term Goals: To be achieved in: 6 weeks  1. Disability index score of 10% or less on the  NDI  to assist with reaching prior level of function. [x]? Progressing: []? Met: []? Not Met: []? Adjusted  2. Patient will demonstrate increased AROM  to [facility], to allow for proper joint functioning to allow pt to resume turning head while driving without increase in symptoms. [x]? Progressing: []? Met: []? Not Met: []? Adjusted  3. Patient will demonstrate increased Strength by 1/2 mmt grade  patients Functional Deficits to allow pt to resume amb with/without RW prn for amb in community without increase in symptoms. []? Progressing: [x]? Met: []? Not Met: []? Adjusted  4. Patient will return to functional activities including falling asleep without difficulty without increased symptoms or restriction. []? Progressing: [x]? Met: []? Not Met: []? Adjusted  5. Pt will report ability to drive without pain or restrictions. - ADDED 11/10/21   []? Progressing: []? Met: []? Not Met: []? Adjusted        Overall Progression Towards Functional goals/ Treatment Progress Update:  [x] Patient is progressing as expected towards functional goals listed. [] Progression is slowed due to complexities/Impairments listed. [] Progression has been slowed due to co-morbidities.   [] Plan just implemented, too soon to assess goals progression <30days   [] Goals require adjustment due to lack of progress  [] Patient is not progressing as expected and requires additional follow up with physician  [] Other    Persisting Functional Limitations/Impairments:  [x]Sleeping []Sitting               []Standing []Transfers        []Walking []Kneeling               []Stairs []Squatting / bending   [x]ADLs []Reaching  [x]Lifting  [x]Housework  [x]Driving []Job related tasks  []Sports/Recreation []Other:        ASSESSMENT:   11/10 Pt continues to present with decreased cervical AROM in SB and rotation. Pt with decreased  strength B and increased tissue tension in cervical mm. Pt demonstrates improved posture and exercise technique with therapy and improved function as noted by decreased dysfunction on the NDI. Pt continues to be limited in endurance with home management and driving secondary to decreased cervical rotation. Pt to be scheduled with therapist certified in DN as dry needling was added to MD referral. Plan to continue to progress postural re-ed and strengthening, cervical ROM, and manual therapy to decrease pain and tissue tension and improve strength, ROM, and endurance to return to PLOF without limitations. 11/5 - Pt reports decreased pain in neck this date. Pt demonstrates improved cervical rotation B this date with AROM and improved shoulder abd and flexion with wall slides. Plan to d/c to HEP NV.     11/3 - Pt demonstrates improved posture with TB exercises this date. Pt with improved awareness of her posture with ADLs. Pt with TP and increased tissue tension in BUT L>R this date. Continue to progress per pt tolerance. 10/29 - pt required VC to relax UT with B ER this date. Increased to 2 sets of 10 with green TB. Pt with increased tissue tension in R scalenes this date which decreased with gentle STM. Pt reports decreased pain and increased mobility with OP PT. Continue to progress postural strengthening/stretching per pt tolerance. 10/26 - Progress note completed this date. Pt demonstrates improvements in L cervical SB and rotation AROM. Pt with increased B shoulder flexion and abd strength. Pt reports decreased pain and increased ability to sleep without pain or discomfort.  Plan to focus remaining treatment sessions on postural strengthening and STM to return to PLOF without limitations or risk for further injury. 10/22 - Pt with TP and increased tissue tension in suboccipitals, R UT, and R SCM. Pt reports feeling a good stretch with UT stretch. Initiated B ER with TB this date. Pt required VC to keep elbows bent. Pt required less frequent VC for upright trunk posture and demonstrated improved technique with chin tuck. Continue to progress per pt tolerance. 10/19 - progressed TB shoulder ABD to orange band this date. Pt with TP in R SCM which decreased with manual therapy. Pt reports decreased pain at end of session. 10/12 - progressed reaching to second shelf this date. Pt unable to reach second shelf with 1 pound weight, but demonstrated ability to touch shelf without weight. Pt with TP in L UT, L infraspinatus, and R SCM. Pt demonstrates improved posture with exercises and increased exercise tolerance. Continues to require seated rest breaks throughout session. 10/6 - Initiated reaching with 1 pound weight this date. Pt required verbal and tactile cues to deactivate UT and recruit scapular mm. Pt required less frequent rest breaks throughout treatment session this date. Pt requested ice this date as she states sometimes it decreases her pain at home. Continue to progress standing postural strengthening and re-ed exercises per pt tolerance. 10/4 - Progressed TB exercises to standing this date. Decreased to 1 set of 10 due to pt fatigue in standing. Initiated thoracic ext over chair with tactile cue to ext through thoracic spine vs cervical spine. Pt required VC for chin tuck with cervical rotation. Reports decreased pain with chin tuck + rotation. Continue to progress as tolerated. 10/1 - Pt required verbal and tactile cues to deactivate UT with TB exercises this date. Pt reports no pain throughout treatment session. Pt reports she has been more aware of her posture throughout the day. Pt with less TP and tissue tension in UT mm this date.      9/28 - Pt with decreased pain and tissue tension this date. Pt requires VC for appropriate form with chin tuck. Pt demonstrates improved posture with TB exercises. Pt reports she is more aware of her posture during the day, but has pain when she sits on a pillow. Plan to continue to progress postural education and scapular strengthening to further decrease pain and return to PLOF without pain or limitations. 9/23: Patient continues to require VC and TC during exercises in order to squeeze scapula together. Patient has difficulty maintaining posture and is struggling to find home solutions to help her adjust her body mechanics to incorporate improved posture habits during her ADL's and IADLs. In addition, pt concerned re safety at home. Patient has noted tenderness and tightness in bilat UT, levator, rhomboids, scalenes, SCM. Patient reports pain relief with STM. Plan to continue to progress appropriate postural strengthening and appropriate motor programming to increase cervical ROM. 9/21 see PT POC above    9/14 needs improved postures, especially for TV watching   9/9 Pt with decreased pain and tissue tension this date. STM primarily on L side this date and pain decreased after manual therapy. Pt reports ear pain is on and off and currently has no ear pain. Increased resistance with TB exercises this date. Pt reported increased difficulty but no increased pain. Plan to continue to progress postural strengthening and cervical ROM per pt tolerance. Treatment/Activity Tolerance:  [x] Patient able to complete tx  [] Patient limited by fatigue  [] Patient limited by pain  [] Patient limited by other medical complications  [] Other:     Prognosis: [x] Good [] Fair  [] Poor    Patient Requires Follow-up: [x] Yes  [] No    PLAN:  strength, ROM/flexibility, posture and body mechs, manual, MOC, HEP, pt education     Frequency/Duration: 2 days per week for 6  Weeks:  Interventions:  [x]?   Therapeutic exercise including:strength, ROM, flexibility  [x]? NMR activation and proprioception including postural re-education  [x]? Manual therapy as indicated to include: IASTM, STM, PROM, Gr I-IV mobilizations, manipulation. [x]? Modalities as needed that may include: thermal agents, E-stim, Biofeedback, US, iontophoresis as indicated  [x]? Patient education on joint protection, postural re-education, activity modification, progression of HEP. Plan for next treatment session: see flowsheet    PLAN: See eval. PT 2x / week for 6 weeks. + 2x/wk for 5 wks + 2x/wk for 6 wks   [x] Continue per plan of care [] Alter current plan (see comments)  [] Plan of care initiated [] Hold pending MD visit [] Discharge    Electronically signed by: Amando Mo, PT, DPT      Note: If patient does not return for scheduled/ recommended follow up visits, this note will serve as a discharge from care along with most recent update on progress.

## 2021-11-23 ENCOUNTER — HOSPITAL ENCOUNTER (OUTPATIENT)
Dept: PHYSICAL THERAPY | Age: 81
Setting detail: THERAPIES SERIES
Discharge: HOME | End: 2021-11-23
Payer: MEDICARE

## 2021-11-23 PROCEDURE — 97110 THERAPEUTIC EXERCISES: CPT

## 2021-11-23 PROCEDURE — 97530 THERAPEUTIC ACTIVITIES: CPT

## 2021-11-23 PROCEDURE — 97140 MANUAL THERAPY 1/> REGIONS: CPT

## 2021-11-23 NOTE — PLAN OF CARE
[address] [facility] Physical Therapy  Phone: [phone]   Fax: [phone]        Physical Therapy Daily Treatment Note  Date:  2021    Patient Name:  Jan Yu    :  1940  MRN: [id number]  Medical/Treatment Diagnosis Information:  Diagnosis: cervical disc disease, R TMJ  Treatment Diagnosis: pt with painful myofascial changes, impaired posture including forward head posture, reduced postural strengths. hypomobility t/o spine, especially at upper cerv spine  Insurance/Certification information:  PT Insurance Information: medicare  Physician Information:  Referring Practitioner: Dr. Aline Paula of care signed (Y/N): []  Yes [x]  No     Date of Patient follow up with Physician:     POC: 11/10      Progress Report: [x]  Yes  []  No     Progress report due (10 Rx/or 30 days whichever is less): visit #62 or     Recertification due (POC duration/ or 90 days whichever is less): visit #12 or     Visit # Insurance Allowable Auth required? Date Range    + 10/10 +   Med nec []  Yes  [x]  No NA       Units approved Units used Date Range   NA NA NA       Latex Allergy:  [x]NO      []YES  Preferred Language for Healthcare:   [x]English       []other:    Functional Scale:        Date assessed:  NDI raw score = 18, dysfunction =36 %, taken at initial eval  NDI raw score = 20; dysfunction = 40%    10/26/21  NDI raw score = 17; dysfunction = 34%    11/10/21       Pain level: 2/10  In her lower head and ear and back of neck    SUBJECTIVE:   feels much better after starting a new rub in medicine   11/10 - Pt reports she went to see the MD yesterday and she thought she would benefit from further therapy. Reports MD asked if anyone here did needling as it helped her with her shoulder. Pt brought referral for additional PT 2x/wk for 6 wks.  - Pt reports she felt sore this morning but has decreased pain now.  Reports she felt tired after LV.  11/3 - Pt reports she had some tingling in her head a couple days ago but it went away. Reports pain in her ear has decreased. 10/29 - Pt reports her pain is less today. States she feels she can move her neck further. 10/26 - Pt reports she had no pain yesterday. States pain is a little higher today after driving a lot today. Had to park far away and she is tired. 10/22 - Pt reports her pain typically gets better throughout the day. States she has some ear pain today. 10/19 - Pt reports she almost fell this morning. She was reading the paper and went to sit in a rolling chair and the chair slipped. She caught herself but thinks this could be attributing to her pain. 10/12 - pt reports she is a little sore today. States her head and ear were bothering her Sunday but that has since gone away. 10/6 - Pt reports more pain today in her ear and neck. States she carried her groceries on the opposite side and isn't sure what increased her pain. 10/4 Pt reports she is more sore today. She cooked a pot of chili yesterday and thinks she may have over done it. 10/1 pt reports her pain comes and goes but she continues to feel better. States her son is getting his stress test currently. Brought her calendar to schedule additional visits. 9/28 - Pt states she has a lot going on with her son's health. He is supposed to get a stress test Saturday. Reports some ear pain when she woke up, but feels good now.   9/23: Patient reports that she is feeling well today and not having too much pain. Patient states that she is trying her best to create a more ergonomic home desk environment for her laptop. Patient states that she has not found a solution that she prefers yet and will coninue to try and find a solution that works for her. Pt asks re life alert systems for home use    9/21 increasing mobility without increased pain.  Reports she is still taking the mm relaxer and reports she took [address] when she was stressed and Angela Parker - recently had new battery put in.        Exercises/Interventions:     Therapeutic Exercises (22017) Resistance / level Sets/sec Reps Notes   Arm bike   2 min fwd/2 min back      UT stretch - HAND HOLDING CHAIR      Chin tuck -   Scap squeeze   Chin tuck + scap squeeze           Pulleys - flexion and abd  5 sec  10 B each Completed to promote thoracic ext    Seated:    cerv AROM   SB  Rot  UT stretches  scalene stretches  cerv retraction Sitting with pillows and towel roll at lumbar spine to provide upright sitting posture with good support  10x3\" B   10x5\" B  3x30\"  3x30\"  10x5\"      Standing TB  High row   Mid row  LPD   Horizontal abd - seated  B ER - seated  Gibson   Orange2   210  10    Seated thoracic extension      SCM stretch   L increased pain on R side    LT wall slides  -flexion   - abd      Manual cues to deactivate UT    Therapeutic Activities (65683)       Sit <-->supine <--> L s/l <-->prone Min A. Pt c/o dizziness when transitioning prone to/from s/l and when transitioning s/l to sit   11/23 attempted prone to see if pt could comfortably attain position needed for DN   Instruct in upright sitting posture for watching TV       Standing reaching to shelf - second shelf  - flexion  - abd Tactile and verbal cues to deactivate UT and use scapular mm to reach. CGA          Progress note completed this date - objective measures taken and discussed goals and progress made with therapy. Educated on [address].  Pt to be scheduled with PT that dry needles as was on MD order              Neuromuscular Re-ed (68186)       Postural ed                                          Manual Intervention ([phone])       manual - STM and TPR B UT mm and B SCM,  suboccipitals and B rhomboids        8 min  8/27 Done on reclined mat table  9/21, 9/7, 8/30 completed seated in chair                                           Modalities:   11/10, 11/5, 11/3, 10/29, 10/26, 10/22 - cervical MHP seated in chair at end of session x 10 min 10/19, 10/12, 10/6 - 1 cervical cold pack to cspine x 10 min at end of session   10/4, 10/1, 9/28 - cervical MHP seated in chair at end of session x 10 min   9/23, 9/14 9/9, 9/7, 9/2 - cervical MHP seated in chair at end of session x 15 min   8/31- cervical MHP seated in chair at end of session x 12 min   8/27- cervical MHP on reclined mat table x 15 min   8/25 - cervical MHP seated in chair at end of session x 10 min     Pt. Education:  11/23 extensive pt ed re dry needling, risks and benefits. D/w pt that scoliosis, osteoporosis/penia, easy bruising, blood thinners, difficulty getting into prone position are all factors that limit appropriateness of DN  Gave her info packet to read and take home. Educated pt that due to her multiple medical issues & difficutly being comfy in prone, PT advises against DN for safety reasons    9/23: pt Educated re safety in home /fall risk reduction. At pt request d/w pt re life alert systems. Patient ed  on option of obtaining an apple watch to use as a \"life alert\" system. Patient likes this idea and state she will have her son help her to look into it.   8/31 - Pt educated on ABCDs of melanoma and importance of skin checks as she reports concern about itching spot on her back. Pt has dermatology appointment in 2 wks. 8/19 patient educated on diagnosis, prognosis and expectations for rehab  -all patient questions were answered    Home Exercise Program:  11/23 cont with bands and stretches, posture  9/23 computer ergonomics and apple watch for fall risk reduction  9/21  computer ergonomics  9/14 upright psoture for TV viewing  8/19 cerv AROM SB/ROT      Therapeutic Exercise and NMR EXR  [] ([id number]) Provided verbal/tactile cueing for activities related to strengthening, flexibility, endurance, ROM for improvements in  [] LE / Lumbar: LE, proximal hip, and core control with self care, mobility, lifting, ambulation.   [] UE / Cervical: cervical, postural, scapular, training, and/or use of adaptive equipment for improvement with: ADLs and compensatory training, meal preparation, safety procedures and instruction in use of adaptive equipment, including bathing, grooming, dressing, personal hygiene, basic household cleaning and chores. Home Exercise Program:    [x] ([id number]) Reviewed/Progressed HEP activities related to strengthening, flexibility, endurance, ROM of   [] LE / Lumbar: core, proximal hip and LE for functional self-care, mobility, lifting and ambulation/stair navigation   [] UE / Cervical: cervical, postural, scapular, scapulothoracic and UE control with self care, reaching, carrying, lifting, house/yardwork, driving, computer work  [] ([id number])Reviewed/Progressed HEP activities related to improving balance, coordination, kinesthetic sense, posture, motor skill, proprioception of   [] LE: core, proximal hip and LE for self care, mobility, lifting, and ambulation/stair navigation    [] UE / Cervical: cervical, postural,  scapular, scapulothoracic and UE control with self care, reaching, carrying, lifting, house/yardwork, driving, computer work    Manual Treatments:  PROM / STM / Oscillations-Mobs:  G-I, II, III, IV (PA's, Inf., Post.)  [] (76683) Provided manual therapy to mobilize LE, proximal hip and/or LS spine soft tissue/joints for the purpose of modulating pain, promoting relaxation,  increasing ROM, reducing/eliminating soft tissue swelling/inflammation/restriction, improving soft tissue extensibility and allowing for proper ROM for normal function with   [] LE / lumbar: self care, mobility, lifting and ambulation. [] UE / Cervical: self care, reaching, carrying, lifting, house/yardwork, driving, computer work.      Modalities:  [] ([id number]) Vasopneumatic compression: Utilized vasopneumatic compression to decrease edema / swelling for the purpose of improving mobility and quad tone / recruitment which will allow for increased overall function including but not symptoms or restriction. []? Progressing: [x]? Met: []? Not Met: []? Adjusted  5. Pt will report ability to drive without pain or restrictions. - ADDED 11/10/21   []? Progressing: []? Met: []? Not Met: []? Adjusted        Overall Progression Towards Functional goals/ Treatment Progress Update:  [x] Patient is progressing as expected towards functional goals listed. [] Progression is slowed due to complexities/Impairments listed. [] Progression has been slowed due to co-morbidities. [] Plan just implemented, too soon to assess goals progression <30days   [] Goals require adjustment due to lack of progress  [] Patient is not progressing as expected and requires additional follow up with physician  [] Other    Persisting Functional Limitations/Impairments:  [x]Sleeping []Sitting               []Standing []Transfers        []Walking []Kneeling               []Stairs []Squatting / bending   [x]ADLs []Reaching  [x]Lifting  [x]Housework  [x]Driving []Job related tasks  []Sports/Recreation []Other:        ASSESSMENT: 11/23 due to her multiple medical issues, difficutly being comfy in prone, and dizziness occurring with transitioning out of prone position (the position needed for DN of UT) and previous dizziness occurring with transition out of supine while in PT clinic (position needed for DN of SCM), PT advises against DN for safety reasons. Rec focus on manual therapy to reduce myofascial pain/TPs and approp exercise to restore normal functional use of UE.  11/10 Pt continues to present with decreased cervical AROM in SB and rotation. Pt with decreased  strength B and increased tissue tension in cervical mm. Pt demonstrates improved posture and exercise technique with therapy and improved function as noted by decreased dysfunction on the NDI. Pt continues to be limited in endurance with home management and driving secondary to decreased cervical rotation.  Pt to be scheduled with therapist certified in DN as dry needling was added to MD referral. Plan to continue to progress postural re-ed and strengthening, cervical ROM, and manual therapy to decrease pain and tissue tension and improve strength, ROM, and endurance to return to PLOF without limitations. 11/5 - Pt reports decreased pain in neck this date. Pt demonstrates improved cervical rotation B this date with AROM and improved shoulder abd and flexion with wall slides. Plan to d/c to HEP NV.     11/3 - Pt demonstrates improved posture with TB exercises this date. Pt with improved awareness of her posture with ADLs. Pt with TP and increased tissue tension in BUT L>R this date. Continue to progress per pt tolerance. 10/29 - pt required VC to relax UT with B ER this date. Increased to 2 sets of 10 with green TB. Pt with increased tissue tension in R scalenes this date which decreased with gentle STM. Pt reports decreased pain and increased mobility with OP PT. Continue to progress postural strengthening/stretching per pt tolerance. 10/26 - Progress note completed this date. Pt demonstrates improvements in L cervical SB and rotation AROM. Pt with increased B shoulder flexion and abd strength. Pt reports decreased pain and increased ability to sleep without pain or discomfort. Plan to focus remaining treatment sessions on postural strengthening and STM to return to PLOF without limitations or risk for further injury. 10/22 - Pt with TP and increased tissue tension in suboccipitals, R UT, and R SCM. Pt reports feeling a good stretch with UT stretch. Initiated B ER with TB this date. Pt required VC to keep elbows bent. Pt required less frequent VC for upright trunk posture and demonstrated improved technique with chin tuck. Continue to progress per pt tolerance. 10/19 - progressed TB shoulder ABD to orange band this date. Pt with TP in R SCM which decreased with manual therapy. Pt reports decreased pain at end of session.       10/12 - progressed reaching to second shelf this date. Pt unable to reach second shelf with 1 pound weight, but demonstrated ability to touch shelf without weight. Pt with TP in L UT, L infraspinatus, and R SCM. Pt demonstrates improved posture with exercises and increased exercise tolerance. Continues to require seated rest breaks throughout session. 10/6 - Initiated reaching with 1 pound weight this date. Pt required verbal and tactile cues to deactivate UT and recruit scapular mm. Pt required less frequent rest breaks throughout treatment session this date. Pt requested ice this date as she states sometimes it decreases her pain at home. Continue to progress standing postural strengthening and re-ed exercises per pt tolerance. 10/4 - Progressed TB exercises to standing this date. Decreased to 1 set of 10 due to pt fatigue in standing. Initiated thoracic ext over chair with tactile cue to ext through thoracic spine vs cervical spine. Pt required VC for chin tuck with cervical rotation. Reports decreased pain with chin tuck + rotation. Continue to progress as tolerated. 10/1 - Pt required verbal and tactile cues to deactivate UT with TB exercises this date. Pt reports no pain throughout treatment session. Pt reports she has been more aware of her posture throughout the day. Pt with less TP and tissue tension in UT mm this date. 9/28 - Pt with decreased pain and tissue tension this date. Pt requires VC for appropriate form with chin tuck. Pt demonstrates improved posture with TB exercises. Pt reports she is more aware of her posture during the day, but has pain when she sits on a pillow. Plan to continue to progress postural education and scapular strengthening to further decrease pain and return to PLOF without pain or limitations. 9/23: Patient continues to require VC and TC during exercises in order to squeeze scapula together.  Patient has difficulty maintaining posture and is struggling to find home solutions to help her adjust her body mechanics to incorporate improved posture habits during her ADL's and IADLs. In addition, pt concerned re safety at home. Patient has noted tenderness and tightness in bilat UT, levator, rhomboids, scalenes, SCM. Patient reports pain relief with STM. Plan to continue to progress appropriate postural strengthening and appropriate motor programming to increase cervical ROM. 9/21 see PT POC above    9/14 needs improved postures, especially for TV watching   9/9 Pt with decreased pain and tissue tension this date. STM primarily on L side this date and pain decreased after manual therapy. Pt reports ear pain is on and off and currently has no ear pain. Increased resistance with TB exercises this date. Pt reported increased difficulty but no increased pain. Plan to continue to progress postural strengthening and cervical ROM per pt tolerance. Treatment/Activity Tolerance:  [x] Patient able to complete tx  [] Patient limited by fatigue  [] Patient limited by pain  [] Patient limited by other medical complications  [] Other:     Prognosis: [x] Good [] Fair  [] Poor    Patient Requires Follow-up: [x] Yes  [] No    PLAN:  strength, ROM/flexibility, posture and body mechs, manual, MOC, HEP, pt education     Frequency/Duration: 2 days per week for 6  Weeks:  Interventions:  [x]? Therapeutic exercise including:strength, ROM, flexibility  [x]? NMR activation and proprioception including postural re-education  [x]? Manual therapy as indicated to include: IASTM, STM, PROM, Gr I-IV mobilizations, manipulation. [x]? Modalities as needed that may include: thermal agents, E-stim, Biofeedback, US, iontophoresis as indicated  [x]? Patient education on joint protection, postural re-education, activity modification, progression of HEP. Plan for next treatment session: see flowsheet    PLAN: See eval. PT 2x / week for 6 weeks.  + 2x/wk for 5 wks + 2x/wk for 6 wks   [x] Continue per plan of care [] Alter current plan (see comments)  [] Plan of care initiated [] Hold pending MD visit [] Discharge    Electronically signed by: Sha Harrison PT, DPT      Note: If patient does not return for scheduled/ recommended follow up visits, this note will serve as a discharge from care along with most recent update on progress.

## 2021-11-26 ENCOUNTER — HOSPITAL ENCOUNTER (OUTPATIENT)
Dept: PHYSICAL THERAPY | Age: 81
Setting detail: THERAPIES SERIES
Discharge: HOME | End: 2021-11-26
Payer: MEDICARE

## 2021-11-26 PROCEDURE — 97140 MANUAL THERAPY 1/> REGIONS: CPT

## 2021-11-26 PROCEDURE — 97110 THERAPEUTIC EXERCISES: CPT

## 2021-11-26 NOTE — FLOWSHEET NOTE
[address] [facility] Physical Therapy  Phone: [phone]   Fax: [phone]        Physical Therapy Daily Treatment Note  Date:  2021    Patient Name:  Froylan Parkinson    :  1940  MRN: [id number]  Medical/Treatment Diagnosis Information:  Diagnosis: cervical disc disease, R TMJ  Treatment Diagnosis: pt with painful myofascial changes, impaired posture including forward head posture, reduced postural strengths. hypomobility t/o spine, especially at upper cerv spine  Insurance/Certification information:  PT Insurance Information: medicare  Physician Information:  Referring Practitioner: Dr. Newman Began of care signed (Y/N): []  Yes [x]  No     Date of Patient follow up with Physician:     POC: 11/10      Progress Report: [x]  Yes  []  No     Progress report due (10 Rx/or 30 days whichever is less): visit #60 or     Recertification due (POC duration/ or 90 days whichever is less): visit #12 or     Visit # Insurance Allowable Auth required? Date Range    + 10/10 +   Med nec []  Yes  [x]  No NA       Units approved Units used Date Range   NA NA NA       Latex Allergy:  [x]NO      []YES  Preferred Language for Healthcare:   [x]English       []other:    Functional Scale:        Date assessed:  NDI raw score = 18, dysfunction =36 %, taken at initial eval  NDI raw score = 20; dysfunction = 40%    10/26/21  NDI raw score = 17; dysfunction = 34%    11/10/21       Pain level: 1-2/10  In her lower head and ear and back of neck    SUBJECTIVE:     reports biomed ointment has been helped. Yesterday she was sitting infront of the sliding glass door and the cold increased her neck pain. She was sore after LV due to transfers. States she is getting better.  feels much better after starting a new rub in medicine   11/10 - Pt reports she went to see the MD yesterday and she thought she would benefit from further therapy.  Reports MD asked if anyone here did that works for her. Pt asks re life alert systems for home use    9/21 increasing mobility without increased pain. Reports she is still taking the mm relaxer and reports she took Lauren Flatness when she was stressed and noticed increased neck mm pain And it seemed to help. States her son hopes to get hernia surgery soon. Intermittent R ear pain with combing her hair     9/14 Intermittent pain still gets pain in neck and face/ear. Thinks PT tx is helping. Reports sleeping is good. Can turn  to the right better    OBJECTIVE: 11/23 script from Dr. Fallon Araujo has been scanned into media section requesting dry needling is possible. Gave pt DN info form and waiver. 11/10  cerv AROM  R L  SB  35 38  Rot  61 50    Strength / Myotomes RIGHT LEFT   Shoulder Flex 4+ 4+   Shoulder Abduction (C5) 4+ 4      stretch   L: 28#  R: 30#    Increased tissue tension and TP in B cervical paraspinal mm, B UT, B rhomboids, and B SCM     10/26  cerv AROM  R L  SB  40 35  Rot  60 59    Strength / Myotomes RIGHT LEFT   Shoulder Flex 4+ 4+   Shoulder Abduction (C5) 4+ 4       9/23 Patient ambulates with AD 2TP, Patient ambulates with forward head, rounded shoulders, and kyphotic curve in T-spine. 9/14 pt reports slouched posture with excessive cerv flex when she watches TV. Palpation: Myofascial pain R>L UT, scalenes. SCM and masseter WFL B.    cerv AROM  R L  SB  40 25  Rot  60 50    9/9 - Pt with no clicking or popping in TMJ, palpation of R SCM increases ear pain   8/31 - pt reports she has a spot that is iching and burning on her back. Skin inspection - small, red oval on R scapular region with smooth borders. Pt reports she has a dermatology appointment within the next 2 weeks. 8/25 - amb with RW this date. Tamiko Agustin is too tall, but it does not lower further. Increased tissue tension in R SCM and scalenes.       RESTRICTIONS/PRECAUTIONS: scoliosis, osteopososis/osteopenia; has lost 3 friends and her  and brother int he last 13 months, joint replacements ( B knees, R ankle, B shoudlers): x 5. Lives with her son who has multiple medical issues including COPD and heart issues. Easy bruising. Spinal cord stimulator (lumbar) put in by Dr. Felicitas Kauffman - recently had new battery put in.        Exercises/Interventions:     Therapeutic Exercises (22145) Resistance / level Sets/sec Reps Notes   Arm bike   2 min fwd/2 min back      UT stretch - HAND HOLDING CHAIR      Chin tuck -   Scap squeeze   Chin tuck + scap squeeze           Pulleys - flexion and abd  5 sec  10 B each Completed to promote thoracic ext    Seated:    cerv AROM   SB  Rot  UT stretches  scalene stretches  cerv retraction Sitting with pillows and towel roll at lumbar spine to provide upright sitting posture with good support  10x3\" B   10x3\" B  2x30\" B    10x3\"      Standing TB  High row   Mid row  LPD   Horizontal abd - seated  B ER - seated  [employer]   [place]   2210  10 10    Seated thoracic extension      SCM stretch   L increased pain on R side    LT wall slides  -flexion   - abd      Manual cues to deactivate UT    Therapeutic Activities (48998)       Sit <-->supine <--> L s/l <-->prone    11/23 attempted prone to see if pt could comfortably attain position needed for DN   Instruct in upright sitting posture for watching TV       Standing reaching to shelf - second shelf  - flexion  - abd Tactile and verbal cues to deactivate UT and use scapular mm to reach. CGA          Progress note completed this date - objective measures taken and discussed goals and progress made with therapy. Educated on [address].  Pt to be scheduled with PT that dry needles as was on MD order              Neuromuscular Re-ed (46622)       Postural ed                                          Manual Intervention ([phone])       manual - STM and TPR R UT mm and R SCM,  suboccipitals        9 min  8/27 Done on reclined mat table  9/21, 9/7, 8/30 completed seated in chair Modalities:   11/26, 11/10, 11/5, 11/3, 10/29, 10/26, 10/22 - cervical MHP seated in chair at end of session x 10 min    10/19, 10/12, 10/6 - 1 cervical cold pack to cspine x 10 min at end of session   10/4, 10/1, 9/28 - cervical MHP seated in chair at end of session x 10 min   9/23, 9/14 9/9, 9/7, 9/2 - cervical MHP seated in chair at end of session x 15 min   8/31- cervical MHP seated in chair at end of session x 12 min   8/27- cervical MHP on reclined mat table x 15 min   8/25 - cervical MHP seated in chair at end of session x 10 min     Pt. Education:  11/23 extensive pt ed re dry needling, risks and benefits. D/w pt that scoliosis, osteoporosis/penia, easy bruising, blood thinners, difficulty getting into prone position are all factors that limit appropriateness of DN  Gave her info packet to read and take home. Educated pt that due to her multiple medical issues & difficutly being comfy in prone, PT advises against DN for safety reasons    9/23: pt Educated re safety in home /fall risk reduction. At pt request d/w pt re life alert systems. Patient ed  on option of obtaining an apple watch to use as a \"life alert\" system. Patient likes this idea and state she will have her son help her to look into it.   8/31 - Pt educated on ABCDs of melanoma and importance of skin checks as she reports concern about itching spot on her back. Pt has dermatology appointment in 2 wks.    8/19 patient educated on diagnosis, prognosis and expectations for rehab  -all patient questions were answered    Home Exercise Program:  11/23 cont with bands and stretches, posture  9/23 computer ergonomics and apple watch for fall risk reduction  9/21  computer ergonomics  9/14 upright psoture for TV viewing  8/19 cerv AROM SB/ROT      Therapeutic Exercise and NMR EXR  [] [phone]) Provided verbal/tactile cueing for activities related to strengthening, flexibility, endurance, ROM for improvements in  [] LE / Lumbar: LE, proximal hip, and core control with self care, mobility, lifting, ambulation. [] UE / Cervical: cervical, postural, scapular, scapulothoracic and UE control with self care, reaching, carrying, lifting, house/yardwork, driving, computer work.  [] (97267) Provided verbal/tactile cueing for activities related to improving balance, coordination, kinesthetic sense, posture, motor skill, proprioception to assist with   [] LE / lumbar: LE, proximal hip, and core control in self care, mobility, lifting, ambulation and eccentric single leg control. [] UE / cervical: cervical, scapular, scapulothoracic and UE control with self care, reaching, carrying, lifting, house/yardwork, driving, computer work.   [] (91551) Therapist is in constant attendance of 2 or more patients providing skilled therapy interventions, but not providing any significant amount of measurable one-on-one time to either patient, for improvements in  [] LE / lumbar: LE, proximal hip, and core control in self care, mobility, lifting, ambulation and eccentric single leg control. [] UE / cervical: cervical, scapular, scapulothoracic and UE control with self care, reaching, carrying, lifting, house/yardwork, driving, computer work.       NMR and Therapeutic Activities:    [] (01212 or 39361) Provided verbal/tactile cueing for activities related to improving balance, coordination, kinesthetic sense, posture, motor skill, proprioception and motor activation to allow for proper function of   [] LE: / Lumbar core, proximal hip and LE with self care and ADLs  [] UE / Cervical: cervical, postural, scapular, scapulothoracic and UE control with self care, carrying, lifting, driving, computer work.   [] (70016) Gait Re-education- Provided training and instruction to the patient for proper LE, core and proximal hip recruitment and positioning and eccentric body weight control with ambulation re-education including up and down stairs     Home Management Training / Self Care:  [] (93986) Provided self-care/home management training related to activities of daily living and compensatory training, and/or use of adaptive equipment for improvement with: ADLs and compensatory training, meal preparation, safety procedures and instruction in use of adaptive equipment, including bathing, grooming, dressing, personal hygiene, basic household cleaning and chores. Home Exercise Program:    [x] (19997) Reviewed/Progressed HEP activities related to strengthening, flexibility, endurance, ROM of   [] LE / Lumbar: core, proximal hip and LE for functional self-care, mobility, lifting and ambulation/stair navigation   [] UE / Cervical: cervical, postural, scapular, scapulothoracic and UE control with self care, reaching, carrying, lifting, house/yardwork, driving, computer work  [] (31664)Reviewed/Progressed HEP activities related to improving balance, coordination, kinesthetic sense, posture, motor skill, proprioception of   [] LE: core, proximal hip and LE for self care, mobility, lifting, and ambulation/stair navigation    [] UE / Cervical: cervical, postural,  scapular, scapulothoracic and UE control with self care, reaching, carrying, lifting, house/yardwork, driving, computer work    Manual Treatments:  PROM / STM / Oscillations-Mobs:  G-I, II, III, IV (PA's, Inf., Post.)  [] (26022) Provided manual therapy to mobilize LE, proximal hip and/or LS spine soft tissue/joints for the purpose of modulating pain, promoting relaxation,  increasing ROM, reducing/eliminating soft tissue swelling/inflammation/restriction, improving soft tissue extensibility and allowing for proper ROM for normal function with   [] LE / lumbar: self care, mobility, lifting and ambulation. [] UE / Cervical: self care, reaching, carrying, lifting, house/yardwork, driving, computer work.      Modalities:  [] (98170) Vasopneumatic compression: Utilized vasopneumatic compression to decrease edema / swelling for the purpose of improving mobility and quad tone / recruitment which will allow for increased overall function including but not limited to self-care, transfers, ambulation, and ascending / descending stairs. Charges:  Timed Code Treatment Minutes: 45   Total Treatment Minutes: 55     [] EVAL - LOW (01920)   [] EVAL - MOD (20946)  [] EVAL - HIGH (10150)  [] RE-EVAL (67361)  [x] AI(60395) x   2    [] Ionto  [] NMR (26689) x       [] Vaso  [x] Manual (38680) x 1      [] Ultrasound  [] TA x   1     [] Mech Traction (75442)  [] Aquatic Therapy x      [] ES (un) (31306):   [] Home Management Training x  [] ES(attended) (92090)   [] Dry Needling 1-2 muscles (41775):  [] Dry Needling 3+ muscles (226240)  [] Group:      [] Other:          GOALS:  Patient stated goal: less pain  [x]? Progressing: []? Met: []? Not Met: []? Adjusted     Therapist goals for Patient:   Short Term Goals: To be achieved in: 2 weeks  1. Independent in HEP and progression per patient tolerance, in order to prevent re-injury. []? Progressing: [x]? Met: []? Not Met: []? Adjusted  2. Patient will have a decrease in pain to facilitate improvement in movement, function, and ADLs as indicated by improvement with respect to Functional Deficits. []? Progressing: [x]? Met: []? Not Met: []? Adjusted     Long Term Goals: To be achieved in: 6 weeks  1. Disability index score of 10% or less on the  NDI  to assist with reaching prior level of function. [x]? Progressing: []? Met: []? Not Met: []? Adjusted  2. Patient will demonstrate increased AROM  to [facility], to allow for proper joint functioning to allow pt to resume turning head while driving without increase in symptoms. [x]? Progressing: []? Met: []? Not Met: []? Adjusted  3. Patient will demonstrate increased Strength by 1/2 mmt grade  patients Functional Deficits to allow pt to resume amb with/without RW prn for amb in community without increase in symptoms. []? Progressing: [x]? Met: []?  Not Met: []? Adjusted  4. Patient will return to functional activities including falling asleep without difficulty without increased symptoms or restriction. []? Progressing: [x]? Met: []? Not Met: []? Adjusted  5. Pt will report ability to drive without pain or restrictions. - ADDED 11/10/21   []? Progressing: []? Met: []? Not Met: []? Adjusted        Overall Progression Towards Functional goals/ Treatment Progress Update:  [x] Patient is progressing as expected towards functional goals listed. [] Progression is slowed due to complexities/Impairments listed. [] Progression has been slowed due to co-morbidities. [] Plan just implemented, too soon to assess goals progression <30days   [] Goals require adjustment due to lack of progress  [] Patient is not progressing as expected and requires additional follow up with physician  [] Other    Persisting Functional Limitations/Impairments:  [x]Sleeping []Sitting               []Standing []Transfers        []Walking []Kneeling               []Stairs []Squatting / bending   [x]ADLs []Reaching  [x]Lifting  [x]Housework  [x]Driving []Job related tasks  []Sports/Recreation []Other:        ASSESSMENT:   11/26 Pt demonstrates improved thoracic extension with high rows this date. Pt with decreased tissue tension in SCM this date, but continues with TP and increased tissue tension in R UT mm. Pt reports no increased pain throughout treatment session. Continue to progress per pt tolerance. 11/23 due to her multiple medical issues, difficutly being comfy in prone, and dizziness occurring with transitioning out of prone position (the position needed for DN of UT) and previous dizziness occurring with transition out of supine while in PT clinic (position needed for DN of SCM), PT advises against DN for safety reasons.  Rec focus on manual therapy to reduce myofascial pain/TPs and approp exercise to restore normal functional use of UE.    11/10 Pt continues to present with decreased cervical AROM in SB and rotation. Pt with decreased  strength B and increased tissue tension in cervical mm. Pt demonstrates improved posture and exercise technique with therapy and improved function as noted by decreased dysfunction on the NDI. Pt continues to be limited in endurance with home management and driving secondary to decreased cervical rotation. Pt to be scheduled with therapist certified in DN as dry needling was added to MD referral. Plan to continue to progress postural re-ed and strengthening, cervical ROM, and manual therapy to decrease pain and tissue tension and improve strength, ROM, and endurance to return to PLOF without limitations. 11/5 - Pt reports decreased pain in neck this date. Pt demonstrates improved cervical rotation B this date with AROM and improved shoulder abd and flexion with wall slides. Plan to d/c to HEP NV.     11/3 - Pt demonstrates improved posture with TB exercises this date. Pt with improved awareness of her posture with ADLs. Pt with TP and increased tissue tension in BUT L>R this date. Continue to progress per pt tolerance. 10/29 - pt required VC to relax UT with B ER this date. Increased to 2 sets of 10 with green TB. Pt with increased tissue tension in R scalenes this date which decreased with gentle STM. Pt reports decreased pain and increased mobility with OP PT. Continue to progress postural strengthening/stretching per pt tolerance. 10/26 - Progress note completed this date. Pt demonstrates improvements in L cervical SB and rotation AROM. Pt with increased B shoulder flexion and abd strength. Pt reports decreased pain and increased ability to sleep without pain or discomfort. Plan to focus remaining treatment sessions on postural strengthening and STM to return to PLOF without limitations or risk for further injury. 10/22 - Pt with TP and increased tissue tension in suboccipitals, R UT, and R SCM.  Pt reports feeling a good stretch with UT stretch. Initiated B ER with TB this date. Pt required VC to keep elbows bent. Pt required less frequent VC for upright trunk posture and demonstrated improved technique with chin tuck. Continue to progress per pt tolerance. 10/19 - progressed TB shoulder ABD to orange band this date. Pt with TP in R SCM which decreased with manual therapy. Pt reports decreased pain at end of session. 10/12 - progressed reaching to second shelf this date. Pt unable to reach second shelf with 1 pound weight, but demonstrated ability to touch shelf without weight. Pt with TP in L UT, L infraspinatus, and R SCM. Pt demonstrates improved posture with exercises and increased exercise tolerance. Continues to require seated rest breaks throughout session. 10/6 - Initiated reaching with 1 pound weight this date. Pt required verbal and tactile cues to deactivate UT and recruit scapular mm. Pt required less frequent rest breaks throughout treatment session this date. Pt requested ice this date as she states sometimes it decreases her pain at home. Continue to progress standing postural strengthening and re-ed exercises per pt tolerance. 10/4 - Progressed TB exercises to standing this date. Decreased to 1 set of 10 due to pt fatigue in standing. Initiated thoracic ext over chair with tactile cue to ext through thoracic spine vs cervical spine. Pt required VC for chin tuck with cervical rotation. Reports decreased pain with chin tuck + rotation. Continue to progress as tolerated. 10/1 - Pt required verbal and tactile cues to deactivate UT with TB exercises this date. Pt reports no pain throughout treatment session. Pt reports she has been more aware of her posture throughout the day. Pt with less TP and tissue tension in UT mm this date. 9/28 - Pt with decreased pain and tissue tension this date. Pt requires VC for appropriate form with chin tuck. Pt demonstrates improved posture with TB exercises.  Pt reports she is mobilizations, manipulation. [x]? Modalities as needed that may include: thermal agents, E-stim, Biofeedback, US, iontophoresis as indicated  [x]? Patient education on joint protection, postural re-education, activity modification, progression of HEP. Plan for next treatment session: see flowsheet    PLAN: See eval. PT 2x / week for 6 weeks. + 2x/wk for 5 wks + 2x/wk for 6 wks   [x] Continue per plan of care [] Alter current plan (see comments)  [] Plan of care initiated [] Hold pending MD visit [] Discharge    Electronically signed by: Ghazala Weaver, PT, DPT      Note: If patient does not return for scheduled/ recommended follow up visits, this note will serve as a discharge from care along with most recent update on progress.

## 2021-11-30 ENCOUNTER — HOSPITAL ENCOUNTER (OUTPATIENT)
Dept: PHYSICAL THERAPY | Age: 81
Setting detail: THERAPIES SERIES
Discharge: HOME | End: 2021-11-30
Payer: MEDICARE

## 2021-11-30 PROCEDURE — 97110 THERAPEUTIC EXERCISES: CPT

## 2021-11-30 PROCEDURE — 97140 MANUAL THERAPY 1/> REGIONS: CPT

## 2021-11-30 NOTE — FLOWSHEET NOTE
[facility] Physical Therapy  Phone: [phone]   Fax: [phone]        Physical Therapy Daily Treatment Note  Date:  2021    Patient Name:  Korey Herrera    :  1940  MRN: [id number]  Medical/Treatment Diagnosis Information:  Diagnosis: cervical disc disease, R TMJ  Treatment Diagnosis: pt with painful myofascial changes, impaired posture including forward head posture, reduced postural strengths. hypomobility t/o spine, especially at upper cerv spine  Insurance/Certification information:  PT Insurance Information: medicare  Physician Information:  Referring Practitioner: Dr. Jose Webster of care signed (Y/N): []  Yes [x]  No     Date of Patient follow up with Physician:     POC: 11/10      Progress Report: [x]  Yes  []  No     Progress report due (10 Rx/or 30 days whichever is less): visit #52 or 3/44    Recertification due (POC duration/ or 90 days whichever is less): visit #12 or     Visit # Insurance Allowable Auth required? Date Range    + 10/10 + 3/12  Med nec []  Yes  [x]  No NA       Units approved Units used Date Range   NA NA NA       Latex Allergy:  [x]NO      []YES  Preferred Language for Healthcare:   [x]English       []other:    Functional Scale:        Date assessed:  NDI raw score = 18, dysfunction =36 %, taken at initial eval  NDI raw score = 20; dysfunction = 40%    10/26/21  NDI raw score = 17; dysfunction = 34%    11/10/21       Pain level: 1/10  In her ear and back of neck    SUBJECTIVE:   reports the biomed ointment cont to help. Reports any head pain tends to be in the evenings   reports biomed ointment has been helped. Yesterday she was sitting infront of the sliding glass door and the cold increased her neck pain. She was sore after LV due to transfers. States she is getting better.     feels much better after starting a new rub in medicine   11/10 - Pt reports she went to see the MD yesterday and she found a solution that she prefers yet and will coninue to try and find a solution that works for her. Pt asks re life alert systems for home use    9/21 increasing mobility without increased pain. Reports she is still taking the mm relaxer and reports she took Giuliano Mony when she was stressed and noticed increased neck mm pain And it seemed to help. States her son hopes to get hernia surgery soon. Intermittent R ear pain with combing her hair     9/14 Intermittent pain still gets pain in neck and face/ear. Thinks PT tx is helping. Reports sleeping is good. Can turn  to the right better    OBJECTIVE: 11/23 script from Dr. Debra Rowe has been scanned into media section requesting dry needling is possible. Gave pt DN info form and waiver. 11/10  cerv AROM  R L  SB  35 38  Rot  61 50    Strength / Myotomes RIGHT LEFT   Shoulder Flex 4+ 4+   Shoulder Abduction (C5) 4+ 4      stretch   L: 28#  R: 30#    Increased tissue tension and TP in B cervical paraspinal mm, B UT, B rhomboids, and B SCM     10/26  cerv AROM  R L  SB  40 35  Rot  60 59    Strength / Myotomes RIGHT LEFT   Shoulder Flex 4+ 4+   Shoulder Abduction (C5) 4+ 4       9/23 Patient ambulates with AD 1UX, Patient ambulates with forward head, rounded shoulders, and kyphotic curve in T-spine. 9/14 pt reports slouched posture with excessive cerv flex when she watches TV. Palpation: Myofascial pain R>L UT, scalenes. SCM and masseter WFL B.    cerv AROM  R L  SB  40 25  Rot  60 50    9/9 - Pt with no clicking or popping in TMJ, palpation of R SCM increases ear pain   8/31 - pt reports she has a spot that is iching and burning on her back. Skin inspection - small, red oval on R scapular region with smooth borders. Pt reports she has a dermatology appointment within the next 2 weeks. 8/25 - amb with RW this date. Tigre Burnette is too tall, but it does not lower further. Increased tissue tension in R SCM and scalenes.       RESTRICTIONS/PRECAUTIONS: scoliosis, osteopososis/osteopenia; has lost 3 friends and her  and brother int he last 17 months, joint replacements ( B knees, R ankle, B shoudlers): x 5. Lives with her son who has multiple medical issues including COPD and heart issues. Easy bruising. Spinal cord stimulator (lumbar) put in by Dr. Blayne Friend - recently had new battery put in.        Exercises/Interventions:     Therapeutic Exercises (47654) Resistance / level Sets/sec Reps Notes   Arm bike   2 min fwd/2 min back      UT stretch - HAND HOLDING CHAIR      Chin tuck -   Scap squeeze   Chin tuck + scap squeeze           Pulleys - flexion and abd  5 sec  10 B each Completed to promote thoracic ext    Seated:    cerv AROM   SB  Rot  UT stretches  scalene stretches  cerv retraction  scap retraction Sitting with pillows and towel roll at lumbar spine to provide upright sitting posture with good support  10x3\" B   10x3\" B  2x30\" B    10x3\"  10x3\"      Standing TB  High row   Mid row  LPD   Horizontal abd - seated  B ER - seated  Laurie Lomeli2   2210  10 10    Seated thoracic extension      SCM stretch   L increased pain on R side    LT wall slides  -flexion   - abd      Manual cues to deactivate UT    Therapeutic Activities (89068)       Sit <-->supine <--> L s/l <-->prone    11/23 attempted prone to see if pt could comfortably attain position needed for DN   Instruct in upright sitting posture for watching TV       Standing reaching to shelf - second shelf  - flexion  - abd Tactile and verbal cues to deactivate UT and use scapular mm to reach. CGA          Progress note completed this date - objective measures taken and discussed goals and progress made with therapy. Educated on [address].  Pt to be scheduled with PT that dry needles as was on MD order              Neuromuscular Re-ed (95767)       Postural ed                                          Manual Intervention (09399)       manual - STM and TPR R UT mm and R SCM,  suboccipitals        9 min  8/27 Done on reclined mat table  9/21, 9/7, 8/30 completed seated in chair                                           Modalities:   11/26, 11/10, 11/5, 11/3, 10/29, 10/26, 10/22 - cervical MHP seated in chair at end of session x 10 min    10/19, 10/12, 10/6 - 1 cervical cold pack to cspine x 10 min at end of session   10/4, 10/1, 9/28 - cervical MHP seated in chair at end of session x 10 min   9/23, 9/14 9/9, 9/7, 9/2 - cervical MHP seated in chair at end of session x 15 min   8/31- cervical MHP seated in chair at end of session x 12 min   8/27- cervical MHP on reclined mat table x 15 min   8/25 - cervical MHP seated in chair at end of session x 10 min     Pt. Education:  11/30 how to do self STM to [facility]  11/23 extensive pt ed re dry needling, risks and benefits. D/w pt that scoliosis, osteoporosis/penia, easy bruising, blood thinners, difficulty getting into prone position are all factors that limit appropriateness of DN  Gave her info packet to read and take home. Educated pt that due to her multiple medical issues & difficutly being comfy in prone, PT advises against DN for safety reasons    9/23: pt Educated re safety in home /fall risk reduction. At pt request d/w pt re life alert systems. Patient ed  on option of obtaining an apple watch to use as a \"life alert\" system. Patient likes this idea and state she will have her son help her to look into it.   8/31 - Pt educated on ABCDs of melanoma and importance of skin checks as she reports concern about itching spot on her back. Pt has dermatology appointment in 2 wks.    8/19 patient educated on diagnosis, prognosis and expectations for rehab  -all patient questions were answered    Home Exercise Program:  11/30 self STM to [facility]  11/23 cont with bands and stretches, posture  9/23 computer ergonomics and apple watch for fall risk reduction  9/21  computer ergonomics  9/14 upright psoture for TV viewing  8/19 cerv AROM SB/ROT      Therapeutic Exercise and NMR EXR  [] (16220) Provided verbal/tactile cueing for activities related to strengthening, flexibility, endurance, ROM for improvements in  [] LE / Lumbar: LE, proximal hip, and core control with self care, mobility, lifting, ambulation. [] UE / Cervical: cervical, postural, scapular, scapulothoracic and UE control with self care, reaching, carrying, lifting, house/yardwork, driving, computer work.  [] (02048) Provided verbal/tactile cueing for activities related to improving balance, coordination, kinesthetic sense, posture, motor skill, proprioception to assist with   [] LE / lumbar: LE, proximal hip, and core control in self care, mobility, lifting, ambulation and eccentric single leg control. [] UE / cervical: cervical, scapular, scapulothoracic and UE control with self care, reaching, carrying, lifting, house/yardwork, driving, computer work.   [] (63589) Therapist is in constant attendance of 2 or more patients providing skilled therapy interventions, but not providing any significant amount of measurable one-on-one time to either patient, for improvements in  [] LE / lumbar: LE, proximal hip, and core control in self care, mobility, lifting, ambulation and eccentric single leg control. [] UE / cervical: cervical, scapular, scapulothoracic and UE control with self care, reaching, carrying, lifting, house/yardwork, driving, computer work.       NMR and Therapeutic Activities:    [] (29611 or 41049) Provided verbal/tactile cueing for activities related to improving balance, coordination, kinesthetic sense, posture, motor skill, proprioception and motor activation to allow for proper function of   [] LE: / Lumbar core, proximal hip and LE with self care and ADLs  [] UE / Cervical: cervical, postural, scapular, scapulothoracic and UE control with self care, carrying, lifting, driving, computer work.   [] (50662) Gait Re-education- Provided training and instruction to the patient for proper LE, core and proximal hip recruitment and positioning and eccentric body weight control with ambulation re-education including up and down stairs     Home Management Training / Self Care:  [] (78609) Provided self-care/home management training related to activities of daily living and compensatory training, and/or use of adaptive equipment for improvement with: ADLs and compensatory training, meal preparation, safety procedures and instruction in use of adaptive equipment, including bathing, grooming, dressing, personal hygiene, basic household cleaning and chores. Home Exercise Program:    [x] (71774) Reviewed/Progressed HEP activities related to strengthening, flexibility, endurance, ROM of   [] LE / Lumbar: core, proximal hip and LE for functional self-care, mobility, lifting and ambulation/stair navigation   [] UE / Cervical: cervical, postural, scapular, scapulothoracic and UE control with self care, reaching, carrying, lifting, house/yardwork, driving, computer work  [] (82675)Reviewed/Progressed HEP activities related to improving balance, coordination, kinesthetic sense, posture, motor skill, proprioception of   [] LE: core, proximal hip and LE for self care, mobility, lifting, and ambulation/stair navigation    [] UE / Cervical: cervical, postural,  scapular, scapulothoracic and UE control with self care, reaching, carrying, lifting, house/yardwork, driving, computer work    Manual Treatments:  PROM / STM / Oscillations-Mobs:  G-I, II, III, IV (PA's, Inf., Post.)  [] (74702) Provided manual therapy to mobilize LE, proximal hip and/or LS spine soft tissue/joints for the purpose of modulating pain, promoting relaxation,  increasing ROM, reducing/eliminating soft tissue swelling/inflammation/restriction, improving soft tissue extensibility and allowing for proper ROM for normal function with   [] LE / lumbar: self care, mobility, lifting and ambulation.     [] UE / Cervical: self care, reaching, carrying, lifting, house/yardwork, driving, computer work. Modalities:  [] (39215) Vasopneumatic compression: Utilized vasopneumatic compression to decrease edema / swelling for the purpose of improving mobility and quad tone / recruitment which will allow for increased overall function including but not limited to self-care, transfers, ambulation, and ascending / descending stairs. Charges:  Timed Code Treatment Minutes: 45   Total Treatment Minutes: 45     [] EVAL - LOW (82649)   [] EVAL - MOD (43520)  [] EVAL - HIGH (66738)  [] RE-EVAL (46971)  [x] GS(16217) x   2    [] Ionto  [] NMR (89145) x       [] Vaso  [x] Manual (13993) x 1      [] Ultrasound  [] TA x   1     [] Mech Traction (57918)  [] Aquatic Therapy x      [] ES (un) (49403):   [] Home Management Training x  [] ES(attended) (49259)   [] Dry Needling 1-2 muscles (79812):  [] Dry Needling 3+ muscles (723232)  [] Group:      [] Other:          GOALS:  Patient stated goal: less pain  [x]? Progressing: []? Met: []? Not Met: []? Adjusted     Therapist goals for Patient:   Short Term Goals: To be achieved in: 2 weeks  1. Independent in HEP and progression per patient tolerance, in order to prevent re-injury. []? Progressing: [x]? Met: []? Not Met: []? Adjusted  2. Patient will have a decrease in pain to facilitate improvement in movement, function, and ADLs as indicated by improvement with respect to Functional Deficits. []? Progressing: [x]? Met: []? Not Met: []? Adjusted     Long Term Goals: To be achieved in: 6 weeks  1. Disability index score of 10% or less on the  NDI  to assist with reaching prior level of function. [x]? Progressing: []? Met: []? Not Met: []? Adjusted  2. Patient will demonstrate increased AROM  to [facility], to allow for proper joint functioning to allow pt to resume turning head while driving without increase in symptoms. [x]? Progressing: []? Met: []? Not Met: []? Adjusted  3.  Patient will demonstrate increased Strength by 1/2 mmt grade  patients Functional Deficits to allow pt to resume amb with/without RW prn for amb in community without increase in symptoms. []? Progressing: [x]? Met: []? Not Met: []? Adjusted  4. Patient will return to functional activities including falling asleep without difficulty without increased symptoms or restriction. []? Progressing: [x]? Met: []? Not Met: []? Adjusted  5. Pt will report ability to drive without pain or restrictions. - ADDED 11/10/21   []? Progressing: []? Met: []? Not Met: []? Adjusted        Overall Progression Towards Functional goals/ Treatment Progress Update:  [x] Patient is progressing as expected towards functional goals listed. [] Progression is slowed due to complexities/Impairments listed. [] Progression has been slowed due to co-morbidities. [] Plan just implemented, too soon to assess goals progression <30days   [] Goals require adjustment due to lack of progress  [] Patient is not progressing as expected and requires additional follow up with physician  [] Other    Persisting Functional Limitations/Impairments:  [x]Sleeping []Sitting               []Standing []Transfers        []Walking []Kneeling               []Stairs []Squatting / bending   [x]ADLs []Reaching  [x]Lifting  [x]Housework  [x]Driving []Job related tasks  []Sports/Recreation []Other:        ASSESSMENT: 11/30 good response to manual therapy. Will benefit from trial of self STM to [facility]  11/26 Pt demonstrates improved thoracic extension with high rows this date. Pt with decreased tissue tension in SCM this date, but continues with TP and increased tissue tension in R UT mm. Pt reports no increased pain throughout treatment session. Continue to progress per pt tolerance.      11/23 due to her multiple medical issues, difficutly being comfy in prone, and dizziness occurring with transitioning out of prone position (the position needed for DN of UT) and previous dizziness occurring with transition out of supine while in PT clinic (position needed for DN of SCM), PT advises against DN for safety reasons. Rec focus on manual therapy to reduce myofascial pain/TPs and approp exercise to restore normal functional use of UE.    11/10 Pt continues to present with decreased cervical AROM in SB and rotation. Pt with decreased  strength B and increased tissue tension in cervical mm. Pt demonstrates improved posture and exercise technique with therapy and improved function as noted by decreased dysfunction on the NDI. Pt continues to be limited in endurance with home management and driving secondary to decreased cervical rotation. Pt to be scheduled with therapist certified in DN as dry needling was added to MD referral. Plan to continue to progress postural re-ed and strengthening, cervical ROM, and manual therapy to decrease pain and tissue tension and improve strength, ROM, and endurance to return to PLOF without limitations. 11/5 - Pt reports decreased pain in neck this date. Pt demonstrates improved cervical rotation B this date with AROM and improved shoulder abd and flexion with wall slides. Plan to d/c to HEP NV.     11/3 - Pt demonstrates improved posture with TB exercises this date. Pt with improved awareness of her posture with ADLs. Pt with TP and increased tissue tension in BUT L>R this date. Continue to progress per pt tolerance. 10/29 - pt required VC to relax UT with B ER this date. Increased to 2 sets of 10 with green TB. Pt with increased tissue tension in R scalenes this date which decreased with gentle STM. Pt reports decreased pain and increased mobility with OP PT. Continue to progress postural strengthening/stretching per pt tolerance. 10/26 - Progress note completed this date. Pt demonstrates improvements in L cervical SB and rotation AROM. Pt with increased B shoulder flexion and abd strength. Pt reports decreased pain and increased ability to sleep without pain or discomfort.  Plan to focus remaining treatment sessions on postural strengthening and STM to return to PLOF without limitations or risk for further injury. 10/22 - Pt with TP and increased tissue tension in suboccipitals, R UT, and R SCM. Pt reports feeling a good stretch with UT stretch. Initiated B ER with TB this date. Pt required VC to keep elbows bent. Pt required less frequent VC for upright trunk posture and demonstrated improved technique with chin tuck. Continue to progress per pt tolerance. 10/19 - progressed TB shoulder ABD to orange band this date. Pt with TP in R SCM which decreased with manual therapy. Pt reports decreased pain at end of session. 10/12 - progressed reaching to second shelf this date. Pt unable to reach second shelf with 1 pound weight, but demonstrated ability to touch shelf without weight. Pt with TP in L UT, L infraspinatus, and R SCM. Pt demonstrates improved posture with exercises and increased exercise tolerance. Continues to require seated rest breaks throughout session. 10/6 - Initiated reaching with 1 pound weight this date. Pt required verbal and tactile cues to deactivate UT and recruit scapular mm. Pt required less frequent rest breaks throughout treatment session this date. Pt requested ice this date as she states sometimes it decreases her pain at home. Continue to progress standing postural strengthening and re-ed exercises per pt tolerance. 10/4 - Progressed TB exercises to standing this date. Decreased to 1 set of 10 due to pt fatigue in standing. Initiated thoracic ext over chair with tactile cue to ext through thoracic spine vs cervical spine. Pt required VC for chin tuck with cervical rotation. Reports decreased pain with chin tuck + rotation. Continue to progress as tolerated. 10/1 - Pt required verbal and tactile cues to deactivate UT with TB exercises this date. Pt reports no pain throughout treatment session. Pt reports she has been more aware of her posture throughout the day.  Pt with less TP and tissue tension in UT mm this date. 9/28 - Pt with decreased pain and tissue tension this date. Pt requires VC for appropriate form with chin tuck. Pt demonstrates improved posture with TB exercises. Pt reports she is more aware of her posture during the day, but has pain when she sits on a pillow. Plan to continue to progress postural education and scapular strengthening to further decrease pain and return to PLOF without pain or limitations. 9/23: Patient continues to require VC and TC during exercises in order to squeeze scapula together. Patient has difficulty maintaining posture and is struggling to find home solutions to help her adjust her body mechanics to incorporate improved posture habits during her ADL's and IADLs. In addition, pt concerned re safety at home. Patient has noted tenderness and tightness in bilat UT, levator, rhomboids, scalenes, SCM. Patient reports pain relief with STM. Plan to continue to progress appropriate postural strengthening and appropriate motor programming to increase cervical ROM. 9/21 see PT POC above    9/14 needs improved postures, especially for TV watching   9/9 Pt with decreased pain and tissue tension this date. STM primarily on L side this date and pain decreased after manual therapy. Pt reports ear pain is on and off and currently has no ear pain. Increased resistance with TB exercises this date. Pt reported increased difficulty but no increased pain. Plan to continue to progress postural strengthening and cervical ROM per pt tolerance.      Treatment/Activity Tolerance:  [x] Patient able to complete tx  [] Patient limited by fatigue  [] Patient limited by pain  [] Patient limited by other medical complications  [] Other:     Prognosis: [x] Good [] Fair  [] Poor    Patient Requires Follow-up: [x] Yes  [] No    PLAN:  strength, ROM/flexibility, posture and body mechs, manual, MOC, HEP, pt education     Frequency/Duration: 2 days per week for 6 Weeks:  Interventions:  [x]? Therapeutic exercise including:strength, ROM, flexibility  [x]? NMR activation and proprioception including postural re-education  [x]? Manual therapy as indicated to include: IASTM, STM, PROM, Gr I-IV mobilizations, manipulation. [x]? Modalities as needed that may include: thermal agents, E-stim, Biofeedback, US, iontophoresis as indicated  [x]? Patient education on joint protection, postural re-education, activity modification, progression of HEP. Plan for next treatment session: see flowsheet    PLAN: See eval. PT 2x / week for 6 weeks. + 2x/wk for 5 wks + 2x/wk for 6 wks   [x] Continue per plan of care [] Alter current plan (see comments)  [] Plan of care initiated [] Hold pending MD visit [] Discharge    Electronically signed by: Bi Jeff PT, DPT      Note: If patient does not return for scheduled/ recommended follow up visits, this note will serve as a discharge from care along with most recent update on progress.

## 2021-12-02 ENCOUNTER — HOSPITAL ENCOUNTER (OUTPATIENT)
Dept: PHYSICAL THERAPY | Age: 81
Setting detail: THERAPIES SERIES
Discharge: HOME | End: 2021-12-02
Payer: MEDICARE

## 2021-12-02 PROCEDURE — 97110 THERAPEUTIC EXERCISES: CPT

## 2021-12-02 PROCEDURE — 97140 MANUAL THERAPY 1/> REGIONS: CPT

## 2021-12-02 NOTE — FLOWSHEET NOTE
[address] [facility] Physical Therapy  Phone: [phone]   Fax: [phone]        Physical Therapy Daily Treatment Note  Date:  2021    Patient Name:  Cesar Coronado    :  1940  MRN: [id number]  Medical/Treatment Diagnosis Information:  Diagnosis: cervical disc disease, R TMJ  Treatment Diagnosis: pt with painful myofascial changes, impaired posture including forward head posture, reduced postural strengths. hypomobility t/o spine, especially at upper cerv spine  Insurance/Certification information:  PT Insurance Information: medicare  Physician Information:  Referring Practitioner: Dr. Ranjit Arteaga of care signed (Y/N): []  Yes [x]  No     Date of Patient follow up with Physician:     POC: 11/10      Progress Report: [x]  Yes  []  No     Progress report due (10 Rx/or 30 days whichever is less): visit #26 or     Recertification due (POC duration/ or 90 days whichever is less): visit #12 or     Visit # Insurance Allowable Auth required? Date Range    + 10/10 +   Med nec []  Yes  [x]  No NA       Units approved Units used Date Range   NA NA NA       Latex Allergy:  [x]NO      []YES  Preferred Language for Healthcare:   [x]English       []other:    Functional Scale:        Date assessed:  NDI raw score = 18, dysfunction =36 %, taken at initial eval  NDI raw score = 20; dysfunction = 40%    10/26/21  NDI raw score = 17; dysfunction = 34%    11/10/21       Pain level: 10  In her ear and back of neck    SUBJECTIVE:  : Patient reports that her legs are tired today. Says her neck is a little sore today from putting ornaments on her [address].  reports the biomed ointment cont to help. Reports any head pain tends to be in the evenings   reports biomed ointment has been helped. Yesterday she was sitting infront of the sliding glass door and the cold increased her neck pain. She was sore after LV due to transfers.  States she is getting better. 11/23 feels much better after starting a new rub in medicine   11/10 - Pt reports she went to see the MD yesterday and she thought she would benefit from further therapy. Reports MD asked if anyone here did needling as it helped her with her shoulder. Pt brought referral for additional PT 2x/wk for 6 wks. 11/5 - Pt reports she felt sore this morning but has decreased pain now. Reports she felt tired after LV.  11/3 - Pt reports she had some tingling in her head a couple days ago but it went away. Reports pain in her ear has decreased. 10/29 - Pt reports her pain is less today. States she feels she can move her neck further. 10/26 - Pt reports she had no pain yesterday. States pain is a little higher today after driving a lot today. Had to park far away and she is tired. 10/22 - Pt reports her pain typically gets better throughout the day. States she has some ear pain today. 10/19 - Pt reports she almost fell this morning. She was reading the paper and went to sit in a rolling chair and the chair slipped. She caught herself but thinks this could be attributing to her pain. 10/12 - pt reports she is a little sore today. States her head and ear were bothering her Sunday but that has since gone away. 10/6 - Pt reports more pain today in her ear and neck. States she carried her groceries on the opposite side and isn't sure what increased her pain. 10/4 Pt reports she is more sore today. She cooked a pot of chili yesterday and thinks she may have over done it. 10/1 pt reports her pain comes and goes but she continues to feel better. States her son is getting his stress test currently. Brought her calendar to schedule additional visits. 9/28 - Pt states she has a lot going on with her son's health. He is supposed to get a stress test Saturday. Reports some ear pain when she woke up, but feels good now.   9/23: Patient reports that she is feeling well today and not having too much pain.  Patient states that she is trying her best to create a more ergonomic home desk environment for her laptop. Patient states that she has not found a solution that she prefers yet and will coninue to try and find a solution that works for her. Pt asks re life alert systems for home use    9/21 increasing mobility without increased pain. Reports she is still taking the mm relaxer and reports she took Deborra Charlotte when she was stressed and noticed increased neck mm pain And it seemed to help. States her son hopes to get hernia surgery soon. Intermittent R ear pain with combing her hair     9/14 Intermittent pain still gets pain in neck and face/ear. Thinks PT tx is helping. Reports sleeping is good. Can turn  to the right better    OBJECTIVE: 11/23 script from Dr. Jame Swann has been scanned into media section requesting dry needling is possible. Gave pt DN info form and waiver. 11/10  cerv AROM  R L  SB  35 38  Rot  61 50    Strength / Myotomes RIGHT LEFT   Shoulder Flex 4+ 4+   Shoulder Abduction (C5) 4+ 4      stretch   L: 28#  R: 30#    Increased tissue tension and TP in B cervical paraspinal mm, B UT, B rhomboids, and B SCM     10/26  cerv AROM  R L  SB  40 35  Rot  60 59    Strength / Myotomes RIGHT LEFT   Shoulder Flex 4+ 4+   Shoulder Abduction (C5) 4+ 4       9/23 Patient ambulates with AD 6ER, Patient ambulates with forward head, rounded shoulders, and kyphotic curve in T-spine. 9/14 pt reports slouched posture with excessive cerv flex when she watches TV. Palpation: Myofascial pain R>L UT, scalenes. SCM and masseter WFL B.    cerv AROM  R L  SB  40 25  Rot  60 50    9/9 - Pt with no clicking or popping in TMJ, palpation of R SCM increases ear pain   8/31 - pt reports she has a spot that is iching and burning on her back. Skin inspection - small, red oval on R scapular region with smooth borders. Pt reports she has a dermatology appointment within the next 2 weeks. 8/25 - amb with RW this date.  Nir Matt is too tall, but it does not lower further. Increased tissue tension in R SCM and scalenes. RESTRICTIONS/PRECAUTIONS: scoliosis, osteopososis/osteopenia; has lost 3 friends and her  and brother int he last 17 months, joint replacements ( B knees, R ankle, B shoudlers): x 5. Lives with her son who has multiple medical issues including COPD and heart issues. Easy bruising. Spinal cord stimulator (lumbar) put in by Dr. Armin Thomson - recently had new battery put in.        Exercises/Interventions:     Therapeutic Exercises (58117) Resistance / level Sets/sec Reps Notes   Arm bike   2 min fwd/2 min back      UT stretch - HAND HOLDING CHAIR      Chin tuck -   Scap squeeze   Chin tuck + scap squeeze           Pulleys - flexion and abd  5 sec  15 B each Completed to promote thoracic ext    Seated:    cerv AROM   SB  Rot  UT stretches  scalene stretches  cerv retraction  scap retraction Sitting with pillows and towel roll at lumbar spine to provide upright sitting posture with good support  10x3\" B   10x3\" B  2x30\" B    10x3\"  10x3\"      Standing TB  High row   Mid row  LPD   Horizontal abd - seated  B ER - seated  Dahlia Lomeli   2210  10 10    Seated thoracic extension      SCM stretch   L increased pain on R side    LT wall slides  -flexion   - abd      Manual cues to deactivate UT    Therapeutic Activities (18359)       Sit <-->supine <--> L s/l <-->prone    11/23 attempted prone to see if pt could comfortably attain position needed for DN   Instruct in upright sitting posture for watching TV       Standing reaching to shelf - second shelf  - flexion  - abd Tactile and verbal cues to deactivate UT and use scapular mm to reach. CGA          Progress note completed this date - objective measures taken and discussed goals and progress made with therapy. Educated on [address].  Pt to be scheduled with PT that dry needles as was on MD order              Neuromuscular Re-ed (76991)       Postural ed Manual Intervention (63703)       manual - STM and TPR R UT mm and R SCM,  suboccipitals        9 min  8/27 Done on reclined mat table  9/21, 9/7, 8/30 completed seated in chair                                           Modalities:   11/26, 11/10, 11/5, 11/3, 10/29, 10/26, 10/22 - cervical MHP seated in chair at end of session x 10 min    10/19, 10/12, 10/6 - 1 cervical cold pack to cspine x 10 min at end of session   10/4, 10/1, 9/28 - cervical MHP seated in chair at end of session x 10 min   9/23, 9/14 9/9, 9/7, 9/2 - cervical MHP seated in chair at end of session x 15 min   8/31- cervical MHP seated in chair at end of session x 12 min   8/27- cervical MHP on reclined mat table x 15 min   8/25 - cervical MHP seated in chair at end of session x 10 min     Pt. Education:  11/30 how to do self STM to [facility]  11/23 extensive pt ed re dry needling, risks and benefits. D/w pt that scoliosis, osteoporosis/penia, easy bruising, blood thinners, difficulty getting into prone position are all factors that limit appropriateness of DN  Gave her info packet to read and take home. Educated pt that due to her multiple medical issues & difficutly being comfy in prone, PT advises against DN for safety reasons    9/23: pt Educated re safety in home /fall risk reduction. At pt request d/w pt re life alert systems. Patient ed  on option of obtaining an apple watch to use as a \"life alert\" system. Patient likes this idea and state she will have her son help her to look into it.   8/31 - Pt educated on ABCDs of melanoma and importance of skin checks as she reports concern about itching spot on her back. Pt has dermatology appointment in 2 wks.    8/19 patient educated on diagnosis, prognosis and expectations for rehab  -all patient questions were answered    Home Exercise Program:  11/30 self STM to [facility]  11/23 cont with bands and stretches, posture  9/23 computer ergonomics and apple watch for fall risk reduction  9/21 computer ergonomics  9/14 upright psoture for TV viewing  8/19 cerv AROM SB/ROT      Therapeutic Exercise and NMR EXR  [] (69662) Provided verbal/tactile cueing for activities related to strengthening, flexibility, endurance, ROM for improvements in  [] LE / Lumbar: LE, proximal hip, and core control with self care, mobility, lifting, ambulation. [] UE / Cervical: cervical, postural, scapular, scapulothoracic and UE control with self care, reaching, carrying, lifting, house/yardwork, driving, computer work.  [] (92646) Provided verbal/tactile cueing for activities related to improving balance, coordination, kinesthetic sense, posture, motor skill, proprioception to assist with   [] LE / lumbar: LE, proximal hip, and core control in self care, mobility, lifting, ambulation and eccentric single leg control. [] UE / cervical: cervical, scapular, scapulothoracic and UE control with self care, reaching, carrying, lifting, house/yardwork, driving, computer work.   [] (84961) Therapist is in constant attendance of 2 or more patients providing skilled therapy interventions, but not providing any significant amount of measurable one-on-one time to either patient, for improvements in  [] LE / lumbar: LE, proximal hip, and core control in self care, mobility, lifting, ambulation and eccentric single leg control. [] UE / cervical: cervical, scapular, scapulothoracic and UE control with self care, reaching, carrying, lifting, house/yardwork, driving, computer work. NMR and Therapeutic Activities:    [] (17006 or 62099) Provided verbal/tactile cueing for activities related to improving balance, coordination, kinesthetic sense, posture, motor skill, proprioception and motor activation to allow for proper function of   [] LE: / Lumbar core, proximal hip and LE with self care and ADLs  [] UE / Cervical: cervical, postural, scapular, scapulothoracic and UE control with self care, carrying, lifting, driving, computer work. [] (48511) Gait Re-education- Provided training and instruction to the patient for proper LE, core and proximal hip recruitment and positioning and eccentric body weight control with ambulation re-education including up and down stairs     Home Management Training / Self Care:  [] (72573) Provided self-care/home management training related to activities of daily living and compensatory training, and/or use of adaptive equipment for improvement with: ADLs and compensatory training, meal preparation, safety procedures and instruction in use of adaptive equipment, including bathing, grooming, dressing, personal hygiene, basic household cleaning and chores.      Home Exercise Program:    [x] (66759) Reviewed/Progressed HEP activities related to strengthening, flexibility, endurance, ROM of   [] LE / Lumbar: core, proximal hip and LE for functional self-care, mobility, lifting and ambulation/stair navigation   [] UE / Cervical: cervical, postural, scapular, scapulothoracic and UE control with self care, reaching, carrying, lifting, house/yardwork, driving, computer work  [] (40193)Reviewed/Progressed HEP activities related to improving balance, coordination, kinesthetic sense, posture, motor skill, proprioception of   [] LE: core, proximal hip and LE for self care, mobility, lifting, and ambulation/stair navigation    [] UE / Cervical: cervical, postural,  scapular, scapulothoracic and UE control with self care, reaching, carrying, lifting, house/yardwork, driving, computer work    Manual Treatments:  PROM / STM / Oscillations-Mobs:  G-I, II, III, IV (PA's, Inf., Post.)  [] (18824) Provided manual therapy to mobilize LE, proximal hip and/or LS spine soft tissue/joints for the purpose of modulating pain, promoting relaxation,  increasing ROM, reducing/eliminating soft tissue swelling/inflammation/restriction, improving soft tissue extensibility and allowing for proper ROM for normal function with   [] LE / lumbar: self care, mobility, lifting and ambulation. [] UE / Cervical: self care, reaching, carrying, lifting, house/yardwork, driving, computer work. Modalities:  [] (69108) Vasopneumatic compression: Utilized vasopneumatic compression to decrease edema / swelling for the purpose of improving mobility and quad tone / recruitment which will allow for increased overall function including but not limited to self-care, transfers, ambulation, and ascending / descending stairs. Charges:  Timed Code Treatment Minutes: 40   Total Treatment Minutes: 40     [] EVAL - LOW (13541)   [] EVAL - MOD (96606)  [] EVAL - HIGH (62714)  [] RE-EVAL (69699)  [x] UM(44235) x   2    [] Ionto  [] NMR (56949) x       [] Vaso  [x] Manual (96396) x 1      [] Ultrasound  [] TA x   1     [] Mech Traction (26640)  [] Aquatic Therapy x      [] ES (un) (56323):   [] Home Management Training x  [] ES(attended) (73982)   [] Dry Needling 1-2 muscles (99337):  [] Dry Needling 3+ muscles (292150)  [] Group:      [] Other:          GOALS:  Patient stated goal: less pain  [x]? Progressing: []? Met: []? Not Met: []? Adjusted     Therapist goals for Patient:   Short Term Goals: To be achieved in: 2 weeks  1. Independent in HEP and progression per patient tolerance, in order to prevent re-injury. []? Progressing: [x]? Met: []? Not Met: []? Adjusted  2. Patient will have a decrease in pain to facilitate improvement in movement, function, and ADLs as indicated by improvement with respect to Functional Deficits. []? Progressing: [x]? Met: []? Not Met: []? Adjusted     Long Term Goals: To be achieved in: 6 weeks  1. Disability index score of 10% or less on the  NDI  to assist with reaching prior level of function. [x]? Progressing: []? Met: []? Not Met: []? Adjusted  2. Patient will demonstrate increased AROM  to [facility], to allow for proper joint functioning to allow pt to resume turning head while driving without increase in symptoms. [x]?  Progressing: []? Met: []? Not Met: []? Adjusted  3. Patient will demonstrate increased Strength by 1/2 mmt grade  patients Functional Deficits to allow pt to resume amb with/without RW prn for amb in community without increase in symptoms. []? Progressing: [x]? Met: []? Not Met: []? Adjusted  4. Patient will return to functional activities including falling asleep without difficulty without increased symptoms or restriction. []? Progressing: [x]? Met: []? Not Met: []? Adjusted  5. Pt will report ability to drive without pain or restrictions. - ADDED 11/10/21   []? Progressing: []? Met: []? Not Met: []? Adjusted        Overall Progression Towards Functional goals/ Treatment Progress Update:  [x] Patient is progressing as expected towards functional goals listed. [] Progression is slowed due to complexities/Impairments listed. [] Progression has been slowed due to co-morbidities. [] Plan just implemented, too soon to assess goals progression <30days   [] Goals require adjustment due to lack of progress  [] Patient is not progressing as expected and requires additional follow up with physician  [] Other    Persisting Functional Limitations/Impairments:  [x]Sleeping []Sitting               []Standing []Transfers        []Walking []Kneeling               []Stairs []Squatting / bending   [x]ADLs []Reaching  [x]Lifting  [x]Housework  [x]Driving []Job related tasks  []Sports/Recreation []Other:        ASSESSMENT: 12/2: Patient performed periscapular strengthening and postural exercises seated this date due to fatigue in BLEs. The patient responded well today with manual STM to upper trap , scalenes muscle therapy. 11/30 good response to manual therapy. Will benefit from trial of self STM to [facility]  11/26 Pt demonstrates improved thoracic extension with high rows this date.  Pt with decreased tissue tension in SCM this date, but continues with TP and increased tissue tension in R UT mm. Pt reports no increased pain throughout treatment session. Continue to progress per pt tolerance. 11/23 due to her multiple medical issues, difficutly being comfy in prone, and dizziness occurring with transitioning out of prone position (the position needed for DN of UT) and previous dizziness occurring with transition out of supine while in PT clinic (position needed for DN of SCM), PT advises against DN for safety reasons. Rec focus on manual therapy to reduce myofascial pain/TPs and approp exercise to restore normal functional use of UE.    11/10 Pt continues to present with decreased cervical AROM in SB and rotation. Pt with decreased  strength B and increased tissue tension in cervical mm. Pt demonstrates improved posture and exercise technique with therapy and improved function as noted by decreased dysfunction on the NDI. Pt continues to be limited in endurance with home management and driving secondary to decreased cervical rotation. Pt to be scheduled with therapist certified in DN as dry needling was added to MD referral. Plan to continue to progress postural re-ed and strengthening, cervical ROM, and manual therapy to decrease pain and tissue tension and improve strength, ROM, and endurance to return to PLOF without limitations. 11/5 - Pt reports decreased pain in neck this date. Pt demonstrates improved cervical rotation B this date with AROM and improved shoulder abd and flexion with wall slides. Plan to d/c to HEP NV.     11/3 - Pt demonstrates improved posture with TB exercises this date. Pt with improved awareness of her posture with ADLs. Pt with TP and increased tissue tension in BUT L>R this date. Continue to progress per pt tolerance. 10/29 - pt required VC to relax UT with B ER this date. Increased to 2 sets of 10 with green TB.  Pt with increased tissue tension in R scalenes this date which decreased with gentle STM. Pt reports decreased pain and increased mobility with OP PT. Continue to progress postural strengthening/stretching per pt tolerance. 10/26 - Progress note completed this date. Pt demonstrates improvements in L cervical SB and rotation AROM. Pt with increased B shoulder flexion and abd strength. Pt reports decreased pain and increased ability to sleep without pain or discomfort. Plan to focus remaining treatment sessions on postural strengthening and STM to return to PLOF without limitations or risk for further injury. 10/22 - Pt with TP and increased tissue tension in suboccipitals, R UT, and R SCM. Pt reports feeling a good stretch with UT stretch. Initiated B ER with TB this date. Pt required VC to keep elbows bent. Pt required less frequent VC for upright trunk posture and demonstrated improved technique with chin tuck. Continue to progress per pt tolerance. 10/19 - progressed TB shoulder ABD to orange band this date. Pt with TP in R SCM which decreased with manual therapy. Pt reports decreased pain at end of session. 10/12 - progressed reaching to second shelf this date. Pt unable to reach second shelf with 1 pound weight, but demonstrated ability to touch shelf without weight. Pt with TP in L UT, L infraspinatus, and R SCM. Pt demonstrates improved posture with exercises and increased exercise tolerance. Continues to require seated rest breaks throughout session. 10/6 - Initiated reaching with 1 pound weight this date. Pt required verbal and tactile cues to deactivate UT and recruit scapular mm. Pt required less frequent rest breaks throughout treatment session this date. Pt requested ice this date as she states sometimes it decreases her pain at home. Continue to progress standing postural strengthening and re-ed exercises per pt tolerance. 10/4 - Progressed TB exercises to standing this date. Decreased to 1 set of 10 due to pt fatigue in standing.  Initiated thoracic ext over chair with tactile cue to ext through thoracic spine vs cervical spine. Pt required VC for chin tuck with cervical rotation. Reports decreased pain with chin tuck + rotation. Continue to progress as tolerated. 10/1 - Pt required verbal and tactile cues to deactivate UT with TB exercises this date. Pt reports no pain throughout treatment session. Pt reports she has been more aware of her posture throughout the day. Pt with less TP and tissue tension in UT mm this date. 9/28 - Pt with decreased pain and tissue tension this date. Pt requires VC for appropriate form with chin tuck. Pt demonstrates improved posture with TB exercises. Pt reports she is more aware of her posture during the day, but has pain when she sits on a pillow. Plan to continue to progress postural education and scapular strengthening to further decrease pain and return to PLOF without pain or limitations. 9/23: Patient continues to require VC and TC during exercises in order to squeeze scapula together. Patient has difficulty maintaining posture and is struggling to find home solutions to help her adjust her body mechanics to incorporate improved posture habits during her ADL's and IADLs. In addition, pt concerned re safety at home. Patient has noted tenderness and tightness in bilat UT, levator, rhomboids, scalenes, SCM. Patient reports pain relief with STM. Plan to continue to progress appropriate postural strengthening and appropriate motor programming to increase cervical ROM. 9/21 see PT POC above    9/14 needs improved postures, especially for TV watching   9/9 Pt with decreased pain and tissue tension this date. STM primarily on L side this date and pain decreased after manual therapy. Pt reports ear pain is on and off and currently has no ear pain. Increased resistance with TB exercises this date. Pt reported increased difficulty but no increased pain.  Plan to continue to progress postural strengthening and cervical ROM per pt tolerance. Treatment/Activity Tolerance:  [x] Patient able to complete tx  [] Patient limited by fatigue  [] Patient limited by pain  [] Patient limited by other medical complications  [] Other:     Prognosis: [x] Good [] Fair  [] Poor    Patient Requires Follow-up: [x] Yes  [] No    PLAN:  strength, ROM/flexibility, posture and body mechs, manual, MOC, HEP, pt education     Frequency/Duration: 2 days per week for 6  Weeks:  Interventions:  [x]? Therapeutic exercise including:strength, ROM, flexibility  [x]? NMR activation and proprioception including postural re-education  [x]? Manual therapy as indicated to include: IASTM, STM, PROM, Gr I-IV mobilizations, manipulation. [x]? Modalities as needed that may include: thermal agents, E-stim, Biofeedback, US, iontophoresis as indicated  [x]? Patient education on joint protection, postural re-education, activity modification, progression of HEP. Plan for next treatment session: see flowsheet    PLAN: See miki. PT 2x / week for 6 weeks. + 2x/wk for 5 wks + 2x/wk for 6 wks   [x] Continue per plan of care [] Alter current plan (see comments)  [] Plan of care initiated [] Hold pending MD visit [] Discharge    Electronically signed by: Nirav Sims PT, DPT      Note: If patient does not return for scheduled/ recommended follow up visits, this note will serve as a discharge from care along with most recent update on progress.

## 2021-12-06 ENCOUNTER — HOSPITAL ENCOUNTER (OUTPATIENT)
Dept: PHYSICAL THERAPY | Age: 81
Setting detail: THERAPIES SERIES
Discharge: HOME | End: 2021-12-06
Payer: MEDICARE

## 2021-12-06 PROCEDURE — 97140 MANUAL THERAPY 1/> REGIONS: CPT

## 2021-12-06 PROCEDURE — 97110 THERAPEUTIC EXERCISES: CPT

## 2021-12-06 NOTE — FLOWSHEET NOTE
[address] [facility]  Phone: [phone]   Fax: [phone]        Physical Therapy Daily Treatment Note  Date:  2021    Patient Name:  Katlyn Morris    :  1940  MRN: [id number]  Medical/Treatment Diagnosis Information:  Diagnosis: cervical disc disease, R TMJ  Treatment Diagnosis: pt with painful myofascial changes, impaired posture including forward head posture, reduced postural strengths. hypomobility t/o spine, especially at upper cerv spine  Insurance/Certification information:  PT Insurance Information: medicare  Physician Information:  Referring Practitioner: Dr. Ana Osborne of care signed (Y/N): []  Yes [x]  No     Date of Patient follow up with Physician:     POC: 11/10      Progress Report: [x]  Yes  []  No     Progress report due (10 Rx/or 30 days whichever is less): visit #97 or     Recertification due (POC duration/ or 90 days whichever is less): visit #12 or     Visit # Insurance Allowable Auth required? Date Range    + 10/10 +   Med nec []  Yes  [x]  No NA       Units approved Units used Date Range   NA NA NA       Latex Allergy:  [x]NO      []YES  Preferred Language for Healthcare:   [x]English       []other:    Functional Scale:        Date assessed:  NDI raw score = 18, dysfunction =36 %, taken at initial eval  NDI raw score = 20; dysfunction = 40%    10/26/21  NDI raw score = 17; dysfunction = 34%    11/10/21       Pain level: 3/10    SUBJECTIVE:    : Pt reports she had some ear pain yesterday for the first time in a while. States she is a little sore from tree trimming and laundry. : Patient reports that her legs are tired today. Says her neck is a little sore today from putting ornaments on her [address].  reports the biomed ointment cont to help. Reports any head pain tends to be in the evenings   reports biomed ointment has been helped.  Yesterday she was sitting infront of the sliding glass door and the cold increased her neck pain. She was sore after LV due to transfers. States she is getting better. 11/23 feels much better after starting a new rub in medicine   11/10 - Pt reports she went to see the MD yesterday and she thought she would benefit from further therapy. Reports MD asked if anyone here did needling as it helped her with her shoulder. Pt brought referral for additional PT 2x/wk for 6 wks. 11/5 - Pt reports she felt sore this morning but has decreased pain now. Reports she felt tired after LV.  11/3 - Pt reports she had some tingling in her head a couple days ago but it went away. Reports pain in her ear has decreased. 10/29 - Pt reports her pain is less today. States she feels she can move her neck further. 10/26 - Pt reports she had no pain yesterday. States pain is a little higher today after driving a lot today. Had to park far away and she is tired. 10/22 - Pt reports her pain typically gets better throughout the day. States she has some ear pain today. 10/19 - Pt reports she almost fell this morning. She was reading the paper and went to sit in a rolling chair and the chair slipped. She caught herself but thinks this could be attributing to her pain. 10/12 - pt reports she is a little sore today. States her head and ear were bothering her Sunday but that has since gone away. 10/6 - Pt reports more pain today in her ear and neck. States she carried her groceries on the opposite side and isn't sure what increased her pain. 10/4 Pt reports she is more sore today. She cooked a pot of chili yesterday and thinks she may have over done it. 10/1 pt reports her pain comes and goes but she continues to feel better. States her son is getting his stress test currently. Brought her calendar to schedule additional visits. 9/28 - Pt states she has a lot going on with her son's health. He is supposed to get a stress test Saturday.  Reports some ear pain when she woke up, but feels good now.   9/23: Patient reports that she is feeling well today and not having too much pain. Patient states that she is trying her best to create a more ergonomic home desk environment for her laptop. Patient states that she has not found a solution that she prefers yet and will coninue to try and find a solution that works for her. Pt asks re life alert systems for home use  9/21 increasing mobility without increased pain. Reports she is still taking the mm relaxer and reports she took Austine Comas when she was stressed and noticed increased neck mm pain And it seemed to help. States her son hopes to get hernia surgery soon. Intermittent R ear pain with combing her hair   9/14 Intermittent pain still gets pain in neck and face/ear. Thinks PT tx is helping. Reports sleeping is good. Can turn  to the right better    OBJECTIVE: 11/23 script from Dr. Sharan Miner has been scanned into media section requesting dry needling is possible. Gave pt DN info form and waiver. 11/10  cerv AROM  R L  SB  35 38  Rot  61 50    Strength / Myotomes RIGHT LEFT   Shoulder Flex 4+ 4+   Shoulder Abduction (C5) 4+ 4      stretch   L: 28#  R: 30#    Increased tissue tension and TP in B cervical paraspinal mm, B UT, B rhomboids, and B SCM     10/26  cerv AROM  R L  SB  40 35  Rot  60 59    Strength / Myotomes RIGHT LEFT   Shoulder Flex 4+ 4+   Shoulder Abduction (C5) 4+ 4       9/23 Patient ambulates with AD 6NH, Patient ambulates with forward head, rounded shoulders, and kyphotic curve in T-spine. 9/14 pt reports slouched posture with excessive cerv flex when she watches TV. Palpation: Myofascial pain R>L UT, scalenes. SCM and masseter WFL B.    cerv AROM  R L  SB  40 25  Rot  60 50    9/9 - Pt with no clicking or popping in TMJ, palpation of R SCM increases ear pain   8/31 - pt reports she has a spot that is iching and burning on her back.  Skin inspection - small, red oval on R scapular region with smooth borders. Pt reports she has a dermatology appointment within the next 2 weeks. 8/25 - amb with RW this date. Tom Castro is too tall, but it does not lower further. Increased tissue tension in R SCM and scalenes. RESTRICTIONS/PRECAUTIONS: scoliosis, osteopososis/osteopenia; has lost 3 friends and her  and brother int he last 17 months, joint replacements ( B knees, R ankle, B shoudlers): x 5. Lives with her son who has multiple medical issues including COPD and heart issues. Easy bruising. Spinal cord stimulator (lumbar) put in by Dr. Samuel Soler - recently had new battery put in.        Exercises/Interventions:     Therapeutic Exercises (19228) Resistance / level Sets/sec Reps Notes   Arm bike   2.5 min fwd/2.5 min back      UT stretch - HAND HOLDING CHAIR      Chin tuck -   Scap squeeze   Chin tuck + scap squeeze  2 sec    10      Pulleys - flexion and abd  5 sec  15 B each Completed to promote thoracic ext    Seated:    cerv AROM   SB  Rot  UT stretches  scalene stretches  cerv retraction  scap retraction Sitting with pillows and towel roll at lumbar spine to provide upright sitting posture with good support  10x3\" B   10x3\" B            Standing TB  High row   Mid row  LPD   Horizontal abd - seated  B ER - seated  Renee Reagan   [facility] 2   [id number]  10 10 10   Seated thoracic extension   3 sec 10     SCM stretch   L increased pain on R side    LT wall slides  -flexion   - abd      Manual cues to deactivate UT    Therapeutic Activities (99250)       Sit <-->supine <--> L s/l <-->prone    11/23 attempted prone to see if pt could comfortably attain position needed for DN   Instruct in upright sitting posture for watching TV       Standing reaching to shelf - second shelf  - flexion  - abd Tactile and verbal cues to deactivate UT and use scapular mm to reach. CGA          Progress note completed this date - objective measures taken and discussed goals and progress made with therapy. Educated on [address].  Pt to be scheduled with PT that dry needles as was on MD order              Neuromuscular Re-ed (68401)       Postural ed                                          Manual Intervention ([id number])       manual - STM and TPR B UT mm and R SCM,  suboccipitals        9 min  8/27 Done on reclined mat table  9/21, 9/7, 8/30 completed seated in chair                                           Modalities:   12/6, 11/26, 11/10, 11/5, 11/3, 10/29, 10/26, 10/22 - cervical MHP seated in chair at end of session x 10 min    10/19, 10/12, 10/6 - 1 cervical cold pack to cspine x 10 min at end of session   10/4, 10/1, 9/28 - cervical MHP seated in chair at end of session x 10 min   9/23, 9/14 9/9, 9/7, 9/2 - cervical MHP seated in chair at end of session x 15 min   8/31- cervical MHP seated in chair at end of session x 12 min   8/27- cervical MHP on reclined mat table x 15 min   8/25 - cervical MHP seated in chair at end of session x 10 min     Pt. Education:  11/30 how to do self STM to [facility]  11/23 extensive pt ed re dry needling, risks and benefits. D/w pt that scoliosis, osteoporosis/penia, easy bruising, blood thinners, difficulty getting into prone position are all factors that limit appropriateness of DN  Gave her info packet to read and take home. Educated pt that due to her multiple medical issues & difficutly being comfy in prone, PT advises against DN for safety reasons    9/23: pt Educated re safety in home /fall risk reduction. At pt request d/w pt re life alert systems. Patient ed  on option of obtaining an apple watch to use as a \"life alert\" system. Patient likes this idea and state she will have her son help her to look into it.   8/31 - Pt educated on ABCDs of melanoma and importance of skin checks as she reports concern about itching spot on her back. Pt has dermatology appointment in 2 wks.    8/19 patient educated on diagnosis, prognosis and expectations for rehab  -all patient questions were answered    Home Exercise Program:  11/30 self STM to [facility]  11/23 cont with bands and stretches, posture  9/23 computer ergonomics and apple watch for fall risk reduction  9/21  computer ergonomics  9/14 upright psoture for TV viewing  8/19 cerv AROM SB/ROT      Therapeutic Exercise and NMR EXR  [] (73775) Provided verbal/tactile cueing for activities related to strengthening, flexibility, endurance, ROM for improvements in  [] LE / Lumbar: LE, proximal hip, and core control with self care, mobility, lifting, ambulation. [] UE / Cervical: cervical, postural, scapular, scapulothoracic and UE control with self care, reaching, carrying, lifting, house/yardwork, driving, computer work.  [] (48189) Provided verbal/tactile cueing for activities related to improving balance, coordination, kinesthetic sense, posture, motor skill, proprioception to assist with   [] LE / lumbar: LE, proximal hip, and core control in self care, mobility, lifting, ambulation and eccentric single leg control. [] UE / cervical: cervical, scapular, scapulothoracic and UE control with self care, reaching, carrying, lifting, house/yardwork, driving, computer work.   [] (09128) Therapist is in constant attendance of 2 or more patients providing skilled therapy interventions, but not providing any significant amount of measurable one-on-one time to either patient, for improvements in  [] LE / lumbar: LE, proximal hip, and core control in self care, mobility, lifting, ambulation and eccentric single leg control. [] UE / cervical: cervical, scapular, scapulothoracic and UE control with self care, reaching, carrying, lifting, house/yardwork, driving, computer work.       NMR and Therapeutic Activities:    [] (27848 or 46207) Provided verbal/tactile cueing for activities related to improving balance, coordination, kinesthetic sense, posture, motor skill, proprioception and motor activation to allow for proper function of   [] LE: / Lumbar core, proximal hip and LE with self care and ADLs  [] UE / Cervical: cervical, postural, scapular, scapulothoracic and UE control with self care, carrying, lifting, driving, computer work.   [] (20073) Gait Re-education- Provided training and instruction to the patient for proper LE, core and proximal hip recruitment and positioning and eccentric body weight control with ambulation re-education including up and down stairs     Home Management Training / Self Care:  [] (50551) Provided self-care/home management training related to activities of daily living and compensatory training, and/or use of adaptive equipment for improvement with: ADLs and compensatory training, meal preparation, safety procedures and instruction in use of adaptive equipment, including bathing, grooming, dressing, personal hygiene, basic household cleaning and chores.      Home Exercise Program:    [x] (24985) Reviewed/Progressed HEP activities related to strengthening, flexibility, endurance, ROM of   [] LE / Lumbar: core, proximal hip and LE for functional self-care, mobility, lifting and ambulation/stair navigation   [] UE / Cervical: cervical, postural, scapular, scapulothoracic and UE control with self care, reaching, carrying, lifting, house/yardwork, driving, computer work  [] (27372)Reviewed/Progressed HEP activities related to improving balance, coordination, kinesthetic sense, posture, motor skill, proprioception of   [] LE: core, proximal hip and LE for self care, mobility, lifting, and ambulation/stair navigation    [] UE / Cervical: cervical, postural,  scapular, scapulothoracic and UE control with self care, reaching, carrying, lifting, house/yardwork, driving, computer work    Manual Treatments:  PROM / STM / Oscillations-Mobs:  G-I, II, III, IV (PA's, Inf., Post.)  [] (18065) Provided manual therapy to mobilize LE, proximal hip and/or LS spine soft tissue/joints for the purpose of modulating pain, promoting relaxation,  increasing ROM, reducing/eliminating soft AROM  to [facility], to allow for proper joint functioning to allow pt to resume turning head while driving without increase in symptoms. [x]? Progressing: []? Met: []? Not Met: []? Adjusted  3. Patient will demonstrate increased Strength by 1/2 mmt grade  patients Functional Deficits to allow pt to resume amb with/without RW prn for amb in community without increase in symptoms. []? Progressing: [x]? Met: []? Not Met: []? Adjusted  4. Patient will return to functional activities including falling asleep without difficulty without increased symptoms or restriction. []? Progressing: [x]? Met: []? Not Met: []? Adjusted  5. Pt will report ability to drive without pain or restrictions. - ADDED 11/10/21   []? Progressing: []? Met: []? Not Met: []? Adjusted        Overall Progression Towards Functional goals/ Treatment Progress Update:  [x] Patient is progressing as expected towards functional goals listed. [] Progression is slowed due to complexities/Impairments listed. [] Progression has been slowed due to co-morbidities. [] Plan just implemented, too soon to assess goals progression <30days   [] Goals require adjustment due to lack of progress  [] Patient is not progressing as expected and requires additional follow up with physician  [] Other    Persisting Functional Limitations/Impairments:  [x]Sleeping []Sitting               []Standing []Transfers        []Walking []Kneeling               []Stairs []Squatting / bending   [x]ADLs []Reaching  [x]Lifting  [x]Housework  [x]Driving []Job related tasks  []Sports/Recreation []Other:        ASSESSMENT:   12/6 - Pt with increased tissue tension in B UT and decreased tightness in B SCM mm. Pt required VC for decreased cervical ext with TB exercises. Pt demonstrates increased thoracic mobility and ext noted during standing TB exercises. Continue to progress per pt tolerance.      12/2: Patient performed periscapular strengthening and postural exercises seated this date due to fatigue in BLEs. The patient responded well today with manual STM to upper trap , scalenes muscle therapy. 11/30 good response to manual therapy. Will benefit from trial of self STM to [facility]    11/26 Pt demonstrates improved thoracic extension with high rows this date. Pt with decreased tissue tension in SCM this date, but continues with TP and increased tissue tension in R UT mm. Pt reports no increased pain throughout treatment session. Continue to progress per pt tolerance. 11/23 due to her multiple medical issues, difficutly being comfy in prone, and dizziness occurring with transitioning out of prone position (the position needed for DN of UT) and previous dizziness occurring with transition out of supine while in PT clinic (position needed for DN of SCM), PT advises against DN for safety reasons. Rec focus on manual therapy to reduce myofascial pain/TPs and approp exercise to restore normal functional use of UE.    11/10 Pt continues to present with decreased cervical AROM in SB and rotation. Pt with decreased  strength B and increased tissue tension in cervical mm. Pt demonstrates improved posture and exercise technique with therapy and improved function as noted by decreased dysfunction on the NDI. Pt continues to be limited in endurance with home management and driving secondary to decreased cervical rotation. Pt to be scheduled with therapist certified in DN as dry needling was added to MD referral. Plan to continue to progress postural re-ed and strengthening, cervical ROM, and manual therapy to decrease pain and tissue tension and improve strength, ROM, and endurance to return to PLOF without limitations. 11/5 - Pt reports decreased pain in neck this date. Pt demonstrates improved cervical rotation B this date with AROM and improved shoulder abd and flexion with wall slides. Plan to d/c to HEP NV.     11/3 - Pt demonstrates improved posture with TB exercises this date. Pt with improved awareness of her posture with ADLs. Pt with TP and increased tissue tension in BUT L>R this date. Continue to progress per pt tolerance. 10/29 - pt required VC to relax UT with B ER this date. Increased to 2 sets of 10 with green TB. Pt with increased tissue tension in R scalenes this date which decreased with gentle STM. Pt reports decreased pain and increased mobility with OP PT. Continue to progress postural strengthening/stretching per pt tolerance. 10/26 - Progress note completed this date. Pt demonstrates improvements in L cervical SB and rotation AROM. Pt with increased B shoulder flexion and abd strength. Pt reports decreased pain and increased ability to sleep without pain or discomfort. Plan to focus remaining treatment sessions on postural strengthening and STM to return to PLOF without limitations or risk for further injury. 10/22 - Pt with TP and increased tissue tension in suboccipitals, R UT, and R SCM. Pt reports feeling a good stretch with UT stretch. Initiated B ER with TB this date. Pt required VC to keep elbows bent. Pt required less frequent VC for upright trunk posture and demonstrated improved technique with chin tuck. Continue to progress per pt tolerance. 10/19 - progressed TB shoulder ABD to orange band this date. Pt with TP in R SCM which decreased with manual therapy. Pt reports decreased pain at end of session. 10/12 - progressed reaching to second shelf this date. Pt unable to reach second shelf with 1 pound weight, but demonstrated ability to touch shelf without weight. Pt with TP in L UT, L infraspinatus, and R SCM. Pt demonstrates improved posture with exercises and increased exercise tolerance. Continues to require seated rest breaks throughout session. 10/6 - Initiated reaching with 1 pound weight this date.  Pt required verbal and tactile cues to deactivate UT and recruit scapular mm. Pt required less frequent rest breaks throughout treatment session this date. Pt requested ice this date as she states sometimes it decreases her pain at home. Continue to progress standing postural strengthening and re-ed exercises per pt tolerance. 10/4 - Progressed TB exercises to standing this date. Decreased to 1 set of 10 due to pt fatigue in standing. Initiated thoracic ext over chair with tactile cue to ext through thoracic spine vs cervical spine. Pt required VC for chin tuck with cervical rotation. Reports decreased pain with chin tuck + rotation. Continue to progress as tolerated. 10/1 - Pt required verbal and tactile cues to deactivate UT with TB exercises this date. Pt reports no pain throughout treatment session. Pt reports she has been more aware of her posture throughout the day. Pt with less TP and tissue tension in UT mm this date. 9/28 - Pt with decreased pain and tissue tension this date. Pt requires VC for appropriate form with chin tuck. Pt demonstrates improved posture with TB exercises. Pt reports she is more aware of her posture during the day, but has pain when she sits on a pillow. Plan to continue to progress postural education and scapular strengthening to further decrease pain and return to PLOF without pain or limitations. 9/23: Patient continues to require VC and TC during exercises in order to squeeze scapula together. Patient has difficulty maintaining posture and is struggling to find home solutions to help her adjust her body mechanics to incorporate improved posture habits during her ADL's and IADLs. In addition, pt concerned re safety at home. Patient has noted tenderness and tightness in bilat UT, levator, rhomboids, scalenes, SCM. Patient reports pain relief with STM. Plan to continue to progress appropriate postural strengthening and appropriate motor programming to increase cervical ROM.        9/21 see PT POC above    9/14 needs improved postures, especially for TV watching   9/9 Pt with decreased pain and tissue tension this date. STM primarily on L side this date and pain decreased after manual therapy. Pt reports ear pain is on and off and currently has no ear pain. Increased resistance with TB exercises this date. Pt reported increased difficulty but no increased pain. Plan to continue to progress postural strengthening and cervical ROM per pt tolerance. Treatment/Activity Tolerance:  [x] Patient able to complete tx  [] Patient limited by fatigue  [] Patient limited by pain  [] Patient limited by other medical complications  [] Other:     Prognosis: [x] Good [] Fair  [] Poor    Patient Requires Follow-up: [x] Yes  [] No    PLAN:  strength, ROM/flexibility, posture and body mechs, manual, MOC, HEP, pt education     Frequency/Duration: 2 days per week for 6  Weeks:  Interventions:  [x]? Therapeutic exercise including:strength, ROM, flexibility  [x]? NMR activation and proprioception including postural re-education  [x]? Manual therapy as indicated to include: IASTM, STM, PROM, Gr I-IV mobilizations, manipulation. [x]? Modalities as needed that may include: thermal agents, E-stim, Biofeedback, US, iontophoresis as indicated  [x]? Patient education on joint protection, postural re-education, activity modification, progression of HEP. Plan for next treatment session: see flowsheet    PLAN: See miki. PT 2x / week for 6 weeks. + 2x/wk for 5 wks + 2x/wk for 6 wks   [x] Continue per plan of care [] Alter current plan (see comments)  [] Plan of care initiated [] Hold pending MD visit [] Discharge    Electronically signed by: Chaz Schmidt, PT, DPT      Note: If patient does not return for scheduled/ recommended follow up visits, this note will serve as a discharge from care along with most recent update on progress.

## 2021-12-09 ENCOUNTER — HOSPITAL ENCOUNTER (OUTPATIENT)
Dept: PHYSICAL THERAPY | Age: 81
Setting detail: THERAPIES SERIES
Discharge: HOME | End: 2021-12-09
Payer: MEDICARE

## 2021-12-09 PROCEDURE — 97140 MANUAL THERAPY 1/> REGIONS: CPT

## 2021-12-09 PROCEDURE — 97110 THERAPEUTIC EXERCISES: CPT

## 2021-12-09 NOTE — FLOWSHEET NOTE
[address] [facility] Physical Therapy  Phone: [phone]   Fax: [phone]        Physical Therapy Daily Treatment Note  Date:  2021    Patient Name:  Cesar Coronado    :  1940  MRN: [id number]  Medical/Treatment Diagnosis Information:  Diagnosis: cervical disc disease, R TMJ  Treatment Diagnosis: pt with painful myofascial changes, impaired posture including forward head posture, reduced postural strengths. hypomobility t/o spine, especially at upper cerv spine  Insurance/Certification information:  PT Insurance Information: medicare  Physician Information:  Referring Practitioner: Dr. Ranjit Arteaga of care signed (Y/N): []  Yes [x]  No     Date of Patient follow up with Physician:     POC: 11/10      Progress Report: [x]  Yes  []  No     Progress report due (10 Rx/or 30 days whichever is less): visit #73 or     Recertification due (POC duration/ or 90 days whichever is less): visit #12 or     Visit # Insurance Allowable Auth required? Date Range    + 10/10 +   Med nec []  Yes  [x]  No NA       Units approved Units used Date Range   NA NA NA       Latex Allergy:  [x]NO      []YES  Preferred Language for Healthcare:   [x]English       []other:    Functional Scale:        Date assessed:  NDI raw score = 18, dysfunction =36 %, taken at initial eval  NDI raw score = 20; dysfunction = 40%    10/26/21  NDI raw score = 17; dysfunction = 34%    11/10/21       Pain level: 2/10    SUBJECTIVE:    : feeling much better than she did on Tuesday. Reports she is now 4'11; used to be 5'2\"  : Pt reports she had some ear pain yesterday for the first time in a while. States she is a little sore from tree trimming and laundry. : Patient reports that her legs are tired today. Says her neck is a little sore today from putting ornaments on her [address].  reports the biomed ointment cont to help.   Reports any head pain tends to be in the evenings  11/26 reports biomed ointment has been helped. Yesterday she was sitting infront of the sliding glass door and the cold increased her neck pain. She was sore after LV due to transfers. States she is getting better. 11/23 feels much better after starting a new rub in medicine   11/10 - Pt reports she went to see the MD yesterday and she thought she would benefit from further therapy. Reports MD asked if anyone here did needling as it helped her with her shoulder. Pt brought referral for additional PT 2x/wk for 6 wks. 11/5 - Pt reports she felt sore this morning but has decreased pain now. Reports she felt tired after LV.  11/3 - Pt reports she had some tingling in her head a couple days ago but it went away. Reports pain in her ear has decreased. 10/29 - Pt reports her pain is less today. States she feels she can move her neck further. 10/26 - Pt reports she had no pain yesterday. States pain is a little higher today after driving a lot today. Had to park far away and she is tired. 10/22 - Pt reports her pain typically gets better throughout the day. States she has some ear pain today. 10/19 - Pt reports she almost fell this morning. She was reading the paper and went to sit in a rolling chair and the chair slipped. She caught herself but thinks this could be attributing to her pain. 10/12 - pt reports she is a little sore today. States her head and ear were bothering her Sunday but that has since gone away. 10/6 - Pt reports more pain today in her ear and neck. States she carried her groceries on the opposite side and isn't sure what increased her pain. 10/4 Pt reports she is more sore today. She cooked a pot of chili yesterday and thinks she may have over done it. 10/1 pt reports her pain comes and goes but she continues to feel better. States her son is getting his stress test currently. Brought her calendar to schedule additional visits.    9/28 - Pt states she has a lot going on with her son's health. He is supposed to get a stress test Saturday. Reports some ear pain when she woke up, but feels good now.   9/23: Patient reports that she is feeling well today and not having too much pain. Patient states that she is trying her best to create a more ergonomic home desk environment for her laptop. Patient states that she has not found a solution that she prefers yet and will coninue to try and find a solution that works for her. Pt asks re life alert systems for home use  9/21 increasing mobility without increased pain. Reports she is still taking the mm relaxer and reports she took [employer]ward Levers when she was stressed and noticed increased neck mm pain And it seemed to help. States her son hopes to get hernia surgery soon. Intermittent R ear pain with combing her hair   9/14 Intermittent pain still gets pain in neck and face/ear. Thinks PT tx is helping. Reports sleeping is good. Can turn  to the right better    OBJECTIVE: 12/9 gait and posture: pt amb with 45 deg trunk flex - flex from hip and thoracic kyphosis   11/23 script from Dr. Duke Mcgarry has been scanned into media section requesting dry needling is possible. Gave pt DN info form and waiver. 11/10  cerv AROM  R L  SB  35 38  Rot  61 50    Strength / Myotomes RIGHT LEFT   Shoulder Flex 4+ 4+   Shoulder Abduction (C5) 4+ 4      stretch   L: 28#  R: 30#    Increased tissue tension and TP in B cervical paraspinal mm, B UT, B rhomboids, and B SCM     10/26  cerv AROM  R L  SB  40 35  Rot  60 59    Strength / Myotomes RIGHT LEFT   Shoulder Flex 4+ 4+   Shoulder Abduction (C5) 4+ 4       9/23 Patient ambulates with AD 9NT, Patient ambulates with forward head, rounded shoulders, and kyphotic curve in T-spine. 9/14 pt reports slouched posture with excessive cerv flex when she watches TV. Palpation: Myofascial pain R>L UT, scalenes.   SCM and masseter WFL B.    cerv AROM  R L  SB  40 25  Rot  60 50    9/9 - Pt with no clicking or popping in TMJ, palpation of R SCM increases ear pain   8/31 - pt reports she has a spot that is iching and burning on her back. Skin inspection - small, red oval on R scapular region with smooth borders. Pt reports she has a dermatology appointment within the next 2 weeks. 8/25 - amb with RW this date. [place] is too tall, but it does not lower further. Increased tissue tension in R SCM and scalenes. RESTRICTIONS/PRECAUTIONS: scoliosis, osteopososis/osteopenia; has lost 3 friends and her  and brother int he last 17 months, joint replacements ( B knees, R ankle, B shoudlers): x 5. Lives with her son who has multiple medical issues including COPD and heart issues. Easy bruising.    Spinal cord stimulator (lumbar) put in by Dr. Angelina Shah - recently had new battery put in.        Exercises/Interventions:     Therapeutic Exercises (24278) Resistance / level Sets/sec Reps Notes   Arm bike   2.5 min fwd/2.5 min back      UT stretch - HAND HOLDING CHAIR      Chin tuck -   Scap squeeze   Chin tuck + scap squeeze  2 sec   10  10      Pulleys - flexion and abd  5 sec  15 B each Completed to promote thoracic ext    Seated:    cerv AROM   SB  Rot  UT stretches  scalene stretches  cerv retraction  scap retraction Sitting with pillows and towel roll at lumbar spine to provide upright sitting posture with good support  10x3\" B   10x3\" B            Standing TB  High row   Mid row  LPD   Horizontal abd - seated  B ER - seated  Ashlie Brandonramírez   [place] [address]   [id number]  10 10 10   Seated thoracic extension   3 sec 10     SCM stretch   L increased pain on R side    LT wall slides  -flexion   - abd      Manual cues to deactivate UT    Therapeutic Activities (15843)       Sit <-->supine <--> L s/l <-->prone    11/23 attempted prone to see if pt could comfortably attain position needed for DN   Instruct in upright sitting posture for watching TV       Standing reaching to shelf - second shelf  - flexion  - abd Tactile and verbal cues to deactivate UT and use scapular mm to reach. CGA          Progress note completed this date - objective measures taken and discussed goals and progress made with therapy. Educated on [address]. Pt to be scheduled with PT that dry needles as was on MD order              Neuromuscular Re-ed (67186)       Postural ed                                          Manual Intervention ([id number])       manual - STM and TPR B UT mm and R SCM,levator  suboccipitals        9 min  8/27 Done on reclined mat table  9/21, 9/7, 8/30 completed seated in chair                                           Modalities:   12/6, 11/26, 11/10, 11/5, 11/3, 10/29, 10/26, 10/22 - cervical MHP seated in chair at end of session x 10 min    10/19, 10/12, 10/6 - 1 cervical cold pack to cspine x 10 min at end of session   10/4, 10/1, 9/28 - cervical MHP seated in chair at end of session x 10 min   9/23, 9/14 9/9, 9/7, 9/2 - cervical MHP seated in chair at end of session x 15 min   8/31- cervical MHP seated in chair at end of session x 12 min   8/27- cervical MHP on reclined mat table x 15 min   8/25 - cervical MHP seated in chair at end of session x 10 min     Pt. Education:  12/9 review self STM and HEP, review stress management techs; use of heat, self massage - gentle, gentle stretches to manage pain neural irritation causing strange sensations to head  11/30 how to do self STM to [facility]  11/23 extensive pt ed re dry needling, risks and benefits. D/w pt that scoliosis, osteoporosis/penia, easy bruising, blood thinners, difficulty getting into prone position are all factors that limit appropriateness of DN  Gave her info packet to read and take home. Educated pt that due to her multiple medical issues & difficutly being comfy in prone, PT advises against DN for safety reasons    9/23: pt Educated re safety in home /fall risk reduction. At pt request d/w pt re life alert systems. Patient ed  on option of obtaining an apple watch to use as a \"life alert\" system.  Patient likes lifting, house/yardwork, driving, computer work. NMR and Therapeutic Activities:    [] (60861 or 17342) Provided verbal/tactile cueing for activities related to improving balance, coordination, kinesthetic sense, posture, motor skill, proprioception and motor activation to allow for proper function of   [] LE: / Lumbar core, proximal hip and LE with self care and ADLs  [] UE / Cervical: cervical, postural, scapular, scapulothoracic and UE control with self care, carrying, lifting, driving, computer work.   [] (18224) Gait Re-education- Provided training and instruction to the patient for proper LE, core and proximal hip recruitment and positioning and eccentric body weight control with ambulation re-education including up and down stairs     Home Management Training / Self Care:  [] (71874) Provided self-care/home management training related to activities of daily living and compensatory training, and/or use of adaptive equipment for improvement with: ADLs and compensatory training, meal preparation, safety procedures and instruction in use of adaptive equipment, including bathing, grooming, dressing, personal hygiene, basic household cleaning and chores.      Home Exercise Program:    [x] (29907) Reviewed/Progressed HEP activities related to strengthening, flexibility, endurance, ROM of   [] LE / Lumbar: core, proximal hip and LE for functional self-care, mobility, lifting and ambulation/stair navigation   [] UE / Cervical: cervical, postural, scapular, scapulothoracic and UE control with self care, reaching, carrying, lifting, house/yardwork, driving, computer work  [] (98959)Reviewed/Progressed HEP activities related to improving balance, coordination, kinesthetic sense, posture, motor skill, proprioception of   [] LE: core, proximal hip and LE for self care, mobility, lifting, and ambulation/stair navigation    [] UE / Cervical: cervical, postural,  scapular, scapulothoracic and UE control with self care, indicated by improvement with respect to Functional Deficits. []? Progressing: [x]? Met: []? Not Met: []? Adjusted     Long Term Goals: To be achieved in: 6 weeks  1. Disability index score of 10% or less on the  NDI  to assist with reaching prior level of function. [x]? Progressing: []? Met: []? Not Met: []? Adjusted  2. Patient will demonstrate increased AROM  to [facility], to allow for proper joint functioning to allow pt to resume turning head while driving without increase in symptoms. [x]? Progressing: []? Met: []? Not Met: []? Adjusted  3. Patient will demonstrate increased Strength by 1/2 mmt grade  patients Functional Deficits to allow pt to resume amb with/without RW prn for amb in community without increase in symptoms. []? Progressing: [x]? Met: []? Not Met: []? Adjusted  4. Patient will return to functional activities including falling asleep without difficulty without increased symptoms or restriction. []? Progressing: [x]? Met: []? Not Met: []? Adjusted  5. Pt will report ability to drive without pain or restrictions. - ADDED 11/10/21   []? Progressing: []? Met: []? Not Met: []? Adjusted        Overall Progression Towards Functional goals/ Treatment Progress Update:  [x] Patient is progressing as expected towards functional goals listed. [] Progression is slowed due to complexities/Impairments listed. [] Progression has been slowed due to co-morbidities.   [] Plan just implemented, too soon to assess goals progression <30days   [] Goals require adjustment due to lack of progress  [] Patient is not progressing as expected and requires additional follow up with physician  [] Other    Persisting Functional Limitations/Impairments:  [x]Sleeping []Sitting               []Standing []Transfers        []Walking []Kneeling               []Stairs []Squatting / bending   [x]ADLs []Reaching  [x]Lifting  [x]Housework  [x]Driving []Job related tasks  []Sports/Recreation []Other:        ASSESSMENT:   12/9 reduced myofascial pain B upper quarter. Pt having difficulty doing HEP regularly and having trouble doing self STM. Pt cont with high stress levels which contribute to cont pain. 12/6 - Pt with increased tissue tension in B UT and decreased tightness in B SCM mm. Pt required VC for decreased cervical ext with TB exercises. Pt demonstrates increased thoracic mobility and ext noted during standing TB exercises. Continue to progress per pt tolerance. 12/2: Patient performed periscapular strengthening and postural exercises seated this date due to fatigue in BLEs. The patient responded well today with manual STM to upper trap , scalenes muscle therapy. 11/30 good response to manual therapy. Will benefit from trial of self STM to [facility]    11/26 Pt demonstrates improved thoracic extension with high rows this date. Pt with decreased tissue tension in SCM this date, but continues with TP and increased tissue tension in R UT mm. Pt reports no increased pain throughout treatment session. Continue to progress per pt tolerance. 11/23 due to her multiple medical issues, difficutly being comfy in prone, and dizziness occurring with transitioning out of prone position (the position needed for DN of UT) and previous dizziness occurring with transition out of supine while in PT clinic (position needed for DN of SCM), PT advises against DN for safety reasons. Rec focus on manual therapy to reduce myofascial pain/TPs and approp exercise to restore normal functional use of UE.    11/10 Pt continues to present with decreased cervical AROM in SB and rotation. Pt with decreased  strength B and increased tissue tension in cervical mm. Pt demonstrates improved posture and exercise technique with therapy and improved function as noted by decreased dysfunction on the NDI.  Pt continues to be limited in endurance with home management and driving secondary to decreased cervical rotation. Pt to be scheduled with therapist certified in DN as dry needling was added to MD referral. Plan to continue to progress postural re-ed and strengthening, cervical ROM, and manual therapy to decrease pain and tissue tension and improve strength, ROM, and endurance to return to PLOF without limitations. 11/5 - Pt reports decreased pain in neck this date. Pt demonstrates improved cervical rotation B this date with AROM and improved shoulder abd and flexion with wall slides. Plan to d/c to HEP NV.     11/3 - Pt demonstrates improved posture with TB exercises this date. Pt with improved awareness of her posture with ADLs. Pt with TP and increased tissue tension in BUT L>R this date. Continue to progress per pt tolerance. 10/29 - pt required VC to relax UT with B ER this date. Increased to 2 sets of 10 with green TB. Pt with increased tissue tension in R scalenes this date which decreased with gentle STM. Pt reports decreased pain and increased mobility with OP PT. Continue to progress postural strengthening/stretching per pt tolerance. 10/26 - Progress note completed this date. Pt demonstrates improvements in L cervical SB and rotation AROM. Pt with increased B shoulder flexion and abd strength. Pt reports decreased pain and increased ability to sleep without pain or discomfort. Plan to focus remaining treatment sessions on postural strengthening and STM to return to PLOF without limitations or risk for further injury. 10/22 - Pt with TP and increased tissue tension in suboccipitals, R UT, and R SCM. Pt reports feeling a good stretch with UT stretch. Initiated B ER with TB this date. Pt required VC to keep elbows bent. Pt required less frequent VC for upright trunk posture and demonstrated improved technique with chin tuck. Continue to progress per pt tolerance. 10/19 - progressed TB shoulder ABD to orange band this date.  Pt with TP in R SCM which decreased with manual therapy. Pt reports decreased pain at end of session. 10/12 - progressed reaching to second shelf this date. Pt unable to reach second shelf with 1 pound weight, but demonstrated ability to touch shelf without weight. Pt with TP in L UT, L infraspinatus, and R SCM. Pt demonstrates improved posture with exercises and increased exercise tolerance. Continues to require seated rest breaks throughout session. 10/6 - Initiated reaching with 1 pound weight this date. Pt required verbal and tactile cues to deactivate UT and recruit scapular mm. Pt required less frequent rest breaks throughout treatment session this date. Pt requested ice this date as she states sometimes it decreases her pain at home. Continue to progress standing postural strengthening and re-ed exercises per pt tolerance. 10/4 - Progressed TB exercises to standing this date. Decreased to 1 set of 10 due to pt fatigue in standing. Initiated thoracic ext over chair with tactile cue to ext through thoracic spine vs cervical spine. Pt required VC for chin tuck with cervical rotation. Reports decreased pain with chin tuck + rotation. Continue to progress as tolerated. 10/1 - Pt required verbal and tactile cues to deactivate UT with TB exercises this date. Pt reports no pain throughout treatment session. Pt reports she has been more aware of her posture throughout the day. Pt with less TP and tissue tension in UT mm this date. 9/28 - Pt with decreased pain and tissue tension this date. Pt requires VC for appropriate form with chin tuck. Pt demonstrates improved posture with TB exercises. Pt reports she is more aware of her posture during the day, but has pain when she sits on a pillow. Plan to continue to progress postural education and scapular strengthening to further decrease pain and return to PLOF without pain or limitations.      9/23: Patient continues to require VC and TC during exercises in order to squeeze scapula together. Patient has difficulty maintaining posture and is struggling to find home solutions to help her adjust her body mechanics to incorporate improved posture habits during her ADL's and IADLs. In addition, pt concerned re safety at home. Patient has noted tenderness and tightness in bilat UT, levator, rhomboids, scalenes, SCM. Patient reports pain relief with STM. Plan to continue to progress appropriate postural strengthening and appropriate motor programming to increase cervical ROM. 9/21 see PT POC above    9/14 needs improved postures, especially for TV watching   9/9 Pt with decreased pain and tissue tension this date. STM primarily on L side this date and pain decreased after manual therapy. Pt reports ear pain is on and off and currently has no ear pain. Increased resistance with TB exercises this date. Pt reported increased difficulty but no increased pain. Plan to continue to progress postural strengthening and cervical ROM per pt tolerance. Treatment/Activity Tolerance:  [x] Patient able to complete tx  [] Patient limited by fatigue  [] Patient limited by pain  [] Patient limited by other medical complications  [] Other:     Prognosis: [x] Good [] Fair  [] Poor    Patient Requires Follow-up: [x] Yes  [] No    PLAN:  strength, ROM/flexibility, posture and body mechs, manual, MOC, HEP, pt education     Frequency/Duration: 2 days per week for 6  Weeks:  Interventions:  [x]? Therapeutic exercise including:strength, ROM, flexibility  [x]? NMR activation and proprioception including postural re-education  [x]? Manual therapy as indicated to include: IASTM, STM, PROM, Gr I-IV mobilizations, manipulation. [x]? Modalities as needed that may include: thermal agents, E-stim, Biofeedback, US, iontophoresis as indicated  [x]? Patient education on joint protection, postural re-education, activity modification, progression of HEP.       Plan for next treatment session: see flowsheet    PLAN: See eval. PT 2x / week for 6 weeks. + 2x/wk for 5 wks + 2x/wk for 6 wks   [x] Continue per plan of care [] Alter current plan (see comments)  [] Plan of care initiated [] Hold pending MD visit [] Discharge    Electronically signed by: Олег Quintanilla, PT, DPT      Note: If patient does not return for scheduled/ recommended follow up visits, this note will serve as a discharge from care along with most recent update on progress.

## 2021-12-13 ENCOUNTER — HOSPITAL ENCOUNTER (OUTPATIENT)
Dept: PHYSICAL THERAPY | Age: 81
Setting detail: THERAPIES SERIES
Discharge: HOME | End: 2021-12-13
Payer: MEDICARE

## 2021-12-13 PROCEDURE — 97110 THERAPEUTIC EXERCISES: CPT

## 2021-12-13 PROCEDURE — 97140 MANUAL THERAPY 1/> REGIONS: CPT

## 2021-12-13 NOTE — FLOWSHEET NOTE
[facility]  Phone: [phone]   Fax: [phone]        Physical Therapy Daily Treatment Note  Date:  2021    Patient Name:  Elayne Ribeiro    :  1940  MRN: [id number]  Medical/Treatment Diagnosis Information:  Diagnosis: cervical disc disease, R TMJ  Treatment Diagnosis: pt with painful myofascial changes, impaired posture including forward head posture, reduced postural strengths. hypomobility t/o spine, especially at upper cerv spine  Insurance/Certification information:  PT Insurance Information: medicare  Physician Information:  Referring Practitioner: Dr. Jeb Florian of care signed (Y/N): []  Yes [x]  No     Date of Patient follow up with Physician:     POC: 11/10      Progress Report: [x]  Yes  []  No     Progress report due (10 Rx/or 30 days whichever is less): visit #68 or     Recertification due (POC duration/ or 90 days whichever is less): visit #12 or     Visit # Insurance Allowable Auth required? Date Range    + 10/10 +   Med nec []  Yes  [x]  No NA       Units approved Units used Date Range   NA NA NA       Latex Allergy:  [x]NO      []YES  Preferred Language for Healthcare:   [x]English       []other:    Functional Scale:        Date assessed:  NDI raw score = 18, dysfunction =36 %, taken at initial eval  NDI raw score = 20; dysfunction = 40%    10/26/21  NDI raw score = 17; dysfunction = 34%    11/10/21       Pain level: 2/10    SUBJECTIVE:     - Pt reports she woke up in the middle of the night with pain in the back of her neck. Pain has gotten better throughout the day. : feeling much better than she did on Tuesday. Reports she is now 4'11; used to be 5'2\"  : Pt reports she had some ear pain yesterday for the first time in a while. States she is a little sore from tree trimming and laundry. : Patient reports that her legs are tired today.  Says her neck is a little sore today from putting ornaments on her [address]. 11/30 reports the biomed ointment cont to help. Reports any head pain tends to be in the evenings  11/26 reports biomed ointment has been helped. Yesterday she was sitting infront of the sliding glass door and the cold increased her neck pain. She was sore after LV due to transfers. States she is getting better. 11/23 feels much better after starting a new rub in medicine   11/10 - Pt reports she went to see the MD yesterday and she thought she would benefit from further therapy. Reports MD asked if anyone here did needling as it helped her with her shoulder. Pt brought referral for additional PT 2x/wk for 6 wks. 11/5 - Pt reports she felt sore this morning but has decreased pain now. Reports she felt tired after LV.  11/3 - Pt reports she had some tingling in her head a couple days ago but it went away. Reports pain in her ear has decreased. 10/29 - Pt reports her pain is less today. States she feels she can move her neck further. 10/26 - Pt reports she had no pain yesterday. States pain is a little higher today after driving a lot today. Had to park far away and she is tired. 10/22 - Pt reports her pain typically gets better throughout the day. States she has some ear pain today. 10/19 - Pt reports she almost fell this morning. She was reading the paper and went to sit in a rolling chair and the chair slipped. She caught herself but thinks this could be attributing to her pain. 10/12 - pt reports she is a little sore today. States her head and ear were bothering her Sunday but that has since gone away. 10/6 - Pt reports more pain today in her ear and neck. States she carried her groceries on the opposite side and isn't sure what increased her pain. 10/4 Pt reports she is more sore today. She cooked a pot of chili yesterday and thinks she may have over done it. 10/1 pt reports her pain comes and goes but she continues to feel better.  States her son is getting his stress test currently. Brought her calendar to schedule additional visits. 9/28 - Pt states she has a lot going on with her son's health. He is supposed to get a stress test Saturday. Reports some ear pain when she woke up, but feels good now.   9/23: Patient reports that she is feeling well today and not having too much pain. Patient states that she is trying her best to create a more ergonomic home desk environment for her laptop. Patient states that she has not found a solution that she prefers yet and will coninue to try and find a solution that works for her. Pt asks re life alert systems for home use  9/21 increasing mobility without increased pain. Reports she is still taking the mm relaxer and reports she took Tiffany Codding when she was stressed and noticed increased neck mm pain And it seemed to help. States her son hopes to get hernia surgery soon. Intermittent R ear pain with combing her hair   9/14 Intermittent pain still gets pain in neck and face/ear. Thinks PT tx is helping. Reports sleeping is good. Can turn  to the right better    OBJECTIVE: 12/9 gait and posture: pt amb with 45 deg trunk flex - flex from hip and thoracic kyphosis   11/23 script from Dr. Colt Dent has been scanned into media section requesting dry needling is possible. Gave pt DN info form and waiver. 11/10  cerv AROM  R L  SB  35 38  Rot  61 50    Strength / Myotomes RIGHT LEFT   Shoulder Flex 4+ 4+   Shoulder Abduction (C5) 4+ 4      stretch   L: 28#  R: 30#    Increased tissue tension and TP in B cervical paraspinal mm, B UT, B rhomboids, and B SCM     10/26  cerv AROM  R L  SB  40 35  Rot  60 59    Strength / Myotomes RIGHT LEFT   Shoulder Flex 4+ 4+   Shoulder Abduction (C5) 4+ 4       9/23 Patient ambulates with AD 1MB, Patient ambulates with forward head, rounded shoulders, and kyphotic curve in T-spine. 9/14 pt reports slouched posture with excessive cerv flex when she watches TV.   Palpation: Myofascial pain R>L UT, scalenes. SCM and masseter WFL B.    cerv AROM  R L  SB  40 25  Rot  60 50    9/9 - Pt with no clicking or popping in TMJ, palpation of R SCM increases ear pain   8/31 - pt reports she has a spot that is iching and burning on her back. Skin inspection - small, red oval on R scapular region with smooth borders. Pt reports she has a dermatology appointment within the next 2 weeks. 8/25 - amb with RW this date. Ruston Else is too tall, but it does not lower further. Increased tissue tension in R SCM and scalenes. RESTRICTIONS/PRECAUTIONS: scoliosis, osteopososis/osteopenia; has lost 3 friends and her  and brother int he last 17 months, joint replacements ( B knees, R ankle, B shoudlers): x 5. Lives with her son who has multiple medical issues including COPD and heart issues. Easy bruising.    Spinal cord stimulator (lumbar) put in by Dr. Silver Hannah - recently had new battery put in.        Exercises/Interventions:     Therapeutic Exercises (72603) Resistance / level Sets/sec Reps Notes   Arm bike   2.5 min fwd/2.5 min back      UT stretch - HAND HOLDING CHAIR      Chin tuck -   Scap squeeze   Chin tuck + scap squeeze  2 sec     10      Pulleys - flexion and abd  5 sec  15 B each Completed to promote thoracic ext    Seated:    cerv AROM   SB  Rot  UT stretches  scalene stretches  cerv retraction  scap retraction Sitting with pillows and towel roll at lumbar spine to provide upright sitting posture with good support  10x3\" B   10x3\" B            Standing TB  High row   Mid row  LPD   Horizontal abd - seated  B ER - seated  [place] 2   [address]  10 10 10   Seated thoracic extension   3 sec 10     SCM stretch   L increased pain on R side    LT wall slides  -flexion   - abd      Manual cues to deactivate UT    Therapeutic Activities (41213)       Sit <-->supine <--> L s/l <-->prone    11/23 attempted prone to see if pt could comfortably attain position needed for DN   Instruct in upright sitting posture for watching TV       Standing reaching to shelf - second shelf  - flexion  - abd Tactile and verbal cues to deactivate UT and use scapular mm to reach. CGA          Progress note completed this date - objective measures taken and discussed goals and progress made with therapy. Educated on [address]. Pt to be scheduled with PT that dry needles as was on MD order              Neuromuscular Re-ed (96542)       Postural ed                                          Manual Intervention ([phone])       manual - STM and TPR B UT mm and R SCM,levator  suboccipitals        9 min  8/27 Done on reclined mat table  9/21, 9/7, 8/30 completed seated in chair                                           Modalities:   12/13 - 1 cervical cold pack to cspine x 10 min at end of session   12/6, 11/26, 11/10, 11/5, 11/3, 10/29, 10/26, 10/22 - cervical MHP seated in chair at end of session x 10 min    10/19, 10/12, 10/6 - 1 cervical cold pack to cspine x 10 min at end of session   10/4, 10/1, 9/28 - cervical MHP seated in chair at end of session x 10 min   9/23, 9/14 9/9, 9/7, 9/2 - cervical MHP seated in chair at end of session x 15 min   8/31- cervical MHP seated in chair at end of session x 12 min   8/27- cervical MHP on reclined mat table x 15 min   8/25 - cervical MHP seated in chair at end of session x 10 min     Pt. Education:  12/9 review self STM and HEP, review stress management techs; use of heat, self massage - gentle, gentle stretches to manage pain neural irritation causing strange sensations to head  11/30 how to do self STM to [facility]  11/23 extensive pt ed re dry needling, risks and benefits. D/w pt that scoliosis, osteoporosis/penia, easy bruising, blood thinners, difficulty getting into prone position are all factors that limit appropriateness of DN  Gave her info packet to read and take home.  Educated pt that due to her multiple medical issues & difficutly being comfy in prone, PT advises against DN for safety reasons    9/23: pt Educated re safety in home /fall risk reduction. At pt request d/w pt re life alert systems. Patient ed  on option of obtaining an apple watch to use as a \"life alert\" system. Patient likes this idea and state she will have her son help her to look into it.   8/31 - Pt educated on ABCDs of melanoma and importance of skin checks as she reports concern about itching spot on her back. Pt has dermatology appointment in 2 wks. 8/19 patient educated on diagnosis, prognosis and expectations for rehab  -all patient questions were answered    Home Exercise Program:  11/30 self STM to [facility]  11/23 cont with bands and stretches, posture  9/23 computer ergonomics and apple watch for fall risk reduction  9/21  computer ergonomics  9/14 upright psoture for TV viewing  8/19 cerv AROM SB/ROT      Therapeutic Exercise and NMR EXR  [] ([id number]) Provided verbal/tactile cueing for activities related to strengthening, flexibility, endurance, ROM for improvements in  [] LE / Lumbar: LE, proximal hip, and core control with self care, mobility, lifting, ambulation. [] UE / Cervical: cervical, postural, scapular, scapulothoracic and UE control with self care, reaching, carrying, lifting, house/yardwork, driving, computer work.  [] (79672) Provided verbal/tactile cueing for activities related to improving balance, coordination, kinesthetic sense, posture, motor skill, proprioception to assist with   [] LE / lumbar: LE, proximal hip, and core control in self care, mobility, lifting, ambulation and eccentric single leg control.    [] UE / cervical: cervical, scapular, scapulothoracic and UE control with self care, reaching, carrying, lifting, house/yardwork, driving, computer work.   [] (64970) Therapist is in constant attendance of 2 or more patients providing skilled therapy interventions, but not providing any significant amount of measurable one-on-one time to either patient, for improvements in  [] LE / lumbar: LE, proximal hip, and core control in self care, mobility, lifting, ambulation and eccentric single leg control. [] UE / cervical: cervical, scapular, scapulothoracic and UE control with self care, reaching, carrying, lifting, house/yardwork, driving, computer work. NMR and Therapeutic Activities:    [] (32000 or 82953) Provided verbal/tactile cueing for activities related to improving balance, coordination, kinesthetic sense, posture, motor skill, proprioception and motor activation to allow for proper function of   [] LE: / Lumbar core, proximal hip and LE with self care and ADLs  [] UE / Cervical: cervical, postural, scapular, scapulothoracic and UE control with self care, carrying, lifting, driving, computer work.   [] (53299) Gait Re-education- Provided training and instruction to the patient for proper LE, core and proximal hip recruitment and positioning and eccentric body weight control with ambulation re-education including up and down stairs     Home Management Training / Self Care:  [] (03265) Provided self-care/home management training related to activities of daily living and compensatory training, and/or use of adaptive equipment for improvement with: ADLs and compensatory training, meal preparation, safety procedures and instruction in use of adaptive equipment, including bathing, grooming, dressing, personal hygiene, basic household cleaning and chores.      Home Exercise Program:    [x] (00114) Reviewed/Progressed HEP activities related to strengthening, flexibility, endurance, ROM of   [] LE / Lumbar: core, proximal hip and LE for functional self-care, mobility, lifting and ambulation/stair navigation   [] UE / Cervical: cervical, postural, scapular, scapulothoracic and UE control with self care, reaching, carrying, lifting, house/yardwork, driving, computer work  [] (12111)Reviewed/Progressed HEP activities related to improving balance, coordination, kinesthetic sense, posture, motor skill, proprioception of   [] LE: core, proximal hip and LE for self care, mobility, lifting, and ambulation/stair navigation    [] UE / Cervical: cervical, postural,  scapular, scapulothoracic and UE control with self care, reaching, carrying, lifting, house/yardwork, driving, computer work    Manual Treatments:  PROM / STM / Oscillations-Mobs:  G-I, II, III, IV (PA's, Inf., Post.)  [] (08730) Provided manual therapy to mobilize LE, proximal hip and/or LS spine soft tissue/joints for the purpose of modulating pain, promoting relaxation,  increasing ROM, reducing/eliminating soft tissue swelling/inflammation/restriction, improving soft tissue extensibility and allowing for proper ROM for normal function with   [] LE / lumbar: self care, mobility, lifting and ambulation. [] UE / Cervical: self care, reaching, carrying, lifting, house/yardwork, driving, computer work. Modalities:  [] (29703) Vasopneumatic compression: Utilized vasopneumatic compression to decrease edema / swelling for the purpose of improving mobility and quad tone / recruitment which will allow for increased overall function including but not limited to self-care, transfers, ambulation, and ascending / descending stairs. Charges:  Timed Code Treatment Minutes: 40   Total Treatment Minutes: 50     [] EVAL - LOW (58372)   [] EVAL - MOD (12305)  [] EVAL - HIGH (05445)  [] RE-EVAL (91938)  [x] DA(37168) x   2    [] Ionto  [] NMR (68727) x       [] Vaso  [x] Manual (18352) x 1      [] Ultrasound  [] TA x   1     [] Mech Traction (85674)  [] Aquatic Therapy x      [] ES (un) (44894):   [] Home Management Training x  [] ES(attended) (52721)   [] Dry Needling 1-2 muscles (18304):  [] Dry Needling 3+ muscles (658170)  [] Group:      [] Other:          GOALS:  Patient stated goal: less pain  [x]? Progressing: []? Met: []? Not Met: []? Adjusted     Therapist goals for Patient:   Short Term Goals: To be achieved in: 2 weeks  1.  Independent in HEP and progression per patient tolerance, in order to prevent re-injury. []? Progressing: [x]? Met: []? Not Met: []? Adjusted  2. Patient will have a decrease in pain to facilitate improvement in movement, function, and ADLs as indicated by improvement with respect to Functional Deficits. []? Progressing: [x]? Met: []? Not Met: []? Adjusted     Long Term Goals: To be achieved in: 6 weeks  1. Disability index score of 10% or less on the  NDI  to assist with reaching prior level of function. [x]? Progressing: []? Met: []? Not Met: []? Adjusted  2. Patient will demonstrate increased AROM  to [facility], to allow for proper joint functioning to allow pt to resume turning head while driving without increase in symptoms. [x]? Progressing: []? Met: []? Not Met: []? Adjusted  3. Patient will demonstrate increased Strength by 1/2 mmt grade  patients Functional Deficits to allow pt to resume amb with/without RW prn for amb in community without increase in symptoms. []? Progressing: [x]? Met: []? Not Met: []? Adjusted  4. Patient will return to functional activities including falling asleep without difficulty without increased symptoms or restriction. []? Progressing: [x]? Met: []? Not Met: []? Adjusted  5. Pt will report ability to drive without pain or restrictions. - ADDED 11/10/21   []? Progressing: []? Met: []? Not Met: []? Adjusted        Overall Progression Towards Functional goals/ Treatment Progress Update:  [x] Patient is progressing as expected towards functional goals listed. [] Progression is slowed due to complexities/Impairments listed. [] Progression has been slowed due to co-morbidities.   [] Plan just implemented, too soon to assess goals progression <30days   [] Goals require adjustment due to lack of progress  [] Patient is not progressing as expected and requires additional follow up with physician  [] Other    Persisting Functional Limitations/Impairments:  [x]Sleeping []Sitting               []Standing []Transfers        []Walking []Kneeling               []Stairs []Squatting / bending   [x]ADLs []Reaching  [x]Lifting  [x]Housework  [x]Driving []Job related tasks  []Sports/Recreation []Other:        ASSESSMENT:   12/13 Pt with decreased  Tissue tension in c spine. Demonstrates improved posture with exercise this date and improved exercise endurance requiring less rest throughout treatment session. Continue to progress per pt tolerance. 12/9 reduced myofascial pain B upper quarter. Pt having difficulty doing HEP regularly and having trouble doing self STM. Pt cont with high stress levels which contribute to cont pain. 12/6 - Pt with increased tissue tension in B UT and decreased tightness in B SCM mm. Pt required VC for decreased cervical ext with TB exercises. Pt demonstrates increased thoracic mobility and ext noted during standing TB exercises. Continue to progress per pt tolerance. 12/2: Patient performed periscapular strengthening and postural exercises seated this date due to fatigue in BLEs. The patient responded well today with manual STM to upper trap , scalenes muscle therapy. 11/30 good response to manual therapy. Will benefit from trial of self STM to [facility]    11/26 Pt demonstrates improved thoracic extension with high rows this date. Pt with decreased tissue tension in SCM this date, but continues with TP and increased tissue tension in R UT mm. Pt reports no increased pain throughout treatment session. Continue to progress per pt tolerance.      11/23 due to her multiple medical issues, difficutly being comfy in prone, and dizziness occurring with transitioning out of prone position (the position needed for DN of UT) and previous dizziness occurring with transition out of supine while in PT clinic (position needed for DN of SCM), PT advises against DN for safety reasons. Rec focus on manual therapy to reduce myofascial pain/TPs and approp exercise to restore normal functional use of UE.    11/10 Pt continues to present with decreased cervical AROM in SB and rotation. Pt with decreased  strength B and increased tissue tension in cervical mm. Pt demonstrates improved posture and exercise technique with therapy and improved function as noted by decreased dysfunction on the NDI. Pt continues to be limited in endurance with home management and driving secondary to decreased cervical rotation. Pt to be scheduled with therapist certified in DN as dry needling was added to MD referral. Plan to continue to progress postural re-ed and strengthening, cervical ROM, and manual therapy to decrease pain and tissue tension and improve strength, ROM, and endurance to return to PLOF without limitations. 11/5 - Pt reports decreased pain in neck this date. Pt demonstrates improved cervical rotation B this date with AROM and improved shoulder abd and flexion with wall slides. Plan to d/c to HEP NV.     11/3 - Pt demonstrates improved posture with TB exercises this date. Pt with improved awareness of her posture with ADLs. Pt with TP and increased tissue tension in BUT L>R this date. Continue to progress per pt tolerance. 10/29 - pt required VC to relax UT with B ER this date. Increased to 2 sets of 10 with green TB. Pt with increased tissue tension in R scalenes this date which decreased with gentle STM. Pt reports decreased pain and increased mobility with OP PT. Continue to progress postural strengthening/stretching per pt tolerance. 10/26 - Progress note completed this date. Pt demonstrates improvements in L cervical SB and rotation AROM. Pt with increased B shoulder flexion and abd strength. Pt reports decreased pain and increased ability to sleep without pain or discomfort.  Plan to focus remaining treatment sessions on postural strengthening and STM to return to PLOF without limitations or risk for further injury. 10/22 - Pt with TP and increased tissue tension in suboccipitals, R UT, and R SCM. Pt reports feeling a good stretch with UT stretch. Initiated B ER with TB this date. Pt required VC to keep elbows bent. Pt required less frequent VC for upright trunk posture and demonstrated improved technique with chin tuck. Continue to progress per pt tolerance. 10/19 - progressed TB shoulder ABD to orange band this date. Pt with TP in R SCM which decreased with manual therapy. Pt reports decreased pain at end of session. 10/12 - progressed reaching to second shelf this date. Pt unable to reach second shelf with 1 pound weight, but demonstrated ability to touch shelf without weight. Pt with TP in L UT, L infraspinatus, and R SCM. Pt demonstrates improved posture with exercises and increased exercise tolerance. Continues to require seated rest breaks throughout session. 10/6 - Initiated reaching with 1 pound weight this date. Pt required verbal and tactile cues to deactivate UT and recruit scapular mm. Pt required less frequent rest breaks throughout treatment session this date. Pt requested ice this date as she states sometimes it decreases her pain at home. Continue to progress standing postural strengthening and re-ed exercises per pt tolerance. 10/4 - Progressed TB exercises to standing this date. Decreased to 1 set of 10 due to pt fatigue in standing. Initiated thoracic ext over chair with tactile cue to ext through thoracic spine vs cervical spine. Pt required VC for chin tuck with cervical rotation. Reports decreased pain with chin tuck + rotation. Continue to progress as tolerated. 10/1 - Pt required verbal and tactile cues to deactivate UT with TB exercises this date. Pt reports no pain throughout treatment session. Pt reports she has been more aware of her posture throughout the day.  Pt with less TP and tissue tension in UT mm this date. 9/28 - Pt with decreased pain and tissue tension this date. Pt requires VC for appropriate form with chin tuck. Pt demonstrates improved posture with TB exercises. Pt reports she is more aware of her posture during the day, but has pain when she sits on a pillow. Plan to continue to progress postural education and scapular strengthening to further decrease pain and return to PLOF without pain or limitations. 9/23: Patient continues to require VC and TC during exercises in order to squeeze scapula together. Patient has difficulty maintaining posture and is struggling to find home solutions to help her adjust her body mechanics to incorporate improved posture habits during her ADL's and IADLs. In addition, pt concerned re safety at home. Patient has noted tenderness and tightness in bilat UT, levator, rhomboids, scalenes, SCM. Patient reports pain relief with STM. Plan to continue to progress appropriate postural strengthening and appropriate motor programming to increase cervical ROM. 9/21 see PT POC above    9/14 needs improved postures, especially for TV watching   9/9 Pt with decreased pain and tissue tension this date. STM primarily on L side this date and pain decreased after manual therapy. Pt reports ear pain is on and off and currently has no ear pain. Increased resistance with TB exercises this date. Pt reported increased difficulty but no increased pain. Plan to continue to progress postural strengthening and cervical ROM per pt tolerance.      Treatment/Activity Tolerance:  [x] Patient able to complete tx  [] Patient limited by fatigue  [] Patient limited by pain  [] Patient limited by other medical complications  [] Other:     Prognosis: [x] Good [] Fair  [] Poor    Patient Requires Follow-up: [x] Yes  [] No    PLAN:  strength, ROM/flexibility, posture and body mechs, manual, MOC, HEP, pt education     Frequency/Duration: 2 days per week for 6 Weeks:  Interventions:  [x]? Therapeutic exercise including:strength, ROM, flexibility  [x]? NMR activation and proprioception including postural re-education  [x]? Manual therapy as indicated to include: IASTM, STM, PROM, Gr I-IV mobilizations, manipulation. [x]? Modalities as needed that may include: thermal agents, E-stim, Biofeedback, US, iontophoresis as indicated  [x]? Patient education on joint protection, postural re-education, activity modification, progression of HEP. Plan for next treatment session: see flowsheet    PLAN: See eval. PT 2x / week for 6 weeks. + 2x/wk for 5 wks + 2x/wk for 6 wks   [x] Continue per plan of care [] Alter current plan (see comments)  [] Plan of care initiated [] Hold pending MD visit [] Discharge    Electronically signed by: Edelmira Carrillo PT, DPT      Note: If patient does not return for scheduled/ recommended follow up visits, this note will serve as a discharge from care along with most recent update on progress.

## 2021-12-16 ENCOUNTER — HOSPITAL ENCOUNTER (OUTPATIENT)
Dept: PHYSICAL THERAPY | Age: 81
Setting detail: THERAPIES SERIES
Discharge: HOME | End: 2021-12-16
Payer: MEDICARE

## 2021-12-16 PROCEDURE — 97110 THERAPEUTIC EXERCISES: CPT

## 2021-12-16 PROCEDURE — 97530 THERAPEUTIC ACTIVITIES: CPT

## 2021-12-16 NOTE — PROGRESS NOTES
[facility] Physical Therapy  Phone: [phone]   Fax: [phone]      Physical Therapy Daily Treatment Note  Date:  2021    Patient Name:  Jose Rafael Mustafa    :  1940  MRN: [id number]  Medical/Treatment Diagnosis Information:  Diagnosis: cervical disc disease, R TMJ  Treatment Diagnosis: pt with painful myofascial changes, impaired posture including forward head posture, reduced postural strengths. hypomobility t/o spine, especially at upper cerv spine  Insurance/Certification information:  PT Insurance Information: medicare  Physician Information:  Referring Practitioner: Dr. Jessica Olivia of care signed (Y/N): []  Yes [x]  No     Date of Patient follow up with Physician:     POC: 11/10   PN:       Progress Report: [x]  Yes  []  No     Progress report due (10 Rx/or 30 days whichever is less): visit #48 or     Recertification due (POC duration/ or 90 days whichever is less): visit #12 or     Visit # Insurance Allowable Auth required? Date Range    + 10/10 +   Med nec []  Yes  [x]  No NA     Latex Allergy:  [x]NO      []YES  Preferred Language for Healthcare:   [x]English       []other:    Functional Scale:        Date assessed:  NDI raw score = 18, dysfunction =36 %, taken at initial eval  NDI raw score = 20; dysfunction = 40%    10/26/21  NDI raw score = 17; dysfunction = 34%    11/10/21  NDI raw score = 11; dysfunction = 22%    21       Pain level: 2/10    SUBJECTIVE:     - Pt reports she is doing well today. Reports low pain. States she felt tired after her visit Monday.  - Pt reports she woke up in the middle of the night with pain in the back of her neck. Pain has gotten better throughout the day. : feeling much better than she did on Tuesday. Reports she is now 4'11; used to be 5'2\"  : Pt reports she had some ear pain yesterday for the first time in a while.  States she is a little sore from tree trimming and laundry. 12/2: Patient reports that her legs are tired today. Says her neck is a little sore today from putting ornaments on her [address]. 11/30 reports the biomed ointment cont to help. Reports any head pain tends to be in the evenings  11/26 reports biomed ointment has been helped. Yesterday she was sitting infront of the sliding glass door and the cold increased her neck pain. She was sore after LV due to transfers. States she is getting better. 11/23 feels much better after starting a new rub in medicine   11/10 - Pt reports she went to see the MD yesterday and she thought she would benefit from further therapy. Reports MD asked if anyone here did needling as it helped her with her shoulder. Pt brought referral for additional PT 2x/wk for 6 wks. 11/5 - Pt reports she felt sore this morning but has decreased pain now. Reports she felt tired after LV.  11/3 - Pt reports she had some tingling in her head a couple days ago but it went away. Reports pain in her ear has decreased. 10/29 - Pt reports her pain is less today. States she feels she can move her neck further. 10/26 - Pt reports she had no pain yesterday. States pain is a little higher today after driving a lot today. Had to park far away and she is tired. 10/22 - Pt reports her pain typically gets better throughout the day. States she has some ear pain today. 10/19 - Pt reports she almost fell this morning. She was reading the paper and went to sit in a rolling chair and the chair slipped. She caught herself but thinks this could be attributing to her pain. 10/12 - pt reports she is a little sore today. States her head and ear were bothering her Sunday but that has since gone away. 10/6 - Pt reports more pain today in her ear and neck. States she carried her groceries on the opposite side and isn't sure what increased her pain. 10/4 Pt reports she is more sore today.  She cooked a pot of chili yesterday and thinks she may have over done it. 10/1 pt reports her pain comes and goes but she continues to feel better. States her son is getting his stress test currently. Brought her calendar to schedule additional visits. 9/28 - Pt states she has a lot going on with her son's health. He is supposed to get a stress test Saturday. Reports some ear pain when she woke up, but feels good now.   9/23: Patient reports that she is feeling well today and not having too much pain. Patient states that she is trying her best to create a more ergonomic home desk environment for her laptop. Patient states that she has not found a solution that she prefers yet and will coninue to try and find a solution that works for her. Pt asks re life alert systems for home use  9/21 increasing mobility without increased pain. Reports she is still taking the mm relaxer and reports she took Sarina Gravely when she was stressed and noticed increased neck mm pain And it seemed to help. States her son hopes to get hernia surgery soon. Intermittent R ear pain with combing her hair   9/14 Intermittent pain still gets pain in neck and face/ear. Thinks PT tx is helping. Reports sleeping is good. Can turn  to the right better    OBJECTIVE:   12/16  cerv AROM  R L  SB  45 35  Rot  72 68    Strength / Myotomes RIGHT LEFT   Shoulder Flex 4+ 4   Shoulder Abduction (C5) 4+ 4       12/9 gait and posture: pt amb with 45 deg trunk flex - flex from hip and thoracic kyphosis  11/23 script from Dr. Benny Mcgowan has been scanned into media section requesting dry needling is possible. Gave pt DN info form and waiver.    11/10  cerv AROM  R L  SB  35 38  Rot  61 50    Strength / Myotomes RIGHT LEFT   Shoulder Flex 4+ 4+   Shoulder Abduction (C5) 4+ 4      stretch   L: 28#  R: 30#    Increased tissue tension and TP in B cervical paraspinal mm, B UT, B rhomboids, and B SCM     10/26  cerv AROM  R L  SB  40 35  Rot  60 59    Strength / Myotomes RIGHT LEFT   Shoulder Flex 4+ 4+   Shoulder Abduction (C5) 4+ 4       9/23 Patient ambulates with AD 9PO, Patient ambulates with forward head, rounded shoulders, and kyphotic curve in T-spine. 9/14 pt reports slouched posture with excessive cerv flex when she watches TV. Palpation: Myofascial pain R>L UT, scalenes. SCM and masseter WFL B.    cerv AROM  R L  SB  40 25  Rot  60 50    9/9 - Pt with no clicking or popping in TMJ, palpation of R SCM increases ear pain   8/31 - pt reports she has a spot that is iching and burning on her back. Skin inspection - small, red oval on R scapular region with smooth borders. Pt reports she has a dermatology appointment within the next 2 weeks. 8/25 - amb with RW this date. Louann Flores is too tall, but it does not lower further. Increased tissue tension in R SCM and scalenes. RESTRICTIONS/PRECAUTIONS: scoliosis, osteopososis/osteopenia; has lost 3 friends and her  and brother int he last 17 months, joint replacements ( B knees, R ankle, B shoudlers): x 5. Lives with her son who has multiple medical issues including COPD and heart issues. Easy bruising.    Spinal cord stimulator (lumbar) put in by Dr. Loraine Jackson - recently had new battery put in.        Exercises/Interventions:     Therapeutic Exercises (17282) Resistance / level Sets/sec Reps Notes   Arm bike   2.5 min fwd/2.5 min back      UT stretch - HAND HOLDING CHAIR      Chin tuck -   Scap squeeze   Chin tuck + scap squeeze  2 sec     10      Pulleys - flexion and abd  5 sec  15 B each Completed to promote thoracic ext    Seated:    cerv AROM   SB  Rot  UT stretches  scalene stretches  cerv retraction  scap retraction Sitting with pillows and towel roll at lumbar spine to provide upright sitting posture with good support  10x3\" B   10x3\" B            Standing TB  High row   Mid row  LPD   Horizontal abd - seated  B ER - seated  Green  Green   GreenGreen    Seated thoracic extension      SCM stretch   L increased pain on R side    LT wall slides  -flexion   - abd      Manual cues to deactivate UT    Therapeutic Activities (85833)       Sit <-->supine <--> L s/l <-->prone    11/23 attempted prone to see if pt could comfortably attain position needed for DN   Instruct in upright sitting posture for watching TV       Standing reaching to shelf - second shelf  - flexion  - abd Tactile and verbal cues to deactivate UT and use scapular mm to reach. CGA          Progress note completed this date - objective measures taken and discussed goals and progress made with therapy. Educated on [address]. Pt to be scheduled with PT that dry needles as was on MD order  X 15 min             Neuromuscular Re-ed (16002)       Postural ed                                          Manual Intervention ([phone])       manual - STM and TPR B UT mm and R SCM,levator  suboccipitals          8/27 Done on reclined mat table  9/21, 9/7, 8/30 completed seated in chair                                           Modalities:   12/13 - 1 cervical cold pack to cspine x 10 min at end of session   12/6, 11/26, 11/10, 11/5, 11/3, 10/29, 10/26, 10/22 - cervical MHP seated in chair at end of session x 10 min    10/19, 10/12, 10/6 - 1 cervical cold pack to cspine x 10 min at end of session   10/4, 10/1, 9/28 - cervical MHP seated in chair at end of session x 10 min   9/23, 9/14 9/9, 9/7, 9/2 - cervical MHP seated in chair at end of session x 15 min   8/31- cervical MHP seated in chair at end of session x 12 min   8/27- cervical MHP on reclined mat table x 15 min   8/25 - cervical MHP seated in chair at end of session x 10 min     Pt.  Education:  12/16 - educated on progress made with therapy and focus on posture, stretching, and cervical rotation for driving with remaining visits  12/9 review self STM and HEP, review stress management techs; use of heat, self massage - gentle, gentle stretches to manage pain neural irritation causing strange sensations to head  11/30 how to do self STM to [facility]  11/23 extensive pt ed re dry needling, risks and benefits. D/w pt that scoliosis, osteoporosis/penia, easy bruising, blood thinners, difficulty getting into prone position are all factors that limit appropriateness of DN  Gave her info packet to read and take home. Educated pt that due to her multiple medical issues & difficutly being comfy in prone, PT advises against DN for safety reasons    9/23: pt Educated re safety in home /fall risk reduction. At pt request d/w pt re life alert systems. Patient ed  on option of obtaining an apple watch to use as a \"life alert\" system. Patient likes this idea and state she will have her son help her to look into it.   8/31 - Pt educated on ABCDs of melanoma and importance of skin checks as she reports concern about itching spot on her back. Pt has dermatology appointment in 2 wks. 8/19 patient educated on diagnosis, prognosis and expectations for rehab  -all patient questions were answered    Home Exercise Program:  11/30 self STM to [facility]  11/23 cont with bands and stretches, posture  9/23 computer ergonomics and apple watch for fall risk reduction  9/21  computer ergonomics  9/14 upright psoture for TV viewing  8/19 cerv AROM SB/ROT      Therapeutic Exercise and NMR EXR  [] [phone]) Provided verbal/tactile cueing for activities related to strengthening, flexibility, endurance, ROM for improvements in  [] LE / Lumbar: LE, proximal hip, and core control with self care, mobility, lifting, ambulation. [] UE / Cervical: cervical, postural, scapular, scapulothoracic and UE control with self care, reaching, carrying, lifting, house/yardwork, driving, computer work.  [] (39933) Provided verbal/tactile cueing for activities related to improving balance, coordination, kinesthetic sense, posture, motor skill, proprioception to assist with   [] LE / lumbar: LE, proximal hip, and core control in self care, mobility, lifting, ambulation and eccentric single leg control.    [] UE / cervical: cervical, scapular, scapulothoracic and UE control with self care, reaching, carrying, lifting, house/yardwork, driving, computer work.   [] (95210) Therapist is in constant attendance of 2 or more patients providing skilled therapy interventions, but not providing any significant amount of measurable one-on-one time to either patient, for improvements in  [] LE / lumbar: LE, proximal hip, and core control in self care, mobility, lifting, ambulation and eccentric single leg control. [] UE / cervical: cervical, scapular, scapulothoracic and UE control with self care, reaching, carrying, lifting, house/yardwork, driving, computer work. NMR and Therapeutic Activities:    [] (41214 or 51662) Provided verbal/tactile cueing for activities related to improving balance, coordination, kinesthetic sense, posture, motor skill, proprioception and motor activation to allow for proper function of   [] LE: / Lumbar core, proximal hip and LE with self care and ADLs  [] UE / Cervical: cervical, postural, scapular, scapulothoracic and UE control with self care, carrying, lifting, driving, computer work.   [] (97863) Gait Re-education- Provided training and instruction to the patient for proper LE, core and proximal hip recruitment and positioning and eccentric body weight control with ambulation re-education including up and down stairs     Home Management Training / Self Care:  [] (57818) Provided self-care/home management training related to activities of daily living and compensatory training, and/or use of adaptive equipment for improvement with: ADLs and compensatory training, meal preparation, safety procedures and instruction in use of adaptive equipment, including bathing, grooming, dressing, personal hygiene, basic household cleaning and chores.      Home Exercise Program:    [x] (30954) Reviewed/Progressed HEP activities related to strengthening, flexibility, endurance, ROM of   [] LE / Lumbar: core, proximal hip and LE for functional self-care, mobility, lifting and ambulation/stair navigation   [] UE / Cervical: cervical, postural, scapular, scapulothoracic and UE control with self care, reaching, carrying, lifting, house/yardwork, driving, computer work  [] (88373)Reviewed/Progressed HEP activities related to improving balance, coordination, kinesthetic sense, posture, motor skill, proprioception of   [] LE: core, proximal hip and LE for self care, mobility, lifting, and ambulation/stair navigation    [] UE / Cervical: cervical, postural,  scapular, scapulothoracic and UE control with self care, reaching, carrying, lifting, house/yardwork, driving, computer work    Manual Treatments:  PROM / STM / Oscillations-Mobs:  G-I, II, III, IV (PA's, Inf., Post.)  [] (35868) Provided manual therapy to mobilize LE, proximal hip and/or LS spine soft tissue/joints for the purpose of modulating pain, promoting relaxation,  increasing ROM, reducing/eliminating soft tissue swelling/inflammation/restriction, improving soft tissue extensibility and allowing for proper ROM for normal function with   [] LE / lumbar: self care, mobility, lifting and ambulation. [] UE / Cervical: self care, reaching, carrying, lifting, house/yardwork, driving, computer work. Modalities:  [] (62570) Vasopneumatic compression: Utilized vasopneumatic compression to decrease edema / swelling for the purpose of improving mobility and quad tone / recruitment which will allow for increased overall function including but not limited to self-care, transfers, ambulation, and ascending / descending stairs.        Charges:  Timed Code Treatment Minutes: 40   Total Treatment Minutes: 40     [] EVAL - LOW (51953)   [] EVAL - MOD (49129)  [] EVAL - HIGH (70258)  [] RE-EVAL (00382)  [x] MR(99231) x   2    [] Ionto  [] NMR (98013) x       [] Vaso  [] Manual (01430) x 1      [] Ultrasound  [x] TA x   1     [] Mech Traction (36849)  [] Aquatic Therapy x      [] ES (un) (32141):   [] Home Management Training x  [] ES(attended) (63708)   [] Dry Needling 1-2 muscles (49256):  [] Dry Needling 3+ muscles (120497)  [] Group:      [] Other:          GOALS:  Patient stated goal: less pain  []? Progressing: [x]? Met: []? Not Met: []? Adjusted     Therapist goals for Patient:   Short Term Goals: To be achieved in: 2 weeks  1. Independent in HEP and progression per patient tolerance, in order to prevent re-injury. []? Progressing: [x]? Met: []? Not Met: []? Adjusted  2. Patient will have a decrease in pain to facilitate improvement in movement, function, and ADLs as indicated by improvement with respect to Functional Deficits. []? Progressing: [x]? Met: []? Not Met: []? Adjusted     Long Term Goals: To be achieved in: 6 weeks  1. Disability index score of 10% or less on the  NDI  to assist with reaching prior level of function. [x]? Progressing: []? Met: []? Not Met: []? Adjusted  2. Patient will demonstrate increased AROM  to [facility], to allow for proper joint functioning to allow pt to resume turning head while driving without increase in symptoms. []? Progressing: [x]? Met: []? Not Met: []? Adjusted  3. Patient will demonstrate increased Strength by 1/2 mmt grade  patients Functional Deficits to allow pt to resume amb with/without RW prn for amb in community without increase in symptoms. []? Progressing: [x]? Met: []? Not Met: []? Adjusted  4. Patient will return to functional activities including falling asleep without difficulty without increased symptoms or restriction. []? Progressing: [x]? Met: []? Not Met: []? Adjusted  5. Pt will report ability to drive without pain or restrictions. - ADDED 11/10/21   [x]? Progressing: []? Met: []? Not Met: []? Adjusted        Overall Progression Towards Functional goals/ Treatment Progress Update:  [x] Patient is progressing as expected towards functional goals listed. [] Progression is slowed due to complexities/Impairments listed.   [] Progression has been slowed due to co-morbidities. [] Plan just implemented, too soon to assess goals progression <30days   [] Goals require adjustment due to lack of progress  [] Patient is not progressing as expected and requires additional follow up with physician  [] Other    Persisting Functional Limitations/Impairments:  [x]Sleeping []Sitting               []Standing []Transfers        []Walking []Kneeling               []Stairs []Squatting / bending   [x]ADLs []Reaching  [x]Lifting  [x]Housework  [x]Driving []Job related tasks  []Sports/Recreation []Other:        ASSESSMENT:   12/16 - Progress note completed this date. Pt demonstrates improved cervical AROM in both B SB and rotation. Pt continues to have some shoulder flexion and abd weakness (L>R). Pt demonstrates improved function as noted by decreased dysfunction on the NDI. Pt reports continued limitations in driving with turning over her L shoulder. Plan to complete remaining visits focusing on postural re-ed, stretching, and cervical AROM and d/c to HEP.     12/13 Pt with decreased  Tissue tension in c spine. Demonstrates improved posture with exercise this date and improved exercise endurance requiring less rest throughout treatment session. Continue to progress per pt tolerance. 12/9 reduced myofascial pain B upper quarter. Pt having difficulty doing HEP regularly and having trouble doing self STM. Pt cont with high stress levels which contribute to cont pain. 12/6 - Pt with increased tissue tension in B UT and decreased tightness in B SCM mm. Pt required VC for decreased cervical ext with TB exercises. Pt demonstrates increased thoracic mobility and ext noted during standing TB exercises. Continue to progress per pt tolerance. 12/2: Patient performed periscapular strengthening and postural exercises seated this date due to fatigue in BLEs. The patient responded well today with manual STM to upper trap , scalenes muscle therapy. 11/30 good response to manual therapy. Will benefit from trial of self STM to [facility]    11/26 Pt demonstrates improved thoracic extension with high rows this date. Pt with decreased tissue tension in SCM this date, but continues with TP and increased tissue tension in R UT mm. Pt reports no increased pain throughout treatment session. Continue to progress per pt tolerance. 11/23 due to her multiple medical issues, difficutly being comfy in prone, and dizziness occurring with transitioning out of prone position (the position needed for DN of UT) and previous dizziness occurring with transition out of supine while in PT clinic (position needed for DN of SCM), PT advises against DN for safety reasons. Rec focus on manual therapy to reduce myofascial pain/TPs and approp exercise to restore normal functional use of UE.    11/10 Pt continues to present with decreased cervical AROM in SB and rotation. Pt with decreased  strength B and increased tissue tension in cervical mm. Pt demonstrates improved posture and exercise technique with therapy and improved function as noted by decreased dysfunction on the NDI. Pt continues to be limited in endurance with home management and driving secondary to decreased cervical rotation. Pt to be scheduled with therapist certified in DN as dry needling was added to MD referral. Plan to continue to progress postural re-ed and strengthening, cervical ROM, and manual therapy to decrease pain and tissue tension and improve strength, ROM, and endurance to return to PLOF without limitations. 11/5 - Pt reports decreased pain in neck this date. Pt demonstrates improved cervical rotation B this date with AROM and improved shoulder abd and flexion with wall slides. Plan to d/c to HEP NV.     11/3 - Pt demonstrates improved posture with TB exercises this date.  Pt with improved awareness of her posture with ADLs. Pt with TP and increased tissue tension in BUT L>R this date. Continue to progress per pt tolerance. 10/29 - pt required VC to relax UT with B ER this date. Increased to 2 sets of 10 with green TB. Pt with increased tissue tension in R scalenes this date which decreased with gentle STM. Pt reports decreased pain and increased mobility with OP PT. Continue to progress postural strengthening/stretching per pt tolerance. 10/26 - Progress note completed this date. Pt demonstrates improvements in L cervical SB and rotation AROM. Pt with increased B shoulder flexion and abd strength. Pt reports decreased pain and increased ability to sleep without pain or discomfort. Plan to focus remaining treatment sessions on postural strengthening and STM to return to PLOF without limitations or risk for further injury. 10/22 - Pt with TP and increased tissue tension in suboccipitals, R UT, and R SCM. Pt reports feeling a good stretch with UT stretch. Initiated B ER with TB this date. Pt required VC to keep elbows bent. Pt required less frequent VC for upright trunk posture and demonstrated improved technique with chin tuck. Continue to progress per pt tolerance. 10/19 - progressed TB shoulder ABD to orange band this date. Pt with TP in R SCM which decreased with manual therapy. Pt reports decreased pain at end of session. 10/12 - progressed reaching to second shelf this date. Pt unable to reach second shelf with 1 pound weight, but demonstrated ability to touch shelf without weight. Pt with TP in L UT, L infraspinatus, and R SCM. Pt demonstrates improved posture with exercises and increased exercise tolerance. Continues to require seated rest breaks throughout session. 10/6 - Initiated reaching with 1 pound weight this date. Pt required verbal and tactile cues to deactivate UT and recruit scapular mm. Pt required less frequent rest breaks throughout treatment session this date.  Pt requested ice this date as she states sometimes it decreases her pain at home. Continue to progress standing postural strengthening and re-ed exercises per pt tolerance. 10/4 - Progressed TB exercises to standing this date. Decreased to 1 set of 10 due to pt fatigue in standing. Initiated thoracic ext over chair with tactile cue to ext through thoracic spine vs cervical spine. Pt required VC for chin tuck with cervical rotation. Reports decreased pain with chin tuck + rotation. Continue to progress as tolerated. 10/1 - Pt required verbal and tactile cues to deactivate UT with TB exercises this date. Pt reports no pain throughout treatment session. Pt reports she has been more aware of her posture throughout the day. Pt with less TP and tissue tension in UT mm this date. 9/28 - Pt with decreased pain and tissue tension this date. Pt requires VC for appropriate form with chin tuck. Pt demonstrates improved posture with TB exercises. Pt reports she is more aware of her posture during the day, but has pain when she sits on a pillow. Plan to continue to progress postural education and scapular strengthening to further decrease pain and return to PLOF without pain or limitations. 9/23: Patient continues to require VC and TC during exercises in order to squeeze scapula together. Patient has difficulty maintaining posture and is struggling to find home solutions to help her adjust her body mechanics to incorporate improved posture habits during her ADL's and IADLs. In addition, pt concerned re safety at home. Patient has noted tenderness and tightness in bilat UT, levator, rhomboids, scalenes, SCM. Patient reports pain relief with STM. Plan to continue to progress appropriate postural strengthening and appropriate motor programming to increase cervical ROM. 9/21 see PT POC above    9/14 needs improved postures, especially for TV watching   9/9 Pt with decreased pain and tissue tension this date.  STM primarily on L side this date and pain decreased after manual therapy. Pt reports ear pain is on and off and currently has no ear pain. Increased resistance with TB exercises this date. Pt reported increased difficulty but no increased pain. Plan to continue to progress postural strengthening and cervical ROM per pt tolerance. Treatment/Activity Tolerance:  [x] Patient able to complete tx  [] Patient limited by fatigue  [] Patient limited by pain  [] Patient limited by other medical complications  [] Other:     Prognosis: [x] Good [] Fair  [] Poor    Patient Requires Follow-up: [x] Yes  [] No    PLAN:  strength, ROM/flexibility, posture and body mechs, manual, MOC, HEP, pt education     Frequency/Duration: 2 days per week for 6  Weeks:  Interventions:  [x]? Therapeutic exercise including:strength, ROM, flexibility  [x]? NMR activation and proprioception including postural re-education  [x]? Manual therapy as indicated to include: IASTM, STM, PROM, Gr I-IV mobilizations, manipulation. [x]? Modalities as needed that may include: thermal agents, E-stim, Biofeedback, US, iontophoresis as indicated  [x]? Patient education on joint protection, postural re-education, activity modification, progression of HEP. Plan for next treatment session: see flowsheet    PLAN: See eval. PT 2x / week for 6 weeks. + 2x/wk for 5 wks + 2x/wk for 6 wks   [x] Continue per plan of care [] Alter current plan (see comments)  [] Plan of care initiated [] Hold pending MD visit [] Discharge    Electronically signed by: Ramila Martinez, PT, DPT      Note: If patient does not return for scheduled/ recommended follow up visits, this note will serve as a discharge from care along with most recent update on progress.

## 2021-12-20 ENCOUNTER — HOSPITAL ENCOUNTER (OUTPATIENT)
Dept: PHYSICAL THERAPY | Age: 81
Setting detail: THERAPIES SERIES
Discharge: HOME | End: 2021-12-20
Payer: MEDICARE

## 2021-12-20 PROCEDURE — 97110 THERAPEUTIC EXERCISES: CPT

## 2021-12-20 NOTE — PROGRESS NOTES
[facility]  Phone: [phone]   Fax: [phone]      Physical Therapy Daily Treatment Note  Date:  2021    Patient Name:  Dominguez Murphy    :  1940  MRN: [id number]  Medical/Treatment Diagnosis Information:  Diagnosis: cervical disc disease, R TMJ  Treatment Diagnosis: pt with painful myofascial changes, impaired posture including forward head posture, reduced postural strengths. hypomobility t/o spine, especially at upper cerv spine  Insurance/Certification information:  PT Insurance Information: medicare  Physician Information:  Referring Practitioner: Dr. Freddy Hardy of care signed (Y/N): []  Yes [x]  No     Date of Patient follow up with Physician:     POC: 11/10   PN:       Progress Report: [x]  Yes  []  No     Progress report due (10 Rx/or 30 days whichever is less): visit #16 or     Recertification due (POC duration/ or 90 days whichever is less): visit #12 or     Visit # Insurance Allowable Auth required? Date Range    + 10/10 +   Med nec []  Yes  [x]  No NA     Latex Allergy:  [x]NO      []YES  Preferred Language for Healthcare:   [x]English       []other:    Functional Scale:        Date assessed:  NDI raw score = 18, dysfunction =36 %, taken at initial eval  NDI raw score = 20; dysfunction = 40%    10/26/21  NDI raw score = 17; dysfunction = 34%    11/10/21  NDI raw score = 11; dysfunction = 22%    21       Pain level: 1/10    SUBJECTIVE:     - pt reports she is feeling good today. Pain is low. She is getting ready for [employer] and sending about 3 cards a day. Feels she is more aware of maintaining good posture.  - Pt reports she is doing well today. Reports low pain. States she felt tired after her visit Monday.  - Pt reports she woke up in the middle of the night with pain in the back of her neck. Pain has gotten better throughout the day.    : feeling much better than she did on Tuesday. Reports she is now 4'11; used to be 5'2\"  12/6: Pt reports she had some ear pain yesterday for the first time in a while. States she is a little sore from tree trimming and laundry. 12/2: Patient reports that her legs are tired today. Says her neck is a little sore today from putting ornaments on her [address]. 11/30 reports the biomed ointment cont to help. Reports any head pain tends to be in the evenings  11/26 reports biomed ointment has been helped. Yesterday she was sitting infront of the sliding glass door and the cold increased her neck pain. She was sore after LV due to transfers. States she is getting better. 11/23 feels much better after starting a new rub in medicine   11/10 - Pt reports she went to see the MD yesterday and she thought she would benefit from further therapy. Reports MD asked if anyone here did needling as it helped her with her shoulder. Pt brought referral for additional PT 2x/wk for 6 wks. 11/5 - Pt reports she felt sore this morning but has decreased pain now. Reports she felt tired after LV.  11/3 - Pt reports she had some tingling in her head a couple days ago but it went away. Reports pain in her ear has decreased. 10/29 - Pt reports her pain is less today. States she feels she can move her neck further. 10/26 - Pt reports she had no pain yesterday. States pain is a little higher today after driving a lot today. Had to park far away and she is tired. 10/22 - Pt reports her pain typically gets better throughout the day. States she has some ear pain today. 10/19 - Pt reports she almost fell this morning. She was reading the paper and went to sit in a rolling chair and the chair slipped. She caught herself but thinks this could be attributing to her pain. 10/12 - pt reports she is a little sore today. States her head and ear were bothering her Sunday but that has since gone away. 10/6 - Pt reports more pain today in her ear and neck.  States she carried her groceries on the opposite side and isn't sure what increased her pain. 10/4 Pt reports she is more sore today. She cooked a pot of chili yesterday and thinks she may have over done it. 10/1 pt reports her pain comes and goes but she continues to feel better. States her son is getting his stress test currently. Brought her calendar to schedule additional visits. 9/28 - Pt states she has a lot going on with her son's health. He is supposed to get a stress test Saturday. Reports some ear pain when she woke up, but feels good now.   9/23: Patient reports that she is feeling well today and not having too much pain. Patient states that she is trying her best to create a more ergonomic home desk environment for her laptop. Patient states that she has not found a solution that she prefers yet and will coninue to try and find a solution that works for her. Pt asks re life alert systems for home use  9/21 increasing mobility without increased pain. Reports she is still taking the mm relaxer and reports she took Tiana Dunk when she was stressed and noticed increased neck mm pain And it seemed to help. States her son hopes to get hernia surgery soon. Intermittent R ear pain with combing her hair   9/14 Intermittent pain still gets pain in neck and face/ear. Thinks PT tx is helping. Reports sleeping is good. Can turn  to the right better    OBJECTIVE:   12/16  cerv AROM  R L  SB  45 35  Rot  72 68    Strength / Myotomes RIGHT LEFT   Shoulder Flex 4+ 4   Shoulder Abduction (C5) 4+ 4       12/9 gait and posture: pt amb with 45 deg trunk flex - flex from hip and thoracic kyphosis  11/23 script from Dr. Jarrell Query has been scanned into media section requesting dry needling is possible. Gave pt DN info form and waiver.    11/10  cerv AROM  R L  SB  35 38  Rot  61 50    Strength / Myotomes RIGHT LEFT   Shoulder Flex 4+ 4+   Shoulder Abduction (C5) 4+ 4      stretch   L: 28#  R: 30#    Increased tissue tension and TP in B cervical paraspinal mm, B UT, B rhomboids, and B SCM     10/26  cerv AROM  R L  SB  40 35  Rot  60 59    Strength / Myotomes RIGHT LEFT   Shoulder Flex 4+ 4+   Shoulder Abduction (C5) 4+ 4       9/23 Patient ambulates with AD 6RV, Patient ambulates with forward head, rounded shoulders, and kyphotic curve in T-spine. 9/14 pt reports slouched posture with excessive cerv flex when she watches TV. Palpation: Myofascial pain R>L UT, scalenes. SCM and masseter WFL B.    cerv AROM  R L  SB  40 25  Rot  60 50    9/9 - Pt with no clicking or popping in TMJ, palpation of R SCM increases ear pain   8/31 - pt reports she has a spot that is iching and burning on her back. Skin inspection - small, red oval on R scapular region with smooth borders. Pt reports she has a dermatology appointment within the next 2 weeks. 8/25 - amb with RW this date. [place] is too tall, but it does not lower further. Increased tissue tension in R SCM and scalenes. RESTRICTIONS/PRECAUTIONS: scoliosis, osteopososis/osteopenia; has lost 3 friends and her  and brother int he last 17 months, joint replacements ( B knees, R ankle, B shoudlers): x 5. Lives with her son who has multiple medical issues including COPD and heart issues. Easy bruising.    Spinal cord stimulator (lumbar) put in by Dr. Silver Hannah - recently had new battery put in.        Exercises/Interventions:     Therapeutic Exercises (57984) Resistance / level Sets/sec Reps Notes   Arm bike   2.5 min fwd/2.5 min back      UT stretch - HAND HOLDING CHAIR      Chin tuck -   Scap squeeze   Chin tuck + scap squeeze  2 sec2 sec     10 10     Pulleys - flexion and abd  5 sec  15 B each Completed to promote thoracic ext    Seated:    cerv AROM   SB  Rot  UT stretches  scalene stretches  cerv retraction  scap retraction Sitting with pillows and towel roll at lumbar spine to provide upright sitting posture with good support  10x3\" B   10x3\" B            Standing TB  High row   Mid row  LPD   Horizontal abd - seated  B ER - seated  Balwinder Careyeen 2   [address]  10   10 10   Seated thoracic extension      SCM stretch   L increased pain on R side    LT wall slides  -flexion   - abd      Manual cues to deactivate UT    Therapeutic Activities (70729)       Sit <-->supine <--> L s/l <-->prone    11/23 attempted prone to see if pt could comfortably attain position needed for DN   Instruct in upright sitting posture for watching TV       Standing reaching to shelf - second shelf  - flexion  - abd Tactile and verbal cues to deactivate UT and use scapular mm to reach. CGA          Progress note completed this date - objective measures taken and discussed goals and progress made with therapy. Educated on [address]. Pt to be scheduled with PT that dry needles as was on MD order              Neuromuscular Re-ed (10828)       Postural ed                                          Manual Intervention ([id number])       manual - STM and TPR B UT mm and R SCM,levator  suboccipitals          8/27 Done on reclined mat table  9/21, 9/7, 8/30 completed seated in chair                                           Modalities:   12/13 - 1 cervical cold pack to cspine x 10 min at end of session   12/6, 11/26, 11/10, 11/5, 11/3, 10/29, 10/26, 10/22 - cervical MHP seated in chair at end of session x 10 min    10/19, 10/12, 10/6 - 1 cervical cold pack to cspine x 10 min at end of session   10/4, 10/1, 9/28 - cervical MHP seated in chair at end of session x 10 min   9/23, 9/14 9/9, 9/7, 9/2 - cervical MHP seated in chair at end of session x 15 min   8/31- cervical MHP seated in chair at end of session x 12 min   8/27- cervical MHP on reclined mat table x 15 min   8/25 - cervical MHP seated in chair at end of session x 10 min     Pt.  Education:  12/16 - educated on progress made with therapy and focus on posture, stretching, and cervical rotation for driving with remaining visits  12/9 review self STM and HEP, review stress management techs; use of heat, self massage - gentle, gentle stretches to manage pain neural irritation causing strange sensations to head  11/30 how to do self STM to [facility]  11/23 extensive pt ed re dry needling, risks and benefits. D/w pt that scoliosis, osteoporosis/penia, easy bruising, blood thinners, difficulty getting into prone position are all factors that limit appropriateness of DN  Gave her info packet to read and take home. Educated pt that due to her multiple medical issues & difficutly being comfy in prone, PT advises against DN for safety reasons    9/23: pt Educated re safety in home /fall risk reduction. At pt request d/w pt re life alert systems. Patient ed  on option of obtaining an apple watch to use as a \"life alert\" system. Patient likes this idea and state she will have her son help her to look into it.   8/31 - Pt educated on ABCDs of melanoma and importance of skin checks as she reports concern about itching spot on her back. Pt has dermatology appointment in 2 wks. 8/19 patient educated on diagnosis, prognosis and expectations for rehab  -all patient questions were answered    Home Exercise Program:  11/30 self STM to [facility]  11/23 cont with bands and stretches, posture  9/23 computer ergonomics and apple watch for fall risk reduction  9/21  computer ergonomics  9/14 upright psoture for TV viewing  8/19 cerv AROM SB/ROT      Therapeutic Exercise and NMR EXR  [] ([id number]) Provided verbal/tactile cueing for activities related to strengthening, flexibility, endurance, ROM for improvements in  [] LE / Lumbar: LE, proximal hip, and core control with self care, mobility, lifting, ambulation.   [] UE / Cervical: cervical, postural, scapular, scapulothoracic and UE control with self care, reaching, carrying, lifting, house/yardwork, driving, computer work.  [] (78119) Provided verbal/tactile cueing for activities related to improving balance, coordination, kinesthetic sense, posture, motor skill, proprioception to assist with   [] LE / lumbar: LE, proximal hip, and core control in self care, mobility, lifting, ambulation and eccentric single leg control. [] UE / cervical: cervical, scapular, scapulothoracic and UE control with self care, reaching, carrying, lifting, house/yardwork, driving, computer work.   [] (18307) Therapist is in constant attendance of 2 or more patients providing skilled therapy interventions, but not providing any significant amount of measurable one-on-one time to either patient, for improvements in  [] LE / lumbar: LE, proximal hip, and core control in self care, mobility, lifting, ambulation and eccentric single leg control. [] UE / cervical: cervical, scapular, scapulothoracic and UE control with self care, reaching, carrying, lifting, house/yardwork, driving, computer work.       NMR and Therapeutic Activities:    [] (91987 or 09860) Provided verbal/tactile cueing for activities related to improving balance, coordination, kinesthetic sense, posture, motor skill, proprioception and motor activation to allow for proper function of   [] LE: / Lumbar core, proximal hip and LE with self care and ADLs  [] UE / Cervical: cervical, postural, scapular, scapulothoracic and UE control with self care, carrying, lifting, driving, computer work.   [] (64461) Gait Re-education- Provided training and instruction to the patient for proper LE, core and proximal hip recruitment and positioning and eccentric body weight control with ambulation re-education including up and down stairs     Home Management Training / Self Care:  [] (23341) Provided self-care/home management training related to activities of daily living and compensatory training, and/or use of adaptive equipment for improvement with: ADLs and compensatory training, meal preparation, safety procedures and instruction in use of adaptive equipment, including bathing, grooming, dressing, personal hygiene, basic household cleaning and chores. Home Exercise Program:    [x] (59738) Reviewed/Progressed HEP activities related to strengthening, flexibility, endurance, ROM of   [] LE / Lumbar: core, proximal hip and LE for functional self-care, mobility, lifting and ambulation/stair navigation   [] UE / Cervical: cervical, postural, scapular, scapulothoracic and UE control with self care, reaching, carrying, lifting, house/yardwork, driving, computer work  [] (05282)Reviewed/Progressed HEP activities related to improving balance, coordination, kinesthetic sense, posture, motor skill, proprioception of   [] LE: core, proximal hip and LE for self care, mobility, lifting, and ambulation/stair navigation    [] UE / Cervical: cervical, postural,  scapular, scapulothoracic and UE control with self care, reaching, carrying, lifting, house/yardwork, driving, computer work    Manual Treatments:  PROM / STM / Oscillations-Mobs:  G-I, II, III, IV (PA's, Inf., Post.)  [] (21140) Provided manual therapy to mobilize LE, proximal hip and/or LS spine soft tissue/joints for the purpose of modulating pain, promoting relaxation,  increasing ROM, reducing/eliminating soft tissue swelling/inflammation/restriction, improving soft tissue extensibility and allowing for proper ROM for normal function with   [] LE / lumbar: self care, mobility, lifting and ambulation. [] UE / Cervical: self care, reaching, carrying, lifting, house/yardwork, driving, computer work. Modalities:  [] (23615) Vasopneumatic compression: Utilized vasopneumatic compression to decrease edema / swelling for the purpose of improving mobility and quad tone / recruitment which will allow for increased overall function including but not limited to self-care, transfers, ambulation, and ascending / descending stairs.        Charges:  Timed Code Treatment Minutes: 41   Total Treatment Minutes: 41     [] EVAL - LOW (86401)   [] EVAL - MOD (41985)  [] EVAL - HIGH (26673)  [] Gerldine Sensing (96516)  [x] QV(75026) x   3    [] Ionto  [] NMR (24068) x       [] Vaso  [] Manual (48730) x 1      [] Ultrasound  [] TA x   1     [] Mech Traction (82794)  [] Aquatic Therapy x      [] ES (un) (10186):   [] Home Management Training x  [] ES(attended) (63644)   [] Dry Needling 1-2 muscles (30358):  [] Dry Needling 3+ muscles (259977)  [] Group:      [] Other:          GOALS:  Patient stated goal: less pain  []? Progressing: [x]? Met: []? Not Met: []? Adjusted     Therapist goals for Patient:   Short Term Goals: To be achieved in: 2 weeks  1. Independent in HEP and progression per patient tolerance, in order to prevent re-injury. []? Progressing: [x]? Met: []? Not Met: []? Adjusted  2. Patient will have a decrease in pain to facilitate improvement in movement, function, and ADLs as indicated by improvement with respect to Functional Deficits. []? Progressing: [x]? Met: []? Not Met: []? Adjusted     Long Term Goals: To be achieved in: 6 weeks  1. Disability index score of 10% or less on the  NDI  to assist with reaching prior level of function. [x]? Progressing: []? Met: []? Not Met: []? Adjusted  2. Patient will demonstrate increased AROM  to [facility], to allow for proper joint functioning to allow pt to resume turning head while driving without increase in symptoms. []? Progressing: [x]? Met: []? Not Met: []? Adjusted  3. Patient will demonstrate increased Strength by 1/2 mmt grade  patients Functional Deficits to allow pt to resume amb with/without RW prn for amb in community without increase in symptoms. []? Progressing: [x]? Met: []? Not Met: []? Adjusted  4. Patient will return to functional activities including falling asleep without difficulty without increased symptoms or restriction. []? Progressing: [x]? Met: []? Not Met: []? Adjusted  5. Pt will report ability to drive without pain or restrictions. - ADDED 11/10/21   [x]? Progressing: []? Met: []? Not Met: []?  Adjusted        Overall Progression Towards Functional goals/ Treatment Progress Update:  [x] Patient is progressing as expected towards functional goals listed. [] Progression is slowed due to complexities/Impairments listed. [] Progression has been slowed due to co-morbidities. [] Plan just implemented, too soon to assess goals progression <30days   [] Goals require adjustment due to lack of progress  [] Patient is not progressing as expected and requires additional follow up with physician  [] Other    Persisting Functional Limitations/Impairments:  [x]Sleeping []Sitting               []Standing []Transfers        []Walking []Kneeling               []Stairs []Squatting / bending   [x]ADLs []Reaching  [x]Lifting  [x]Housework  [x]Driving []Job related tasks  []Sports/Recreation []Other:        ASSESSMENT:   12/20 - Pt demonstrates improved exercise tolerance requiring no seated rest with standing TB exercises this date. Pt does not report pain with any cervical AROM this date and demonstrates improved mobility. Continue to progress postural re-ed and cervical ROM to return to PLOF without limitations. 12/16 - Progress note completed this date. Pt demonstrates improved cervical AROM in both B SB and rotation. Pt continues to have some shoulder flexion and abd weakness (L>R). Pt demonstrates improved function as noted by decreased dysfunction on the NDI. Pt reports continued limitations in driving with turning over her L shoulder. Plan to complete remaining visits focusing on postural re-ed, stretching, and cervical AROM and d/c to HEP.     12/13 Pt with decreased  Tissue tension in c spine. Demonstrates improved posture with exercise this date and improved exercise endurance requiring less rest throughout treatment session. Continue to progress per pt tolerance. 12/9 reduced myofascial pain B upper quarter. Pt having difficulty doing HEP regularly and having trouble doing self STM. Pt cont with high stress levels which contribute to cont pain. 12/6 - Pt with increased tissue tension in B UT and decreased tightness in B SCM mm. Pt required VC for decreased cervical ext with TB exercises. Pt demonstrates increased thoracic mobility and ext noted during standing TB exercises. Continue to progress per pt tolerance. 12/2: Patient performed periscapular strengthening and postural exercises seated this date due to fatigue in BLEs. The patient responded well today with manual STM to upper trap , scalenes muscle therapy. 11/30 good response to manual therapy. Will benefit from trial of self STM to [facility]    11/26 Pt demonstrates improved thoracic extension with high rows this date. Pt with decreased tissue tension in SCM this date, but continues with TP and increased tissue tension in R UT mm. Pt reports no increased pain throughout treatment session. Continue to progress per pt tolerance. 11/23 due to her multiple medical issues, difficutly being comfy in prone, and dizziness occurring with transitioning out of prone position (the position needed for DN of UT) and previous dizziness occurring with transition out of supine while in PT clinic (position needed for DN of SCM), PT advises against DN for safety reasons. Rec focus on manual therapy to reduce myofascial pain/TPs and approp exercise to restore normal functional use of UE.    11/10 Pt continues to present with decreased cervical AROM in SB and rotation. Pt with decreased  strength B and increased tissue tension in cervical mm. Pt demonstrates improved posture and exercise technique with therapy and improved function as noted by decreased dysfunction on the NDI. Pt continues to be limited in endurance with home management and driving secondary to decreased cervical rotation.  Pt to be scheduled with therapist certified in DN as dry needling was added to MD referral. Plan to continue to progress postural re-ed and strengthening, cervical ROM, and manual therapy to decrease pain and tissue tension and improve strength, ROM, and endurance to return to PLOF without limitations. 11/5 - Pt reports decreased pain in neck this date. Pt demonstrates improved cervical rotation B this date with AROM and improved shoulder abd and flexion with wall slides. Plan to d/c to HEP NV.     11/3 - Pt demonstrates improved posture with TB exercises this date. Pt with improved awareness of her posture with ADLs. Pt with TP and increased tissue tension in BUT L>R this date. Continue to progress per pt tolerance. 10/29 - pt required VC to relax UT with B ER this date. Increased to 2 sets of 10 with green TB. Pt with increased tissue tension in R scalenes this date which decreased with gentle STM. Pt reports decreased pain and increased mobility with OP PT. Continue to progress postural strengthening/stretching per pt tolerance. 10/26 - Progress note completed this date. Pt demonstrates improvements in L cervical SB and rotation AROM. Pt with increased B shoulder flexion and abd strength. Pt reports decreased pain and increased ability to sleep without pain or discomfort. Plan to focus remaining treatment sessions on postural strengthening and STM to return to PLOF without limitations or risk for further injury. 10/22 - Pt with TP and increased tissue tension in suboccipitals, R UT, and R SCM. Pt reports feeling a good stretch with UT stretch. Initiated B ER with TB this date. Pt required VC to keep elbows bent. Pt required less frequent VC for upright trunk posture and demonstrated improved technique with chin tuck. Continue to progress per pt tolerance. 10/19 - progressed TB shoulder ABD to orange band this date. Pt with TP in R SCM which decreased with manual therapy. Pt reports decreased pain at end of session. 10/12 - progressed reaching to second shelf this date.  Pt unable to reach second shelf with 1 pound weight, but demonstrated ability to touch shelf without weight. Pt with TP in L UT, L infraspinatus, and R SCM. Pt demonstrates improved posture with exercises and increased exercise tolerance. Continues to require seated rest breaks throughout session. 10/6 - Initiated reaching with 1 pound weight this date. Pt required verbal and tactile cues to deactivate UT and recruit scapular mm. Pt required less frequent rest breaks throughout treatment session this date. Pt requested ice this date as she states sometimes it decreases her pain at home. Continue to progress standing postural strengthening and re-ed exercises per pt tolerance. 10/4 - Progressed TB exercises to standing this date. Decreased to 1 set of 10 due to pt fatigue in standing. Initiated thoracic ext over chair with tactile cue to ext through thoracic spine vs cervical spine. Pt required VC for chin tuck with cervical rotation. Reports decreased pain with chin tuck + rotation. Continue to progress as tolerated. 10/1 - Pt required verbal and tactile cues to deactivate UT with TB exercises this date. Pt reports no pain throughout treatment session. Pt reports she has been more aware of her posture throughout the day. Pt with less TP and tissue tension in UT mm this date. 9/28 - Pt with decreased pain and tissue tension this date. Pt requires VC for appropriate form with chin tuck. Pt demonstrates improved posture with TB exercises. Pt reports she is more aware of her posture during the day, but has pain when she sits on a pillow. Plan to continue to progress postural education and scapular strengthening to further decrease pain and return to PLOF without pain or limitations. 9/23: Patient continues to require VC and TC during exercises in order to squeeze scapula together.  Patient has difficulty maintaining posture and is struggling to find home solutions to help her adjust her body mechanics to incorporate improved posture habits during her ADL's and IADLs. In addition, pt concerned re safety at home. Patient has noted tenderness and tightness in bilat UT, levator, rhomboids, scalenes, SCM. Patient reports pain relief with STM. Plan to continue to progress appropriate postural strengthening and appropriate motor programming to increase cervical ROM. 9/21 see PT POC above    9/14 needs improved postures, especially for TV watching   9/9 Pt with decreased pain and tissue tension this date. STM primarily on L side this date and pain decreased after manual therapy. Pt reports ear pain is on and off and currently has no ear pain. Increased resistance with TB exercises this date. Pt reported increased difficulty but no increased pain. Plan to continue to progress postural strengthening and cervical ROM per pt tolerance. Treatment/Activity Tolerance:  [x] Patient able to complete tx  [] Patient limited by fatigue  [] Patient limited by pain  [] Patient limited by other medical complications  [] Other:     Prognosis: [x] Good [] Fair  [] Poor    Patient Requires Follow-up: [x] Yes  [] No    PLAN:  strength, ROM/flexibility, posture and body mechs, manual, MOC, HEP, pt education     Frequency/Duration: 2 days per week for 6  Weeks:  Interventions:  [x]? Therapeutic exercise including:strength, ROM, flexibility  [x]? NMR activation and proprioception including postural re-education  [x]? Manual therapy as indicated to include: IASTM, STM, PROM, Gr I-IV mobilizations, manipulation. [x]? Modalities as needed that may include: thermal agents, E-stim, Biofeedback, US, iontophoresis as indicated  [x]? Patient education on joint protection, postural re-education, activity modification, progression of HEP. Plan for next treatment session: see flowsheet    PLAN: See eval. PT 2x / week for 6 weeks.  + 2x/wk for 5 wks + 2x/wk for 6 wks   [x] Continue per plan of care [] Alter current plan (see comments)  [] Plan of care initiated [] Hold pending MD visit [] Discharge    Electronically signed by: Sylvia Galicia PT, DPT      Note: If patient does not return for scheduled/ recommended follow up visits, this note will serve as a discharge from care along with most recent update on progress.

## 2021-12-23 ENCOUNTER — HOSPITAL ENCOUNTER (OUTPATIENT)
Dept: PHYSICAL THERAPY | Age: 81
Setting detail: THERAPIES SERIES
Discharge: HOME | End: 2021-12-23
Payer: MEDICARE

## 2021-12-23 PROCEDURE — 97110 THERAPEUTIC EXERCISES: CPT

## 2021-12-23 NOTE — PROGRESS NOTES
[address] [facility]  Phone: [phone]   Fax: [phone]      Physical Therapy Daily Treatment Note  Date:  2021    Patient Name:  Kylah Galeana    :  1940  MRN: [id number]  Medical/Treatment Diagnosis Information:  Diagnosis: cervical disc disease, R TMJ  Treatment Diagnosis: pt with painful myofascial changes, impaired posture including forward head posture, reduced postural strengths. hypomobility t/o spine, especially at upper cerv spine  Insurance/Certification information:  PT Insurance Information: medicare  Physician Information:  Referring Practitioner: Dr. Jean Jarvis of care signed (Y/N): []  Yes [x]  No     Date of Patient follow up with Physician:     POC: 11/10   PN:       Progress Report: [x]  Yes  []  No     Progress report due (10 Rx/or 30 days whichever is less): visit #85 or     Recertification due (POC duration/ or 90 days whichever is less): visit # or     Visit # Insurance Allowable Auth required? Date Range    + 10/10 + 10/12  Med nec []  Yes  [x]  No NA     Latex Allergy:  [x]NO      []YES  Preferred Language for Healthcare:   [x]English       []other:    Functional Scale:        Date assessed:  NDI raw score = 18, dysfunction =36 %, taken at initial eval  NDI raw score = 20; dysfunction = 40%    10/26/21  NDI raw score = 17; dysfunction = 34%    11/10/21  NDI raw score = 11; dysfunction = 22%    21       Pain level: 2/10    SUBJECTIVE:     - Pt reports she has been busy baking and writing [employer] cards so pain is a little higher.  - pt reports she is feeling good today. Pain is low. She is getting ready for [employer] and sending about 3 cards a day. Feels she is more aware of maintaining good posture.  - Pt reports she is doing well today. Reports low pain. States she felt tired after her visit Monday.     - Pt reports she woke up in the middle of the night with pain in the back of her neck. Pain has gotten better throughout the day. 12/9: feeling much better than she did on Tuesday. Reports she is now 4'11; used to be 5'2\"  12/6: Pt reports she had some ear pain yesterday for the first time in a while. States she is a little sore from tree trimming and laundry. 12/2: Patient reports that her legs are tired today. Says her neck is a little sore today from putting ornaments on her [address]. 11/30 reports the biomed ointment cont to help. Reports any head pain tends to be in the evenings  11/26 reports biomed ointment has been helped. Yesterday she was sitting infront of the sliding glass door and the cold increased her neck pain. She was sore after LV due to transfers. States she is getting better. 11/23 feels much better after starting a new rub in medicine   11/10 - Pt reports she went to see the MD yesterday and she thought she would benefit from further therapy. Reports MD asked if anyone here did needling as it helped her with her shoulder. Pt brought referral for additional PT 2x/wk for 6 wks. 11/5 - Pt reports she felt sore this morning but has decreased pain now. Reports she felt tired after LV.  11/3 - Pt reports she had some tingling in her head a couple days ago but it went away. Reports pain in her ear has decreased. 10/29 - Pt reports her pain is less today. States she feels she can move her neck further. 10/26 - Pt reports she had no pain yesterday. States pain is a little higher today after driving a lot today. Had to park far away and she is tired. 10/22 - Pt reports her pain typically gets better throughout the day. States she has some ear pain today. 10/19 - Pt reports she almost fell this morning. She was reading the paper and went to sit in a rolling chair and the chair slipped. She caught herself but thinks this could be attributing to her pain. 10/12 - pt reports she is a little sore today.  States her head and ear were bothering her Sunday but that has since gone away. 10/6 - Pt reports more pain today in her ear and neck. States she carried her groceries on the opposite side and isn't sure what increased her pain. 10/4 Pt reports she is more sore today. She cooked a pot of chili yesterday and thinks she may have over done it. 10/1 pt reports her pain comes and goes but she continues to feel better. States her son is getting his stress test currently. Brought her calendar to schedule additional visits. 9/28 - Pt states she has a lot going on with her son's health. He is supposed to get a stress test Saturday. Reports some ear pain when she woke up, but feels good now.   9/23: Patient reports that she is feeling well today and not having too much pain. Patient states that she is trying her best to create a more ergonomic home desk environment for her laptop. Patient states that she has not found a solution that she prefers yet and will coninue to try and find a solution that works for her. Pt asks re life alert systems for home use  9/21 increasing mobility without increased pain. Reports she is still taking the mm relaxer and reports she took Malinda Settler when she was stressed and noticed increased neck mm pain And it seemed to help. States her son hopes to get hernia surgery soon. Intermittent R ear pain with combing her hair   9/14 Intermittent pain still gets pain in neck and face/ear. Thinks PT tx is helping. Reports sleeping is good. Can turn  to the right better    OBJECTIVE:   12/16  cerv AROM  R L  SB  45 35  Rot  72 68    Strength / Myotomes RIGHT LEFT   Shoulder Flex 4+ 4   Shoulder Abduction (C5) 4+ 4       12/9 gait and posture: pt amb with 45 deg trunk flex - flex from hip and thoracic kyphosis  11/23 script from Dr. Fernando Chau has been scanned into media section requesting dry needling is possible. Gave pt DN info form and waiver.    11/10  cerv AROM  R L  SB  35 38  Rot  61 50    Strength / Myotomes RIGHT LEFT   Shoulder Flex 4+ 4+   Shoulder Abduction (C5) 4+ 4      stretch   L: 28#  R: 30#    Increased tissue tension and TP in B cervical paraspinal mm, B UT, B rhomboids, and B SCM     10/26  cerv AROM  R L  SB  40 35  Rot  60 59    Strength / Myotomes RIGHT LEFT   Shoulder Flex 4+ 4+   Shoulder Abduction (C5) 4+ 4       9/23 Patient ambulates with AD 2GQ, Patient ambulates with forward head, rounded shoulders, and kyphotic curve in T-spine. 9/14 pt reports slouched posture with excessive cerv flex when she watches TV. Palpation: Myofascial pain R>L UT, scalenes. SCM and masseter WFL B.    cerv AROM  R L  SB  40 25  Rot  60 50    9/9 - Pt with no clicking or popping in TMJ, palpation of R SCM increases ear pain   8/31 - pt reports she has a spot that is iching and burning on her back. Skin inspection - small, red oval on R scapular region with smooth borders. Pt reports she has a dermatology appointment within the next 2 weeks. 8/25 - amb with RW this date. Alma Rosa Luis is too tall, but it does not lower further. Increased tissue tension in R SCM and scalenes. RESTRICTIONS/PRECAUTIONS: scoliosis, osteopososis/osteopenia; has lost 3 friends and her  and brother int he last 17 months, joint replacements ( B knees, R ankle, B shoudlers): x 5. Lives with her son who has multiple medical issues including COPD and heart issues. Easy bruising.    Spinal cord stimulator (lumbar) put in by Dr. Joao Espinal - recently had new battery put in.        Exercises/Interventions:     Therapeutic Exercises (13330) Resistance / level Sets/sec Reps Notes   Arm bike   2.5 min fwd/2.5 min back      UT stretch - HAND HOLDING CHAIR      Chin tuck -   Scap squeeze   Chin tuck + scap squeeze  2 sec2 sec     10 10     Pulleys - flexion and abd  5 sec  15 B each Completed to promote thoracic ext    Seated:    cerv AROM   SB  Rot  UT stretches  scalene stretches  cerv retraction  scap retraction Sitting with pillows and towel roll at lumbar spine to stretching, and cervical rotation for driving with remaining visits  12/9 review self STM and HEP, review stress management techs; use of heat, self massage - gentle, gentle stretches to manage pain neural irritation causing strange sensations to head  11/30 how to do self STM to [facility]  11/23 extensive pt ed re dry needling, risks and benefits. D/w pt that scoliosis, osteoporosis/penia, easy bruising, blood thinners, difficulty getting into prone position are all factors that limit appropriateness of DN  Gave her info packet to read and take home. Educated pt that due to her multiple medical issues & difficutly being comfy in prone, PT advises against DN for safety reasons    9/23: pt Educated re safety in home /fall risk reduction. At pt request d/w pt re life alert systems. Patient ed  on option of obtaining an apple watch to use as a \"life alert\" system. Patient likes this idea and state she will have her son help her to look into it.   8/31 - Pt educated on ABCDs of melanoma and importance of skin checks as she reports concern about itching spot on her back. Pt has dermatology appointment in 2 wks. 8/19 patient educated on diagnosis, prognosis and expectations for rehab  -all patient questions were answered    Home Exercise Program:  11/30 self STM to [facility]  11/23 cont with bands and stretches, posture  9/23 computer ergonomics and apple watch for fall risk reduction  9/21  computer ergonomics  9/14 upright psoture for TV viewing  8/19 cerv AROM SB/ROT      Therapeutic Exercise and NMR EXR  [] [phone]) Provided verbal/tactile cueing for activities related to strengthening, flexibility, endurance, ROM for improvements in  [] LE / Lumbar: LE, proximal hip, and core control with self care, mobility, lifting, ambulation.   [] UE / Cervical: cervical, postural, scapular, scapulothoracic and UE control with self care, reaching, carrying, lifting, house/yardwork, driving, computer work.  [] (46270) Provided verbal/tactile cueing for activities related to improving balance, coordination, kinesthetic sense, posture, motor skill, proprioception to assist with   [] LE / lumbar: LE, proximal hip, and core control in self care, mobility, lifting, ambulation and eccentric single leg control. [] UE / cervical: cervical, scapular, scapulothoracic and UE control with self care, reaching, carrying, lifting, house/yardwork, driving, computer work.   [] (00514) Therapist is in constant attendance of 2 or more patients providing skilled therapy interventions, but not providing any significant amount of measurable one-on-one time to either patient, for improvements in  [] LE / lumbar: LE, proximal hip, and core control in self care, mobility, lifting, ambulation and eccentric single leg control. [] UE / cervical: cervical, scapular, scapulothoracic and UE control with self care, reaching, carrying, lifting, house/yardwork, driving, computer work.       NMR and Therapeutic Activities:    [] (37937 or 43094) Provided verbal/tactile cueing for activities related to improving balance, coordination, kinesthetic sense, posture, motor skill, proprioception and motor activation to allow for proper function of   [] LE: / Lumbar core, proximal hip and LE with self care and ADLs  [] UE / Cervical: cervical, postural, scapular, scapulothoracic and UE control with self care, carrying, lifting, driving, computer work.   [] (92538) Gait Re-education- Provided training and instruction to the patient for proper LE, core and proximal hip recruitment and positioning and eccentric body weight control with ambulation re-education including up and down stairs     Home Management Training / Self Care:  [] (20169) Provided self-care/home management training related to activities of daily living and compensatory training, and/or use of adaptive equipment for improvement with: ADLs and compensatory training, meal preparation, safety procedures and instruction in use of adaptive equipment, including bathing, grooming, dressing, personal hygiene, basic household cleaning and chores. Home Exercise Program:    [x] (25848) Reviewed/Progressed HEP activities related to strengthening, flexibility, endurance, ROM of   [] LE / Lumbar: core, proximal hip and LE for functional self-care, mobility, lifting and ambulation/stair navigation   [] UE / Cervical: cervical, postural, scapular, scapulothoracic and UE control with self care, reaching, carrying, lifting, house/yardwork, driving, computer work  [] (36631)Reviewed/Progressed HEP activities related to improving balance, coordination, kinesthetic sense, posture, motor skill, proprioception of   [] LE: core, proximal hip and LE for self care, mobility, lifting, and ambulation/stair navigation    [] UE / Cervical: cervical, postural,  scapular, scapulothoracic and UE control with self care, reaching, carrying, lifting, house/yardwork, driving, computer work    Manual Treatments:  PROM / STM / Oscillations-Mobs:  G-I, II, III, IV (PA's, Inf., Post.)  [] (95492) Provided manual therapy to mobilize LE, proximal hip and/or LS spine soft tissue/joints for the purpose of modulating pain, promoting relaxation,  increasing ROM, reducing/eliminating soft tissue swelling/inflammation/restriction, improving soft tissue extensibility and allowing for proper ROM for normal function with   [] LE / lumbar: self care, mobility, lifting and ambulation. [] UE / Cervical: self care, reaching, carrying, lifting, house/yardwork, driving, computer work. Modalities:  [] (73098) Vasopneumatic compression: Utilized vasopneumatic compression to decrease edema / swelling for the purpose of improving mobility and quad tone / recruitment which will allow for increased overall function including but not limited to self-care, transfers, ambulation, and ascending / descending stairs.        Charges:  Timed Code Treatment Minutes: 33   Total Treatment Minutes: 33     [] EVAL - LOW (69894)   [] EVAL - MOD (74600)  [] EVAL - HIGH (51148)  [] RE-EVAL (27246)  [x] EK(22341) x   2    [] Ionto  [] NMR (76972) x       [] Vaso  [] Manual (74006) x 1      [] Ultrasound  [] TA x   1     [] Mech Traction (04278)  [] Aquatic Therapy x      [] ES (un) (44411):   [] Home Management Training x  [] ES(attended) (61836)   [] Dry Needling 1-2 muscles (81312):  [] Dry Needling 3+ muscles (718459)  [] Group:      [] Other:          GOALS:  Patient stated goal: less pain  []? Progressing: [x]? Met: []? Not Met: []? Adjusted     Therapist goals for Patient:   Short Term Goals: To be achieved in: 2 weeks  1. Independent in HEP and progression per patient tolerance, in order to prevent re-injury. []? Progressing: [x]? Met: []? Not Met: []? Adjusted  2. Patient will have a decrease in pain to facilitate improvement in movement, function, and ADLs as indicated by improvement with respect to Functional Deficits. []? Progressing: [x]? Met: []? Not Met: []? Adjusted     Long Term Goals: To be achieved in: 6 weeks  1. Disability index score of 10% or less on the  NDI  to assist with reaching prior level of function. [x]? Progressing: []? Met: []? Not Met: []? Adjusted  2. Patient will demonstrate increased AROM  to [facility], to allow for proper joint functioning to allow pt to resume turning head while driving without increase in symptoms. []? Progressing: [x]? Met: []? Not Met: []? Adjusted  3. Patient will demonstrate increased Strength by 1/2 mmt grade  patients Functional Deficits to allow pt to resume amb with/without RW prn for amb in community without increase in symptoms. []? Progressing: [x]? Met: []? Not Met: []? Adjusted  4. Patient will return to functional activities including falling asleep without difficulty without increased symptoms or restriction. []? Progressing: [x]? Met: []? Not Met: []? Adjusted  5.  Pt will report ability to drive without pain or restrictions. - ADDED 11/10/21   [x]? Progressing: []? Met: []? Not Met: []? Adjusted        Overall Progression Towards Functional goals/ Treatment Progress Update:  [x] Patient is progressing as expected towards functional goals listed. [] Progression is slowed due to complexities/Impairments listed. [] Progression has been slowed due to co-morbidities. [] Plan just implemented, too soon to assess goals progression <30days   [] Goals require adjustment due to lack of progress  [] Patient is not progressing as expected and requires additional follow up with physician  [] Other    Persisting Functional Limitations/Impairments:  [x]Sleeping []Sitting               []Standing []Transfers        []Walking []Kneeling               []Stairs []Squatting / bending   [x]ADLs []Reaching  [x]Lifting  [x]Housework  [x]Driving []Job related tasks  []Sports/Recreation []Other:        ASSESSMENT:   12/23 - Pt with improved exercise technique with seated thoracic ext. Pt demonstrates improved ability to complete seated chin tuck without compensations. Pt with decreased pain and improved posture. Plan to complete remaining visits and d/c to HEP.     12/20 - Pt demonstrates improved exercise tolerance requiring no seated rest with standing TB exercises this date. Pt does not report pain with any cervical AROM this date and demonstrates improved mobility. Continue to progress postural re-ed and cervical ROM to return to PLOF without limitations. 12/16 - Progress note completed this date. Pt demonstrates improved cervical AROM in both B SB and rotation. Pt continues to have some shoulder flexion and abd weakness (L>R). Pt demonstrates improved function as noted by decreased dysfunction on the NDI. Pt reports continued limitations in driving with turning over her L shoulder.  Plan to complete remaining visits focusing on postural re-ed, stretching, and cervical AROM and d/c to HEP.     12/13 Pt with decreased  Tissue tension in c spine. Demonstrates improved posture with exercise this date and improved exercise endurance requiring less rest throughout treatment session. Continue to progress per pt tolerance. 12/9 reduced myofascial pain B upper quarter. Pt having difficulty doing HEP regularly and having trouble doing self STM. Pt cont with high stress levels which contribute to cont pain. 12/6 - Pt with increased tissue tension in B UT and decreased tightness in B SCM mm. Pt required VC for decreased cervical ext with TB exercises. Pt demonstrates increased thoracic mobility and ext noted during standing TB exercises. Continue to progress per pt tolerance. 12/2: Patient performed periscapular strengthening and postural exercises seated this date due to fatigue in BLEs. The patient responded well today with manual STM to upper [facility] amelia muscle therapy. 11/30 good response to manual therapy. Will benefit from trial of self STM to [facility]    11/26 Pt demonstrates improved thoracic extension with high rows this date. Pt with decreased tissue tension in SCM this date, but continues with TP and increased tissue tension in R UT mm. Pt reports no increased pain throughout treatment session. Continue to progress per pt tolerance. 11/23 due to her multiple medical issues, difficutly being comfy in prone, and dizziness occurring with transitioning out of prone position (the position needed for DN of UT) and previous dizziness occurring with transition out of supine while in PT clinic (position needed for DN of SCM), PT advises against DN for safety reasons. Rec focus on manual therapy to reduce myofascial pain/TPs and approp exercise to restore normal functional use of UE.    11/10 Pt continues to present with decreased cervical AROM in SB and rotation.  Pt with decreased  strength B and increased tissue tension in cervical mm. Pt demonstrates improved posture and exercise technique with therapy and improved function as noted by decreased dysfunction on the NDI. Pt continues to be limited in endurance with home management and driving secondary to decreased cervical rotation. Pt to be scheduled with therapist certified in DN as dry needling was added to MD referral. Plan to continue to progress postural re-ed and strengthening, cervical ROM, and manual therapy to decrease pain and tissue tension and improve strength, ROM, and endurance to return to PLOF without limitations. 11/5 - Pt reports decreased pain in neck this date. Pt demonstrates improved cervical rotation B this date with AROM and improved shoulder abd and flexion with wall slides. Plan to d/c to HEP NV.     11/3 - Pt demonstrates improved posture with TB exercises this date. Pt with improved awareness of her posture with ADLs. Pt with TP and increased tissue tension in BUT L>R this date. Continue to progress per pt tolerance. 10/29 - pt required VC to relax UT with B ER this date. Increased to 2 sets of 10 with green TB. Pt with increased tissue tension in R scalenes this date which decreased with gentle STM. Pt reports decreased pain and increased mobility with OP PT. Continue to progress postural strengthening/stretching per pt tolerance. 10/26 - Progress note completed this date. Pt demonstrates improvements in L cervical SB and rotation AROM. Pt with increased B shoulder flexion and abd strength. Pt reports decreased pain and increased ability to sleep without pain or discomfort. Plan to focus remaining treatment sessions on postural strengthening and STM to return to PLOF without limitations or risk for further injury. 10/22 - Pt with TP and increased tissue tension in suboccipitals, R UT, and R SCM. Pt reports feeling a good stretch with UT stretch. Initiated B ER with TB this date. Pt required VC to keep elbows bent.  Pt required less frequent VC for upright trunk posture and demonstrated improved technique with chin tuck. Continue to progress per pt tolerance. 10/19 - progressed TB shoulder ABD to orange band this date. Pt with TP in R SCM which decreased with manual therapy. Pt reports decreased pain at end of session. 10/12 - progressed reaching to second shelf this date. Pt unable to reach second shelf with 1 pound weight, but demonstrated ability to touch shelf without weight. Pt with TP in L UT, L infraspinatus, and R SCM. Pt demonstrates improved posture with exercises and increased exercise tolerance. Continues to require seated rest breaks throughout session. 10/6 - Initiated reaching with 1 pound weight this date. Pt required verbal and tactile cues to deactivate UT and recruit scapular mm. Pt required less frequent rest breaks throughout treatment session this date. Pt requested ice this date as she states sometimes it decreases her pain at home. Continue to progress standing postural strengthening and re-ed exercises per pt tolerance. 10/4 - Progressed TB exercises to standing this date. Decreased to 1 set of 10 due to pt fatigue in standing. Initiated thoracic ext over chair with tactile cue to ext through thoracic spine vs cervical spine. Pt required VC for chin tuck with cervical rotation. Reports decreased pain with chin tuck + rotation. Continue to progress as tolerated. 10/1 - Pt required verbal and tactile cues to deactivate UT with TB exercises this date. Pt reports no pain throughout treatment session. Pt reports she has been more aware of her posture throughout the day. Pt with less TP and tissue tension in UT mm this date. 9/28 - Pt with decreased pain and tissue tension this date. Pt requires VC for appropriate form with chin tuck. Pt demonstrates improved posture with TB exercises. Pt reports she is more aware of her posture during the day, but has pain when she sits on a pillow.  Plan to continue to progress postural education and scapular strengthening to further decrease pain and return to PLOF without pain or limitations. 9/23: Patient continues to require VC and TC during exercises in order to squeeze scapula together. Patient has difficulty maintaining posture and is struggling to find home solutions to help her adjust her body mechanics to incorporate improved posture habits during her ADL's and IADLs. In addition, pt concerned re safety at home. Patient has noted tenderness and tightness in bilat UT, levator, rhomboids, scalenes, SCM. Patient reports pain relief with STM. Plan to continue to progress appropriate postural strengthening and appropriate motor programming to increase cervical ROM. 9/21 see PT POC above    9/14 needs improved postures, especially for TV watching   9/9 Pt with decreased pain and tissue tension this date. STM primarily on L side this date and pain decreased after manual therapy. Pt reports ear pain is on and off and currently has no ear pain. Increased resistance with TB exercises this date. Pt reported increased difficulty but no increased pain. Plan to continue to progress postural strengthening and cervical ROM per pt tolerance. Treatment/Activity Tolerance:  [x] Patient able to complete tx  [] Patient limited by fatigue  [] Patient limited by pain  [] Patient limited by other medical complications  [] Other:     Prognosis: [x] Good [] Fair  [] Poor    Patient Requires Follow-up: [x] Yes  [] No    PLAN:  strength, ROM/flexibility, posture and body mechs, manual, MOC, HEP, pt education     Frequency/Duration: 2 days per week for 6  Weeks:  Interventions:  [x]? Therapeutic exercise including:strength, ROM, flexibility  [x]? NMR activation and proprioception including postural re-education  [x]? Manual therapy as indicated to include: IASTM, STM, PROM, Gr I-IV mobilizations, manipulation. [x]?   Modalities as needed that may include: thermal agents, E-stim, Biofeedback, US, iontophoresis as indicated  [x]? Patient education on joint protection, postural re-education, activity modification, progression of HEP. Plan for next treatment session: see flowsheet    PLAN: See eval. PT 2x / week for 6 weeks. + 2x/wk for 5 wks + 2x/wk for 6 wks   [x] Continue per plan of care [] Alter current plan (see comments)  [] Plan of care initiated [] Hold pending MD visit [] Discharge    Electronically signed by: Cruz Guevara, PT, DPT      Note: If patient does not return for scheduled/ recommended follow up visits, this note will serve as a discharge from care along with most recent update on progress.

## 2021-12-27 ENCOUNTER — HOSPITAL ENCOUNTER (OUTPATIENT)
Dept: PHYSICAL THERAPY | Age: 81
Setting detail: THERAPIES SERIES
Discharge: HOME | End: 2021-12-27
Payer: MEDICARE

## 2021-12-27 PROCEDURE — 97110 THERAPEUTIC EXERCISES: CPT

## 2021-12-27 NOTE — FLOWSHEET NOTE
[facility] Physical Therapy  Phone: [phone]   Fax: [phone]      Physical Therapy Daily Treatment Note  Date:  2021    Patient Name:  Froylan Parkinson    :  1940  MRN: [id number]  Medical/Treatment Diagnosis Information:  Diagnosis: cervical disc disease, R TMJ  Treatment Diagnosis: pt with painful myofascial changes, impaired posture including forward head posture, reduced postural strengths. hypomobility t/o spine, especially at upper cerv spine  Insurance/Certification information:  PT Insurance Information: medicare  Physician Information:  Referring Practitioner: Dr. Aline Paula of care signed (Y/N): []  Yes [x]  No     Date of Patient follow up with Physician:     POC: 11/10   PN:       Progress Report: [x]  Yes  []  No     Progress report due (10 Rx/or 30 days whichever is less): visit #75 or     Recertification due (POC duration/ or 90 days whichever is less): visit #12 or     Visit # Insurance Allowable Auth required? Date Range    + 10/10 +   Med nec []  Yes  [x]  No NA     Latex Allergy:  [x]NO      []YES  Preferred Language for Healthcare:   [x]English       []other:    Functional Scale:        Date assessed:  NDI raw score = 18, dysfunction =36 %, taken at initial eval  NDI raw score = 20; dysfunction = 40%    10/26/21  NDI raw score = 17; dysfunction = 34%    11/10/21  NDI raw score = 11; dysfunction = 22%    21       Pain level: 3/10    SUBJECTIVE:     Pt reports she has been checking her BP more often and it has been running lower than before. Reports her ear is bothering her again. Has not been using biomed cream - it .  - Pt reports she has been busy baking and writing [place] cards so pain is a little higher.  - pt reports she is feeling good today. Pain is low. She is getting ready for [place] and sending about 3 cards a day.  Feels she is more aware of maintaining good posture. 12/16 - Pt reports she is doing well today. Reports low pain. States she felt tired after her visit Monday. 12/13 - Pt reports she woke up in the middle of the night with pain in the back of her neck. Pain has gotten better throughout the day. 12/9: feeling much better than she did on Tuesday. Reports she is now 4'11; used to be 5'2\"  12/6: Pt reports she had some ear pain yesterday for the first time in a while. States she is a little sore from tree trimming and laundry. 12/2: Patient reports that her legs are tired today. Says her neck is a little sore today from putting ornaments on her [address]. 11/30 reports the biomed ointment cont to help. Reports any head pain tends to be in the evenings  11/26 reports biomed ointment has been helped. Yesterday she was sitting infront of the sliding glass door and the cold increased her neck pain. She was sore after LV due to transfers. States she is getting better. 11/23 feels much better after starting a new rub in medicine   11/10 - Pt reports she went to see the MD yesterday and she thought she would benefit from further therapy. Reports MD asked if anyone here did needling as it helped her with her shoulder. Pt brought referral for additional PT 2x/wk for 6 wks. 11/5 - Pt reports she felt sore this morning but has decreased pain now. Reports she felt tired after LV.  11/3 - Pt reports she had some tingling in her head a couple days ago but it went away. Reports pain in her ear has decreased. 10/29 - Pt reports her pain is less today. States she feels she can move her neck further. 10/26 - Pt reports she had no pain yesterday. States pain is a little higher today after driving a lot today. Had to park far away and she is tired. 10/22 - Pt reports her pain typically gets better throughout the day. States she has some ear pain today. 10/19 - Pt reports she almost fell this morning.  She was reading the paper and went to sit in a rolling chair and the chair slipped. She caught herself but thinks this could be attributing to her pain. 10/12 - pt reports she is a little sore today. States her head and ear were bothering her Sunday but that has since gone away. 10/6 - Pt reports more pain today in her ear and neck. States she carried her groceries on the opposite side and isn't sure what increased her pain. 10/4 Pt reports she is more sore today. She cooked a pot of chili yesterday and thinks she may have over done it. 10/1 pt reports her pain comes and goes but she continues to feel better. States her son is getting his stress test currently. Brought her calendar to schedule additional visits. 9/28 - Pt states she has a lot going on with her son's health. He is supposed to get a stress test Saturday. Reports some ear pain when she woke up, but feels good now.   9/23: Patient reports that she is feeling well today and not having too much pain. Patient states that she is trying her best to create a more ergonomic home desk environment for her laptop. Patient states that she has not found a solution that she prefers yet and will coninue to try and find a solution that works for her. Pt asks re life alert systems for home use  9/21 increasing mobility without increased pain. Reports she is still taking the mm relaxer and reports she took Gaynelle Luis Enrique when she was stressed and noticed increased neck mm pain And it seemed to help. States her son hopes to get hernia surgery soon. Intermittent R ear pain with combing her hair   9/14 Intermittent pain still gets pain in neck and face/ear. Thinks PT tx is helping. Reports sleeping is good.   Can turn  to the right better    OBJECTIVE:   12/16  cerv AROM  R L  SB  45 35  Rot  72 68    Strength / Myotomes RIGHT LEFT   Shoulder Flex 4+ 4   Shoulder Abduction (C5) 4+ 4       12/9 gait and posture: pt amb with 45 deg trunk flex - flex from hip and thoracic kyphosis  11/23 script from Dr. Anabel Daugherty has been scanned into media section requesting dry needling is possible. Gave pt DN info form and waiver. 11/10  cerv AROM  R L  SB  35 38  Rot  61 50    Strength / Myotomes RIGHT LEFT   Shoulder Flex 4+ 4+   Shoulder Abduction (C5) 4+ 4      stretch   L: 28#  R: 30#    Increased tissue tension and TP in B cervical paraspinal mm, B UT, B rhomboids, and B SCM     10/26  cerv AROM  R L  SB  40 35  Rot  60 59    Strength / Myotomes RIGHT LEFT   Shoulder Flex 4+ 4+   Shoulder Abduction (C5) 4+ 4       9/23 Patient ambulates with AD 8TL, Patient ambulates with forward head, rounded shoulders, and kyphotic curve in T-spine. 9/14 pt reports slouched posture with excessive cerv flex when she watches TV. Palpation: Myofascial pain R>L UT, scalenes. SCM and masseter WFL B.    cerv AROM  R L  SB  40 25  Rot  60 50    9/9 - Pt with no clicking or popping in TMJ, palpation of R SCM increases ear pain   8/31 - pt reports she has a spot that is iching and burning on her back. Skin inspection - small, red oval on R scapular region with smooth borders. Pt reports she has a dermatology appointment within the next 2 weeks. 8/25 - amb with RW this date. Joon Willis is too tall, but it does not lower further. Increased tissue tension in R SCM and scalenes. RESTRICTIONS/PRECAUTIONS: scoliosis, osteopososis/osteopenia; has lost 3 friends and her  and brother int he last 17 months, joint replacements ( B knees, R ankle, B shoudlers): x 5. Lives with her son who has multiple medical issues including COPD and heart issues. Easy bruising.    Spinal cord stimulator (lumbar) put in by Dr. Dhiraj Escobar - recently had new battery put in.        Exercises/Interventions:     Therapeutic Exercises (90929) Resistance / level Sets/sec Reps Notes   Arm bike   2.5 min fwd/2.5 min back      UT stretch - HAND HOLDING CHAIR      Chin tuck -   Scap squeeze   Chin tuck + scap squeeze  2 sec    10     Pulleys - flexion and abd  5 sec  15 B each Completed to promote thoracic ext    Seated:    cerv AROM   SB  Rot  UT stretches  scalene stretches  cerv retraction  scap retraction Sitting with pillows and towel roll at lumbar spine to provide upright sitting posture with good support  10x3\" B   10x3\" B            Standing TB  High row   Mid row  LPD   Horizontal abd - seated  B ER - seated  Mary Lomeli 2   9910  10      10   Seated thoracic extension   3 sec 10     SCM stretch   L increased pain on R side    LT wall slides  -flexion   - abd      Manual cues to deactivate UT    Therapeutic Activities (69108)       Sit <-->supine <--> L s/l <-->prone    11/23 attempted prone to see if pt could comfortably attain position needed for DN   Instruct in upright sitting posture for watching TV       Standing reaching to shelf - second shelf  - flexion  - abd Tactile and verbal cues to deactivate UT and use scapular mm to reach. CGA          Progress note completed this date - objective measures taken and discussed goals and progress made with therapy. Educated on [address].  Pt to be scheduled with PT that dry needles as was on MD order              Neuromuscular Re-ed (35417)       Postural ed                                          Manual Intervention ([phone])       manual - STM and TPR B UT mm and R SCM,levator  suboccipitals          8/27 Done on reclined mat table  9/21, 9/7, 8/30 completed seated in chair                                           Modalities:   12/13 - 1 cervical cold pack to cspine x 10 min at end of session   12/6, 11/26, 11/10, 11/5, 11/3, 10/29, 10/26, 10/22 - cervical MHP seated in chair at end of session x 10 min    10/19, 10/12, 10/6 - 1 cervical cold pack to cspine x 10 min at end of session   10/4, 10/1, 9/28 - cervical MHP seated in chair at end of session x 10 min   9/23, 9/14 9/9, 9/7, 9/2 - cervical MHP seated in chair at end of session x 15 min   8/31- cervical MHP seated in chair at end of session x 12 min   8/27- cervical MHP on reclined mat table x 15 min   8/25 - cervical MHP seated in chair at end of session x 10 min     Pt. Education:  12/16 - educated on progress made with therapy and focus on posture, stretching, and cervical rotation for driving with remaining visits  12/9 review self STM and HEP, review stress management techs; use of heat, self massage - gentle, gentle stretches to manage pain neural irritation causing strange sensations to head  11/30 how to do self STM to [facility]  11/23 extensive pt ed re dry needling, risks and benefits. D/w pt that scoliosis, osteoporosis/penia, easy bruising, blood thinners, difficulty getting into prone position are all factors that limit appropriateness of DN  Gave her info packet to read and take home. Educated pt that due to her multiple medical issues & difficutly being comfy in prone, PT advises against DN for safety reasons    9/23: pt Educated re safety in home /fall risk reduction. At pt request d/w pt re life alert systems. Patient ed  on option of obtaining an apple watch to use as a \"life alert\" system. Patient likes this idea and state she will have her son help her to look into it.   8/31 - Pt educated on ABCDs of melanoma and importance of skin checks as she reports concern about itching spot on her back. Pt has dermatology appointment in 2 wks. 8/19 patient educated on diagnosis, prognosis and expectations for rehab  -all patient questions were answered    Home Exercise Program:  11/30 self STM to [facility]  11/23 cont with bands and stretches, posture  9/23 computer ergonomics and apple watch for fall risk reduction  9/21  computer ergonomics  9/14 upright psoture for TV viewing  8/19 cerv AROM SB/ROT      Therapeutic Exercise and NMR EXR  [] [phone]) Provided verbal/tactile cueing for activities related to strengthening, flexibility, endurance, ROM for improvements in  [] LE / Lumbar: LE, proximal hip, and core control with self care, mobility, lifting, ambulation.   [] UE / Cervical: cervical, postural, scapular, scapulothoracic and UE control with self care, reaching, carrying, lifting, house/yardwork, driving, computer work.  [] (95762) Provided verbal/tactile cueing for activities related to improving balance, coordination, kinesthetic sense, posture, motor skill, proprioception to assist with   [] LE / lumbar: LE, proximal hip, and core control in self care, mobility, lifting, ambulation and eccentric single leg control. [] UE / cervical: cervical, scapular, scapulothoracic and UE control with self care, reaching, carrying, lifting, house/yardwork, driving, computer work.   [] (95992) Therapist is in constant attendance of 2 or more patients providing skilled therapy interventions, but not providing any significant amount of measurable one-on-one time to either patient, for improvements in  [] LE / lumbar: LE, proximal hip, and core control in self care, mobility, lifting, ambulation and eccentric single leg control. [] UE / cervical: cervical, scapular, scapulothoracic and UE control with self care, reaching, carrying, lifting, house/yardwork, driving, computer work.       NMR and Therapeutic Activities:    [] (71885 or 52221) Provided verbal/tactile cueing for activities related to improving balance, coordination, kinesthetic sense, posture, motor skill, proprioception and motor activation to allow for proper function of   [] LE: / Lumbar core, proximal hip and LE with self care and ADLs  [] UE / Cervical: cervical, postural, scapular, scapulothoracic and UE control with self care, carrying, lifting, driving, computer work.   [] (91846) Gait Re-education- Provided training and instruction to the patient for proper LE, core and proximal hip recruitment and positioning and eccentric body weight control with ambulation re-education including up and down stairs     Home Management Training / Self Care:  [] (87337) Provided self-care/home management training related to activities of daily living and compensatory training, and/or use of adaptive equipment for improvement with: ADLs and compensatory training, meal preparation, safety procedures and instruction in use of adaptive equipment, including bathing, grooming, dressing, personal hygiene, basic household cleaning and chores. Home Exercise Program:    [x] (76895) Reviewed/Progressed HEP activities related to strengthening, flexibility, endurance, ROM of   [] LE / Lumbar: core, proximal hip and LE for functional self-care, mobility, lifting and ambulation/stair navigation   [] UE / Cervical: cervical, postural, scapular, scapulothoracic and UE control with self care, reaching, carrying, lifting, house/yardwork, driving, computer work  [] (05954)Reviewed/Progressed HEP activities related to improving balance, coordination, kinesthetic sense, posture, motor skill, proprioception of   [] LE: core, proximal hip and LE for self care, mobility, lifting, and ambulation/stair navigation    [] UE / Cervical: cervical, postural,  scapular, scapulothoracic and UE control with self care, reaching, carrying, lifting, house/yardwork, driving, computer work    Manual Treatments:  PROM / STM / Oscillations-Mobs:  G-I, II, III, IV (PA's, Inf., Post.)  [] (86045) Provided manual therapy to mobilize LE, proximal hip and/or LS spine soft tissue/joints for the purpose of modulating pain, promoting relaxation,  increasing ROM, reducing/eliminating soft tissue swelling/inflammation/restriction, improving soft tissue extensibility and allowing for proper ROM for normal function with   [] LE / lumbar: self care, mobility, lifting and ambulation. [] UE / Cervical: self care, reaching, carrying, lifting, house/yardwork, driving, computer work.      Modalities:  [] (65707) Vasopneumatic compression: Utilized vasopneumatic compression to decrease edema / swelling for the purpose of improving mobility and quad tone / recruitment which will allow for increased overall function including but not limited to self-care, transfers, ambulation, and ascending / descending stairs. Charges:  Timed Code Treatment Minutes: 40   Total Treatment Minutes: 40     [] EVAL - LOW (51742)   [] EVAL - MOD (48164)  [] EVAL - HIGH (97280)  [] RE-EVAL (59441)  [x] QT(53135) x   3    [] Ionto  [] NMR (90098) x       [] Vaso  [] Manual (61242) x 1      [] Ultrasound  [] TA x   1     [] Mech Traction (15745)  [] Aquatic Therapy x      [] ES (un) (52761):   [] Home Management Training x  [] ES(attended) (31397)   [] Dry Needling 1-2 muscles (20437):  [] Dry Needling 3+ muscles (239152)  [] Group:      [] Other:          GOALS:  Patient stated goal: less pain  []? Progressing: [x]? Met: []? Not Met: []? Adjusted     Therapist goals for Patient:   Short Term Goals: To be achieved in: 2 weeks  1. Independent in HEP and progression per patient tolerance, in order to prevent re-injury. []? Progressing: [x]? Met: []? Not Met: []? Adjusted  2. Patient will have a decrease in pain to facilitate improvement in movement, function, and ADLs as indicated by improvement with respect to Functional Deficits. []? Progressing: [x]? Met: []? Not Met: []? Adjusted     Long Term Goals: To be achieved in: 6 weeks  1. Disability index score of 10% or less on the  NDI  to assist with reaching prior level of function. [x]? Progressing: []? Met: []? Not Met: []? Adjusted  2. Patient will demonstrate increased AROM  to [facility], to allow for proper joint functioning to allow pt to resume turning head while driving without increase in symptoms. []? Progressing: [x]? Met: []? Not Met: []? Adjusted  3. Patient will demonstrate increased Strength by 1/2 mmt grade  patients Functional Deficits to allow pt to resume amb with/without RW prn for amb in community without increase in symptoms. []? Progressing: [x]? Met: []? Not Met: []? Adjusted  4.  Patient will return to functional activities including falling asleep without difficulty without increased symptoms or restriction. []? Progressing: [x]? Met: []? Not Met: []? Adjusted  5. Pt will report ability to drive without pain or restrictions. - ADDED 11/10/21   [x]? Progressing: []? Met: []? Not Met: []? Adjusted        Overall Progression Towards Functional goals/ Treatment Progress Update:  [x] Patient is progressing as expected towards functional goals listed. [] Progression is slowed due to complexities/Impairments listed. [] Progression has been slowed due to co-morbidities. [] Plan just implemented, too soon to assess goals progression <30days   [] Goals require adjustment due to lack of progress  [] Patient is not progressing as expected and requires additional follow up with physician  [] Other    Persisting Functional Limitations/Impairments:  [x]Sleeping []Sitting               []Standing []Transfers        []Walking []Kneeling               []Stairs []Squatting / bending   [x]ADLs []Reaching  [x]Lifting  [x]Housework  [x]Driving []Job related tasks  []Sports/Recreation []Other:        ASSESSMENT:   12/27 - Pt reports some pain with L cervical rotation at end range. Pt demonstrates increased thoracic ext this date with TB exercises. Pt states driving is getting better looking over her L shoulder. Plan to d/c to HEP NV.    12/23 - Pt with improved exercise technique with seated thoracic ext. Pt demonstrates improved ability to complete seated chin tuck without compensations. Pt with decreased pain and improved posture. Plan to complete remaining visits and d/c to HEP.     12/20 - Pt demonstrates improved exercise tolerance requiring no seated rest with standing TB exercises this date. Pt does not report pain with any cervical AROM this date and demonstrates improved mobility. Continue to progress postural re-ed and cervical ROM to return to PLOF without limitations. 12/16 - Progress note completed this date.  Pt demonstrates improved cervical AROM in both B SB and rotation. Pt continues to have some shoulder flexion and abd weakness (L>R). Pt demonstrates improved function as noted by decreased dysfunction on the NDI. Pt reports continued limitations in driving with turning over her L shoulder. Plan to complete remaining visits focusing on postural re-ed, stretching, and cervical AROM and d/c to HEP.     12/13 Pt with decreased  Tissue tension in c spine. Demonstrates improved posture with exercise this date and improved exercise endurance requiring less rest throughout treatment session. Continue to progress per pt tolerance. 12/9 reduced myofascial pain B upper quarter. Pt having difficulty doing HEP regularly and having trouble doing self STM. Pt cont with high stress levels which contribute to cont pain. 12/6 - Pt with increased tissue tension in B UT and decreased tightness in B SCM mm. Pt required VC for decreased cervical ext with TB exercises. Pt demonstrates increased thoracic mobility and ext noted during standing TB exercises. Continue to progress per pt tolerance. 12/2: Patient performed periscapular strengthening and postural exercises seated this date due to fatigue in BLEs. The patient responded well today with manual STM to upper trap , scalenes muscle therapy. 11/30 good response to manual therapy. Will benefit from trial of self STM to [facility]    11/26 Pt demonstrates improved thoracic extension with high rows this date. Pt with decreased tissue tension in SCM this date, but continues with TP and increased tissue tension in R UT mm. Pt reports no increased pain throughout treatment session. Continue to progress per pt tolerance.      11/23 due to her multiple medical issues, difficutly being comfy in prone, and dizziness occurring with transitioning out of prone position (the position needed for DN of UT) and previous dizziness occurring with transition out of supine while in PT clinic (position needed for DN of SCM), PT advises against DN for safety reasons. Rec focus on manual therapy to reduce myofascial pain/TPs and approp exercise to restore normal functional use of UE.    11/10 Pt continues to present with decreased cervical AROM in SB and rotation. Pt with decreased  strength B and increased tissue tension in cervical mm. Pt demonstrates improved posture and exercise technique with therapy and improved function as noted by decreased dysfunction on the NDI. Pt continues to be limited in endurance with home management and driving secondary to decreased cervical rotation. Pt to be scheduled with therapist certified in DN as dry needling was added to MD referral. Plan to continue to progress postural re-ed and strengthening, cervical ROM, and manual therapy to decrease pain and tissue tension and improve strength, ROM, and endurance to return to PLOF without limitations. 11/5 - Pt reports decreased pain in neck this date. Pt demonstrates improved cervical rotation B this date with AROM and improved shoulder abd and flexion with wall slides. Plan to d/c to HEP NV.     11/3 - Pt demonstrates improved posture with TB exercises this date. Pt with improved awareness of her posture with ADLs. Pt with TP and increased tissue tension in BUT L>R this date. Continue to progress per pt tolerance. 10/29 - pt required VC to relax UT with B ER this date. Increased to 2 sets of 10 with green TB. Pt with increased tissue tension in R scalenes this date which decreased with gentle STM. Pt reports decreased pain and increased mobility with OP PT. Continue to progress postural strengthening/stretching per pt tolerance. 10/26 - Progress note completed this date. Pt demonstrates improvements in L cervical SB and rotation AROM. Pt with increased B shoulder flexion and abd strength.  Pt reports decreased pain and increased ability to sleep without pain or discomfort. Plan to focus remaining treatment sessions on postural strengthening and STM to return to PLOF without limitations or risk for further injury. 10/22 - Pt with TP and increased tissue tension in suboccipitals, R UT, and R SCM. Pt reports feeling a good stretch with UT stretch. Initiated B ER with TB this date. Pt required VC to keep elbows bent. Pt required less frequent VC for upright trunk posture and demonstrated improved technique with chin tuck. Continue to progress per pt tolerance. 10/19 - progressed TB shoulder ABD to orange band this date. Pt with TP in R SCM which decreased with manual therapy. Pt reports decreased pain at end of session. 10/12 - progressed reaching to second shelf this date. Pt unable to reach second shelf with 1 pound weight, but demonstrated ability to touch shelf without weight. Pt with TP in L UT, L infraspinatus, and R SCM. Pt demonstrates improved posture with exercises and increased exercise tolerance. Continues to require seated rest breaks throughout session. 10/6 - Initiated reaching with 1 pound weight this date. Pt required verbal and tactile cues to deactivate UT and recruit scapular mm. Pt required less frequent rest breaks throughout treatment session this date. Pt requested ice this date as she states sometimes it decreases her pain at home. Continue to progress standing postural strengthening and re-ed exercises per pt tolerance. 10/4 - Progressed TB exercises to standing this date. Decreased to 1 set of 10 due to pt fatigue in standing. Initiated thoracic ext over chair with tactile cue to ext through thoracic spine vs cervical spine. Pt required VC for chin tuck with cervical rotation. Reports decreased pain with chin tuck + rotation. Continue to progress as tolerated. 10/1 - Pt required verbal and tactile cues to deactivate UT with TB exercises this date. Pt reports no pain throughout treatment session.  Pt reports she has been more aware of her posture throughout the day. Pt with less TP and tissue tension in UT mm this date. 9/28 - Pt with decreased pain and tissue tension this date. Pt requires VC for appropriate form with chin tuck. Pt demonstrates improved posture with TB exercises. Pt reports she is more aware of her posture during the day, but has pain when she sits on a pillow. Plan to continue to progress postural education and scapular strengthening to further decrease pain and return to PLOF without pain or limitations. 9/23: Patient continues to require VC and TC during exercises in order to squeeze scapula together. Patient has difficulty maintaining posture and is struggling to find home solutions to help her adjust her body mechanics to incorporate improved posture habits during her ADL's and IADLs. In addition, pt concerned re safety at home. Patient has noted tenderness and tightness in bilat UT, levator, rhomboids, scalenes, SCM. Patient reports pain relief with STM. Plan to continue to progress appropriate postural strengthening and appropriate motor programming to increase cervical ROM. 9/21 see PT POC above    9/14 needs improved postures, especially for TV watching   9/9 Pt with decreased pain and tissue tension this date. STM primarily on L side this date and pain decreased after manual therapy. Pt reports ear pain is on and off and currently has no ear pain. Increased resistance with TB exercises this date. Pt reported increased difficulty but no increased pain. Plan to continue to progress postural strengthening and cervical ROM per pt tolerance.      Treatment/Activity Tolerance:  [x] Patient able to complete tx  [] Patient limited by fatigue  [] Patient limited by pain  [] Patient limited by other medical complications  [] Other:     Prognosis: [x] Good [] Fair  [] Poor    Patient Requires Follow-up: [x] Yes  [] No    PLAN:  strength, ROM/flexibility, posture and body mechs, manual, MOC, HEP, pt education     Frequency/Duration: 2 days per week for 6  Weeks:  Interventions:  [x]? Therapeutic exercise including:strength, ROM, flexibility  [x]? NMR activation and proprioception including postural re-education  [x]? Manual therapy as indicated to include: IASTM, STM, PROM, Gr I-IV mobilizations, manipulation. [x]? Modalities as needed that may include: thermal agents, E-stim, Biofeedback, US, iontophoresis as indicated  [x]? Patient education on joint protection, postural re-education, activity modification, progression of HEP. Plan for next treatment session: see flowsheet    PLAN: See eval. PT 2x / week for 6 weeks. + 2x/wk for 5 wks + 2x/wk for 6 wks   [x] Continue per plan of care [] Alter current plan (see comments)  [] Plan of care initiated [] Hold pending MD visit [] Discharge    Electronically signed by: Ole Morillo, PT, DPT      Note: If patient does not return for scheduled/ recommended follow up visits, this note will serve as a discharge from care along with most recent update on progress.

## 2021-12-30 ENCOUNTER — HOSPITAL ENCOUNTER (OUTPATIENT)
Dept: PHYSICAL THERAPY | Age: 81
Setting detail: THERAPIES SERIES
Discharge: HOME | End: 2021-12-30
Payer: MEDICARE

## 2021-12-30 PROCEDURE — 97110 THERAPEUTIC EXERCISES: CPT

## 2021-12-30 PROCEDURE — 97530 THERAPEUTIC ACTIVITIES: CPT

## 2021-12-30 NOTE — DISCHARGE SUMMARY
[facility]  Outpatient Physical Therapy  Phone: [phone]   Fax: [phone]     Physical Therapy Discharge Summary    Dear Dr. Nik Barrios MD,    We had the pleasure of treating the following patient for physical therapy services at [facility] Outpatient Physical Therapy. A summary of our findings can be found in the discharge summary below. If you have any questions or concerns regarding these findings, please do not hesitate to contact me at the office phone number checked above. Thank you for the referral.     Physician Signature:________________________________Date:__________________  By signing above (or electronic signature), therapists plan is approved by physician      Functional Outcome:   NDI raw score = 18, dysfunction =36 %, taken at initial eval  NDI raw score = 20; dysfunction = 40%    10/26/21  NDI raw score = 17; dysfunction = 34%    11/10/21  NDI raw score = 11; dysfunction = 22%    12/16/21  NDI raw score = 4; dysfunction = 8%    12/30/21    Overall Response to Treatment:   [x]Patient is responding well to treatment and improvement is noted with regards  to goals   []Patient should continue to improve in reasonable time if they continue HEP   []Patient has plateaued and is no longer responding to skilled PT intervention    []Patient is getting worse and would benefit from return to referring MD   []Patient unable to adhere to initial POC   [x]Other: Pt d/c to HEP this date. Pt demonstrates improved cervical AROM and improved B shoulder strength. Pt with decreased dysfunction as noted by decreased dysfunction on the NDI. Pt met all goals set and reports decreased pain and improved ability to look over her shoulder while driving. Pt provided with blue TB to progress HEP as tolerated and [facility] Pt educated to follow up with PT or MD with questions or need for therapy in the future.      Date range of Visits: 9/19/21 - 21  Total Visits: 34    Recommendation:    [x] Discharge to HEP. Follow up with PT or MD PRN. Physical Therapy Daily Treatment Note  Date:  2021    Patient Name:  Korey Herrera    :  1940  MRN: [id number]  Medical/Treatment Diagnosis Information:  Diagnosis: cervical disc disease, R TMJ  Treatment Diagnosis: pt with painful myofascial changes, impaired posture including forward head posture, reduced postural strengths. hypomobility t/o spine, especially at upper cerv spine  Insurance/Certification information:  PT Insurance Information: medicare  Physician Information:  Referring Practitioner: Dr. Jose Webster of care signed (Y/N): []  Yes [x]  No     Date of Patient follow up with Physician:     POC: 11/10   PN:       Progress Report: [x]  Yes  []  No     Progress report due (10 Rx/or 30 days whichever is less): visit #13 or     Recertification due (POC duration/ or 90 days whichever is less): visit #12 or     Visit # Insurance Allowable Auth required? Date Range    + 10/10 +   Med nec []  Yes  [x]  No NA     Latex Allergy:  [x]NO      []YES  Preferred Language for Healthcare:   [x]English       []other:    Functional Scale:        Date assessed:  NDI raw score = 18, dysfunction =36 %, taken at initial eval  NDI raw score = 20; dysfunction = 40%    10/26/21  NDI raw score = 17; dysfunction = 34%    11/10/21  NDI raw score = 11; dysfunction = 22%    21  NDI raw score = 4; dysfunction = 8%    21       Pain level: 1/10    SUBJECTIVE:     Pt reports she is feeling better today compared to Monday. Reports her BP has been better since she is eating better. Has not called about Biomed cream yet as her doctor is out of office this week.  Pt reports she has been checking her BP more often and it has been running lower than before. Reports her ear is bothering her again. Has not been using biomed cream - it .     - Pt reports she has been busy baking and writing [place] cards so pain is a little higher. 12/20 - pt reports she is feeling good today. Pain is low. She is getting ready for [place] and sending about 3 cards a day. Feels she is more aware of maintaining good posture. 12/16 - Pt reports she is doing well today. Reports low pain. States she felt tired after her visit Monday. 12/13 - Pt reports she woke up in the middle of the night with pain in the back of her neck. Pain has gotten better throughout the day. 12/9: feeling much better than she did on Tuesday. Reports she is now 4'11; used to be 5'2\"  12/6: Pt reports she had some ear pain yesterday for the first time in a while. States she is a little sore from tree trimming and laundry. 12/2: Patient reports that her legs are tired today. Says her neck is a little sore today from putting ornaments on her [address]. 11/30 reports the biomed ointment cont to help. Reports any head pain tends to be in the evenings  11/26 reports biomed ointment has been helped. Yesterday she was sitting infront of the sliding glass door and the cold increased her neck pain. She was sore after LV due to transfers. States she is getting better. 11/23 feels much better after starting a new rub in medicine   11/10 - Pt reports she went to see the MD yesterday and she thought she would benefit from further therapy. Reports MD asked if anyone here did needling as it helped her with her shoulder. Pt brought referral for additional PT 2x/wk for 6 wks. 11/5 - Pt reports she felt sore this morning but has decreased pain now. Reports she felt tired after LV.  11/3 - Pt reports she had some tingling in her head a couple days ago but it went away. Reports pain in her ear has decreased. 10/29 - Pt reports her pain is less today. States she feels she can move her neck further. 10/26 - Pt reports she had no pain yesterday. States pain is a little higher today after driving a lot today.  Had to park far away and she is tired. 10/22 - Pt reports her pain typically gets better throughout the day. States she has some ear pain today. 10/19 - Pt reports she almost fell this morning. She was reading the paper and went to sit in a rolling chair and the chair slipped. She caught herself but thinks this could be attributing to her pain. 10/12 - pt reports she is a little sore today. States her head and ear were bothering her Sunday but that has since gone away. 10/6 - Pt reports more pain today in her ear and neck. States she carried her groceries on the opposite side and isn't sure what increased her pain. 10/4 Pt reports she is more sore today. She cooked a pot of chili yesterday and thinks she may have over done it. 10/1 pt reports her pain comes and goes but she continues to feel better. States her son is getting his stress test currently. Brought her calendar to schedule additional visits. 9/28 - Pt states she has a lot going on with her son's health. He is supposed to get a stress test Saturday. Reports some ear pain when she woke up, but feels good now.   9/23: Patient reports that she is feeling well today and not having too much pain. Patient states that she is trying her best to create a more ergonomic home desk environment for her laptop. Patient states that she has not found a solution that she prefers yet and will coninue to try and find a solution that works for her. Pt asks re life alert systems for home use  9/21 increasing mobility without increased pain. Reports she is still taking the mm relaxer and reports she took Austine Comas when she was stressed and noticed increased neck mm pain And it seemed to help. States her son hopes to get hernia surgery soon. Intermittent R ear pain with combing her hair   9/14 Intermittent pain still gets pain in neck and face/ear. Thinks PT tx is helping. Reports sleeping is good.   Can turn  to the right better    OBJECTIVE:   12/30    cerv AROM R L  SB  30 30  Rot  70 65    Strength / Myotomes RIGHT LEFT   Shoulder Flex 4+ 4+   Shoulder Abduction (C5) 4+ 4+     12/16  cerv AROM  R L  SB  45 35  Rot  72 68    Strength / Myotomes RIGHT LEFT   Shoulder Flex 4+ 4   Shoulder Abduction (C5) 4+ 4       12/9 gait and posture: pt amb with 45 deg trunk flex - flex from hip and thoracic kyphosis  11/23 script from Dr. Lou Dalton has been scanned into media section requesting dry needling is possible. Gave pt DN info form and waiver. 11/10  cerv AROM  R L  SB  35 38  Rot  61 50    Strength / Myotomes RIGHT LEFT   Shoulder Flex 4+ 4+   Shoulder Abduction (C5) 4+ 4      stretch   L: 28#  R: 30#    Increased tissue tension and TP in B cervical paraspinal mm, B UT, B rhomboids, and B SCM     10/26  cerv AROM  R L  SB  40 35  Rot  60 59    Strength / Myotomes RIGHT LEFT   Shoulder Flex 4+ 4+   Shoulder Abduction (C5) 4+ 4       9/23 Patient ambulates with AD 2RR, Patient ambulates with forward head, rounded shoulders, and kyphotic curve in T-spine. 9/14 pt reports slouched posture with excessive cerv flex when she watches TV. Palpation: Myofascial pain R>L UT, scalenes. SCM and masseter WFL B.    cerv AROM  R L  SB  40 25  Rot  60 50    9/9 - Pt with no clicking or popping in TMJ, palpation of R SCM increases ear pain   8/31 - pt reports she has a spot that is iching and burning on her back. Skin inspection - small, red oval on R scapular region with smooth borders. Pt reports she has a dermatology appointment within the next 2 weeks. 8/25 - amb with RW this date. Theador Corpus is too tall, but it does not lower further. Increased tissue tension in R SCM and scalenes. RESTRICTIONS/PRECAUTIONS: scoliosis, osteopososis/osteopenia; has lost 3 friends and her  and brother int he last 17 months, joint replacements ( B knees, R ankle, B shoudlers): x 5. Lives with her son who has multiple medical issues including COPD and heart issues. Easy bruising.    Spinal cord stimulator (lumbar) put in by Dr. Silver Hannah - recently had new battery put in.        Exercises/Interventions:     Therapeutic Exercises (65107) Resistance / level Sets/sec Reps Notes   Arm bike        UT stretch - HAND HOLDING CHAIR      Chin tuck -   Scap squeeze   Chin tuck + scap squeeze      10     Pulleys - flexion and abd  5 sec  15 B each Completed to promote thoracic ext    Seated:    cerv AROM   SB  Rot  UT stretches  scalene stretches  cerv retraction  scap retraction Sitting with pillows and towel roll at lumbar spine to provide upright sitting posture with good support  10x3\" B   10x3\" B            Standing TB  High row   Mid row  LPD   Horizontal abd - seated  B ER - seated     Seated thoracic extension      SCM stretch   L increased pain on R side    LT wall slides  -flexion   - abd      Manual cues to deactivate UT    Therapeutic Activities (99128)       Sit <-->supine <--> L s/l <-->prone    11/23 attempted prone to see if pt could comfortably attain position needed for DN   Instruct in upright sitting posture for watching TV       Standing reaching to shelf - second shelf  - flexion  - abd Tactile and verbal cues to deactivate UT and use scapular mm to reach. CGA          Progress note completed this date - objective measures taken and discussed goals and progress made with therapy. Educated on [address]. Pt to be scheduled with PT that dry needles as was on MD order              Neuromuscular Re-ed (17053)       Postural ed       D/c completed this date. Objective measures taken. Pt educated on progress made with therapy. Pt provided with blue TB to progress HEP and 30 day pass to [facility]. Pt educated to follow up with PT or MD with questions or need for therapy in the future. All pt questions answered.   X 15 min                                   Manual Intervention (20122)       manual - STM and TPR B UT mm and R SCM,levator  suboccipitals          8/27 Done on reclined mat table  9/21, 9/7, 8/30 completed for rehab  -all patient questions were answered    Home Exercise Program:  11/30 self STM to [facility]  11/23 cont with bands and stretches, posture  9/23 computer ergonomics and apple watch for fall risk reduction  9/21  computer ergonomics  9/14 upright psoture for TV viewing  8/19 cerv AROM SB/ROT      Therapeutic Exercise and NMR EXR  [] (53975) Provided verbal/tactile cueing for activities related to strengthening, flexibility, endurance, ROM for improvements in  [] LE / Lumbar: LE, proximal hip, and core control with self care, mobility, lifting, ambulation. [] UE / Cervical: cervical, postural, scapular, scapulothoracic and UE control with self care, reaching, carrying, lifting, house/yardwork, driving, computer work.  [] (72025) Provided verbal/tactile cueing for activities related to improving balance, coordination, kinesthetic sense, posture, motor skill, proprioception to assist with   [] LE / lumbar: LE, proximal hip, and core control in self care, mobility, lifting, ambulation and eccentric single leg control. [] UE / cervical: cervical, scapular, scapulothoracic and UE control with self care, reaching, carrying, lifting, house/yardwork, driving, computer work.   [] (65982) Therapist is in constant attendance of 2 or more patients providing skilled therapy interventions, but not providing any significant amount of measurable one-on-one time to either patient, for improvements in  [] LE / lumbar: LE, proximal hip, and core control in self care, mobility, lifting, ambulation and eccentric single leg control. [] UE / cervical: cervical, scapular, scapulothoracic and UE control with self care, reaching, carrying, lifting, house/yardwork, driving, computer work.       NMR and Therapeutic Activities:    [] (83634 or 37843) Provided verbal/tactile cueing for activities related to improving balance, coordination, kinesthetic sense, posture, motor skill, proprioception and motor activation to allow for proper function of   [] LE: / Lumbar core, proximal hip and LE with self care and ADLs  [] UE / Cervical: cervical, postural, scapular, scapulothoracic and UE control with self care, carrying, lifting, driving, computer work.   [] (89733) Gait Re-education- Provided training and instruction to the patient for proper LE, core and proximal hip recruitment and positioning and eccentric body weight control with ambulation re-education including up and down stairs     Home Management Training / Self Care:  [] (30878) Provided self-care/home management training related to activities of daily living and compensatory training, and/or use of adaptive equipment for improvement with: ADLs and compensatory training, meal preparation, safety procedures and instruction in use of adaptive equipment, including bathing, grooming, dressing, personal hygiene, basic household cleaning and chores.      Home Exercise Program:    [x] (31409) Reviewed/Progressed HEP activities related to strengthening, flexibility, endurance, ROM of   [] LE / Lumbar: core, proximal hip and LE for functional self-care, mobility, lifting and ambulation/stair navigation   [] UE / Cervical: cervical, postural, scapular, scapulothoracic and UE control with self care, reaching, carrying, lifting, house/yardwork, driving, computer work  [] (16131)Reviewed/Progressed HEP activities related to improving balance, coordination, kinesthetic sense, posture, motor skill, proprioception of   [] LE: core, proximal hip and LE for self care, mobility, lifting, and ambulation/stair navigation    [] UE / Cervical: cervical, postural,  scapular, scapulothoracic and UE control with self care, reaching, carrying, lifting, house/yardwork, driving, computer work    Manual Treatments:  PROM / STM / Oscillations-Mobs:  G-I, II, III, IV (PA's, Inf., Post.)  [] (03401) Provided manual therapy to mobilize LE, proximal hip and/or LS spine soft tissue/joints for the purpose of modulating pain, promoting relaxation,  increasing ROM, reducing/eliminating soft tissue swelling/inflammation/restriction, improving soft tissue extensibility and allowing for proper ROM for normal function with   [] LE / lumbar: self care, mobility, lifting and ambulation. [] UE / Cervical: self care, reaching, carrying, lifting, house/yardwork, driving, computer work. Modalities:  [] (69088) Vasopneumatic compression: Utilized vasopneumatic compression to decrease edema / swelling for the purpose of improving mobility and quad tone / recruitment which will allow for increased overall function including but not limited to self-care, transfers, ambulation, and ascending / descending stairs. Charges:  Timed Code Treatment Minutes: 25   Total Treatment Minutes: 25     [] EVAL - LOW (19026)   [] EVAL - MOD (57795)  [] EVAL - HIGH (24441)  [] RE-EVAL (86897)  [x] HH(40632) x  1    [] Ionto  [] NMR (40304) x       [] Vaso  [] Manual (49747) x 1      [] Ultrasound  [x] TA x   1     [] Mech Traction (34315)  [] Aquatic Therapy x      [] ES (un) (38575):   [] Home Management Training x  [] ES(attended) (95197)   [] Dry Needling 1-2 muscles (61230):  [] Dry Needling 3+ muscles (299723)  [] Group:      [] Other:          GOALS:  Patient stated goal: less pain  []? Progressing: [x]? Met: []? Not Met: []? Adjusted     Therapist goals for Patient:   Short Term Goals: To be achieved in: 2 weeks  1. Independent in HEP and progression per patient tolerance, in order to prevent re-injury. []? Progressing: [x]? Met: []? Not Met: []? Adjusted  2. Patient will have a decrease in pain to facilitate improvement in movement, function, and ADLs as indicated by improvement with respect to Functional Deficits. []? Progressing: [x]? Met: []? Not Met: []? Adjusted     Long Term Goals: To be achieved in: 6 weeks  1. Disability index score of 10% or less on the  NDI  to assist with reaching prior level of function. []? Progressing: [x]?  Met: []? Not Met: []? Adjusted  2. Patient will demonstrate increased AROM  to [facility], to allow for proper joint functioning to allow pt to resume turning head while driving without increase in symptoms. []? Progressing: [x]? Met: []? Not Met: []? Adjusted  3. Patient will demonstrate increased Strength by 1/2 mmt grade  patients Functional Deficits to allow pt to resume amb with/without RW prn for amb in community without increase in symptoms. []? Progressing: [x]? Met: []? Not Met: []? Adjusted  4. Patient will return to functional activities including falling asleep without difficulty without increased symptoms or restriction. []? Progressing: [x]? Met: []? Not Met: []? Adjusted  5. Pt will report ability to drive without pain or restrictions. - ADDED 11/10/21   [x]? Progressing: [x]? Met: []? Not Met: []? Adjusted      Overall Progression Towards Functional goals/ Treatment Progress Update:  [x] Patient is progressing as expected towards functional goals listed. [] Progression is slowed due to complexities/Impairments listed. [] Progression has been slowed due to co-morbidities. [] Plan just implemented, too soon to assess goals progression <30days   [] Goals require adjustment due to lack of progress  [] Patient is not progressing as expected and requires additional follow up with physician  [] Other    Persisting Functional Limitations/Impairments:  [x]Sleeping []Sitting               []Standing []Transfers        []Walking []Kneeling               []Stairs []Squatting / bending   [x]ADLs []Reaching  [x]Lifting  [x]Housework  [x]Driving []Job related tasks  []Sports/Recreation []Other:        ASSESSMENT:   12/30  Pt d/c to HEP this date. Pt demonstrates improved cervical AROM and improved B shoulder strength. Pt with decreased dysfunction as noted by decreased dysfunction on the NDI. Pt met all goals set and reports decreased pain and improved ability to look over her shoulder while driving.  Pt provided with blue TB to progress HEP as tolerated and 30 day pass to [facility] by [facility]. Pt educated to follow up with PT or MD with questions or need for therapy in the future. 12/27 - Pt reports some pain with L cervical rotation at end range. Pt demonstrates increased thoracic ext this date with TB exercises. Pt states driving is getting better looking over her L shoulder. Plan to d/c to HEP NV.    12/23 - Pt with improved exercise technique with seated thoracic ext. Pt demonstrates improved ability to complete seated chin tuck without compensations. Pt with decreased pain and improved posture. Plan to complete remaining visits and d/c to HEP.     12/20 - Pt demonstrates improved exercise tolerance requiring no seated rest with standing TB exercises this date. Pt does not report pain with any cervical AROM this date and demonstrates improved mobility. Continue to progress postural re-ed and cervical ROM to return to PLOF without limitations. 12/16 - Progress note completed this date. Pt demonstrates improved cervical AROM in both B SB and rotation. Pt continues to have some shoulder flexion and abd weakness (L>R). Pt demonstrates improved function as noted by decreased dysfunction on the NDI. Pt reports continued limitations in driving with turning over her L shoulder. Plan to complete remaining visits focusing on postural re-ed, stretching, and cervical AROM and d/c to HEP.     12/13 Pt with decreased  Tissue tension in c spine. Demonstrates improved posture with exercise this date and improved exercise endurance requiring less rest throughout treatment session. Continue to progress per pt tolerance. 12/9 reduced myofascial pain B upper quarter. Pt having difficulty doing HEP regularly and having trouble doing self STM. Pt cont with high stress levels which contribute to cont pain. 12/6 - Pt with increased tissue tension in B UT and decreased tightness in B SCM mm.  Pt required VC for decreased cervical ext with TB exercises. Pt demonstrates increased thoracic mobility and ext noted during standing TB exercises. Continue to progress per pt tolerance. 12/2: Patient performed periscapular strengthening and postural exercises seated this date due to fatigue in BLEs. The patient responded well today with manual STM to upper trap , scalenes muscle therapy. 11/30 good response to manual therapy. Will benefit from trial of self STM to [facility]    11/26 Pt demonstrates improved thoracic extension with high rows this date. Pt with decreased tissue tension in SCM this date, but continues with TP and increased tissue tension in R UT mm. Pt reports no increased pain throughout treatment session. Continue to progress per pt tolerance. 11/23 due to her multiple medical issues, difficutly being comfy in prone, and dizziness occurring with transitioning out of prone position (the position needed for DN of UT) and previous dizziness occurring with transition out of supine while in PT clinic (position needed for DN of SCM), PT advises against DN for safety reasons. Rec focus on manual therapy to reduce myofascial pain/TPs and approp exercise to restore normal functional use of UE.    11/10 Pt continues to present with decreased cervical AROM in SB and rotation. Pt with decreased  strength B and increased tissue tension in cervical mm. Pt demonstrates improved posture and exercise technique with therapy and improved function as noted by decreased dysfunction on the NDI. Pt continues to be limited in endurance with home management and driving secondary to decreased cervical rotation.  Pt to be scheduled with therapist certified in DN as dry needling was added to MD referral. Plan to continue to progress postural re-ed and strengthening, cervical ROM, and manual therapy to decrease pain and tissue tension and improve strength, ROM, and endurance to return to PLOF without limitations. 11/5 - Pt reports decreased pain in neck this date. Pt demonstrates improved cervical rotation B this date with AROM and improved shoulder abd and flexion with wall slides. Plan to d/c to HEP NV.     11/3 - Pt demonstrates improved posture with TB exercises this date. Pt with improved awareness of her posture with ADLs. Pt with TP and increased tissue tension in BUT L>R this date. Continue to progress per pt tolerance. 10/29 - pt required VC to relax UT with B ER this date. Increased to 2 sets of 10 with green TB. Pt with increased tissue tension in R scalenes this date which decreased with gentle STM. Pt reports decreased pain and increased mobility with OP PT. Continue to progress postural strengthening/stretching per pt tolerance. 10/26 - Progress note completed this date. Pt demonstrates improvements in L cervical SB and rotation AROM. Pt with increased B shoulder flexion and abd strength. Pt reports decreased pain and increased ability to sleep without pain or discomfort. Plan to focus remaining treatment sessions on postural strengthening and STM to return to PLOF without limitations or risk for further injury. 10/22 - Pt with TP and increased tissue tension in suboccipitals, R UT, and R SCM. Pt reports feeling a good stretch with UT stretch. Initiated B ER with TB this date. Pt required VC to keep elbows bent. Pt required less frequent VC for upright trunk posture and demonstrated improved technique with chin tuck. Continue to progress per pt tolerance. 10/19 - progressed TB shoulder ABD to orange band this date. Pt with TP in R SCM which decreased with manual therapy. Pt reports decreased pain at end of session. 10/12 - progressed reaching to second shelf this date. Pt unable to reach second shelf with 1 pound weight, but demonstrated ability to touch shelf without weight. Pt with TP in L UT, L infraspinatus, and R SCM.  Pt demonstrates improved posture with exercises and increased exercise tolerance. Continues to require seated rest breaks throughout session. 10/6 - Initiated reaching with 1 pound weight this date. Pt required verbal and tactile cues to deactivate UT and recruit scapular mm. Pt required less frequent rest breaks throughout treatment session this date. Pt requested ice this date as she states sometimes it decreases her pain at home. Continue to progress standing postural strengthening and re-ed exercises per pt tolerance. 10/4 - Progressed TB exercises to standing this date. Decreased to 1 set of 10 due to pt fatigue in standing. Initiated thoracic ext over chair with tactile cue to ext through thoracic spine vs cervical spine. Pt required VC for chin tuck with cervical rotation. Reports decreased pain with chin tuck + rotation. Continue to progress as tolerated. 10/1 - Pt required verbal and tactile cues to deactivate UT with TB exercises this date. Pt reports no pain throughout treatment session. Pt reports she has been more aware of her posture throughout the day. Pt with less TP and tissue tension in UT mm this date. 9/28 - Pt with decreased pain and tissue tension this date. Pt requires VC for appropriate form with chin tuck. Pt demonstrates improved posture with TB exercises. Pt reports she is more aware of her posture during the day, but has pain when she sits on a pillow. Plan to continue to progress postural education and scapular strengthening to further decrease pain and return to PLOF without pain or limitations. 9/23: Patient continues to require VC and TC during exercises in order to squeeze scapula together. Patient has difficulty maintaining posture and is struggling to find home solutions to help her adjust her body mechanics to incorporate improved posture habits during her ADL's and IADLs. In addition, pt concerned re safety at home.   Patient has noted tenderness and tightness in bilat UT, levator, rhomboids, scalenes, SCM. Patient reports pain relief with STM. Plan to continue to progress appropriate postural strengthening and appropriate motor programming to increase cervical ROM. 9/21 see PT POC above    9/14 needs improved postures, especially for TV watching   9/9 Pt with decreased pain and tissue tension this date. STM primarily on L side this date and pain decreased after manual therapy. Pt reports ear pain is on and off and currently has no ear pain. Increased resistance with TB exercises this date. Pt reported increased difficulty but no increased pain. Plan to continue to progress postural strengthening and cervical ROM per pt tolerance. Treatment/Activity Tolerance:  [x] Patient able to complete tx  [] Patient limited by fatigue  [] Patient limited by pain  [] Patient limited by other medical complications  [] Other:     Prognosis: [x] Good [] Fair  [] Poor    Patient Requires Follow-up: [] Yes  [x] No    PLAN:  strength, ROM/flexibility, posture and body mechs, manual, MOC, HEP, pt education     Frequency/Duration: 2 days per week for 6  Weeks:  Interventions:  [x]? Therapeutic exercise including:strength, ROM, flexibility  [x]? NMR activation and proprioception including postural re-education  [x]? Manual therapy as indicated to include: IASTM, STM, PROM, Gr I-IV mobilizations, manipulation. [x]? Modalities as needed that may include: thermal agents, E-stim, Biofeedback, US, iontophoresis as indicated  [x]? Patient education on joint protection, postural re-education, activity modification, progression of HEP. Plan for next treatment session: see flowsheet    PLAN: See eval. PT 2x / week for 6 weeks.  + 2x/wk for 5 wks + 2x/wk for 6 wks   [] Continue per plan of care [] Alter current plan (see comments)  [] Plan of care initiated [] Hold pending MD visit [x] Discharge    Electronically signed by: Eugene Ross, PT, DPT      Note: If patient does not return for scheduled/ recommended follow up visits, this note will serve as a discharge from care along with most recent update on progress.

## 2022-11-21 ENCOUNTER — PROCEDURE VISIT (OUTPATIENT)
Dept: AUDIOLOGY | Age: 82
End: 2022-11-21
Payer: MEDICARE

## 2022-11-21 DIAGNOSIS — H90.3 SENSORINEURAL HEARING LOSS (SNHL) OF BOTH EARS: Primary | ICD-10-CM

## 2022-11-21 PROCEDURE — 92557 COMPREHENSIVE HEARING TEST: CPT | Performed by: AUDIOLOGIST

## 2022-11-21 PROCEDURE — 92567 TYMPANOMETRY: CPT | Performed by: AUDIOLOGIST

## 2022-11-21 NOTE — PROGRESS NOTES
[facility]  Division of Audiology/Otolaryngology    11/21/2022     Patient name: Roula Bustillos  Primary Care Physician: Robin Mcgregor MD   Medical Record Number: [id number]     Roula Bustillos   1940, 78 y.o. female   [id number]       Referring Provider: Robin Mcgregor MD   Referral Type: In an order in [address] [place] [address]    Reason for Visit: Evaluation of the cause of disorders of hearing, tinnitus, or balance. ADULT AUDIOLOGIC EVALUATION                    Roula Bustillos is a 78 y.o. female seen today, 11/21/2022 , for audiologic evaluation. Former patient of Karissa Garcia MD.    AUDIOLOGIC AND OTHER PERTINENT MEDICAL HISTORY:      Roula Bustillos presents with history of hearing loss. Prior audiogram in 2019 confirmed bilateral sensorineural loss. Patient states much difficulty with clarity of speech while conversing with others. Medical history is reportedly significant for hypertension. No falls. No head trauma. No other otologic factors noted. IMPRESSIONS:      Results are consistent with bilateral sensorineural hearing loss with good to excellent word recognition and normal middle ear function. Hearing loss is significant enough to result in difficulty understanding speech in most listening environments. Recommended hearing aid evaluation. Patient will proceed with hearing aid evaluation. ASSESSMENT AND FINDINGS:     Otoscopy revealed: Visible tympanic membrane bilaterally    Reliability: Good   Transducer: Inserts    RIGHT EAR:  Hearing Sensitivity: Mild to severe sensorineural hearing loss. Speech Recognition Threshold: 35 dB HL  Word Recognition: Excellent (%), based on NU-6   Tympanometry: Normal peak pressure and compliance, Type A tympanogram, consistent with normal middle ear function. Acoustic Reflexes: Ipsilateral: Present at elevated sensation levels and Absent at isolated frequencies.      LEFT EAR:  Hearing Sensitivity: Mild to severe sensorineural hearing loss. Speech Recognition Threshold: 30 dB HL  Word Recognition: Good (80-89%), based on NU-6   Tympanometry: Normal peak pressure and compliance, Type A tympanogram, consistent with normal middle ear function. Acoustic Reflexes: Ipsilateral: Present at normal sensation levels, Present at elevated sensation levels, and Absent at isolated frequencies. COUNSELING:      Reviewed purpose of testing completed today, general auditory system function, and results obtained today. The following items are recommended based on patient report and results from today's appointment:   - Continue yearly hearing exams   - Retest hearing as medically indicated and/or sooner if a change in hearing is noted. - Proceed with Hearing Aid Evaluation (HAE) appointment to discuss hearing aid options. Degree of   Hearing Sensitivity dB Range   Within Normal Limits (WNL) 0 - 20   Mild 20 - 40   Moderate 40 - 55   Moderately-Severe 55 - 70   Severe 70 - 90   Profound 90 +        RECOMMENDATIONS:        Interest in amplification. Proceed with hearing aid consult.     Susanna Marvin  Audiologist    Electronically signed by Susanna Marvin on 11/21/22 at 2:32 PM.

## 2022-12-07 ENCOUNTER — PROCEDURE VISIT (OUTPATIENT)
Dept: AUDIOLOGY | Age: 82
End: 2022-12-07

## 2022-12-07 DIAGNOSIS — H90.3 SENSORINEURAL HEARING LOSS (SNHL) OF BOTH EARS: Primary | ICD-10-CM

## 2022-12-07 NOTE — PROGRESS NOTES
[facility]  Division of Audiology/Otolaryngology    2022     PCP: Bucky Connell DO   MRN: [id number]          HEARING AID FITTING        RIGHT EAR: /MODEL: Oticon More 2  SN: [id number]  WIRE/DOME: 85G open bass dome    LEFT EAR: /MODEL: Oticon More 2 SN: [id number]  WIRE/DOME: 85G open bass dome     ACCESSORIES: (SN: [id number])  HAF: 2022  WARRANTY: 25  TRIAL PERIOD ENDS:2023  L&D ELIGIBLE: Yes     BUTTONS: DISABLED  PROGRAMS: DISABLED  PHONE CONNECTIVITY: Hossein Zee was seen today, 2022, to be fit with Oticon More 2 HERMELINDA hearing aids. Hearing aids were programmed to inexperienced user settings. Fit is good      RECHARGEABLE Device orientation was completed, includin. Hearing aid insertion/removal   2. Buttons/Indicators   3. Rechargeable battery use and life expectancy               4.  insertion/removal               5. Cleaning/maintenance of the device               6. Loss & Damage/Repair warranty information   7. 30-day right-to-return period        [facility] noted good sound quality. It was recommended that patient return for a follow-up appointment within the 30-day right-to-return period for further programming adjustments and education of the devices as warranted. PATIENT EDUCATION:         [facility] was provided with the Hearing Aid Fitting Checklist as a reference of items discussed at today's appointment.   Patient may find additional product information in the provided hearing aid  device manual.        Unbundled Billing- see associated fees and codes below:     Description Code Amount   Hearing Aids  $3890.00   Dispensing Fee  $300.00   Fitting Fee (Non-Refundable)  $300.00   Earmold Non-Custom   $50.00 x2 = $100.00   1-Year Service Plan  $250.00   Conformity Evaluation   (Real-Ear Measurements)  $60.00            Patient paid $4900.00 total following 12/7/22 appointment     RECOMMENDATIONS:      Return for follow-up appointment within 30-day right-to-return period. HAC in 2-3 weeks, will discuss further details on maintenance and upkeep of device at that time.         Susanna Sparks  Audiologist     Patient paid $4900.00

## 2022-12-08 ENCOUNTER — HOSPITAL ENCOUNTER (OUTPATIENT)
Dept: GENERAL RADIOLOGY | Age: 82
Discharge: HOME | End: 2022-12-08
Payer: COMMERCIAL

## 2022-12-08 DIAGNOSIS — M81.0 POSTMENOPAUSAL OSTEOPOROSIS: ICD-10-CM

## 2022-12-08 PROCEDURE — 77080 DXA BONE DENSITY AXIAL: CPT

## 2022-12-21 ENCOUNTER — PROCEDURE VISIT (OUTPATIENT)
Dept: AUDIOLOGY | Age: 82
End: 2022-12-21

## 2022-12-21 DIAGNOSIS — Z97.4 WEARS HEARING AID IN BOTH EARS: Primary | ICD-10-CM

## 2022-12-21 NOTE — PROGRESS NOTES
[facility]  Division of Audiology/Otolaryngology          Ms. Kenney was seen for followup visit since purchasing amplification on 12/7/2022. She is a new user of amplification. Manual capabilities were disabled. Patient extensively counseled on why program use is disabled during the first couple weeks of acclimation. Needed additional orientation on cleaning of aids, changing of domes and filters. Proficient with insertion, patient stated much practice. Right ear canal     Will followup in 3 months or on as needed basis. Patient will complete APHAB for perceived benefit. Diaz will be introduced at that time if preferred. Susanna Devi  Audiologist    No charge for today's followup visit.

## 2023-10-06 ENCOUNTER — PROCEDURE VISIT (OUTPATIENT)
Dept: AUDIOLOGY | Age: 83
End: 2023-10-06

## 2023-10-06 DIAGNOSIS — H90.3 SENSORINEURAL HEARING LOSS, BILATERAL: Primary | ICD-10-CM

## 2023-10-06 NOTE — PROGRESS NOTES
Jewell Chacon  1940.83 y.o. female   [id number]    HEARING AID CHECK    DEVICE & WARRANTY INFORMATION:     RIGHT EAR: /MODEL: Oticon More 2  SN: [id number]  WIRE/DOME: 85G open bass dome    LEFT EAR: /MODEL: Oticon More 2 SN: [id number]  WIRE/DOME: 85G open bass dome     ACCESSORIES: (SN: [id number])  HAF: 12/7/2022  WARRANTY: 12/28/25  TRIAL PERIOD ENDS:01/07/2023  L&D ELIGIBLE: Yes     BUTTONS: DISABLED  PROGRAMS: DISABLED  PHONE CONNECTIVITY: DISABLED    PROCEDURES:     Jewell Chacon was seen today, 10/6/2023, for a hearing aid check appointment. Otoscopy revealed: Non-occluding cerumen in the left ear removed with curette without incident. Clear in the right    Patient noted having some issues hearing her son. She also wanted to go over proper cleaning and maintenance of devices. She was previously seen by Dr. Mita Pizano. Hearing aids were cleaned and checked. A listening check revealed excellent function of the devices. Microphone and  ports were suctioned, domes and wax traps were changed, and devices completed a desiccant cycle through [employer]. Post-cleaning listening check revealed excellent function of the devices. Connected devices to fitting software. Patient was fit at 1 experience level. Software recommended double bass domes bilaterally. Did not have supplies in office and patient was fit with 10mm power domes with experience level increased to 3. Patient noted sounds were more clear and at adequate volume. Will obtain 8mm double bass domes and run real ear at next week's follow up. PATIENT EDUCATION:     - Verbally and visually reviewed importance of consistent use and care and maintenance of devices. Information was verbally shared with patient during appointment.         RECOMMENDATIONS:     Continue consistent hearing aid use  Return for hearing aid checks as needed  Retest hearing as medically indicated and/or sooner if a change in

## 2023-10-11 ENCOUNTER — PROCEDURE VISIT (OUTPATIENT)
Dept: AUDIOLOGY | Age: 83
End: 2023-10-11

## 2023-10-11 DIAGNOSIS — H90.3 SENSORINEURAL HEARING LOSS, BILATERAL: Primary | ICD-10-CM

## 2024-01-17 ENCOUNTER — PROCEDURE VISIT (OUTPATIENT)
Dept: AUDIOLOGY | Age: 84
End: 2024-01-17

## 2024-01-17 DIAGNOSIS — H90.3 SENSORINEURAL HEARING LOSS, BILATERAL: Primary | ICD-10-CM

## 2024-01-17 NOTE — PROGRESS NOTES
Susan Kenney  1940.83 y.o. female   [id number]    HEARING AID CHECK    DEVICE & WARRANTY INFORMATION:     RIGHT EAR: /MODEL: Oticon More 2  SN: [id number]  WIRE/DOME: 85G 8mm double bass dome    LEFT EAR: /MODEL: Oticon More 2 SN: [id number]  WIRE/DOME: 85G 8mm double bass dome     ACCESSORIES: (SN: [id number])  HAF: 12/7/2022  WARRANTY: 12/28/25  TRIAL PERIOD ENDS:01/07/2023  L&D ELIGIBLE: Yes     BUTTONS: DISABLED  PROGRAMS: DISABLED  PHONE CONNECTIVITY: DISABLED    PROCEDURES:     Susan Kenney was seen today, 1/17/2024, for a hearing aid check appointment.    Otoscopy revealed: Non-occluding cerumen bilaterally removed with lighted curette without incident. Clear ear canals bilaterally    Patient noted hearing aids have been working well for her. She notes some difficulty inserting them deeper in her ears, especially her left one. Hearing aids were cleaned and checked.  A listening check revealed good function of the devices.  Microphone and  ports were suctioned, domes and wax traps were changed, and devices completed a desiccant cycle through [employer]. Post-cleaning listening check revealed excellent function of the devices.    Practiced proper insertion of devices. Advised patient pull on pinna to help insert devices further. Patient was able to do this independently.        Connected devices to fitting software. Firmware update ran. No gain adjustments made. Patient noted excellent sound quality post cleaning.  Datalogging revealed 13 hours of use per day.    PATIENT EDUCATION:     - Verbally and visually reviewed importance of consistent use and care and maintenance of devices.      Information was verbally shared with patient during appointment.        RECOMMENDATIONS:     Continue consistent hearing aid use  Return for hearing aid checks as needed. She is scheduled to return in 4 months  Retest hearing as medically indicated and/or sooner if a change in hearing 
there will be a charge for next office visit. Patient reported understanding.     **Patient paid $*** for today's appointment.    Susanna Jay  Audiologist

## 2024-03-06 ENCOUNTER — APPOINTMENT (OUTPATIENT)
Dept: CT IMAGING | Age: 84
End: 2024-03-06
Payer: MEDICARE

## 2024-03-06 ENCOUNTER — APPOINTMENT (OUTPATIENT)
Dept: GENERAL RADIOLOGY | Age: 84
End: 2024-03-06
Payer: MEDICARE

## 2024-03-06 ENCOUNTER — HOSPITAL ENCOUNTER (INPATIENT)
Age: 84
LOS: 9 days | Discharge: NURSING, REHAB OR LONG-TERM CARE | End: 2024-03-15
Attending: INTERNAL MEDICINE | Admitting: INTERNAL MEDICINE
Payer: MEDICARE

## 2024-03-06 DIAGNOSIS — S72.002A CLOSED FRACTURE OF NECK OF LEFT FEMUR, INITIAL ENCOUNTER (HCC): ICD-10-CM
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
## 2024-03-06 DIAGNOSIS — R26.2 UNABLE TO AMBULATE: ICD-10-CM

## 2024-03-06 LAB
ANION GAP SERPL CALCULATED.3IONS-SCNC: 13 MMOL/L (ref 3–16)
BASOPHILS # BLD: 0 K/UL (ref 0–0.2)
BASOPHILS NFR BLD: 0.4 %
BUN SERPL-MCNC: 20 MG/DL (ref 7–20)
CALCIUM SERPL-MCNC: 9.2 MG/DL (ref 8.3–10.6)
CHLORIDE SERPL-SCNC: 95 MMOL/L (ref 99–110)
CO2 SERPL-SCNC: 25 MMOL/L (ref 21–32)
CREAT SERPL-MCNC: 0.7 MG/DL (ref 0.6–1.2)
DEPRECATED RDW RBC AUTO: 13.7 % (ref 12.4–15.4)
EOSINOPHIL # BLD: 0.2 K/UL (ref 0–0.6)
EOSINOPHIL NFR BLD: 2.2 %
GFR SERPLBLD CREATININE-BSD FMLA CKD-EPI: >60 ML/MIN/{1.73_M2}
GLUCOSE SERPL-MCNC: 115 MG/DL (ref 70–99)
HCT VFR BLD AUTO: 40.8 % (ref 36–48)
HGB BLD-MCNC: 13.9 G/DL (ref 12–16)
LYMPHOCYTES # BLD: 1.1 K/UL (ref 1–5.1)
LYMPHOCYTES NFR BLD: 9.5 %
MCH RBC QN AUTO: 32.1 PG (ref 26–34)
MCHC RBC AUTO-ENTMCNC: 34.2 G/DL (ref 31–36)
MCV RBC AUTO: 94 FL (ref 80–100)
MONOCYTES # BLD: 0.5 K/UL (ref 0–1.3)
MONOCYTES NFR BLD: 4.6 %
NEUTROPHILS # BLD: 9.5 K/UL (ref 1.7–7.7)
NEUTROPHILS NFR BLD: 83.3 %
PLATELET # BLD AUTO: 202 K/UL (ref 135–450)
PMV BLD AUTO: 8.9 FL (ref 5–10.5)
POTASSIUM SERPL-SCNC: 3.5 MMOL/L (ref 3.5–5.1)
RBC # BLD AUTO: 4.34 M/UL (ref 4–5.2)
SODIUM SERPL-SCNC: 133 MMOL/L (ref 136–145)
WBC # BLD AUTO: 11.5 K/UL (ref 4–11)

## 2024-03-06 PROCEDURE — 85025 COMPLETE CBC W/AUTO DIFF WBC: CPT

## 2024-03-06 PROCEDURE — 36415 COLL VENOUS BLD VENIPUNCTURE: CPT

## 2024-03-06 PROCEDURE — 6370000000 HC RX 637 (ALT 250 FOR IP): Performed by: PHYSICIAN ASSISTANT

## 2024-03-06 PROCEDURE — 80048 BASIC METABOLIC PNL TOTAL CA: CPT

## 2024-03-06 PROCEDURE — 1200000000 HC SEMI PRIVATE

## 2024-03-06 PROCEDURE — 99285 EMERGENCY DEPT VISIT HI MDM: CPT

## 2024-03-06 PROCEDURE — 73700 CT LOWER EXTREMITY W/O DYE: CPT

## 2024-03-06 PROCEDURE — 73521 X-RAY EXAM HIPS BI 2 VIEWS: CPT

## 2024-03-06 PROCEDURE — 72131 CT LUMBAR SPINE W/O DYE: CPT

## 2024-03-06 RX ORDER — GABAPENTIN 300 MG/1
300 CAPSULE ORAL ONCE
Status: COMPLETED | OUTPATIENT
Start: 2024-03-06 | End: 2024-03-06

## 2024-03-06 RX ORDER — CELECOXIB 200 MG/1
200 CAPSULE ORAL ONCE
Status: COMPLETED | OUTPATIENT
Start: 2024-03-06 | End: 2024-03-06

## 2024-03-06 RX ORDER — ACETAMINOPHEN 325 MG/1
650 TABLET ORAL ONCE
Status: COMPLETED | OUTPATIENT
Start: 2024-03-06 | End: 2024-03-06

## 2024-03-06 RX ADMIN — CELECOXIB 200 MG: 200 CAPSULE ORAL at 22:35

## 2024-03-06 RX ADMIN — ACETAMINOPHEN 650 MG: 325 TABLET ORAL at 20:01

## 2024-03-06 RX ADMIN — GABAPENTIN 300 MG: 300 CAPSULE ORAL at 22:35

## 2024-03-06 ASSESSMENT — PAIN SCALES - GENERAL
PAINLEVEL_OUTOF10: 1
PAINLEVEL_OUTOF10: 5

## 2024-03-06 ASSESSMENT — PAIN DESCRIPTION - LOCATION
LOCATION: BUTTOCKS;LEG
LOCATION: LEG

## 2024-03-06 ASSESSMENT — LIFESTYLE VARIABLES
HOW OFTEN DO YOU HAVE A DRINK CONTAINING ALCOHOL: NEVER
HOW MANY STANDARD DRINKS CONTAINING ALCOHOL DO YOU HAVE ON A TYPICAL DAY: PATIENT DOES NOT DRINK

## 2024-03-06 ASSESSMENT — PAIN - FUNCTIONAL ASSESSMENT: PAIN_FUNCTIONAL_ASSESSMENT: 0-10

## 2024-03-07 ENCOUNTER — ANESTHESIA EVENT (OUTPATIENT)
Dept: OPERATING ROOM | Age: 84
End: 2024-03-07
Payer: MEDICARE

## 2024-03-07 PROBLEM — R26.2 UNABLE TO AMBULATE: Status: RESOLVED | Noted: 2024-03-06 | Resolved: 2024-03-07

## 2024-03-07 PROBLEM — S72.002A CLOSED FRACTURE OF NECK OF LEFT FEMUR (HCC): Status: ACTIVE | Noted: 2024-03-07

## 2024-03-07 LAB
ANION GAP SERPL CALCULATED.3IONS-SCNC: 10 MMOL/L (ref 3–16)
BASOPHILS # BLD: 0 K/UL (ref 0–0.2)
BASOPHILS NFR BLD: 0.5 %
BUN SERPL-MCNC: 17 MG/DL (ref 7–20)
CALCIUM SERPL-MCNC: 8.8 MG/DL (ref 8.3–10.6)
CHLORIDE SERPL-SCNC: 98 MMOL/L (ref 99–110)
CO2 SERPL-SCNC: 27 MMOL/L (ref 21–32)
CREAT SERPL-MCNC: 0.6 MG/DL (ref 0.6–1.2)
DEPRECATED RDW RBC AUTO: 13.3 % (ref 12.4–15.4)
EOSINOPHIL # BLD: 0.3 K/UL (ref 0–0.6)
EOSINOPHIL NFR BLD: 3.7 %
GFR SERPLBLD CREATININE-BSD FMLA CKD-EPI: >60 ML/MIN/{1.73_M2}
GLUCOSE SERPL-MCNC: 108 MG/DL (ref 70–99)
HCT VFR BLD AUTO: 36.6 % (ref 36–48)
HGB BLD-MCNC: 12.7 G/DL (ref 12–16)
LYMPHOCYTES # BLD: 1.2 K/UL (ref 1–5.1)
LYMPHOCYTES NFR BLD: 17.4 %
MAGNESIUM SERPL-MCNC: 2.1 MG/DL (ref 1.8–2.4)
MCH RBC QN AUTO: 32.4 PG (ref 26–34)
MCHC RBC AUTO-ENTMCNC: 34.7 G/DL (ref 31–36)
MCV RBC AUTO: 93.4 FL (ref 80–100)
MONOCYTES # BLD: 0.4 K/UL (ref 0–1.3)
MONOCYTES NFR BLD: 5.3 %
NEUTROPHILS # BLD: 4.9 K/UL (ref 1.7–7.7)
NEUTROPHILS NFR BLD: 73.1 %
PLATELET # BLD AUTO: 161 K/UL (ref 135–450)
PMV BLD AUTO: 8.5 FL (ref 5–10.5)
POTASSIUM SERPL-SCNC: 3.3 MMOL/L (ref 3.5–5.1)
RBC # BLD AUTO: 3.92 M/UL (ref 4–5.2)
SODIUM SERPL-SCNC: 135 MMOL/L (ref 136–145)
WBC # BLD AUTO: 6.7 K/UL (ref 4–11)

## 2024-03-07 PROCEDURE — 6370000000 HC RX 637 (ALT 250 FOR IP): Performed by: NURSE PRACTITIONER

## 2024-03-07 PROCEDURE — 1200000000 HC SEMI PRIVATE

## 2024-03-07 PROCEDURE — 6370000000 HC RX 637 (ALT 250 FOR IP): Performed by: PHYSICIAN ASSISTANT

## 2024-03-07 PROCEDURE — 2580000003 HC RX 258: Performed by: PHYSICIAN ASSISTANT

## 2024-03-07 PROCEDURE — APPNB45 APP NON BILLABLE 31-45 MINUTES: Performed by: NURSE PRACTITIONER

## 2024-03-07 PROCEDURE — 85025 COMPLETE CBC W/AUTO DIFF WBC: CPT

## 2024-03-07 PROCEDURE — 83735 ASSAY OF MAGNESIUM: CPT

## 2024-03-07 PROCEDURE — 80048 BASIC METABOLIC PNL TOTAL CA: CPT

## 2024-03-07 PROCEDURE — 6360000002 HC RX W HCPCS: Performed by: PHYSICIAN ASSISTANT

## 2024-03-07 PROCEDURE — 36415 COLL VENOUS BLD VENIPUNCTURE: CPT

## 2024-03-07 RX ORDER — SODIUM CHLORIDE 0.9 % (FLUSH) 0.9 %
5-40 SYRINGE (ML) INJECTION EVERY 12 HOURS SCHEDULED
Status: DISCONTINUED | OUTPATIENT
Start: 2024-03-07 | End: 2024-03-15 | Stop reason: HOSPADM

## 2024-03-07 RX ORDER — SODIUM CHLORIDE 0.9 % (FLUSH) 0.9 %
5-40 SYRINGE (ML) INJECTION PRN
Status: DISCONTINUED | OUTPATIENT
Start: 2024-03-07 | End: 2024-03-15 | Stop reason: HOSPADM

## 2024-03-07 RX ORDER — LISINOPRIL AND HYDROCHLOROTHIAZIDE 25; 20 MG/1; MG/1
2 TABLET ORAL DAILY
Status: DISCONTINUED | OUTPATIENT
Start: 2024-03-07 | End: 2024-03-07 | Stop reason: CLARIF

## 2024-03-07 RX ORDER — OXYCODONE HYDROCHLORIDE AND ACETAMINOPHEN 5; 325 MG/1; MG/1
1 TABLET ORAL EVERY 4 HOURS PRN
Status: DISCONTINUED | OUTPATIENT
Start: 2024-03-07 | End: 2024-03-07

## 2024-03-07 RX ORDER — ACETAMINOPHEN 650 MG/1
650 SUPPOSITORY RECTAL EVERY 6 HOURS PRN
Status: DISCONTINUED | OUTPATIENT
Start: 2024-03-07 | End: 2024-03-15 | Stop reason: HOSPADM

## 2024-03-07 RX ORDER — ONDANSETRON 2 MG/ML
4 INJECTION INTRAMUSCULAR; INTRAVENOUS EVERY 6 HOURS PRN
Status: DISCONTINUED | OUTPATIENT
Start: 2024-03-07 | End: 2024-03-15 | Stop reason: HOSPADM

## 2024-03-07 RX ORDER — GABAPENTIN 300 MG/1
300 CAPSULE ORAL NIGHTLY
Status: DISCONTINUED | OUTPATIENT
Start: 2024-03-07 | End: 2024-03-15 | Stop reason: HOSPADM

## 2024-03-07 RX ORDER — ACETAMINOPHEN 325 MG/1
650 TABLET ORAL 3 TIMES DAILY
Status: DISCONTINUED | OUTPATIENT
Start: 2024-03-07 | End: 2024-03-15 | Stop reason: HOSPADM

## 2024-03-07 RX ORDER — ATORVASTATIN CALCIUM 20 MG/1
20 TABLET, FILM COATED ORAL DAILY
Status: DISCONTINUED | OUTPATIENT
Start: 2024-03-07 | End: 2024-03-09

## 2024-03-07 RX ORDER — POTASSIUM CHLORIDE 20 MEQ/1
40 TABLET, EXTENDED RELEASE ORAL PRN
Status: DISCONTINUED | OUTPATIENT
Start: 2024-03-07 | End: 2024-03-15 | Stop reason: HOSPADM

## 2024-03-07 RX ORDER — FUROSEMIDE 20 MG/1
20 TABLET ORAL DAILY
Status: DISCONTINUED | OUTPATIENT
Start: 2024-03-07 | End: 2024-03-09

## 2024-03-07 RX ORDER — PRIMIDONE 50 MG/1
50 TABLET ORAL 2 TIMES DAILY
Status: DISCONTINUED | OUTPATIENT
Start: 2024-03-07 | End: 2024-03-15 | Stop reason: HOSPADM

## 2024-03-07 RX ORDER — LISINOPRIL 20 MG/1
20 TABLET ORAL DAILY
Status: DISCONTINUED | OUTPATIENT
Start: 2024-03-07 | End: 2024-03-09

## 2024-03-07 RX ORDER — OXYCODONE HYDROCHLORIDE 5 MG/1
5 TABLET ORAL EVERY 4 HOURS PRN
Status: DISCONTINUED | OUTPATIENT
Start: 2024-03-07 | End: 2024-03-15 | Stop reason: HOSPADM

## 2024-03-07 RX ORDER — POTASSIUM CHLORIDE 7.45 MG/ML
10 INJECTION INTRAVENOUS PRN
Status: DISCONTINUED | OUTPATIENT
Start: 2024-03-07 | End: 2024-03-15 | Stop reason: HOSPADM

## 2024-03-07 RX ORDER — LORAZEPAM 0.5 MG/1
0.5 TABLET ORAL NIGHTLY PRN
Status: DISCONTINUED | OUTPATIENT
Start: 2024-03-07 | End: 2024-03-15 | Stop reason: HOSPADM

## 2024-03-07 RX ORDER — PANTOPRAZOLE SODIUM 40 MG/1
40 TABLET, DELAYED RELEASE ORAL
Status: DISCONTINUED | OUTPATIENT
Start: 2024-03-07 | End: 2024-03-15 | Stop reason: HOSPADM

## 2024-03-07 RX ORDER — HYDROCHLOROTHIAZIDE 25 MG/1
25 TABLET ORAL DAILY
Status: DISCONTINUED | OUTPATIENT
Start: 2024-03-07 | End: 2024-03-09

## 2024-03-07 RX ORDER — ENOXAPARIN SODIUM 100 MG/ML
40 INJECTION SUBCUTANEOUS DAILY
Status: DISCONTINUED | OUTPATIENT
Start: 2024-03-07 | End: 2024-03-08

## 2024-03-07 RX ORDER — GABAPENTIN 100 MG/1
100 CAPSULE ORAL 2 TIMES DAILY
Status: DISCONTINUED | OUTPATIENT
Start: 2024-03-07 | End: 2024-03-15 | Stop reason: HOSPADM

## 2024-03-07 RX ORDER — ACETAMINOPHEN 325 MG/1
650 TABLET ORAL EVERY 6 HOURS PRN
Status: DISCONTINUED | OUTPATIENT
Start: 2024-03-07 | End: 2024-03-15 | Stop reason: HOSPADM

## 2024-03-07 RX ORDER — PROPRANOLOL HYDROCHLORIDE 80 MG/1
160 CAPSULE, EXTENDED RELEASE ORAL DAILY
Status: DISCONTINUED | OUTPATIENT
Start: 2024-03-07 | End: 2024-03-15 | Stop reason: HOSPADM

## 2024-03-07 RX ORDER — FLUTICASONE PROPIONATE 50 MCG
2 SPRAY, SUSPENSION (ML) NASAL DAILY PRN
Status: DISCONTINUED | OUTPATIENT
Start: 2024-03-07 | End: 2024-03-15 | Stop reason: HOSPADM

## 2024-03-07 RX ORDER — SENNOSIDES A AND B 8.6 MG/1
1 TABLET, FILM COATED ORAL DAILY PRN
Status: DISCONTINUED | OUTPATIENT
Start: 2024-03-07 | End: 2024-03-15 | Stop reason: HOSPADM

## 2024-03-07 RX ORDER — CELECOXIB 200 MG/1
200 CAPSULE ORAL 2 TIMES DAILY
Status: DISCONTINUED | OUTPATIENT
Start: 2024-03-07 | End: 2024-03-15 | Stop reason: HOSPADM

## 2024-03-07 RX ORDER — ASPIRIN 81 MG/1
81 TABLET, CHEWABLE ORAL DAILY
Status: DISCONTINUED | OUTPATIENT
Start: 2024-03-07 | End: 2024-03-08

## 2024-03-07 RX ORDER — AMLODIPINE BESYLATE 5 MG/1
5 TABLET ORAL DAILY
Status: DISCONTINUED | OUTPATIENT
Start: 2024-03-07 | End: 2024-03-09

## 2024-03-07 RX ORDER — SODIUM CHLORIDE 9 MG/ML
INJECTION, SOLUTION INTRAVENOUS PRN
Status: DISCONTINUED | OUTPATIENT
Start: 2024-03-07 | End: 2024-03-15 | Stop reason: HOSPADM

## 2024-03-07 RX ORDER — CETIRIZINE HYDROCHLORIDE 10 MG/1
5 TABLET ORAL DAILY
Status: DISCONTINUED | OUTPATIENT
Start: 2024-03-07 | End: 2024-03-15 | Stop reason: HOSPADM

## 2024-03-07 RX ORDER — MAGNESIUM SULFATE IN WATER 40 MG/ML
2000 INJECTION, SOLUTION INTRAVENOUS PRN
Status: DISCONTINUED | OUTPATIENT
Start: 2024-03-07 | End: 2024-03-15 | Stop reason: HOSPADM

## 2024-03-07 RX ORDER — CLONIDINE HYDROCHLORIDE 0.1 MG/1
0.2 TABLET ORAL NIGHTLY
Status: DISCONTINUED | OUTPATIENT
Start: 2024-03-07 | End: 2024-03-09

## 2024-03-07 RX ADMIN — PANTOPRAZOLE SODIUM 40 MG: 40 TABLET, DELAYED RELEASE ORAL at 09:53

## 2024-03-07 RX ADMIN — FUROSEMIDE 20 MG: 20 TABLET ORAL at 09:51

## 2024-03-07 RX ADMIN — CELECOXIB 200 MG: 200 CAPSULE ORAL at 19:44

## 2024-03-07 RX ADMIN — PROPRANOLOL HYDROCHLORIDE 160 MG: 80 CAPSULE, EXTENDED RELEASE ORAL at 09:52

## 2024-03-07 RX ADMIN — ACETAMINOPHEN 650 MG: 325 TABLET ORAL at 10:02

## 2024-03-07 RX ADMIN — ASPIRIN 81 MG: 81 TABLET, CHEWABLE ORAL at 09:54

## 2024-03-07 RX ADMIN — ACETAMINOPHEN 650 MG: 325 TABLET ORAL at 14:06

## 2024-03-07 RX ADMIN — CETIRIZINE HYDROCHLORIDE 5 MG: 10 TABLET, FILM COATED ORAL at 09:53

## 2024-03-07 RX ADMIN — GABAPENTIN 300 MG: 300 CAPSULE ORAL at 19:55

## 2024-03-07 RX ADMIN — PRIMIDONE 50 MG: 50 TABLET ORAL at 19:45

## 2024-03-07 RX ADMIN — SODIUM CHLORIDE, PRESERVATIVE FREE 10 ML: 5 INJECTION INTRAVENOUS at 19:52

## 2024-03-07 RX ADMIN — HYDROCHLOROTHIAZIDE 25 MG: 25 TABLET ORAL at 09:53

## 2024-03-07 RX ADMIN — LISINOPRIL 20 MG: 20 TABLET ORAL at 09:54

## 2024-03-07 RX ADMIN — ACETAMINOPHEN 650 MG: 325 TABLET ORAL at 19:52

## 2024-03-07 RX ADMIN — ENOXAPARIN SODIUM 40 MG: 100 INJECTION SUBCUTANEOUS at 09:52

## 2024-03-07 RX ADMIN — GABAPENTIN 100 MG: 100 CAPSULE ORAL at 09:53

## 2024-03-07 RX ADMIN — POTASSIUM CHLORIDE 40 MEQ: 1500 TABLET, EXTENDED RELEASE ORAL at 05:44

## 2024-03-07 RX ADMIN — CLONIDINE HYDROCHLORIDE 0.2 MG: 0.1 TABLET ORAL at 19:45

## 2024-03-07 RX ADMIN — PRIMIDONE 50 MG: 50 TABLET ORAL at 09:51

## 2024-03-07 RX ADMIN — GABAPENTIN 100 MG: 100 CAPSULE ORAL at 14:06

## 2024-03-07 RX ADMIN — CELECOXIB 200 MG: 200 CAPSULE ORAL at 09:52

## 2024-03-07 RX ADMIN — ATORVASTATIN CALCIUM 20 MG: 10 TABLET, FILM COATED ORAL at 09:53

## 2024-03-07 RX ADMIN — AMLODIPINE BESYLATE 5 MG: 5 TABLET ORAL at 09:54

## 2024-03-07 RX ADMIN — SODIUM CHLORIDE, PRESERVATIVE FREE 10 ML: 5 INJECTION INTRAVENOUS at 09:54

## 2024-03-07 ASSESSMENT — PAIN SCALES - GENERAL
PAINLEVEL_OUTOF10: 5
PAINLEVEL_OUTOF10: 6
PAINLEVEL_OUTOF10: 5
PAINLEVEL_OUTOF10: 0
PAINLEVEL_OUTOF10: 4

## 2024-03-07 ASSESSMENT — PAIN DESCRIPTION - LOCATION
LOCATION: LEG
LOCATION: LEG
LOCATION: HIP

## 2024-03-07 ASSESSMENT — PAIN DESCRIPTION - ORIENTATION
ORIENTATION: LEFT

## 2024-03-07 ASSESSMENT — PAIN DESCRIPTION - DESCRIPTORS
DESCRIPTORS: ACHING

## 2024-03-07 ASSESSMENT — PAIN - FUNCTIONAL ASSESSMENT: PAIN_FUNCTIONAL_ASSESSMENT: PREVENTS OR INTERFERES SOME ACTIVE ACTIVITIES AND ADLS

## 2024-03-07 NOTE — ED PROVIDER NOTES
[facility] EMERGENCY DEPARTMENT  EMERGENCY DEPARTMENT ENCOUNTER        Pt Name: Susan Kenney  MRN: [id number]  Birthdate 1940  Date of evaluation: 3/6/2024  Provider: Betsey Guerra PA-C  PCP: Margareth Pelaez DO  Note Started: 8:42 PM EST 3/6/24      MARCO ANTONIO. I have evaluated this patient.        CHIEF COMPLAINT       Chief Complaint   Patient presents with    Fall     Pt arrives from home by  EMS. Pt had a fall in her hitchen. Pt states she was walking in her kitchen and lost her balance. Pt states she uses a walker and didn't used her walker when in the kitchen. Pt rates her pain a 5 out of 10. Pt C/O buttock and bilateral leg pain       HISTORY OF PRESENT ILLNESS: 1 or more Elements     History From: patient   Limitations to history : None    Susan Kenney is a 83 y.o. female who presents for evaluation after mechanical fall.  Patient states that she only walks with a walker and was trying to get around the kitchen without it when she lost her balance and fell down landing on her bottom.  She is complaining of some low back pain but states that she has had this for several days.  She also reports pain shooting down her right leg that she has also had for several days.  She is scheduled to see her PCP for this next week.  She denies hitting her head or any loss of consciousness.  She is not anticoagulated.  She denies numbness tingling or weakness distally.  No saddle anesthesia, bladder retention or bowel incontinence.  No other injuries or complaints at this time    Nursing Notes were all reviewed and agreed with or any disagreements were addressed in the HPI.    REVIEW OF SYSTEMS :      Review of Systems   Constitutional:  Negative for appetite change, chills and fever.   HENT:  Negative for congestion and rhinorrhea.    Respiratory:  Negative for cough, shortness of breath and wheezing.    Cardiovascular:  Negative for chest pain.   Gastrointestinal:  Negative for abdominal pain,  diarrhea, nausea and vomiting.   Genitourinary:  Negative for difficulty urinating, dysuria and hematuria.   Musculoskeletal:  Positive for arthralgias (R leg) and back pain. Negative for neck pain and neck stiffness.   Skin:  Negative for rash.   Neurological:  Negative for syncope, weakness, numbness and headaches.       Positives and Pertinent negatives as per HPI.     SURGICAL HISTORY     Past Surgical History:   Procedure Laterality Date    BUNIONECTOMY      bilateral    HYSTERECTOMY (CERVIX STATUS UNKNOWN)      JOINT REPLACEMENT  7/09    right TKR    JOINT REPLACEMENT  1/10    left TKR    LAMINECTOMY  12/16/10    placement of lead & spinal cord stimulator generator    TOE SURGERY      left foot    TONSILLECTOMY         CURRENTMEDICATIONS       Previous Medications    AMLODIPINE (NORVASC) 5 MG TABLET    Take 5 mg by mouth daily    ASPIRIN 81 MG TABLET    Take 1 tablet by mouth    ATORVASTATIN (LIPITOR) 10 MG TABLET    Take 1 tablet by mouth daily    AZITHROMYCIN (ZITHROMAX) 250 MG TABLET    Dispense one 5 day supply pack and take as directed    CALCIUM CARBONATE-VITAMIN D (CALCIUM 600 + D PO)    Take by mouth 2 times daily     CELECOXIB (CELEBREX) 200 MG CAPSULE    Take 1 capsule by mouth    CLONIDINE (CATAPRES) 0.1 MG TABLET    Take 2 tablets by mouth nightly    DICLOFENAC SODIUM 1 % GEL    Apply 2 g topically 2 times daily Indications: biomed    FLUTICASONE (FLONASE) 50 MCG/ACT NASAL SPRAY    2 sprays by Nasal route daily.    FUROSEMIDE (LASIX) 20 MG TABLET    Take 0.5 tablets by mouth daily    GABAPENTIN (NEURONTIN) 100 MG CAPSULE    1 tab in am, 1 tab in evening, 3 tabs at hs    GABAPENTIN (NEURONTIN) 300 MG CAPSULE    Take 100 mg by mouth nightly. 100 mg in and lunch and 300 mg at bedtime    LISINOPRIL-HYDROCHLOROTHIAZIDE (PRINZIDE;ZESTORETIC) 20-25 MG PER TABLET    Take 2 tablets by mouth daily am    LORATADINE (CLARITIN) 10 MG TABLET    Take 10 mg by mouth daily.    LORAZEPAM (ATIVAN) 0.5 MG TABLET

## 2024-03-07 NOTE — H&P
Hospital Medicine History & Physical      Patient Name: Susan Kenney    : 1940    PCP: Margareth Pelaez DO    Date of Service:  Patient seen and examined on 3/6/2024     Chief Complaint:  Fall    History Of Present Illness:    Susan Kenney is a 83 y.o. female who presented to ED for evaluation after a mechanical fall.  Patient reports she usually walks with a walker but was trying to get around the kitchen without it when she lost her balance and fell down landing on her bottom.  She reports she is having some low back pain and pain shooting down her right leg but reports those symptoms have been ongoing for the past several days and she is scheduled to see her PCP for this next week. She denies hitting her head or any loss of consciousness.  She is not on any anticoagulation.  She denies numbness, tingling, or weakness distally.  She denies saddle anesthesia, bladder retention or bowel incontinence.  She reports she also has some left leg pain with certain movements and with ambulation.  She did get out of bed and attempted to ambulate with walker in ED but reported she did not feel stable or safe with this.     CT lumbar spine negative for acute process.  Xray bilateral hip limited due to internal rotation of hips and diffuse osteopenia but notable for questionable left femoral neck fracture.  CT left hip ordered and notable for mildly impacted left femoral neck fracture.      Past Medical History:    Patient has a past medical history of Arthritis, Easy bruising, Hyperlipidemia, Hypertension, and Nausea & vomiting.    Past Surgical History:    Patient has a past surgical history that includes Tonsillectomy; Hysterectomy; joint replacement (); joint replacement (1/10); Bunionectomy; Toe Surgery; and laminectomy (12/16/10).    Medications Prior to Admission:      Prior to Admission medications    Medication Sig Start Date End Date Taking? Authorizing Provider   meloxicam (MOBIC) 15 MG  ADULT DIET; Regular  Diet NPO Exceptions are: Sips of Water with Meds  Code Status: Full Code    Consults:  IP CONSULT TO ORTHOPEDIC SURGERY     Disposition:  Admit to Inpatient   ELOS:  Greater than two midnights due to medical therapy     (Please note that portions of this note were completed with a voice recognition program.  Efforts were made to edit the dictations but occasionally words are mis-transcribed.)     Janina Thomas PA-C

## 2024-03-07 NOTE — PROGRESS NOTES
V2.0    [facility] [place] Progress Note      Name:  Susan Kenney /Age/Sex: 1940  (83 y.o. female)   MRN & CSN:  [id number] & [id number] Encounter Date/Time: 3/7/2024 1:34 PM EST   Location:  ED-001 PCP: Margareth Pelaez DO     Attending:Peter Pagan MD       Hospital Day: 2    Assessment and Recommendations   Susan Kenney is a 83 y.o. female who presents with Unable to ambulate      Plan:   Left femoral neck fracture  Likely present secondary to mechanical fall pain control PT evaluation  Eval by orthopedic plan for surgery tomorrow      Hypertension  Hyperlipidemia    Preoperative evaluation  Greater than 4 METS no acute cardiac disease.  EKG reviewed.  No further testing recommended      Diet Diet NPO Exceptions are: Sips of Water with Meds  ADULT DIET; Regular   DVT Prophylaxis    Code Status Full Code   Disposition          Personally reviewed Lab Studies and Imaging         Subjective:     Chief Complaint:     Currently denies any pain.  No management.  Susan Kenney is a 83 y.o. female who presents with       Review of Systems:      Pertinent positives and negatives discussed in HPI    Objective:   No intake or output data in the 24 hours ending 24 1334   Vitals:   Vitals:    24 0300 24 0400 24 0405 24 0500   BP: 133/75 (!) 137/105  (!) 149/99   Pulse: 73 74  67   Resp: 13 26 16 17   Temp:  97.6 °F (36.4 °C)     TempSrc:  Oral     SpO2: 97% 97%  97%   Weight:       Height:             Physical Exam:      General: NAD  Eyes: EOMI  ENT: neck supple  Cardiovascular: Regular rate.  Respiratory: Clear to auscultation  Gastrointestinal: Soft, non tender  Genitourinary: no suprapubic tenderness  Musculoskeletal: No edema  Skin: warm, dry  Neuro: Alert.  Psych: Mood appropriate.         Medications:   Medications:    sodium chloride flush  5-40 mL IntraVENous 2 times per day    [Held by provider] enoxaparin  40 mg SubCUTAneous Daily    amLODIPine  5 mg Oral Daily    [Held by  provider] aspirin  81 mg Oral Daily    atorvastatin  20 mg Oral Daily    celecoxib  200 mg Oral BID    cloNIDine  0.2 mg Oral Nightly    furosemide  20 mg Oral Daily    gabapentin  100 mg Oral BID    And    gabapentin  300 mg Oral Nightly    cetirizine  5 mg Oral Daily    pantoprazole  40 mg Oral QAM AC    primidone  50 mg Oral BID    propranolol  160 mg Oral Daily    lisinopril  20 mg Oral Daily    hydroCHLOROthiazide  25 mg Oral Daily    acetaminophen  650 mg Oral TID      Infusions:    sodium chloride       PRN Meds: sodium chloride flush, 5-40 mL, PRN  sodium chloride, , PRN  potassium chloride, 40 mEq, PRN   Or  potassium alternative oral replacement, 40 mEq, PRN   Or  potassium chloride, 10 mEq, PRN  magnesium sulfate, 2,000 mg, PRN  ondansetron, 4 mg, Q6H PRN  senna, 1 tablet, Daily PRN  acetaminophen, 650 mg, Q6H PRN   Or  acetaminophen, 650 mg, Q6H PRN  fluticasone, 2 spray, Daily PRN  LORazepam, 0.5 mg, Nightly PRN  oxyCODONE, 5 mg, Q4H PRN        Labs and Imaging   CT HIP LEFT WO CONTRAST    Result Date: 3/7/2024  EXAMINATION: CT OF THE LEFT HIP WITHOUT CONTRAST 3/6/2024 11:44 pm TECHNIQUE: CT of the left hip was performed without the administration of intravenous contrast.  Multiplanar reformatted images are provided for review. Automated exposure control, iterative reconstruction, and/or weight based adjustment of the mA/kV was utilized to reduce the radiation dose to as low as reasonably achievable. COMPARISON: March 6, 2024. HISTORY ORDERING SYSTEM PROVIDED HISTORY: eval TECHNOLOGIST PROVIDED HISTORY: Reason for exam:->eval Decision Support Exception - unselect if not a suspected or confirmed emergency medical condition->Emergency Medical Condition (MA) Reason for Exam: eval left hip/pain Relevant Medical/Surgical History: Fall (Pt arrives from home by  EMS. Pt had a fall in her hitchen. Pt states she was walking in her kitchen and lost her balance. Pt states she uses a walker and didn't used her

## 2024-03-07 NOTE — PROGRESS NOTES
ADVANCED CARE PLANNING    Name:Susan Kenney       :  1940              MRN:  [id number]      Purpose of Encounter: Advanced care planning in light of problem listed above   Parties in attendance: :Susan ROBISON Peter Kenney MD, Family members:  Decisional Capacity:Yes    Diagnosis: Principal Problem (Resolved):    Unable to ambulate  Active Problems:    Closed fracture of neck of left femur (HCC)    Patients Medical Story:Presented with worsening symptom of dx above. With at risk for life threatening event. Procedure and testing as noted in progress noted. We discussed patient long term goal and also wishes and aggressive care. Discussed in detail about code status and what it means with detailed explanation.   Goals of Care Determinations: Patient wishes to focus on full code with aggressive care, CPR, intubation long term vent and facility as well.   Plan: Will notify Margareth Pelaez DO of change in care plan. Will look at further interventions as needed.   Code Status: At this time patient wishes to be Full Code  Time Spent with Patient: 21 minutes      Electronically signed by Peter Pagan MD on 3/7/2024 at 2:00 PM  Thank you Margareth Pelaez DO for the opportunity to be involved in this patient's care.

## 2024-03-07 NOTE — PROGRESS NOTES
Patient admitted to room from ED. Oriented patient to room. Call light and belongings within reach. Shift assessment complete; pt comfortably resting in bed. A&Ox4. Neuro checks WNL. The care plan and education has been reviewed and mutually agreed upon with the patient.

## 2024-03-07 NOTE — CONSULTS
[facility] Orthopedic Surgery  Consult Note    Patient: Susan Kenney  Admit Date: 3/6/2024  Requesting Physician: Shane Brennan MD  Room: ED-0014/14    Chief complaint: LEFT hip pain    HPI: Susan Kenney is a 83 y.o. female who presented to [facility] ER with left hip pain after mechanical fall.      Describes pain in the left hip of moderate intensity and of sharp nature since the fall which is relieved by rest and narcotics. Denies new numbness/tingling.       Independent imaging review of the left hip via plain films and CT scan demonstrated: mildly impacted femoral neck fracture on the left.    Patient lives at home and uses a walker to ambulate.      Relevant notes, labs and other tests reviewed.  Medical History:  Past Medical History:   Diagnosis Date    Arthritis     Easy bruising     Hyperlipidemia     Hypertension     Nausea & vomiting      Past Surgical History:   Procedure Laterality Date    BUNIONECTOMY      bilateral    HYSTERECTOMY (CERVIX STATUS UNKNOWN)      JOINT REPLACEMENT  7/09    right TKR    JOINT REPLACEMENT  1/10    left TKR    LAMINECTOMY  12/16/10    placement of lead & spinal cord stimulator generator    TOE SURGERY      left foot    TONSILLECTOMY         Social History:    reports that she is a non-smoker but has been exposed to tobacco smoke. She has never used smokeless tobacco.    Family History:        Problem Relation Age of Onset    Cancer Mother     Cancer Father     Diabetes Father     Heart Disease Father     Diabetes Sister     Heart Disease Sister        Medications:  ALL MEDICATIONS HAVE BEEN REVIEWED:  Scheduled:   sodium chloride flush  5-40 mL IntraVENous 2 times per day    enoxaparin  40 mg SubCUTAneous Daily    amLODIPine  5 mg Oral Daily    aspirin  81 mg Oral Daily    atorvastatin  20 mg Oral Daily    celecoxib  200 mg Oral BID    cloNIDine  0.2 mg Oral Nightly    furosemide  20 mg Oral Daily    gabapentin  100 mg Oral BID    And    gabapentin  300 mg Oral Nightly  morning  - NPO after midnight  - NWB LLE  - Pain control  - DVT prophylaxis: Hold anti-coags pre-op  - Appreciate pre-op clearance from internal medicine  - Verify surgical consent  - Pre op orders placed   - Dispo: per primary team; await post-op therapy eval.    I have reviewed imaging and plan with Dr. Olman Rogers.      Case discussed with RN    Ruben Brown, APRN - CNP  3/7/2024  9:21 AM

## 2024-03-07 NOTE — PLAN OF CARE
Problem: ABCDS Injury Assessment  Goal: Absence of physical injury  Outcome: Progressing     Problem: Skin/Tissue Integrity  Goal: Absence of new skin breakdown  Description: 1.  Monitor for areas of redness and/or skin breakdown  2.  Assess vascular access sites hourly  3.  Every 4-6 hours minimum:  Change oxygen saturation probe site  4.  Every 4-6 hours:  If on nasal continuous positive airway pressure, respiratory therapy assess nares and determine need for appliance change or resting period.  Outcome: Progressing     Problem: Discharge Planning  Goal: Discharge to home or other facility with appropriate resources  Outcome: Progressing  Flowsheets (Taken 3/7/2024 1601 by Candace Willingham, RN)  Discharge to home or other facility with appropriate resources: Identify barriers to discharge with patient and caregiver     Problem: Safety - Adult  Goal: Free from fall injury  Outcome: Progressing

## 2024-03-07 NOTE — ED NOTES
This RN and Yamileth ED tech helped pt ambulate from bed to door. Pt C/O L leg pain. Pt stood up at bedside with walker and this RN and ED tech at bedside. Pt ambulated from bed to door with walker within normal limits. Pt uses a walker at home.   
components:    Sodium 133 (*)     Chloride 95 (*)     Glucose 115 (*)     All other components within normal limits     Critical values: no     Abnormal Assessment Findings: Fall, unable to ambulate    Background  History:   Past Medical History:   Diagnosis Date    Arthritis     Easy bruising     Hyperlipidemia     Hypertension     Nausea & vomiting        Assessment    Vitals/MEWS: MEWS Score: 1  Level of Consciousness: Alert (0)   Vitals:    03/06/24 1950 03/06/24 2245   BP: (!) 168/80 (!) 161/82   Pulse: 72 85   Resp: 18 16   Temp: 98.8 °F (37.1 °C)    TempSrc: Oral    SpO2: 94% 91%   Weight: 79.8 kg (176 lb)    Height: 1.499 m (4' 11\")      FiO2 (%):   O2 Flow Rate: O2 Device: None (Room air)    Cardiac Rhythm:    Pain Assessment:  [] Verbal [] Fontana Baker Scale  Pain Scale: Pain Assessment  Pain Assessment: 0-10  Pain Level: 1  Patient's Stated Pain Goal: 0 - No pain  Pain Location: Leg  Last documented pain score (0-10 scale) Pain Level: 1  Last documented pain medication administered:   Mental Status: oriented, alert, and able to concentrate and follow conversation  Orientation Level:    NIH Score:    C-SSRS: Risk of Suicide: No Risk  Bedside swallow:    Ilda Coma Scale (GCS): Ilda Coma Scale  Eye Opening: Spontaneous  Best Verbal Response: Oriented  Best Motor Response: Obeys commands  [place] Coma Scale Score: 15  Active LDA's:    PO Status: Regular  Pertinent or High Risk Medications/Drips: no   If Yes, please provide details:   Pending Blood Product Administration: no       You may also review the ED PT Care Timeline found under the Summary Nursing Index tab.    Recommendation    Pending orders All ED orders are completed  Plan for Discharge (if known):   Additional Comments: Pt is AxO x3. Pt ambulates at home with walker.   If any further questions, please call Sending RN at [phone]    Electronically signed by: Electronically signed by Shanae Handy RN on 3/7/2024 at 1:22 AM

## 2024-03-07 NOTE — ANESTHESIA PRE PROCEDURE
Department of Anesthesiology  Preprocedure Note       Name:  Susan Kenney   Age:  83 y.o.  :  1940                                          MRN:  [id number]         Date:  3/7/2024      Surgeon: Surgeon(s):  Olman Rogers MD    Procedure: Procedure(s):  LEFT HIP PERCUTANEOUS PINNING - SYNTHES    Medications prior to admission:   Prior to Admission medications    Medication Sig Start Date End Date Taking? Authorizing Provider   meloxicam (MOBIC) 15 MG tablet TAKE 1 TABLET BY MOUTH DAILY 21   Zohaib Arango DO   methylPREDNISolone (MEDROL, OLIVER,) 4 MG tablet Take 1 tablet by mouth See Admin Instructions Take by mouth. 21   Aldair Ivey MD   primidone (MYSOLINE) 50 MG tablet TAKE ONE TABLET BY MOUTH TWICE A DAY 20   Hal Rivas MD   LORazepam (ATIVAN) 0.5 MG tablet lorazepam 0.5 mg tablet   Take 1/2 tablet by mouth twice a day and 1 tablet at night for stress and anxiety    Rodney Jay MD   diclofenac sodium 1 % GEL Apply 2 g topically 2 times daily Indications: biomed    ProviderRodney MD   gabapentin (NEURONTIN) 300 MG capsule Take 100 mg by mouth nightly. 100 mg in and lunch and 300 mg at bedtime    Rodney Jay MD   omeprazole (PRILOSEC) 20 MG delayed release capsule Take 1 capsule by mouth daily    Rodney Jay MD   celecoxib (CELEBREX) 200 MG capsule Take 1 capsule by mouth    Rodney Jay MD   aspirin 81 MG tablet Take 1 tablet by mouth    Rodney Jay MD   mometasone (NASONEX) 50 MCG/ACT nasal spray 1 spray by Nasal route daily 9/5/17 10/5/17  Dominguez Siddiqui MD   amLODIPine (NORVASC) 5 MG tablet Take 1 tablet by mouth daily    Rodney Jay MD   propranolol (INDERAL LA) 160 MG ER capsule  8/9/15   Rodney Jay MD   furosemide (LASIX) 20 MG tablet Take 0.5 tablets by mouth daily    Rodney Jay MD   Multiple Vitamins-Minerals (ICAPS MV) TABS Take 1 capsule by mouth 2 times daily.    Misty

## 2024-03-08 ENCOUNTER — APPOINTMENT (OUTPATIENT)
Dept: GENERAL RADIOLOGY | Age: 84
End: 2024-03-08
Payer: MEDICARE

## 2024-03-08 ENCOUNTER — ANESTHESIA (OUTPATIENT)
Dept: OPERATING ROOM | Age: 84
End: 2024-03-08
Payer: MEDICARE

## 2024-03-08 LAB
ABO + RH BLD: NORMAL
ANION GAP SERPL CALCULATED.3IONS-SCNC: 9 MMOL/L (ref 3–16)
BLD GP AB SCN SERPL QL: NORMAL
BUN SERPL-MCNC: 18 MG/DL (ref 7–20)
CALCIUM SERPL-MCNC: 9 MG/DL (ref 8.3–10.6)
CHLORIDE SERPL-SCNC: 94 MMOL/L (ref 99–110)
CO2 SERPL-SCNC: 29 MMOL/L (ref 21–32)
CREAT SERPL-MCNC: 0.6 MG/DL (ref 0.6–1.2)
DEPRECATED RDW RBC AUTO: 13.1 % (ref 12.4–15.4)
GFR SERPLBLD CREATININE-BSD FMLA CKD-EPI: >60 ML/MIN/{1.73_M2}
GLUCOSE SERPL-MCNC: 97 MG/DL (ref 70–99)
HCT VFR BLD AUTO: 38 % (ref 36–48)
HGB BLD-MCNC: 13.2 G/DL (ref 12–16)
INR PPP: 0.99 (ref 0.84–1.16)
MCH RBC QN AUTO: 32.4 PG (ref 26–34)
MCHC RBC AUTO-ENTMCNC: 34.7 G/DL (ref 31–36)
MCV RBC AUTO: 93.4 FL (ref 80–100)
PLATELET # BLD AUTO: 156 K/UL (ref 135–450)
PMV BLD AUTO: 8.8 FL (ref 5–10.5)
POTASSIUM SERPL-SCNC: 3.3 MMOL/L (ref 3.5–5.1)
PROTHROMBIN TIME: 13.1 SEC (ref 11.5–14.8)
RBC # BLD AUTO: 4.06 M/UL (ref 4–5.2)
SODIUM SERPL-SCNC: 132 MMOL/L (ref 136–145)
WBC # BLD AUTO: 6.4 K/UL (ref 4–11)

## 2024-03-08 PROCEDURE — 86901 BLOOD TYPING SEROLOGIC RH(D): CPT

## 2024-03-08 PROCEDURE — 1200000000 HC SEMI PRIVATE

## 2024-03-08 PROCEDURE — 3600000004 HC SURGERY LEVEL 4 BASE: Performed by: ORTHOPAEDIC SURGERY

## 2024-03-08 PROCEDURE — 86850 RBC ANTIBODY SCREEN: CPT

## 2024-03-08 PROCEDURE — 6370000000 HC RX 637 (ALT 250 FOR IP): Performed by: PHYSICIAN ASSISTANT

## 2024-03-08 PROCEDURE — 6360000002 HC RX W HCPCS: Performed by: ORTHOPAEDIC SURGERY

## 2024-03-08 PROCEDURE — 6360000002 HC RX W HCPCS: Performed by: NURSE PRACTITIONER

## 2024-03-08 PROCEDURE — 6370000000 HC RX 637 (ALT 250 FOR IP): Performed by: NURSE PRACTITIONER

## 2024-03-08 PROCEDURE — 85027 COMPLETE CBC AUTOMATED: CPT

## 2024-03-08 PROCEDURE — 3700000001 HC ADD 15 MINUTES (ANESTHESIA): Performed by: ORTHOPAEDIC SURGERY

## 2024-03-08 PROCEDURE — 7100000001 HC PACU RECOVERY - ADDTL 15 MIN: Performed by: ORTHOPAEDIC SURGERY

## 2024-03-08 PROCEDURE — 80048 BASIC METABOLIC PNL TOTAL CA: CPT

## 2024-03-08 PROCEDURE — 2580000003 HC RX 258: Performed by: NURSE PRACTITIONER

## 2024-03-08 PROCEDURE — 2720000010 HC SURG SUPPLY STERILE: Performed by: ORTHOPAEDIC SURGERY

## 2024-03-08 PROCEDURE — 3600000014 HC SURGERY LEVEL 4 ADDTL 15MIN: Performed by: ORTHOPAEDIC SURGERY

## 2024-03-08 PROCEDURE — 73501 X-RAY EXAM HIP UNI 1 VIEW: CPT

## 2024-03-08 PROCEDURE — 2709999900 HC NON-CHARGEABLE SUPPLY: Performed by: ORTHOPAEDIC SURGERY

## 2024-03-08 PROCEDURE — 85610 PROTHROMBIN TIME: CPT

## 2024-03-08 PROCEDURE — 3700000000 HC ANESTHESIA ATTENDED CARE: Performed by: ORTHOPAEDIC SURGERY

## 2024-03-08 PROCEDURE — 2580000003 HC RX 258: Performed by: ORTHOPAEDIC SURGERY

## 2024-03-08 PROCEDURE — 2500000003 HC RX 250 WO HCPCS: Performed by: NURSE ANESTHETIST, CERTIFIED REGISTERED

## 2024-03-08 PROCEDURE — C1769 GUIDE WIRE: HCPCS | Performed by: ORTHOPAEDIC SURGERY

## 2024-03-08 PROCEDURE — 36415 COLL VENOUS BLD VENIPUNCTURE: CPT

## 2024-03-08 PROCEDURE — 7100000000 HC PACU RECOVERY - FIRST 15 MIN: Performed by: ORTHOPAEDIC SURGERY

## 2024-03-08 PROCEDURE — 86900 BLOOD TYPING SEROLOGIC ABO: CPT

## 2024-03-08 PROCEDURE — 0QH734Z INSERTION OF INTERNAL FIXATION DEVICE INTO LEFT UPPER FEMUR, PERCUTANEOUS APPROACH: ICD-10-PCS | Performed by: ORTHOPAEDIC SURGERY

## 2024-03-08 PROCEDURE — 2580000003 HC RX 258: Performed by: PHYSICIAN ASSISTANT

## 2024-03-08 PROCEDURE — 2580000003 HC RX 258: Performed by: NURSE ANESTHETIST, CERTIFIED REGISTERED

## 2024-03-08 PROCEDURE — C1713 ANCHOR/SCREW BN/BN,TIS/BN: HCPCS | Performed by: ORTHOPAEDIC SURGERY

## 2024-03-08 PROCEDURE — 6360000002 HC RX W HCPCS: Performed by: NURSE ANESTHETIST, CERTIFIED REGISTERED

## 2024-03-08 DEVICE — SCREW BNE L80MM DIA7.3MM THRD L16MM CANC S STL SELF DRL ST: Type: IMPLANTABLE DEVICE | Site: HIP | Status: FUNCTIONAL

## 2024-03-08 DEVICE — SCREW BNE L75MM DIA7.3MM THRD L16MM CANC S STL ST SELF DRL: Type: IMPLANTABLE DEVICE | Site: HIP | Status: FUNCTIONAL

## 2024-03-08 RX ORDER — SODIUM CHLORIDE 9 MG/ML
INJECTION, SOLUTION INTRAVENOUS CONTINUOUS
Status: DISCONTINUED | OUTPATIENT
Start: 2024-03-08 | End: 2024-03-08 | Stop reason: HOSPADM

## 2024-03-08 RX ORDER — NALOXONE HYDROCHLORIDE 0.4 MG/ML
INJECTION, SOLUTION INTRAMUSCULAR; INTRAVENOUS; SUBCUTANEOUS PRN
Status: DISCONTINUED | OUTPATIENT
Start: 2024-03-08 | End: 2024-03-08 | Stop reason: HOSPADM

## 2024-03-08 RX ORDER — ONDANSETRON 2 MG/ML
4 INJECTION INTRAMUSCULAR; INTRAVENOUS
Status: DISCONTINUED | OUTPATIENT
Start: 2024-03-08 | End: 2024-03-08 | Stop reason: HOSPADM

## 2024-03-08 RX ORDER — SODIUM CHLORIDE 9 MG/ML
INJECTION, SOLUTION INTRAVENOUS PRN
Status: DISCONTINUED | OUTPATIENT
Start: 2024-03-08 | End: 2024-03-08 | Stop reason: HOSPADM

## 2024-03-08 RX ORDER — PROPOFOL 10 MG/ML
INJECTION, EMULSION INTRAVENOUS PRN
Status: DISCONTINUED | OUTPATIENT
Start: 2024-03-08 | End: 2024-03-08 | Stop reason: SDUPTHER

## 2024-03-08 RX ORDER — SUCCINYLCHOLINE/SOD CL,ISO/PF 200MG/10ML
SYRINGE (ML) INTRAVENOUS PRN
Status: DISCONTINUED | OUTPATIENT
Start: 2024-03-08 | End: 2024-03-08 | Stop reason: SDUPTHER

## 2024-03-08 RX ORDER — PHENYLEPHRINE HCL IN 0.9% NACL 1 MG/10 ML
SYRINGE (ML) INTRAVENOUS PRN
Status: DISCONTINUED | OUTPATIENT
Start: 2024-03-08 | End: 2024-03-08 | Stop reason: SDUPTHER

## 2024-03-08 RX ORDER — ROCURONIUM BROMIDE 10 MG/ML
INJECTION, SOLUTION INTRAVENOUS PRN
Status: DISCONTINUED | OUTPATIENT
Start: 2024-03-08 | End: 2024-03-08 | Stop reason: SDUPTHER

## 2024-03-08 RX ORDER — FENTANYL CITRATE 50 UG/ML
50 INJECTION, SOLUTION INTRAMUSCULAR; INTRAVENOUS EVERY 5 MIN PRN
Status: DISCONTINUED | OUTPATIENT
Start: 2024-03-08 | End: 2024-03-08 | Stop reason: HOSPADM

## 2024-03-08 RX ORDER — MEPERIDINE HYDROCHLORIDE 25 MG/ML
12.5 INJECTION INTRAMUSCULAR; INTRAVENOUS; SUBCUTANEOUS EVERY 5 MIN PRN
Status: DISCONTINUED | OUTPATIENT
Start: 2024-03-08 | End: 2024-03-08 | Stop reason: HOSPADM

## 2024-03-08 RX ORDER — OXYCODONE HYDROCHLORIDE 5 MG/1
5 TABLET ORAL PRN
Status: DISCONTINUED | OUTPATIENT
Start: 2024-03-08 | End: 2024-03-08 | Stop reason: HOSPADM

## 2024-03-08 RX ORDER — HYDROMORPHONE HYDROCHLORIDE 2 MG/ML
0.5 INJECTION, SOLUTION INTRAMUSCULAR; INTRAVENOUS; SUBCUTANEOUS EVERY 5 MIN PRN
Status: DISCONTINUED | OUTPATIENT
Start: 2024-03-08 | End: 2024-03-08 | Stop reason: HOSPADM

## 2024-03-08 RX ORDER — SODIUM CHLORIDE 9 MG/ML
INJECTION, SOLUTION INTRAVENOUS
Status: DISCONTINUED
Start: 2024-03-08 | End: 2024-03-08

## 2024-03-08 RX ORDER — FENTANYL CITRATE 50 UG/ML
INJECTION, SOLUTION INTRAMUSCULAR; INTRAVENOUS PRN
Status: DISCONTINUED | OUTPATIENT
Start: 2024-03-08 | End: 2024-03-08 | Stop reason: SDUPTHER

## 2024-03-08 RX ORDER — ONDANSETRON 2 MG/ML
INJECTION INTRAMUSCULAR; INTRAVENOUS PRN
Status: DISCONTINUED | OUTPATIENT
Start: 2024-03-08 | End: 2024-03-08 | Stop reason: SDUPTHER

## 2024-03-08 RX ORDER — DEXMEDETOMIDINE HYDROCHLORIDE 100 UG/ML
INJECTION, SOLUTION INTRAVENOUS PRN
Status: DISCONTINUED | OUTPATIENT
Start: 2024-03-08 | End: 2024-03-08 | Stop reason: SDUPTHER

## 2024-03-08 RX ORDER — SODIUM CHLORIDE 0.9 % (FLUSH) 0.9 %
5-40 SYRINGE (ML) INJECTION EVERY 12 HOURS SCHEDULED
Status: DISCONTINUED | OUTPATIENT
Start: 2024-03-08 | End: 2024-03-08 | Stop reason: HOSPADM

## 2024-03-08 RX ORDER — LIDOCAINE HYDROCHLORIDE 20 MG/ML
INJECTION, SOLUTION INFILTRATION; PERINEURAL PRN
Status: DISCONTINUED | OUTPATIENT
Start: 2024-03-08 | End: 2024-03-08 | Stop reason: SDUPTHER

## 2024-03-08 RX ORDER — DEXAMETHASONE SODIUM PHOSPHATE 4 MG/ML
INJECTION, SOLUTION INTRA-ARTICULAR; INTRALESIONAL; INTRAMUSCULAR; INTRAVENOUS; SOFT TISSUE PRN
Status: DISCONTINUED | OUTPATIENT
Start: 2024-03-08 | End: 2024-03-08 | Stop reason: SDUPTHER

## 2024-03-08 RX ORDER — FAMOTIDINE 10 MG/ML
INJECTION, SOLUTION INTRAVENOUS PRN
Status: DISCONTINUED | OUTPATIENT
Start: 2024-03-08 | End: 2024-03-08 | Stop reason: SDUPTHER

## 2024-03-08 RX ORDER — ENOXAPARIN SODIUM 100 MG/ML
40 INJECTION SUBCUTANEOUS DAILY
Status: DISCONTINUED | OUTPATIENT
Start: 2024-03-09 | End: 2024-03-15 | Stop reason: HOSPADM

## 2024-03-08 RX ORDER — OXYCODONE HYDROCHLORIDE 5 MG/1
10 TABLET ORAL PRN
Status: DISCONTINUED | OUTPATIENT
Start: 2024-03-08 | End: 2024-03-08 | Stop reason: HOSPADM

## 2024-03-08 RX ORDER — SODIUM CHLORIDE 0.9 % (FLUSH) 0.9 %
5-40 SYRINGE (ML) INJECTION PRN
Status: DISCONTINUED | OUTPATIENT
Start: 2024-03-08 | End: 2024-03-08 | Stop reason: HOSPADM

## 2024-03-08 RX ORDER — ASPIRIN 81 MG/1
81 TABLET, CHEWABLE ORAL DAILY
Status: DISCONTINUED | OUTPATIENT
Start: 2024-03-09 | End: 2024-03-15 | Stop reason: HOSPADM

## 2024-03-08 RX ORDER — SODIUM CHLORIDE 9 MG/ML
INJECTION, SOLUTION INTRAVENOUS CONTINUOUS
Status: DISCONTINUED | OUTPATIENT
Start: 2024-03-08 | End: 2024-03-15

## 2024-03-08 RX ADMIN — Medication 100 MCG: at 11:01

## 2024-03-08 RX ADMIN — OXYCODONE 5 MG: 5 TABLET ORAL at 09:28

## 2024-03-08 RX ADMIN — LIDOCAINE HYDROCHLORIDE 50 MG: 20 INJECTION, SOLUTION INFILTRATION; PERINEURAL at 10:53

## 2024-03-08 RX ADMIN — GABAPENTIN 100 MG: 100 CAPSULE ORAL at 09:28

## 2024-03-08 RX ADMIN — CELECOXIB 200 MG: 200 CAPSULE ORAL at 20:36

## 2024-03-08 RX ADMIN — DEXMEDETOMIDINE HYDROCHLORIDE 4 MCG: 100 INJECTION, SOLUTION INTRAVENOUS at 11:13

## 2024-03-08 RX ADMIN — CEFAZOLIN 2000 MG: 2 INJECTION, POWDER, FOR SOLUTION INTRAMUSCULAR; INTRAVENOUS at 10:46

## 2024-03-08 RX ADMIN — ROCURONIUM BROMIDE 10 MG: 10 INJECTION, SOLUTION INTRAVENOUS at 10:53

## 2024-03-08 RX ADMIN — Medication 100 MCG: at 12:05

## 2024-03-08 RX ADMIN — DEXMEDETOMIDINE HYDROCHLORIDE 4 MCG: 100 INJECTION, SOLUTION INTRAVENOUS at 11:48

## 2024-03-08 RX ADMIN — SODIUM CHLORIDE, PRESERVATIVE FREE 10 ML: 5 INJECTION INTRAVENOUS at 20:38

## 2024-03-08 RX ADMIN — Medication 200 MCG: at 11:10

## 2024-03-08 RX ADMIN — CLONIDINE HYDROCHLORIDE 0.2 MG: 0.1 TABLET ORAL at 20:37

## 2024-03-08 RX ADMIN — GABAPENTIN 300 MG: 300 CAPSULE ORAL at 20:36

## 2024-03-08 RX ADMIN — DEXAMETHASONE SODIUM PHOSPHATE 4 MG: 4 INJECTION, SOLUTION INTRAMUSCULAR; INTRAVENOUS at 10:57

## 2024-03-08 RX ADMIN — Medication 80 MG: at 10:54

## 2024-03-08 RX ADMIN — FENTANYL CITRATE 25 MCG: 50 INJECTION, SOLUTION INTRAMUSCULAR; INTRAVENOUS at 11:27

## 2024-03-08 RX ADMIN — FENTANYL CITRATE 25 MCG: 50 INJECTION, SOLUTION INTRAMUSCULAR; INTRAVENOUS at 10:53

## 2024-03-08 RX ADMIN — Medication 100 MCG: at 10:56

## 2024-03-08 RX ADMIN — PHENYLEPHRINE HYDROCHLORIDE 20 MCG/MIN: 10 INJECTION INTRAVENOUS at 11:16

## 2024-03-08 RX ADMIN — SODIUM CHLORIDE: 9 INJECTION, SOLUTION INTRAVENOUS at 10:32

## 2024-03-08 RX ADMIN — FENTANYL CITRATE 25 MCG: 50 INJECTION, SOLUTION INTRAMUSCULAR; INTRAVENOUS at 11:58

## 2024-03-08 RX ADMIN — PROPRANOLOL HYDROCHLORIDE 160 MG: 80 CAPSULE, EXTENDED RELEASE ORAL at 09:28

## 2024-03-08 RX ADMIN — SODIUM CHLORIDE, PRESERVATIVE FREE 10 ML: 5 INJECTION INTRAVENOUS at 09:31

## 2024-03-08 RX ADMIN — SODIUM CHLORIDE: 9 INJECTION, SOLUTION INTRAVENOUS at 14:14

## 2024-03-08 RX ADMIN — FAMOTIDINE 20 MG: 10 INJECTION INTRAVENOUS at 10:46

## 2024-03-08 RX ADMIN — PRIMIDONE 50 MG: 50 TABLET ORAL at 20:37

## 2024-03-08 RX ADMIN — ACETAMINOPHEN 650 MG: 325 TABLET ORAL at 14:47

## 2024-03-08 RX ADMIN — POTASSIUM BICARBONATE 40 MEQ: 782 TABLET, EFFERVESCENT ORAL at 19:12

## 2024-03-08 RX ADMIN — PROPOFOL 80 MG: 10 INJECTION, EMULSION INTRAVENOUS at 10:53

## 2024-03-08 RX ADMIN — ROCURONIUM BROMIDE 20 MG: 10 INJECTION, SOLUTION INTRAVENOUS at 11:02

## 2024-03-08 RX ADMIN — ACETAMINOPHEN 650 MG: 325 TABLET ORAL at 20:35

## 2024-03-08 RX ADMIN — CEFAZOLIN 2000 MG: 2 INJECTION, POWDER, FOR SOLUTION INTRAMUSCULAR; INTRAVENOUS at 18:10

## 2024-03-08 RX ADMIN — FENTANYL CITRATE 25 MCG: 50 INJECTION, SOLUTION INTRAMUSCULAR; INTRAVENOUS at 12:02

## 2024-03-08 RX ADMIN — GABAPENTIN 100 MG: 100 CAPSULE ORAL at 14:47

## 2024-03-08 RX ADMIN — ONDANSETRON 4 MG: 2 INJECTION INTRAMUSCULAR; INTRAVENOUS at 10:57

## 2024-03-08 ASSESSMENT — PAIN - FUNCTIONAL ASSESSMENT
PAIN_FUNCTIONAL_ASSESSMENT: ACTIVITIES ARE NOT PREVENTED
PAIN_FUNCTIONAL_ASSESSMENT: NONE - DENIES PAIN
PAIN_FUNCTIONAL_ASSESSMENT: PREVENTS OR INTERFERES SOME ACTIVE ACTIVITIES AND ADLS
PAIN_FUNCTIONAL_ASSESSMENT: PREVENTS OR INTERFERES SOME ACTIVE ACTIVITIES AND ADLS
PAIN_FUNCTIONAL_ASSESSMENT: ACTIVITIES ARE NOT PREVENTED

## 2024-03-08 ASSESSMENT — ENCOUNTER SYMPTOMS: SHORTNESS OF BREATH: 0

## 2024-03-08 ASSESSMENT — PAIN DESCRIPTION - LOCATION
LOCATION: HIP
LOCATION: LEG
LOCATION: HIP

## 2024-03-08 ASSESSMENT — PAIN SCALES - WONG BAKER
WONGBAKER_NUMERICALRESPONSE: NO HURT
WONGBAKER_NUMERICALRESPONSE: 0
WONGBAKER_NUMERICALRESPONSE: NO HURT
WONGBAKER_NUMERICALRESPONSE: HURTS EVEN MORE
WONGBAKER_NUMERICALRESPONSE: NO HURT
WONGBAKER_NUMERICALRESPONSE: NO HURT
WONGBAKER_NUMERICALRESPONSE: 0
WONGBAKER_NUMERICALRESPONSE: HURTS EVEN MORE
WONGBAKER_NUMERICALRESPONSE: 0
WONGBAKER_NUMERICALRESPONSE: HURTS EVEN MORE
WONGBAKER_NUMERICALRESPONSE: 0
WONGBAKER_NUMERICALRESPONSE: 0
WONGBAKER_NUMERICALRESPONSE: 6
WONGBAKER_NUMERICALRESPONSE: NO HURT
WONGBAKER_NUMERICALRESPONSE: HURTS EVEN MORE
WONGBAKER_NUMERICALRESPONSE: 0
WONGBAKER_NUMERICALRESPONSE: 6
WONGBAKER_NUMERICALRESPONSE: 6
WONGBAKER_NUMERICALRESPONSE: HURTS EVEN MORE
WONGBAKER_NUMERICALRESPONSE: 6
WONGBAKER_NUMERICALRESPONSE: 6
WONGBAKER_NUMERICALRESPONSE: NO HURT

## 2024-03-08 ASSESSMENT — PAIN SCALES - GENERAL
PAINLEVEL_OUTOF10: 0
PAINLEVEL_OUTOF10: 0
PAINLEVEL_OUTOF10: 3
PAINLEVEL_OUTOF10: 2
PAINLEVEL_OUTOF10: 0
PAINLEVEL_OUTOF10: 6

## 2024-03-08 ASSESSMENT — PAIN DESCRIPTION - ORIENTATION
ORIENTATION: LEFT

## 2024-03-08 ASSESSMENT — PAIN DESCRIPTION - DESCRIPTORS
DESCRIPTORS: ACHING

## 2024-03-08 NOTE — PROGRESS NOTES
V2.0    [facility] [place]      Name:  Susan Kenney /Age/Sex: 1940  (83 y.o. female)   MRN & CSN:  [id number] & [id number] Encounter Date/Time: 3/8/2024 1:34 PM EST   Location:  [facility] OR/NONE PCP: Margareth Pelaez DO     Attending:Peter Pagan MD       Hospital Day: 3    Assessment and Recommendations   Susan Kenney is a 83 y.o. female who presents with Unable to ambulate      Plan:   Left femoral neck fracture   Planned surgery today  Pain control   Iv apnd po pain meds    Hypokalemia        Hypertension  Hyperlipidemia    Preoperative evaluation  Greater than 4 METS no acute cardiac disease.  EKG reviewed.  No further testing recommended      Diet Diet NPO Exceptions are: Sips of Water with Meds   DVT Prophylaxis    Code Status Full Code   Disposition          Personally reviewed Lab Studies and Imaging         Subjective:     Chief Complaint:     Currently denies any pain.  No management.  Susan Kenney is a 83 y.o. female who presents with       Review of Systems:      Pertinent positives and negatives discussed in HPI    Objective:     Intake/Output Summary (Last 24 hours) at 3/8/2024 1238  Last data filed at 3/8/2024 1215  Gross per 24 hour   Intake 1600 ml   Output 2200 ml   Net -600 ml      Vitals:   Vitals:    24 1200 24 1205 24 1210 24 1225   BP: (!) 93/48 (!) 120/59 128/76 (!) 142/56   Pulse: 64 60 74 66   Resp:  16 19 16   Temp: 98.7 °F (37.1 °C)   97.8 °F (36.6 °C)   TempSrc: Temporal      SpO2: 100% 100% 100% 93%   Weight:       Height:             Physical Exam:      General: NAD  Eyes: EOMI  ENT: neck supple  Cardiovascular: Regular rate.  Respiratory: Clear to auscultation  Gastrointestinal: Soft, non tender  Genitourinary: no suprapubic tenderness  Musculoskeletal: No edema  Skin: warm, dry  Neuro: Alert.  Psych: Mood appropriate.         Medications:   Medications:    sodium chloride flush  5-40 mL IntraVENous 2 times per day    sodium chloride flush  5-40 mL  115* 108* 97     Hepatic: No results for input(s): \"AST\", \"ALT\", \"ALB\", \"BILITOT\", \"ALKPHOS\" in the last 72 hours.  Lipids:   Lab Results   Component Value Date/Time    CHOL 199 02/06/2020 10:11 AM    HDL 75 02/06/2020 10:11 AM    HDL 53 03/12/2012 09:05 AM    TRIG 109 02/06/2020 10:11 AM     Hemoglobin A1C:   Lab Results   Component Value Date/Time    LABA1C 5.6 05/31/2019 09:43 AM     TSH: No results found for: \"TSH\"  Troponin: No results found for: \"TROPONINT\"  Lactic Acid: No results for input(s): \"LACTA\" in the last 72 hours.  BNP: No results for input(s): \"PROBNP\" in the last 72 hours.  UA:  Lab Results   Component Value Date/Time    NITRU NEGATIVE 02/14/2013 01:24 PM    COLORU YELLOW 02/14/2013 01:24 PM    PHUR 6.0 02/14/2013 01:24 PM    CLARITYU CLEAR 02/14/2013 01:24 PM    SPECGRAV 1.010 02/14/2013 01:24 PM    LEUKOCYTESUR NEGATIVE 02/14/2013 01:24 PM    UROBILINOGEN 0.2 02/14/2013 01:24 PM    BILIRUBINUR NEGATIVE 02/14/2013 01:24 PM    BILIRUBINUR NEGATIVE 01/15/2010 05:28 PM    BLOODU NEGATIVE 02/14/2013 01:24 PM    GLUCOSEU NEGATIVE 02/14/2013 01:24 PM    GLUCOSEU NEGATIVE 01/15/2010 05:28 PM    KETUA NEGATIVE 02/14/2013 01:24 PM     Urine Cultures: No results found for: \"LABURIN\"  Blood Cultures: No results found for: \"BC\"  No results found for: \"BLOODCULT2\"  Organism: No results found for: \"ORG\"      Electronically signed by Peter Pagan MD on 3/8/2024 at 12:38 PM

## 2024-03-08 NOTE — ANESTHESIA POSTPROCEDURE EVALUATION
Department of Anesthesiology  Postprocedure Note    Patient: Susan Kenney  MRN: [id number]  YOB: 1940  Date of evaluation: 3/8/2024    Procedure Summary       Date: 03/08/24 Room / Location: [address]    Anesthesia Start: 1050 Anesthesia Stop: 1205    Procedure: LEFT HIP PERCUTANEOUS PINNING - SYNTHES (Left: Hip) Diagnosis:       Closed fracture of left hip, initial encounter ([facility])      (Closed fracture of left hip, initial encounter ([facility]) [S72.002A])    Surgeons: Olman Rogers MD Responsible Provider: MARIAH Contreras MD    Anesthesia Type: general ASA Status: 3            Anesthesia Type: No value filed.    Eugenia Phase I: Eugenia Score: 10    Eugenia Phase II:      Anesthesia Post Evaluation    Patient location during evaluation: PACU  Patient participation: complete - patient participated  Level of consciousness: awake and alert  Airway patency: patent  Nausea & Vomiting: no nausea and no vomiting  Cardiovascular status: hemodynamically stable  Respiratory status: acceptable  Hydration status: stable  Multimodal analgesia pain management approach  Pain management: adequate    No notable events documented.

## 2024-03-08 NOTE — PROGRESS NOTES
19:57 Assessment complete. VSS. Pt denies pain at the time. The care plan and education has been reviewed and mutually agreed upon with the patient. Will cont to monitor   Demario Monreal RN

## 2024-03-08 NOTE — PROGRESS NOTES
Arrived into PACU following surgical procedure ( Left hip pinning by Dr Singer)  Report received from OR nurse and CRNA. Pt not responding to verbal/ tactile stimulation at this time. Oral airway in place.  Monitor shows NSR. Lungs CTA. Brief in place .Foam dressing dry and intact to left hip area. Strong popliteal pulse. Brisk capillary refill.  Will continue to monitor

## 2024-03-08 NOTE — PROGRESS NOTES
Physical/Occupational Therapy  Susan Kenney  8:13 AM      PT/OT miki appreciated. Will hold pt at this time d/t scheduled L hip pinning w/ Dr. Rogers this AM. Will follow up w/ pt following sx.     Thank you,   Mariela Sood, OTR/L, [id number] 3/8/2024 8:15 AM  Shilpa Whitley, PT, DPT, #[id number]

## 2024-03-08 NOTE — PROGRESS NOTES
Pt has c/o \"feeling weak\" states she just \"feels terrible\"  pt denies CP/SOB/hip pain/nausea.  Dr. Tracy notified and came to bedside.  MD evaluated pt and stated pt was OK to transfer back to , no new interventions at this time.  Report called to Janet ROMERO RN via phone, all questions answered.  Pt transported via bed with transport and portable O2.

## 2024-03-08 NOTE — CARE COORDINATION
Case Management Assessment  Initial Evaluation    Date/Time of Evaluation: 3/8/2024 8:17 AM  Assessment Completed by: Manoj Malcolm Jr, RN    If patient is discharged prior to next notation, then this note serves as note for discharge by case management.    Patient Name: Susan Kenney                   YOB: 1940  Diagnosis: Unable to ambulate [R26.2]  Fall, initial encounter [W19.XXXA]                   Date / Time: 3/6/2024  7:30 PM    Patient Admission Status: Inpatient   Readmission Risk (Low < 19, Mod (19-27), High > 27): Readmission Risk Score: 11.7    Current PCP: Margareth Pelaez, DO  PCP verified by CM? (P) Yes    Chart Reviewed: Yes      History Provided by: (P) Patient  Patient Orientation: (P) Alert and Oriented    Patient Cognition: (P) Alert    Hospitalization in the last 30 days (Readmission):  No    If yes, Readmission Assessment in CM Navigator will be completed.    Advance Directives:      Code Status: Full Code   Patient's Primary Decision Maker is: (P) Legal Next of Kin      Discharge Planning:    Patient lives with: (P) Family Members Type of Home: (P) House (Three stesp to enter, Stairlifts for access to the second floor)  Primary Care Giver: (P) Self  Patient Support Systems include: (P) Family Members   Current Financial resources: (P) Medicare  Current community resources: (P) None  Current services prior to admission: (P) None            Current DME:              Type of Home Care services:  (P) None    ADLS  Prior functional level: (P) Assistance with the following:, Mobility  Current functional level: (P) Assistance with the following:, Mobility    PT AM-PAC:   /24  OT AM-PAC:   /24    Family can provide assistance at DC: (P) Yes  Would you like Case Management to discuss the discharge plan with any other family members/significant others, and if so, who? (P) No  Plans to Return to Present Housing: (P) Unknown at present  Other Identified Issues/Barriers to RETURNING to

## 2024-03-08 NOTE — PLAN OF CARE
Problem: Safety - Adult  Goal: Free from fall injury  3/7/2024 1957 by Demario Monreal RN  Outcome: Progressing  3/7/2024 1656 by Leslie Chino RN  Outcome: Progressing     Problem: Discharge Planning  Goal: Discharge to home or other facility with appropriate resources  3/7/2024 1957 by Demario Monreal RN  Outcome: Progressing  3/7/2024 1656 by Leslie Chino RN  Outcome: Progressing  Flowsheets (Taken 3/7/2024 1601 by Candace Willingham RN)  Discharge to home or other facility with appropriate resources: Identify barriers to discharge with patient and caregiver     Problem: Skin/Tissue Integrity  Goal: Absence of new skin breakdown  Description: 1.  Monitor for areas of redness and/or skin breakdown  2.  Assess vascular access sites hourly  3.  Every 4-6 hours minimum:  Change oxygen saturation probe site  4.  Every 4-6 hours:  If on nasal continuous positive airway pressure, respiratory therapy assess nares and determine need for appliance change or resting period.  3/7/2024 1957 by Demario Monreal RN  Outcome: Progressing  3/7/2024 1656 by Leslie Chino RN  Outcome: Progressing     Problem: ABCDS Injury Assessment  Goal: Absence of physical injury  3/7/2024 1957 by Demario Monreal RN  Outcome: Progressing  3/7/2024 1656 by Leslie Chino RN  Outcome: Progressing     Problem: Pain  Goal: Verbalizes/displays adequate comfort level or baseline comfort level  Outcome: Progressing

## 2024-03-08 NOTE — PLAN OF CARE
[facility] Orthopedic Surgery  Plan of Care Note        LEFT hip percutaneous pinning for fracture.    Plan:  - Resume diet as tolerated  - Ancef x 2 post-op  - WBAT  - PT/OT  - Pain control  - DVT prophylaxis: Lovenox as inpt resumed then ASA 81mg BID x 30 days  - Dispo: per primary team; likely SNF    GLORIA Rodrigez - CNP 3/8/2024 10:04 AM

## 2024-03-08 NOTE — PROGRESS NOTES
Beginning to wake. Oral airway easily removed by this RN. Pt trying to pull off O2 mask and BP cuff. Re-oriented pt to time, place, and situation

## 2024-03-09 LAB
ANION GAP SERPL CALCULATED.3IONS-SCNC: 7 MMOL/L (ref 3–16)
BASOPHILS # BLD: 0 K/UL (ref 0–0.2)
BASOPHILS NFR BLD: 0.4 %
BUN SERPL-MCNC: 16 MG/DL (ref 7–20)
CALCIUM SERPL-MCNC: 7.6 MG/DL (ref 8.3–10.6)
CHLORIDE SERPL-SCNC: 96 MMOL/L (ref 99–110)
CO2 SERPL-SCNC: 26 MMOL/L (ref 21–32)
CREAT SERPL-MCNC: 0.7 MG/DL (ref 0.6–1.2)
DEPRECATED RDW RBC AUTO: 13.4 % (ref 12.4–15.4)
EOSINOPHIL # BLD: 0.1 K/UL (ref 0–0.6)
EOSINOPHIL NFR BLD: 1.1 %
GFR SERPLBLD CREATININE-BSD FMLA CKD-EPI: >60 ML/MIN/{1.73_M2}
GLUCOSE SERPL-MCNC: 111 MG/DL (ref 70–99)
HCT VFR BLD AUTO: 26.5 % (ref 36–48)
HGB BLD-MCNC: 9.3 G/DL (ref 12–16)
LYMPHOCYTES # BLD: 0.8 K/UL (ref 1–5.1)
LYMPHOCYTES NFR BLD: 12.1 %
MCH RBC QN AUTO: 32.7 PG (ref 26–34)
MCHC RBC AUTO-ENTMCNC: 35.1 G/DL (ref 31–36)
MCV RBC AUTO: 93.1 FL (ref 80–100)
MONOCYTES # BLD: 0.6 K/UL (ref 0–1.3)
MONOCYTES NFR BLD: 8.8 %
NEUTROPHILS # BLD: 5 K/UL (ref 1.7–7.7)
NEUTROPHILS NFR BLD: 77.6 %
PLATELET # BLD AUTO: 155 K/UL (ref 135–450)
PMV BLD AUTO: 8.7 FL (ref 5–10.5)
POTASSIUM SERPL-SCNC: 4.4 MMOL/L (ref 3.5–5.1)
RBC # BLD AUTO: 2.85 M/UL (ref 4–5.2)
SODIUM SERPL-SCNC: 129 MMOL/L (ref 136–145)
WBC # BLD AUTO: 6.4 K/UL (ref 4–11)

## 2024-03-09 PROCEDURE — 6370000000 HC RX 637 (ALT 250 FOR IP): Performed by: NURSE PRACTITIONER

## 2024-03-09 PROCEDURE — 97535 SELF CARE MNGMENT TRAINING: CPT

## 2024-03-09 PROCEDURE — 97530 THERAPEUTIC ACTIVITIES: CPT

## 2024-03-09 PROCEDURE — 6360000002 HC RX W HCPCS: Performed by: NURSE PRACTITIONER

## 2024-03-09 PROCEDURE — 97162 PT EVAL MOD COMPLEX 30 MIN: CPT

## 2024-03-09 PROCEDURE — 1200000000 HC SEMI PRIVATE

## 2024-03-09 PROCEDURE — 2580000003 HC RX 258: Performed by: NURSE PRACTITIONER

## 2024-03-09 PROCEDURE — 97166 OT EVAL MOD COMPLEX 45 MIN: CPT

## 2024-03-09 PROCEDURE — 36415 COLL VENOUS BLD VENIPUNCTURE: CPT

## 2024-03-09 PROCEDURE — 85025 COMPLETE CBC W/AUTO DIFF WBC: CPT

## 2024-03-09 PROCEDURE — 80048 BASIC METABOLIC PNL TOTAL CA: CPT

## 2024-03-09 RX ORDER — ATORVASTATIN CALCIUM 20 MG/1
20 TABLET, FILM COATED ORAL NIGHTLY
Status: DISCONTINUED | OUTPATIENT
Start: 2024-03-10 | End: 2024-03-15 | Stop reason: HOSPADM

## 2024-03-09 RX ORDER — LISINOPRIL 20 MG/1
20 TABLET ORAL DAILY
Qty: 30 TABLET | Refills: 3 | Status: SHIPPED | OUTPATIENT
Start: 2024-03-10

## 2024-03-09 RX ADMIN — GABAPENTIN 100 MG: 100 CAPSULE ORAL at 08:09

## 2024-03-09 RX ADMIN — CETIRIZINE HYDROCHLORIDE 5 MG: 10 TABLET, FILM COATED ORAL at 08:10

## 2024-03-09 RX ADMIN — CEFAZOLIN 2000 MG: 2 INJECTION, POWDER, FOR SOLUTION INTRAMUSCULAR; INTRAVENOUS at 03:00

## 2024-03-09 RX ADMIN — SENNOSIDES 8.6 MG: 8.6 TABLET, COATED ORAL at 08:12

## 2024-03-09 RX ADMIN — PRIMIDONE 50 MG: 50 TABLET ORAL at 08:12

## 2024-03-09 RX ADMIN — ACETAMINOPHEN 650 MG: 325 TABLET ORAL at 20:46

## 2024-03-09 RX ADMIN — FUROSEMIDE 20 MG: 20 TABLET ORAL at 08:07

## 2024-03-09 RX ADMIN — AMLODIPINE BESYLATE 5 MG: 5 TABLET ORAL at 08:10

## 2024-03-09 RX ADMIN — GABAPENTIN 100 MG: 100 CAPSULE ORAL at 13:24

## 2024-03-09 RX ADMIN — PRIMIDONE 50 MG: 50 TABLET ORAL at 20:48

## 2024-03-09 RX ADMIN — ATORVASTATIN CALCIUM 20 MG: 10 TABLET, FILM COATED ORAL at 08:08

## 2024-03-09 RX ADMIN — GABAPENTIN 300 MG: 300 CAPSULE ORAL at 20:47

## 2024-03-09 RX ADMIN — ASPIRIN 81 MG 81 MG: 81 TABLET ORAL at 08:09

## 2024-03-09 RX ADMIN — CELECOXIB 200 MG: 200 CAPSULE ORAL at 20:47

## 2024-03-09 RX ADMIN — SODIUM CHLORIDE, PRESERVATIVE FREE 10 ML: 5 INJECTION INTRAVENOUS at 07:59

## 2024-03-09 RX ADMIN — HYDROCHLOROTHIAZIDE 25 MG: 25 TABLET ORAL at 08:08

## 2024-03-09 RX ADMIN — LISINOPRIL 20 MG: 20 TABLET ORAL at 08:08

## 2024-03-09 RX ADMIN — PROPRANOLOL HYDROCHLORIDE 160 MG: 80 CAPSULE, EXTENDED RELEASE ORAL at 08:13

## 2024-03-09 RX ADMIN — SODIUM CHLORIDE: 9 INJECTION, SOLUTION INTRAVENOUS at 11:43

## 2024-03-09 RX ADMIN — SODIUM CHLORIDE: 9 INJECTION, SOLUTION INTRAVENOUS at 18:14

## 2024-03-09 RX ADMIN — SODIUM CHLORIDE, PRESERVATIVE FREE 10 ML: 5 INJECTION INTRAVENOUS at 20:48

## 2024-03-09 RX ADMIN — ACETAMINOPHEN 650 MG: 325 TABLET ORAL at 15:16

## 2024-03-09 RX ADMIN — ACETAMINOPHEN 650 MG: 325 TABLET ORAL at 08:10

## 2024-03-09 RX ADMIN — ENOXAPARIN SODIUM 40 MG: 100 INJECTION SUBCUTANEOUS at 08:13

## 2024-03-09 RX ADMIN — SODIUM CHLORIDE: 9 INJECTION, SOLUTION INTRAVENOUS at 05:11

## 2024-03-09 RX ADMIN — PANTOPRAZOLE SODIUM 40 MG: 40 TABLET, DELAYED RELEASE ORAL at 06:23

## 2024-03-09 RX ADMIN — CELECOXIB 200 MG: 200 CAPSULE ORAL at 08:09

## 2024-03-09 ASSESSMENT — PAIN DESCRIPTION - ORIENTATION
ORIENTATION: LEFT

## 2024-03-09 ASSESSMENT — PAIN SCALES - GENERAL
PAINLEVEL_OUTOF10: 4
PAINLEVEL_OUTOF10: 3
PAINLEVEL_OUTOF10: 2
PAINLEVEL_OUTOF10: 3
PAINLEVEL_OUTOF10: 2
PAINLEVEL_OUTOF10: 3
PAINLEVEL_OUTOF10: 2
PAINLEVEL_OUTOF10: 2
PAINLEVEL_OUTOF10: 3

## 2024-03-09 ASSESSMENT — PAIN DESCRIPTION - DESCRIPTORS
DESCRIPTORS: SORE
DESCRIPTORS: ACHING;DULL
DESCRIPTORS: ACHING;SORE

## 2024-03-09 ASSESSMENT — PAIN DESCRIPTION - LOCATION
LOCATION: HIP

## 2024-03-09 NOTE — PROGRESS NOTES
Shift assessment completed, patient alert and oriented, vitals, neurovascular WNL, purewick in place, NS infusing @ 75ml/hr, ice pack in place for pain management, HOB at lowest level, o2 @ 1.5 L  via nasal canula, all her evening medications administered as per mar, patient state pain is manageable at this time, standard safety precaution applied, camera in the room, care plan reviewed with the patient Wade Killian RN

## 2024-03-09 NOTE — PROGRESS NOTES
V2.0    [facility]      Name:  Susan Kenney /Age/Sex: 1940  (83 y.o. female)   MRN & CSN:  [id number] & [id number] Encounter Date/Time: 3/9/2024 1:34 PM EST   Location:  [facility]4459/4459-01 PCP: Margareth Pelaez DO     Attending:Peter Pagan MD       Hospital Day: 4    Assessment and Recommendations   Susan Kenney is a 83 y.o. female who presents with Unable to ambulate      Plan:   Left femoral neck fracture  Secondary to mechanical fall  -Patient is status post surgery.  PT OT evaluation  Likely will need skilled nursing facility  Medically otherwise stable      Hyponatremia encourage p.o. intake    Hypokalemia        Hypertension  Hyperlipidemia        Diet ADULT DIET; Regular   DVT Prophylaxis    Code Status Full Code   Disposition          Personally reviewed Lab Studies and Imaging         Subjective:     Chief Complaint:     Currently denies any pain.  No management.  Susan Kenney is a 83 y.o. female who presents with       Review of Systems:      Pertinent positives and negatives discussed in HPI    Objective:     Intake/Output Summary (Last 24 hours) at 3/9/2024 1115  Last data filed at 3/9/2024 1010  Gross per 24 hour   Intake 3210 ml   Output 1420 ml   Net 1790 ml      Vitals:   Vitals:    24 2030 24 0054 24 0500 24 0752   BP: 123/70 101/64 112/66 126/60   Pulse: 71 64 67 68   Resp: 18 18 18 17   Temp: 98.5 °F (36.9 °C) 97.4 °F (36.3 °C) 97.6 °F (36.4 °C) 97.7 °F (36.5 °C)   TempSrc: Oral Oral Oral Oral   SpO2: 97% 96% 94% 92%   Weight:       Height:             Physical Exam:      General: NAD  Eyes: EOMI  ENT: neck supple  Cardiovascular: Regular rate.  Respiratory: Clear to auscultation  Gastrointestinal: Soft, non tender  Genitourinary: no suprapubic tenderness  Musculoskeletal: No edema  Skin: warm, dry  Neuro: Alert.  Psych: Mood appropriate.         Medications:   Medications:    aspirin  81 mg Oral Daily    enoxaparin  40 mg SubCUTAneous Daily    sodium

## 2024-03-09 NOTE — DISCHARGE INSTR - COC
Continuity of Care Form    Patient Name: Susan Lara   :  1940  MRN:  [id number]    Admit date:  3/6/2024  Discharge date:  3/15/24    Code Status Order: Full Code   Advance Directives:   Advance Care Flowsheet Documentation       Date/Time Healthcare Directive Type of Healthcare Directive Copy in Chart Healthcare Agent Appointed Healthcare Agent's Name Healthcare Agent's Phone Number    24 1019 Yes, patient has an advance directive for healthcare treatment -- Other (Comment)  did not bring -- -- --            Admitting Physician:  Shane Brennan MD  PCP: Margareth Pelaez DO    Discharging Nurse: Sharmin Culver RN  Discharging Hospital Unit/Room#: 4TN-4459/4459-01  Discharging Unit Phone Number: [phone]    Emergency Contact:   Extended Emergency Contact Information  Primary Emergency Contact: GAUDENCIO LARA  Home Phone: [phone]  Mobile Phone: [phone]  Relation: Child  Secondary Emergency Contact: Braden Lara  Home Phone: [phone]  Mobile Phone: [phone]  Relation: Child    Past Surgical History:  Past Surgical History:   Procedure Laterality Date    BUNIONECTOMY      bilateral    HIP SURGERY Left 3/8/2024    LEFT HIP PERCUTANEOUS PINNING - SYNTHES performed by Olman Rogers MD at [facility] OR    HYSTERECTOMY (CERVIX STATUS UNKNOWN)      JOINT REPLACEMENT      right TKR    JOINT REPLACEMENT  1/10    left TKR    LAMINECTOMY  12/16/10    placement of lead & spinal cord stimulator generator    TOE SURGERY      left foot    TONSILLECTOMY         Immunization History:   Immunization History   Administered Date(s) Administered    COVID-19, PFIZER Bivalent, DO NOT Dilute, (age 12y+), IM, 30 mcg/0.3 mL 2022    COVID-19, PFIZER PURPLE top, DILUTE for use, (age 12 y+), 30mcg/0.3mL 2021, 2021, 10/12/2021    COVID-19, PFIZER, ( formula), (age 12y+), IM, 30mcg/0.3mL 10/12/2023    Influenza Whole 2012    Pneumococcal Conjugate 7-valent (Prevnar7) 2005

## 2024-03-09 NOTE — PLAN OF CARE
Problem: Safety - Adult  Goal: Free from fall injury  Outcome: Progressing     Problem: Discharge Planning  Goal: Discharge to home or other facility with appropriate resources  Outcome: Progressing  Flowsheets  Taken 3/8/2024 1723 by Rishabh Zaman RN  Discharge to home or other facility with appropriate resources: Identify barriers to discharge with patient and caregiver  Taken 3/8/2024 1500 by Rishabh Zaman RN  Discharge to home or other facility with appropriate resources: Identify barriers to discharge with patient and caregiver     Problem: ABCDS Injury Assessment  Goal: Absence of physical injury  Outcome: Progressing     Problem: Pain  Goal: Verbalizes/displays adequate comfort level or baseline comfort level  Outcome: Progressing  Flowsheets  Taken 3/8/2024 1715 by Rishabh Zaman, RN  Verbalizes/displays adequate comfort level or baseline comfort level: Assess pain using appropriate pain scale  Taken 3/8/2024 1530 by Rishabh Zaman, RN  Verbalizes/displays adequate comfort level or baseline comfort level: Assess pain using appropriate pain scale  Taken 3/8/2024 1500 by Rishabh Zaman, RN  Verbalizes/displays adequate comfort level or baseline comfort level: Assess pain using appropriate pain scale  Taken 3/8/2024 1330 by Rishabh Zaman, RN  Verbalizes/displays adequate comfort level or baseline comfort level: Assess pain using appropriate pain scale

## 2024-03-09 NOTE — PLAN OF CARE
Problem: Safety - Adult  Goal: Free from fall injury  3/9/2024 1507 by Philly Bello RN  Outcome: Progressing  3/9/2024 0816 by Philly Bello RN  Outcome: Progressing  3/9/2024 0159 by Wade Killian RN  Outcome: Progressing     Problem: Discharge Planning  Goal: Discharge to home or other facility with appropriate resources  3/9/2024 1507 by Philly Bello RN  Outcome: Progressing  3/9/2024 0816 by Philly Bello RN  Outcome: Progressing  3/9/2024 0159 by Wade Killian RN  Outcome: Progressing     Problem: Skin/Tissue Integrity  Goal: Absence of new skin breakdown  3/9/2024 1507 by Philly Bello RN  Outcome: Progressing  3/9/2024 0816 by Philly Bello RN  Outcome: Progressing  3/9/2024 0159 by Wade Killian RN  Outcome: Progressing     Problem: ABCDS Injury Assessment  Goal: Absence of physical injury  3/9/2024 1507 by Philly Bello RN  Outcome: Progressing  3/9/2024 0816 by Philly Bello RN  Outcome: Progressing  3/9/2024 0159 by Wade Killian RN  Outcome: Progressing     Problem: Pain  Goal: Verbalizes/displays adequate comfort level or baseline comfort level  3/9/2024 1507 by Philly Bello RN  Outcome: Progressing  3/9/2024 0816 by Philly Bello RN  Outcome: Progressing  3/9/2024 0159 by Wade Killian RN  Outcome: Progressing

## 2024-03-09 NOTE — PLAN OF CARE
Problem: Safety - Adult  Goal: Free from fall injury  3/9/2024 0816 by Philly Bello RN  Outcome: Progressing  3/9/2024 0159 by Wade Killian RN  Outcome: Progressing     Problem: Discharge Planning  Goal: Discharge to home or other facility with appropriate resources  3/9/2024 0816 by Philly Bello RN  Outcome: Progressing  3/9/2024 0159 by Wade Killian RN  Outcome: Progressing     Problem: Skin/Tissue Integrity  Goal: Absence of new skin breakdown  3/9/2024 0816 by Philly Bello RN  Outcome: Progressing  3/9/2024 0159 by Wade Killian RN  Outcome: Progressing     Problem: ABCDS Injury Assessment  Goal: Absence of physical injury  3/9/2024 0816 by Philly Bello RN  Outcome: Progressing  3/9/2024 0159 by Wade Killian RN  Outcome: Progressing     Problem: Pain  Goal: Verbalizes/displays adequate comfort level or baseline comfort level  3/9/2024 0816 by Philly Bello RN  Outcome: Progressing  3/9/2024 0159 by Wade Killian RN  Outcome: Progressing

## 2024-03-09 NOTE — PROGRESS NOTES
[facility] - Inpatient Rehabilitation Department   Phone: [phone]    Occupational Therapy    [x] Initial Evaluation            [] Daily Treatment Note         [] Discharge Summary      Patient: Susan Kenney   : 1940   MRN: [id number]   Date of Service:  3/9/2024    Admitting Diagnosis:  Unable to ambulate  Current Admission Summary: 3/ 6 LEFT HIP PERCUTANEOUS PINNING  - fracture caused by a mechanical fall at home   Past Medical History:  has a past medical history of Arthritis, Easy bruising, Hyperlipidemia, Hypertension, and Nausea & vomiting.  Past Surgical History:  has a past surgical history that includes Tonsillectomy; Hysterectomy; joint replacement (); joint replacement (1/10); Bunionectomy; Toe Surgery; laminectomy (12/16/10); and hip surgery (Left, 3/8/2024).    Discharge Recommendations: Susan Kenney scored a 14/24 on the AM-PAC ADL Inpatient form. Current research shows that an AM-PAC score of 17 or less is typically not associated with a discharge to the patient's home setting. Based on the patient's AM-PAC score and their current ADL deficits, it is recommended that the patient have 3-5 sessions per week of Occupational Therapy at d/c to increase the patient's independence.  Please see assessment section for further patient specific details.    If patient discharges prior to next session this note will serve as a discharge summary.  Please see below for the latest assessment towards goals.      DME Required For Discharge: DME to be determined at next level of care    Precautions/Restrictions: high fall risk  Weight Bearing Restrictions: weight bearing as tolerated  [] Right Upper Extremity  [] Left Upper Extremity [] Right Lower Extremity  [] Left Lower Extremity     Required Braces/Orthotics: no braces required   [] Right  [] Left  Positional Restrictions:no positional restrictions    Pre-Admission Information   Lives With: son                        Type of Home:

## 2024-03-09 NOTE — PROGRESS NOTES
Pt taken to bathroom, depends changed due to soiling herself (urine). Pt passing flatus. Pt assisted back to chair- sitting on pillow for comfort. Chair alarm on. Call light in reach. O2 checked- 88% RA, placed back on nasal canula 2L. O2 recovered 96%.     Philly Bello BSN, RN   [phone]

## 2024-03-09 NOTE — PROGRESS NOTES
Physician Progress Note      PATIENT:               MANAV LARA  CSN #:                  [id number]  :                       1940  ADMIT DATE:       3/6/2024 7:30 PM  DISCH DATE:  RESPONDING  PROVIDER #:        Peter Pagan MD          QUERY TEXT:    Pt admitted with Left femoral neck fracture. Pt noted to have Moderate   osteoarthritic changes in both hips and diffuse osteopenia on XRay. If   possible, please document in progress notes and/or discharge summary if you   are evaluating and/or treating any of the following:    The medical record reflects the following:  Risk Factors: mechanical fall, 83 yr old female, patient was on Calcium   supplements at Home.  Clinical Indicators: XR of hip noted as Moderate osteoarthritic changes in   both hips and diffuse osteopenia, diagnosed with Closed fracture of neck of   left femur following fall in kitchen.  Treatment:  Schedule for left hip percutaneous pinning, Ortho consult, Pain   control  Options provided:  -- Pathological left femur neck fracture due to osteopenia following fall   which would not usually break a normal, healthy bone  -- Traumatic left femur neck fracture  -- Other - I will add my own diagnosis  -- Disagree - Not applicable / Not valid  -- Disagree - Clinically unable to determine / Unknown  -- Refer to Clinical Documentation Reviewer    PROVIDER RESPONSE TEXT:    This patient has a pathological left femur neck fracture due to osteopenia   following fall which would not usually break a normal, healthy bone.    Query created by: Jalyn Abdullahi on 3/8/2024 3:15 PM      Electronically signed by:  Peter Pagan MD 3/9/2024 11:15 AM

## 2024-03-09 NOTE — PROGRESS NOTES
[facility] - Inpatient Rehabilitation Department   Phone: [phone]    Physical Therapy    [x] Initial Evaluation            [] Daily Treatment Note         [] Discharge Summary      Patient: Susan Kenney   : 1940   MRN: [id number]   Date of Service:  3/9/2024  Admitting Diagnosis: Unable to ambulate  Current Admission Summary: Susan Kenney is a 83 y.o. female who presents for evaluation after mechanical fall. Patient states that she only walks with a walker and was trying to get around the kitchen without it when she lost her balance and fell down landing on her bottom. She is complaining of some low back pain but states that she has had this for several days. She also reports pain shooting down her right leg that she has also had for several days. She is scheduled to see her PCP for this next week. She denies hitting her head or any loss of consciousness. She is not anticoagulated. She denies numbness tingling or weakness distally. No saddle anesthesia, bladder retention or bowel incontinence. No other injuries or complaints at this time.     *Pt is s/p L hip percutaneous pinning on 3/8/24.  Past Medical History:  has a past medical history of Arthritis, Easy bruising, Hyperlipidemia, Hypertension, and Nausea & vomiting.  Past Surgical History:  has a past surgical history that includes Tonsillectomy; Hysterectomy; joint replacement (); joint replacement (1/10); Bunionectomy; Toe Surgery; laminectomy (12/16/10); and hip surgery (Left, 3/8/2024).  Discharge Recommendations: Susan Kenney scored a  on the AM-PAC short mobility form. Current research shows that an AM-PAC score of 17 or less is typically not associated with a discharge to the patient's home setting. Based on the patient's AM-PAC score and their current functional mobility deficits, it is recommended that the patient have 3-5 sessions per week of Physical Therapy at d/c to increase the patient's independence.  Please

## 2024-03-10 ENCOUNTER — APPOINTMENT (OUTPATIENT)
Dept: GENERAL RADIOLOGY | Age: 84
End: 2024-03-10
Payer: MEDICARE

## 2024-03-10 LAB
ANION GAP SERPL CALCULATED.3IONS-SCNC: 9 MMOL/L (ref 3–16)
BASOPHILS # BLD: 0 K/UL (ref 0–0.2)
BASOPHILS NFR BLD: 0.8 %
BUN SERPL-MCNC: 15 MG/DL (ref 7–20)
CALCIUM SERPL-MCNC: 7.9 MG/DL (ref 8.3–10.6)
CHLORIDE SERPL-SCNC: 98 MMOL/L (ref 99–110)
CO2 SERPL-SCNC: 25 MMOL/L (ref 21–32)
CREAT SERPL-MCNC: 0.6 MG/DL (ref 0.6–1.2)
DEPRECATED RDW RBC AUTO: 13.5 % (ref 12.4–15.4)
EOSINOPHIL # BLD: 0.3 K/UL (ref 0–0.6)
EOSINOPHIL NFR BLD: 6.4 %
GFR SERPLBLD CREATININE-BSD FMLA CKD-EPI: >60 ML/MIN/{1.73_M2}
GLUCOSE SERPL-MCNC: 102 MG/DL (ref 70–99)
HCT VFR BLD AUTO: 26 % (ref 36–48)
HGB BLD-MCNC: 8.9 G/DL (ref 12–16)
LYMPHOCYTES # BLD: 1 K/UL (ref 1–5.1)
LYMPHOCYTES NFR BLD: 19.5 %
MCH RBC QN AUTO: 31.7 PG (ref 26–34)
MCHC RBC AUTO-ENTMCNC: 34.4 G/DL (ref 31–36)
MCV RBC AUTO: 92.2 FL (ref 80–100)
MONOCYTES # BLD: 0.5 K/UL (ref 0–1.3)
MONOCYTES NFR BLD: 9.2 %
NEUTROPHILS # BLD: 3.2 K/UL (ref 1.7–7.7)
NEUTROPHILS NFR BLD: 64.1 %
PLATELET # BLD AUTO: 156 K/UL (ref 135–450)
PMV BLD AUTO: 9 FL (ref 5–10.5)
POTASSIUM SERPL-SCNC: 3.5 MMOL/L (ref 3.5–5.1)
RBC # BLD AUTO: 2.82 M/UL (ref 4–5.2)
SODIUM SERPL-SCNC: 132 MMOL/L (ref 136–145)
WBC # BLD AUTO: 5 K/UL (ref 4–11)

## 2024-03-10 PROCEDURE — 71045 X-RAY EXAM CHEST 1 VIEW: CPT

## 2024-03-10 PROCEDURE — 6370000000 HC RX 637 (ALT 250 FOR IP): Performed by: HOSPITALIST

## 2024-03-10 PROCEDURE — 2580000003 HC RX 258: Performed by: NURSE PRACTITIONER

## 2024-03-10 PROCEDURE — 6370000000 HC RX 637 (ALT 250 FOR IP): Performed by: NURSE PRACTITIONER

## 2024-03-10 PROCEDURE — 6360000002 HC RX W HCPCS: Performed by: NURSE PRACTITIONER

## 2024-03-10 PROCEDURE — 1200000000 HC SEMI PRIVATE

## 2024-03-10 PROCEDURE — 85025 COMPLETE CBC W/AUTO DIFF WBC: CPT

## 2024-03-10 PROCEDURE — 97530 THERAPEUTIC ACTIVITIES: CPT

## 2024-03-10 PROCEDURE — 36415 COLL VENOUS BLD VENIPUNCTURE: CPT

## 2024-03-10 PROCEDURE — 80048 BASIC METABOLIC PNL TOTAL CA: CPT

## 2024-03-10 PROCEDURE — 97535 SELF CARE MNGMENT TRAINING: CPT

## 2024-03-10 RX ADMIN — ENOXAPARIN SODIUM 40 MG: 100 INJECTION SUBCUTANEOUS at 07:15

## 2024-03-10 RX ADMIN — CETIRIZINE HYDROCHLORIDE 5 MG: 10 TABLET, FILM COATED ORAL at 07:15

## 2024-03-10 RX ADMIN — GABAPENTIN 100 MG: 100 CAPSULE ORAL at 07:10

## 2024-03-10 RX ADMIN — PROPRANOLOL HYDROCHLORIDE 160 MG: 80 CAPSULE, EXTENDED RELEASE ORAL at 08:25

## 2024-03-10 RX ADMIN — SODIUM CHLORIDE: 9 INJECTION, SOLUTION INTRAVENOUS at 07:18

## 2024-03-10 RX ADMIN — SODIUM CHLORIDE, PRESERVATIVE FREE 10 ML: 5 INJECTION INTRAVENOUS at 14:02

## 2024-03-10 RX ADMIN — PANTOPRAZOLE SODIUM 40 MG: 40 TABLET, DELAYED RELEASE ORAL at 06:04

## 2024-03-10 RX ADMIN — PRIMIDONE 50 MG: 50 TABLET ORAL at 08:16

## 2024-03-10 RX ADMIN — SENNOSIDES 8.6 MG: 8.6 TABLET, COATED ORAL at 07:10

## 2024-03-10 RX ADMIN — SODIUM CHLORIDE, PRESERVATIVE FREE 10 ML: 5 INJECTION INTRAVENOUS at 07:09

## 2024-03-10 RX ADMIN — ACETAMINOPHEN 650 MG: 325 TABLET ORAL at 08:15

## 2024-03-10 RX ADMIN — CELECOXIB 200 MG: 200 CAPSULE ORAL at 07:15

## 2024-03-10 RX ADMIN — PRIMIDONE 50 MG: 50 TABLET ORAL at 20:46

## 2024-03-10 RX ADMIN — SODIUM CHLORIDE: 9 INJECTION, SOLUTION INTRAVENOUS at 19:54

## 2024-03-10 RX ADMIN — ACETAMINOPHEN 650 MG: 325 TABLET ORAL at 20:46

## 2024-03-10 RX ADMIN — ACETAMINOPHEN 650 MG: 325 TABLET ORAL at 06:04

## 2024-03-10 RX ADMIN — ACETAMINOPHEN 650 MG: 325 TABLET ORAL at 15:51

## 2024-03-10 RX ADMIN — CELECOXIB 200 MG: 200 CAPSULE ORAL at 20:46

## 2024-03-10 RX ADMIN — ONDANSETRON 4 MG: 2 INJECTION INTRAMUSCULAR; INTRAVENOUS at 14:02

## 2024-03-10 RX ADMIN — GABAPENTIN 100 MG: 100 CAPSULE ORAL at 12:26

## 2024-03-10 RX ADMIN — ATORVASTATIN CALCIUM 20 MG: 20 TABLET, FILM COATED ORAL at 20:46

## 2024-03-10 RX ADMIN — GABAPENTIN 300 MG: 300 CAPSULE ORAL at 20:46

## 2024-03-10 RX ADMIN — ASPIRIN 81 MG 81 MG: 81 TABLET ORAL at 07:15

## 2024-03-10 ASSESSMENT — PAIN SCALES - GENERAL
PAINLEVEL_OUTOF10: 2
PAINLEVEL_OUTOF10: 2
PAINLEVEL_OUTOF10: 1
PAINLEVEL_OUTOF10: 2
PAINLEVEL_OUTOF10: 2
PAINLEVEL_OUTOF10: 4
PAINLEVEL_OUTOF10: 3
PAINLEVEL_OUTOF10: 4
PAINLEVEL_OUTOF10: 2
PAINLEVEL_OUTOF10: 4
PAINLEVEL_OUTOF10: 2
PAINLEVEL_OUTOF10: 4
PAINLEVEL_OUTOF10: 2
PAINLEVEL_OUTOF10: 3
PAINLEVEL_OUTOF10: 3

## 2024-03-10 ASSESSMENT — PAIN SCALES - WONG BAKER
WONGBAKER_NUMERICALRESPONSE: 0
WONGBAKER_NUMERICALRESPONSE: NO HURT

## 2024-03-10 ASSESSMENT — PAIN DESCRIPTION - DESCRIPTORS
DESCRIPTORS: ACHING;SORE
DESCRIPTORS: ACHING;DISCOMFORT;DULL;SORE
DESCRIPTORS: ACHING
DESCRIPTORS: ACHING

## 2024-03-10 ASSESSMENT — PAIN DESCRIPTION - LOCATION
LOCATION: HIP

## 2024-03-10 ASSESSMENT — PAIN DESCRIPTION - PAIN TYPE
TYPE: SURGICAL PAIN
TYPE: SURGICAL PAIN

## 2024-03-10 ASSESSMENT — PAIN - FUNCTIONAL ASSESSMENT
PAIN_FUNCTIONAL_ASSESSMENT: ACTIVITIES ARE NOT PREVENTED
PAIN_FUNCTIONAL_ASSESSMENT: ACTIVITIES ARE NOT PREVENTED

## 2024-03-10 ASSESSMENT — PAIN DESCRIPTION - ORIENTATION
ORIENTATION: LEFT

## 2024-03-10 ASSESSMENT — PAIN DESCRIPTION - ONSET: ONSET: GRADUAL

## 2024-03-10 NOTE — PLAN OF CARE
Problem: Safety - Adult  Goal: Free from fall injury  3/9/2024 2210 by Wade Killian RN  Outcome: Progressing     Problem: Discharge Planning  Goal: Discharge to home or other facility with appropriate resources  3/9/2024 2210 by Wade Killian RN  Outcome: Progressing  Flowsheets (Taken 3/9/2024 1641 by Philly Bello, RN)  Discharge to home or other facility with appropriate resources: Identify barriers to discharge with patient and caregiver     Problem: Skin/Tissue Integrity  Goal: Absence of new skin breakdown  Description: 1.  Monitor for areas of redness and/or skin breakdown  2.  Assess vascular access sites hourly  3.  Every 4-6 hours minimum:  Change oxygen saturation probe site  4.  Every 4-6 hours:  If on nasal continuous positive airway pressure, respiratory therapy assess nares and determine need for appliance change or resting period.  3/9/2024 2210 by Wade Killian RN  Outcome: Progressing     Problem: ABCDS Injury Assessment  Goal: Absence of physical injury  3/9/2024 2210 by Wade Killian RN  Outcome: Progressing     Problem: Pain  Goal: Verbalizes/displays adequate comfort level or baseline comfort level  3/9/2024 2210 by Wade Killian RN  Outcome: Progressing

## 2024-03-10 NOTE — PROGRESS NOTES
Latest Reference Range & Units 03/07/24 05:17 03/08/24 05:53 03/09/24 04:41   Sodium 136 - 145 mmol/L 135 (L) 132 (L) 129 (L)   (L): Data is abnormally low    Message sent to provider to review fluids.    Philly JAYN, RN   [phone]

## 2024-03-10 NOTE — PROGRESS NOTES
[facility] - Inpatient Rehabilitation Department   Phone: [phone]    Occupational Therapy    [] Initial Evaluation            [x] Daily Treatment Note         [] Discharge Summary      Patient: Susan Kenney   : 1940   MRN: [id number]   Date of Service:  3/10/2024    Admitting Diagnosis:  Unable to ambulate  Current Admission Summary: 3/ 6 LEFT HIP PERCUTANEOUS PINNING  - fracture caused by a mechanical fall at home   Past Medical History:  has a past medical history of Arthritis, Easy bruising, Hyperlipidemia, Hypertension, and Nausea & vomiting.  Past Surgical History:  has a past surgical history that includes Tonsillectomy; Hysterectomy; joint replacement (); joint replacement (1/10); Bunionectomy; Toe Surgery; laminectomy (12/16/10); and hip surgery (Left, 3/8/2024).    Discharge Recommendations: Susan Kenney scored a 14/24 on the AM-PAC ADL Inpatient form. Current research shows that an AM-PAC score of 17 or less is typically not associated with a discharge to the patient's home setting. Based on the patient's AM-PAC score and their current ADL deficits, it is recommended that the patient have 3-5 sessions per week of Occupational Therapy at d/c to increase the patient's independence.  Please see assessment section for further patient specific details.    If patient discharges prior to next session this note will serve as a discharge summary.  Please see below for the latest assessment towards goals.      DME Required For Discharge: DME to be determined at next level of care    Precautions/Restrictions: high fall risk  Weight Bearing Restrictions: weight bearing as tolerated  [] Right Upper Extremity  [] Left Upper Extremity [] Right Lower Extremity  [] Left Lower Extremity     Required Braces/Orthotics: no braces required   [] Right  [] Left  Positional Restrictions:no positional restrictions    Pre-Admission Information   Lives With: son                        Type of Home:  minimal assistance   Patient will complete functional mobility at minimal assistance     Above goals reviewed on 3/10/2024.  All goals are ongoing at this time unless indicated above.       Therapy Session Time     Individual Group Co-treatment   Time In    1218   Time Out    1257   Minutes    39        Timed Code Treatment Minutes:  39   Total Treatment Minutes:  39       Electronically Signed By: RITA Martines/CAMILO [id number]

## 2024-03-10 NOTE — PROGRESS NOTES
Pt assisted with ADL's Pt assisted up to chair using Stedy x1. Pt voiding using external catheter. Pt declined any needs at this time. Elva alarm on and functioning. Call light in reach.     Pt had portable chest xray while up in chair.    Philly JAYN, RN   [phone]

## 2024-03-10 NOTE — PROGRESS NOTES
[facility] - Inpatient Rehabilitation Department   Phone: [phone]    Physical Therapy    [] Initial Evaluation            [x] Daily Treatment Note         [] Discharge Summary      Patient: Susan Kenney   : 1940   MRN: [id number]   Date of Service:  3/10/2024  Admitting Diagnosis: Unable to ambulate  Current Admission Summary: Susan Kenney is a 83 y.o. female who presents for evaluation after mechanical fall. Patient states that she only walks with a walker and was trying to get around the kitchen without it when she lost her balance and fell down landing on her bottom. She is complaining of some low back pain but states that she has had this for several days. She also reports pain shooting down her right leg that she has also had for several days. She is scheduled to see her PCP for this next week. She denies hitting her head or any loss of consciousness. She is not anticoagulated. She denies numbness tingling or weakness distally. No saddle anesthesia, bladder retention or bowel incontinence. No other injuries or complaints at this time.     *Pt is s/p L hip percutaneous pinning on 3/8/24.  Past Medical History:  has a past medical history of Arthritis, Easy bruising, Hyperlipidemia, Hypertension, and Nausea & vomiting.  Past Surgical History:  has a past surgical history that includes Tonsillectomy; Hysterectomy; joint replacement (); joint replacement (1/10); Bunionectomy; Toe Surgery; laminectomy (12/16/10); and hip surgery (Left, 3/8/2024).  Discharge Recommendations: Susan Kenney scored a  on the AM-PAC short mobility form. Current research shows that an AM-PAC score of 17 or less is typically not associated with a discharge to the patient's home setting. Based on the patient's AM-PAC score and their current functional mobility deficits, it is recommended that the patient have 3-5 sessions per week of Physical Therapy at d/c to increase the patient's independence.  Please  mobility at stand by assistance   Patient will complete transfers at moderate assistance   Patient will ambulate 15 ft with use of rolling walker at 2 person assistance with min A x 2     Above goals reviewed on 3/10/2024.  All goals are ongoing at this time unless indicated above.      Therapy Session Time      Individual Group Co-treatment   Time In     1218   Time Out     1257   Minutes     39     Timed Code Treatment Minutes: 39 Minutes  Total Treatment Minutes: 39 Minutes        Electronically Signed By: Mackenzie Rylee, PT    MacKenzie Rylee PT, DPT [id number]

## 2024-03-10 NOTE — PROGRESS NOTES
Shift assessment completed, A&OX4, vitals stable at this time, all her evening medications administered as per mar, purewick replaced, buster care completed, bowel sounds active, voiding, care plan reviewed with the patient, all safety precautions applied, call light with in reach, bedside table close by her, denied for pain at this time.   Wade Killian RN

## 2024-03-10 NOTE — PLAN OF CARE
Problem: Safety - Adult  Goal: Free from fall injury  3/10/2024 0837 by Philly Bello RN  Outcome: Progressing  3/9/2024 2210 by Wade Killian RN  Outcome: Progressing     Problem: Discharge Planning  Goal: Discharge to home or other facility with appropriate resources  3/10/2024 0837 by Philly Bello RN  Outcome: Progressing  3/9/2024 2210 by Wade Killian RN  Outcome: Progressing     Problem: Skin/Tissue Integrity  Goal: Absence of new skin breakdown  3/10/2024 0837 by Philly Bello RN  Outcome: Progressing  3/9/2024 2210 by Wade Killian RN  Outcome: Progressing     Problem: ABCDS Injury Assessment  Goal: Absence of physical injury  3/10/2024 0837 by Philly Bello RN  Outcome: Progressing  3/9/2024 2210 by Wade Killian RN  Outcome: Progressing     Problem: Pain  Goal: Verbalizes/displays adequate comfort level or baseline comfort level  3/10/2024 0837 by Philly Bello RN  Outcome: Progressing  3/9/2024 2210 by Wade Killian RN  Outcome: Progressing

## 2024-03-10 NOTE — PROGRESS NOTES
V2.0    [facility] [place]      Name:  Susan Kenney /Age/Sex: 1940  (83 y.o. female)   MRN & CSN:  [id number] & [id number] Encounter Date/Time: 3/10/2024 1:34 PM EST   Location:  [facility]4459/4459-01 PCP: Margareth Pelaez DO     Attending:Peter Pagan MD       Hospital Day: 5    Assessment and Recommendations   Susan Kenney is a 83 y.o. female who presents with Unable to ambulate      Plan:   Left femoral neck fracture  Secondary to mechanical fall  -Patient is status post surgery.  PT OT evaluation  Likely will need skilled nursing facility  Medically otherwise stable      Hypertension patient BP meds have been on hold due to blood pressure.  Will resume slowly    Hyponatremia improved    Hypokalemia        Hypertension  Hyperlipidemia        Diet ADULT DIET; Regular   DVT Prophylaxis    Code Status Full Code   Disposition Medically stable for discharge pending placement         Personally reviewed Lab Studies and Imaging         Subjective:     Chief Complaint:     Currently denies any pain.  No management.  Susan Kenney is a 83 y.o. female who presents with       Review of Systems:      Pertinent positives and negatives discussed in HPI    Objective:     Intake/Output Summary (Last 24 hours) at 3/10/2024 1356  Last data filed at 3/10/2024 1219  Gross per 24 hour   Intake 1320 ml   Output 2800 ml   Net -1480 ml      Vitals:   Vitals:    03/10/24 0600 03/10/24 0604 03/10/24 0700 03/10/24 1138   BP:  (!) 102/53 (!) 142/70 126/76   Pulse:  75 80 76   Resp:  16 17 17   Temp:  97.6 °F (36.4 °C) 97.8 °F (36.6 °C) 98.9 °F (37.2 °C)   TempSrc:  Oral Oral Oral   SpO2:  95% 91% 96%   Weight: 81.6 kg (180 lb)      Height:             Physical Exam:      General: NAD  Eyes: EOMI  ENT: neck supple  Cardiovascular: Regular rate.  Respiratory: Clear to auscultation  Gastrointestinal: Soft, non tender  Genitourinary: no suprapubic tenderness  Musculoskeletal: No edema  Skin: warm, dry  Neuro: Alert.  Psych: Mood  results found for: \"TROPONINT\"  Lactic Acid: No results for input(s): \"LACTA\" in the last 72 hours.  BNP: No results for input(s): \"PROBNP\" in the last 72 hours.  UA:  Lab Results   Component Value Date/Time    NITRU NEGATIVE 02/14/2013 01:24 PM    COLORU YELLOW 02/14/2013 01:24 PM    PHUR 6.0 02/14/2013 01:24 PM    CLARITYU CLEAR 02/14/2013 01:24 PM    SPECGRAV 1.010 02/14/2013 01:24 PM    LEUKOCYTESUR NEGATIVE 02/14/2013 01:24 PM    UROBILINOGEN 0.2 02/14/2013 01:24 PM    BILIRUBINUR NEGATIVE 02/14/2013 01:24 PM    BILIRUBINUR NEGATIVE 01/15/2010 05:28 PM    BLOODU NEGATIVE 02/14/2013 01:24 PM    GLUCOSEU NEGATIVE 02/14/2013 01:24 PM    GLUCOSEU NEGATIVE 01/15/2010 05:28 PM    KETUA NEGATIVE 02/14/2013 01:24 PM     Urine Cultures: No results found for: \"LABURIN\"  Blood Cultures: No results found for: \"BC\"  No results found for: \"BLOODCULT2\"  Organism: No results found for: \"ORG\"      Electronically signed by Peter Pagan MD on 3/10/2024 at 1:56 PM

## 2024-03-11 LAB
ANION GAP SERPL CALCULATED.3IONS-SCNC: 7 MMOL/L (ref 3–16)
BUN SERPL-MCNC: 14 MG/DL (ref 7–20)
CALCIUM SERPL-MCNC: 7.8 MG/DL (ref 8.3–10.6)
CHLORIDE SERPL-SCNC: 103 MMOL/L (ref 99–110)
CO2 SERPL-SCNC: 26 MMOL/L (ref 21–32)
CREAT SERPL-MCNC: 0.6 MG/DL (ref 0.6–1.2)
DEPRECATED RDW RBC AUTO: 13.5 % (ref 12.4–15.4)
GFR SERPLBLD CREATININE-BSD FMLA CKD-EPI: >60 ML/MIN/{1.73_M2}
GLUCOSE SERPL-MCNC: 134 MG/DL (ref 70–99)
HCT VFR BLD AUTO: 25.8 % (ref 36–48)
HGB BLD-MCNC: 8.8 G/DL (ref 12–16)
MCH RBC QN AUTO: 31.8 PG (ref 26–34)
MCHC RBC AUTO-ENTMCNC: 34 G/DL (ref 31–36)
MCV RBC AUTO: 93.5 FL (ref 80–100)
PLATELET # BLD AUTO: 170 K/UL (ref 135–450)
PMV BLD AUTO: 8.5 FL (ref 5–10.5)
POTASSIUM SERPL-SCNC: 3.9 MMOL/L (ref 3.5–5.1)
RBC # BLD AUTO: 2.75 M/UL (ref 4–5.2)
SODIUM SERPL-SCNC: 136 MMOL/L (ref 136–145)
WBC # BLD AUTO: 6 K/UL (ref 4–11)

## 2024-03-11 PROCEDURE — 6370000000 HC RX 637 (ALT 250 FOR IP): Performed by: NURSE PRACTITIONER

## 2024-03-11 PROCEDURE — 85027 COMPLETE CBC AUTOMATED: CPT

## 2024-03-11 PROCEDURE — 97530 THERAPEUTIC ACTIVITIES: CPT

## 2024-03-11 PROCEDURE — 2580000003 HC RX 258: Performed by: NURSE PRACTITIONER

## 2024-03-11 PROCEDURE — 1200000000 HC SEMI PRIVATE

## 2024-03-11 PROCEDURE — 6370000000 HC RX 637 (ALT 250 FOR IP): Performed by: HOSPITALIST

## 2024-03-11 PROCEDURE — 97116 GAIT TRAINING THERAPY: CPT

## 2024-03-11 PROCEDURE — 36415 COLL VENOUS BLD VENIPUNCTURE: CPT

## 2024-03-11 PROCEDURE — 97535 SELF CARE MNGMENT TRAINING: CPT

## 2024-03-11 PROCEDURE — APPNB45 APP NON BILLABLE 31-45 MINUTES: Performed by: NURSE PRACTITIONER

## 2024-03-11 PROCEDURE — 80048 BASIC METABOLIC PNL TOTAL CA: CPT

## 2024-03-11 PROCEDURE — 99024 POSTOP FOLLOW-UP VISIT: CPT | Performed by: NURSE PRACTITIONER

## 2024-03-11 PROCEDURE — 6360000002 HC RX W HCPCS: Performed by: NURSE PRACTITIONER

## 2024-03-11 RX ORDER — HYDROCODONE BITARTRATE AND ACETAMINOPHEN 5; 325 MG/1; MG/1
1 TABLET ORAL EVERY 6 HOURS PRN
Qty: 20 TABLET | Refills: 0 | Status: SHIPPED | OUTPATIENT
Start: 2024-03-11 | End: 2024-03-16

## 2024-03-11 RX ORDER — ASPIRIN 81 MG/1
81 TABLET, CHEWABLE ORAL 2 TIMES DAILY
Qty: 60 TABLET | Refills: 0 | Status: SHIPPED | OUTPATIENT
Start: 2024-03-11 | End: 2024-04-10

## 2024-03-11 RX ORDER — CLONIDINE HYDROCHLORIDE 0.1 MG/1
0.2 TABLET ORAL NIGHTLY
Status: DISCONTINUED | OUTPATIENT
Start: 2024-03-11 | End: 2024-03-15 | Stop reason: HOSPADM

## 2024-03-11 RX ADMIN — SODIUM CHLORIDE, PRESERVATIVE FREE 10 ML: 5 INJECTION INTRAVENOUS at 20:36

## 2024-03-11 RX ADMIN — PROPRANOLOL HYDROCHLORIDE 160 MG: 80 CAPSULE, EXTENDED RELEASE ORAL at 11:39

## 2024-03-11 RX ADMIN — GABAPENTIN 100 MG: 100 CAPSULE ORAL at 15:17

## 2024-03-11 RX ADMIN — GABAPENTIN 100 MG: 100 CAPSULE ORAL at 09:35

## 2024-03-11 RX ADMIN — CELECOXIB 200 MG: 200 CAPSULE ORAL at 09:35

## 2024-03-11 RX ADMIN — CLONIDINE HYDROCHLORIDE 0.2 MG: 0.1 TABLET ORAL at 23:50

## 2024-03-11 RX ADMIN — ATORVASTATIN CALCIUM 20 MG: 20 TABLET, FILM COATED ORAL at 20:31

## 2024-03-11 RX ADMIN — PRIMIDONE 50 MG: 50 TABLET ORAL at 20:31

## 2024-03-11 RX ADMIN — ACETAMINOPHEN 650 MG: 325 TABLET ORAL at 15:16

## 2024-03-11 RX ADMIN — ACETAMINOPHEN 650 MG: 325 TABLET ORAL at 09:36

## 2024-03-11 RX ADMIN — ENOXAPARIN SODIUM 40 MG: 100 INJECTION SUBCUTANEOUS at 09:37

## 2024-03-11 RX ADMIN — ASPIRIN 81 MG 81 MG: 81 TABLET ORAL at 09:37

## 2024-03-11 RX ADMIN — CELECOXIB 200 MG: 200 CAPSULE ORAL at 20:30

## 2024-03-11 RX ADMIN — ACETAMINOPHEN 650 MG: 325 TABLET ORAL at 20:31

## 2024-03-11 RX ADMIN — PRIMIDONE 50 MG: 50 TABLET ORAL at 09:36

## 2024-03-11 RX ADMIN — GABAPENTIN 300 MG: 300 CAPSULE ORAL at 20:30

## 2024-03-11 RX ADMIN — PANTOPRAZOLE SODIUM 40 MG: 40 TABLET, DELAYED RELEASE ORAL at 05:33

## 2024-03-11 RX ADMIN — CETIRIZINE HYDROCHLORIDE 5 MG: 10 TABLET, FILM COATED ORAL at 09:36

## 2024-03-11 ASSESSMENT — PAIN DESCRIPTION - PAIN TYPE: TYPE: SURGICAL PAIN

## 2024-03-11 ASSESSMENT — PAIN SCALES - GENERAL: PAINLEVEL_OUTOF10: 4

## 2024-03-11 ASSESSMENT — PAIN DESCRIPTION - FREQUENCY: FREQUENCY: INTERMITTENT

## 2024-03-11 ASSESSMENT — PAIN DESCRIPTION - ORIENTATION
ORIENTATION: LEFT
ORIENTATION: LEFT

## 2024-03-11 ASSESSMENT — PAIN DESCRIPTION - DESCRIPTORS
DESCRIPTORS: ACHING
DESCRIPTORS: ACHING

## 2024-03-11 ASSESSMENT — PAIN - FUNCTIONAL ASSESSMENT: PAIN_FUNCTIONAL_ASSESSMENT: ACTIVITIES ARE NOT PREVENTED

## 2024-03-11 ASSESSMENT — PAIN DESCRIPTION - LOCATION
LOCATION: HIP
LOCATION: HIP

## 2024-03-11 ASSESSMENT — PAIN DESCRIPTION - ONSET: ONSET: ON-GOING

## 2024-03-11 NOTE — PROGRESS NOTES
[facility] - Inpatient Rehabilitation Department   Phone: [phone]    Occupational Therapy    [] Initial Evaluation            [x] Daily Treatment Note         [] Discharge Summary      Patient: Susan Kenney   : 1940   MRN: [id number]   Date of Service:  3/11/2024    Admitting Diagnosis:  Unable to ambulate  Current Admission Summary: Susan Kenney is a 83 y.o. female who presents for evaluation after mechanical fall. Patient states that she only walks with a walker and was trying to get around the kitchen without it when she lost her balance and fell down landing on her bottom. She is complaining of some low back pain but states that she has had this for several days. She also reports pain shooting down her right leg that she has also had for several days. She is scheduled to see her PCP for this next week. She denies hitting her head or any loss of consciousness. She is not anticoagulated. She denies numbness tingling or weakness distally. No saddle anesthesia, bladder retention or bowel incontinence. No other injuries or complaints at this time     3/ 6 LEFT HIP PERCUTANEOUS PINNING  - fracture caused by a mechanical fall at home   Past Medical History:  has a past medical history of Arthritis, Easy bruising, Hyperlipidemia, Hypertension, and Nausea & vomiting.  Past Surgical History:  has a past surgical history that includes Tonsillectomy; Hysterectomy; joint replacement (); joint replacement (1/10); Bunionectomy; Toe Surgery; laminectomy (12/16/10); and hip surgery (Left, 3/8/2024).    Discharge Recommendations: Susan Kenney scored a 15/24 on the AM-PAC ADL Inpatient form. Current research shows that an AM-PAC score of 17 or less is typically not associated with a discharge to the patient's home setting. Based on the patient's AM-PAC score and their current ADL deficits, it is recommended that the patient have 3-5 sessions per week of Occupational Therapy at d/c to increase the

## 2024-03-11 NOTE — PROGRESS NOTES
V2.0    [facility] Progress Note      Name:  Susan Kenney /Age/Sex: 1940  (83 y.o. female)   MRN & CSN:  [id number] & [id number] Encounter Date/Time: 3/11/2024 1:34 PM EST   Location:  [facility]4459/4459-01 PCP: Margareth Pelaez DO     Attending:Peter Pagan MD       Hospital Day: 6    Assessment and Recommendations   Susan Kenney is a 83 y.o. female who presents with Unable to ambulate      Plan:   Left femoral neck fracture  Secondary to mechanical fall  -Patient is status post surgery.  PT OT evaluation  Medically stable.  Awaiting ARU.      Hypertension patient BP meds have been on hold due to blood pressure.  Will resume slowly    Hyponatremia improved    Hypokalemia        Hypertension  Hyperlipidemia        Diet ADULT DIET; Regular   DVT Prophylaxis    Code Status Full Code   Disposition Medically stable for discharge pending placement         Personally reviewed Lab Studies and Imaging         Subjective:     Chief Complaint:     Currently denies any pain.  No management.  Susan Kenney is a 83 y.o. female who presents with       Review of Systems:      Pertinent positives and negatives discussed in HPI    Objective:     Intake/Output Summary (Last 24 hours) at 3/11/2024 1208  Last data filed at 3/11/2024 0932  Gross per 24 hour   Intake 2100 ml   Output 3600 ml   Net -1500 ml      Vitals:   Vitals:    03/10/24 2030 03/10/24 2330 24 0530 24 0932   BP: (!) 118/55 138/74 138/65 125/62   Pulse: 70 70 73 79   Resp: 18 16 16 18   Temp: 98.3 °F (36.8 °C) 97.4 °F (36.3 °C)  98.4 °F (36.9 °C)   TempSrc: Oral Oral  Oral   SpO2: 96% 95% 94% 92%   Weight:       Height:             Physical Exam:      General: NAD  Eyes: EOMI  ENT: neck supple  Cardiovascular: Regular rate.  Respiratory: Clear to auscultation  Gastrointestinal: Soft, non tender  Genitourinary: no suprapubic tenderness  Musculoskeletal: No edema  Skin: warm, dry  Neuro: Alert.  Psych: Mood appropriate.         Medications:   Medications:

## 2024-03-11 NOTE — CARE COORDINATION
03/11/24 1502   IMM Letter   IMM Letter given to Patient/Family/Significant other/Guardian/POA/by: Karmen Patel RN CM - second notice   IMM Letter date given: 03/11/24   IMM Letter time given: 1500  (copy made for hard chart)

## 2024-03-11 NOTE — PROGRESS NOTES
[facility] - Inpatient Rehabilitation Department   Phone: [phone]    Physical Therapy    [] Initial Evaluation            [x] Daily Treatment Note         [] Discharge Summary      Patient: Susan Kenney   : 1940   MRN: [id number]   Date of Service:  3/11/2024  Admitting Diagnosis: Unable to ambulate  Current Admission Summary: Susan Kenney is a 83 y.o. female who presents for evaluation after mechanical fall. Patient states that she only walks with a walker and was trying to get around the kitchen without it when she lost her balance and fell down landing on her bottom. She is complaining of some low back pain but states that she has had this for several days. She also reports pain shooting down her right leg that she has also had for several days. She is scheduled to see her PCP for this next week. She denies hitting her head or any loss of consciousness. She is not anticoagulated. She denies numbness tingling or weakness distally. No saddle anesthesia, bladder retention or bowel incontinence. No other injuries or complaints at this time.     *Pt is s/p L hip percutaneous pinning on 3/8/24.  Past Medical History:  has a past medical history of Arthritis, Easy bruising, Hyperlipidemia, Hypertension, and Nausea & vomiting.  Past Surgical History:  has a past surgical history that includes Tonsillectomy; Hysterectomy; joint replacement (); joint replacement (1/10); Bunionectomy; Toe Surgery; laminectomy (12/16/10); and hip surgery (Left, 3/8/2024).    Discharge Recommendations: Susan Kenney scored a 16/ on the AM-PAC short mobility form. Current research shows that an AM-PAC score of 17 or less is typically not associated with a discharge to the patient's home setting. Based on the patient's AM-PAC score and their current functional mobility deficits, it is recommended that the patient have 3-5 sessions per week of Physical Therapy at d/c to increase the patient's independence.

## 2024-03-11 NOTE — PROGRESS NOTES
[facility] Orthopedic Surgery   Progress Note    CHIEF COMPLAINT/DIAGNOSIS: S/p left hip percutaneous pinning for fracture    SUBJECTIVE: Seen sitting up in bed; describes mild to moderate hip pain.  Had some nausea yesterday but none today.  Denies new issues     OBJECTIVE  Physical    VITALS:  /65   Pulse 73   Temp 97.4 °F (36.3 °C) (Oral)   Resp 16   Ht 1.499 m (4' 11\")   Wt 81.6 kg (180 lb)   SpO2 94%   BMI 36.36 kg/m²     GENERAL: Alert and oriented x3, in no acute distress.   MUSCULOSKELETAL: Able to dorsi and plantarflex the ankle on operative side without issue.   INCISION:  Covered with post-op dressing; clean dry and intact.  ROM: Limited secondary pain and swelling.   Sensory:  Intact to light touch in peroneal and tibial distributions.   Vascular:   2+ DP pulses with brisk cap refill;  calf soft and nontender.    Data    ALL MEDICATIONS HAVE BEEN REVIEWED    CBC:   Recent Labs     03/09/24  0441 03/10/24  0749   WBC 6.4 5.0   HGB 9.3* 8.9*   HCT 26.5* 26.0*    156     BMP:   Recent Labs     03/09/24  0441 03/10/24  0749   * 132*   K 4.4 3.5   CL 96* 98*   CO2 26 25   BUN 16 15   CREATININE 0.7 0.6     INR: No results for input(s): \"INR\" in the last 72 hours.    ASSESSMENT:  S/p left hip percutaneous pinning for fracture (3/8/24), POD#3  Acute blood loss anemia as expected  HTN  HLD    PLAN:   - WB status:  WBAT through operative extremity;   - DVT prophylaxis: Lovenox as inpt, will d/c with ASA 81mg BID x 30 days.  - Ice therapy  - PT/OT  - Pain Control: Rx for dc in chart  Due to orthopaedic surgical procedure/condition, patient may require pain medication for up to 6-8 weeks.  - Expected acute blood loss anemia: H/H today: 8.9/26   - ID:  Ancef x 2 doses post-op completed.   - Disposition: per primary team; ok to d/c from our end once medically stable and dispo needs met.     Follow-up in two weeks with Dr. Rogers.  Office # [phone]    GLORIA Rodrigez - CNP  3/11/2024   9:27 AM

## 2024-03-11 NOTE — CONSULTS
Susan Kenney  3/11/2024  [id number]    Chief Complaint: Unable to ambulate    Subjective   HPI: Susan Kenney is a 83 y.o. female with PMHx notable for HTN, HLD, lumbar stenosis s/p spinal cord stimulator who presented on 3/6/24 with mechanical fall resulting in L hip fracture. Orthopedics was consulted and she underwent percutaneous pinning on 3/8. She is now WBAT LLE. Hospital course complicated by: acute blood loss anemia, hypotension, hypokalemia, pain control.     Currently patient reports that she is doing. Denies any pain at rest, but is experiencing pain with movement and weightbearing through the left hip. Denies any fevers, chills, chest pain, shortness of breath, abdominal pain. Tolerating her diet.     Past Medical History:   Diagnosis Date    Arthritis     Easy bruising     Hyperlipidemia     Hypertension     Nausea & vomiting        Past Surgical History:   Procedure Laterality Date    BUNIONECTOMY      bilateral    HIP SURGERY Left 3/8/2024    LEFT HIP PERCUTANEOUS PINNING - SYNTHES performed by Olman Rogers MD at [facility] OR    HYSTERECTOMY (CERVIX STATUS UNKNOWN)      JOINT REPLACEMENT  7/09    right TKR    JOINT REPLACEMENT  1/10    left TKR    LAMINECTOMY  12/16/10    placement of lead & spinal cord stimulator generator    TOE SURGERY      left foot    TONSILLECTOMY         Social History     Tobacco Use    Smoking status: Passive Smoke Exposure - Never Smoker    Smokeless tobacco: Never   Vaping Use    Vaping Use: Never used   Substance Use Topics    Alcohol use: Yes     Alcohol/week: 0.0 standard drinks of alcohol     Comment: with meals 1-2 GLASSES OF WINE PER WEEK    Drug use: No       Prior Level of Function:   Mod-I for mobility and ADLs.   Friends assist with household chores. Manages her own medications.   Lives with her son in a 2 level house with 3 ESCOBAR, and chair lift to bed/bath.          Objective   Objective:  Patient Vitals for the past 24 hrs:   BP Temp Temp src Pulse Resp SpO2

## 2024-03-12 PROBLEM — E87.6 HYPOKALEMIA: Status: RESOLVED | Noted: 2024-03-12 | Resolved: 2024-03-12

## 2024-03-12 PROBLEM — E87.6 HYPOKALEMIA: Status: ACTIVE | Noted: 2024-03-12

## 2024-03-12 PROBLEM — E87.1 HYPONATREMIA: Status: ACTIVE | Noted: 2024-03-12

## 2024-03-12 PROBLEM — D62 ACUTE BLOOD LOSS AS CAUSE OF POSTOPERATIVE ANEMIA: Status: ACTIVE | Noted: 2024-03-12

## 2024-03-12 PROBLEM — E87.1 HYPONATREMIA: Status: RESOLVED | Noted: 2024-03-12 | Resolved: 2024-03-12

## 2024-03-12 PROCEDURE — 6370000000 HC RX 637 (ALT 250 FOR IP): Performed by: HOSPITALIST

## 2024-03-12 PROCEDURE — 97110 THERAPEUTIC EXERCISES: CPT

## 2024-03-12 PROCEDURE — 6370000000 HC RX 637 (ALT 250 FOR IP): Performed by: NURSE PRACTITIONER

## 2024-03-12 PROCEDURE — 1200000000 HC SEMI PRIVATE

## 2024-03-12 PROCEDURE — 6370000000 HC RX 637 (ALT 250 FOR IP): Performed by: INTERNAL MEDICINE

## 2024-03-12 PROCEDURE — 6360000002 HC RX W HCPCS: Performed by: NURSE PRACTITIONER

## 2024-03-12 PROCEDURE — 97530 THERAPEUTIC ACTIVITIES: CPT

## 2024-03-12 PROCEDURE — 97116 GAIT TRAINING THERAPY: CPT

## 2024-03-12 PROCEDURE — 97535 SELF CARE MNGMENT TRAINING: CPT

## 2024-03-12 PROCEDURE — 2580000003 HC RX 258: Performed by: NURSE PRACTITIONER

## 2024-03-12 RX ORDER — POLYETHYLENE GLYCOL 3350 17 G/17G
17 POWDER, FOR SOLUTION ORAL DAILY PRN
Status: DISCONTINUED | OUTPATIENT
Start: 2024-03-12 | End: 2024-03-15 | Stop reason: HOSPADM

## 2024-03-12 RX ADMIN — GABAPENTIN 100 MG: 100 CAPSULE ORAL at 08:23

## 2024-03-12 RX ADMIN — ACETAMINOPHEN 650 MG: 325 TABLET ORAL at 15:39

## 2024-03-12 RX ADMIN — GABAPENTIN 100 MG: 100 CAPSULE ORAL at 13:45

## 2024-03-12 RX ADMIN — CETIRIZINE HYDROCHLORIDE 5 MG: 10 TABLET, FILM COATED ORAL at 08:22

## 2024-03-12 RX ADMIN — POLYETHYLENE GLYCOL 3350 17 G: 17 POWDER, FOR SOLUTION ORAL at 18:55

## 2024-03-12 RX ADMIN — ATORVASTATIN CALCIUM 20 MG: 20 TABLET, FILM COATED ORAL at 20:23

## 2024-03-12 RX ADMIN — PROPRANOLOL HYDROCHLORIDE 160 MG: 80 CAPSULE, EXTENDED RELEASE ORAL at 08:58

## 2024-03-12 RX ADMIN — PANTOPRAZOLE SODIUM 40 MG: 40 TABLET, DELAYED RELEASE ORAL at 05:38

## 2024-03-12 RX ADMIN — ACETAMINOPHEN 650 MG: 325 TABLET ORAL at 08:22

## 2024-03-12 RX ADMIN — ACETAMINOPHEN 650 MG: 325 TABLET ORAL at 20:22

## 2024-03-12 RX ADMIN — PRIMIDONE 50 MG: 50 TABLET ORAL at 08:23

## 2024-03-12 RX ADMIN — SODIUM CHLORIDE, PRESERVATIVE FREE 10 ML: 5 INJECTION INTRAVENOUS at 20:27

## 2024-03-12 RX ADMIN — GABAPENTIN 300 MG: 300 CAPSULE ORAL at 20:23

## 2024-03-12 RX ADMIN — SENNOSIDES 8.6 MG: 8.6 TABLET, COATED ORAL at 15:39

## 2024-03-12 RX ADMIN — ASPIRIN 81 MG 81 MG: 81 TABLET ORAL at 08:23

## 2024-03-12 RX ADMIN — PRIMIDONE 50 MG: 50 TABLET ORAL at 20:23

## 2024-03-12 RX ADMIN — CLONIDINE HYDROCHLORIDE 0.2 MG: 0.1 TABLET ORAL at 20:22

## 2024-03-12 RX ADMIN — CELECOXIB 200 MG: 200 CAPSULE ORAL at 20:23

## 2024-03-12 RX ADMIN — SODIUM CHLORIDE, PRESERVATIVE FREE 10 ML: 5 INJECTION INTRAVENOUS at 08:24

## 2024-03-12 RX ADMIN — ENOXAPARIN SODIUM 40 MG: 100 INJECTION SUBCUTANEOUS at 08:23

## 2024-03-12 RX ADMIN — CELECOXIB 200 MG: 200 CAPSULE ORAL at 08:24

## 2024-03-12 ASSESSMENT — PAIN SCALES - WONG BAKER
WONGBAKER_NUMERICALRESPONSE: 0
WONGBAKER_NUMERICALRESPONSE: NO HURT

## 2024-03-12 ASSESSMENT — PAIN SCALES - GENERAL
PAINLEVEL_OUTOF10: 3
PAINLEVEL_OUTOF10: 2
PAINLEVEL_OUTOF10: 0

## 2024-03-12 ASSESSMENT — PAIN DESCRIPTION - LOCATION
LOCATION: LEG;HIP
LOCATION: HIP

## 2024-03-12 ASSESSMENT — PAIN DESCRIPTION - ORIENTATION
ORIENTATION: LEFT
ORIENTATION: LEFT

## 2024-03-12 ASSESSMENT — PAIN DESCRIPTION - DESCRIPTORS: DESCRIPTORS: ACHING

## 2024-03-12 NOTE — PROGRESS NOTES
[facility] - Inpatient Rehabilitation Department   Phone: [phone]    Occupational Therapy    [] Initial Evaluation            [x] Daily Treatment Note         [] Discharge Summary      Patient: Susan Kenney   : 1940   MRN: [id number]   Date of Service:  3/12/2024    Admitting Diagnosis:  Closed fracture of neck of left femur (HCC)  Current Admission Summary: Susan Kenney is a 83 y.o. female who presents for evaluation after mechanical fall. Patient states that she only walks with a walker and was trying to get around the kitchen without it when she lost her balance and fell down landing on her bottom. She is complaining of some low back pain but states that she has had this for several days. She also reports pain shooting down her right leg that she has also had for several days. She is scheduled to see her PCP for this next week. She denies hitting her head or any loss of consciousness. She is not anticoagulated. She denies numbness tingling or weakness distally. No saddle anesthesia, bladder retention or bowel incontinence. No other injuries or complaints at this time     3/ 6 LEFT HIP PERCUTANEOUS PINNING  - fracture caused by a mechanical fall at home   Past Medical History:  has a past medical history of Arthritis, Easy bruising, Hyperlipidemia, Hypertension, and Nausea & vomiting.  Past Surgical History:  has a past surgical history that includes Tonsillectomy; Hysterectomy; joint replacement (); joint replacement (1/10); Bunionectomy; Toe Surgery; laminectomy (12/16/10); and hip surgery (Left, 3/8/2024).    Discharge Recommendations: Susan Kenney scored a 15/24 on the AM-PAC ADL Inpatient form. Current research shows that an AM-PAC score of 17 or less is typically not associated with a discharge to the patient's home setting. Based on the patient's AM-PAC score and their current ADL deficits, it is recommended that the patient have 3-5 sessions per week of Occupational Therapy  patient left in chair, chair alarm in place, call light within reach, gait belt, patient at risk for falls, telesitter in use, and nurse notified    Plan  Frequency: 7 x/week  Current Treatment Recommendations: strengthening, balance training, functional mobility training, transfer training, endurance training, patient/caregiver education, ADL/self-care training, home exercise program, and safety education    Goals  Patient Goals: to be able to walk    Short Term Goals:  Time Frame: discharge   Patient will complete upper body ADL at supervision   Patient will complete lower body ADL at maximum assistance   Patient will complete toileting at maximum assistance   Patient will complete grooming at set up assistance   Patient will complete functional transfers at minimal assistance - goal met 3/11   Patient will complete functional mobility at minimal assistance - goal met 3/11    New goals:  Patient will complete transfers at SBA  Patient will complete mobility at SBA and LRAD     Above goals reviewed on 3/12/2024.  All goals are ongoing at this time unless indicated above.       Therapy Session Time     Individual Group Co-treatment   Time In 1030     Time Out 1130     Minutes 60          Timed Code Treatment Minutes:  Timed Code Treatment Minutes: 60 Minutes  Total Treatment Minutes:  60 minutes        Electronically Signed By: SLICK Justin MOT OTR/L [id number]

## 2024-03-12 NOTE — PROGRESS NOTES
Shift assessment completed, [id number] for bathroom use, purewick in place, medications administered as per mar, BP elevated, MD notified, clonidine given to manage BP, o2 @ 1.5L, all safety precautions applied, care plan reviewed with the patient.   Wade Killian RN

## 2024-03-12 NOTE — PROGRESS NOTES
Susan Kenney  3/12/2024  [id number]    Chief Complaint: Closed fracture of neck of left femur (HCC)    Subjective   Patient reports that she is doing well. She denies any headaches, chest pain, shortness of breath. Feeling better now that spinal cord stimulator is functioning well. Hip pain is tolerable. She is interested in acute inpatient rehab.          Objective   Objective:  Patient Vitals for the past 24 hrs:   BP Temp Temp src Pulse Resp SpO2 Weight   03/12/24 1845 (!) 145/69 -- -- 74 -- -- --   03/12/24 1545 128/62 98 °F (36.7 °C) Oral 78 18 91 % --   03/12/24 1231 -- -- -- -- -- 92 % --   03/12/24 1203 (!) 128/57 97.6 °F (36.4 °C) Oral 72 18 93 % --   03/12/24 0821 (!) 127/54 98.3 °F (36.8 °C) Oral 77 18 95 % --   03/12/24 0600 -- -- -- -- -- -- 83 kg (183 lb)   03/12/24 0500 (!) 146/64 98 °F (36.7 °C) Oral 78 18 95 % --   03/12/24 0015 (!) 148/74 98.3 °F (36.8 °C) Oral 69 18 94 % --   03/11/24 2350 (!) 170/77 -- -- -- -- -- --   03/11/24 2215 (!) 165/74 -- -- 73 -- -- --   03/11/24 2000 (!) 165/72 98.2 °F (36.8 °C) Oral 78 18 95 % --     Gen: No distress, pleasant. Ambulating from bathroom with OT.   HEENT: Normocephalic, atraumatic.   CV: No audible murmurs, well perfused extremities.  Resp: No respiratory distress. No increased WOB.  Abd: Soft, nontender nondistended.  Ext: No edema.   Neuro: Alert, oriented, appropriately interactive.     Laboratory data: Available via EMR.     Therapy progress:    PT    Rolling: Level of difficulty - A Little   Sit to Stand from a Chair: Level of difficulty - A Little  Supine to Sit: Level of difficulty - A Little     Bed to Chair: Physical Assistance Required - A Little  Ambulate Across Room: Physical Assistance Required - A Little  Ascend 3-5 Steps With HR: Physical Assistance Required - A Little    OT    Eating: Physical Assistance Required - None  Grooming: Physical Assistance Required - A Little  LB Dressing: Physical Assistance Required - A Lot  UB Dressing:  Physical Assistance Required - A Little  Bathing: Physical Assistance Required - A Lot  Toileting: Physical Assistance Required - Total    SLP         Body mass index is 36.96 kg/m².       Assessment:  Patient Active Problem List   Diagnosis    Spinal stenosis    Disc disorder    HTN (hypertension)    OA (osteoarthritis)    Hypercholesteremia    Osteoarthritis of left shoulder    Postoperative anemia    Decubitus ulcer of buttock, stage 1    Benign essential tremor    TMJ capsulitis    Closed fracture of neck of left femur (HCC)    Acute blood loss as cause of postoperative anemia       Plan:   Patient remains an appropriate candidate for acute inpatient rehab, pending insurance authorization.     Cuong Wren MD 3/12/2024, 6:46 PM    * This document was created using dictation software.  While all precautions were taken to ensure accuracy, errors may have occurred.  Please disregard any typographical errors.

## 2024-03-12 NOTE — PROGRESS NOTES
Hospitalist Progress Note      PCP: Margareth Pelaez DO    Date of Admission: 3/6/2024    LOS: 6    Chief Complaint:   Chief Complaint   Patient presents with    Fall     Pt arrives from home by [facility]. Pt had a fall in her hitchen. Pt states she was walking in her kitchen and lost her balance. Pt states she uses a walker and didn't used her walker when in the kitchen. Pt rates her pain a 5 out of 10. Pt C/O buttock and bilateral leg pain       Case Summary:   83-year-old lady with history of hypertension, hyperlipidemia, lumbar stenosis status post spinal stimulator, benign essential tremors who presented following a fall sustaining left hip fracture status post percutaneous pinning 3/8/2024 complicated by acute blood loss anemia      Active Hospital Problems    Diagnosis Date Noted    Hyponatremia [E87.1] 03/12/2024    Hypokalemia [E87.6] 03/12/2024    Closed fracture of neck of left femur (HCC) [S72.002A] 03/07/2024    Benign essential tremor [G25.0] 05/04/2015    Hypercholesteremia [E78.00] 12/08/2010    HTN (hypertension) [I10] 12/08/2010         Principal Problem:    Closed fracture of neck of left femur (HCC): Due to a fall status post percutaneous pinning 3/8/2024.  - PT/OT and weightbearing as per orthopedics  - Outpatient follow-up with orthopedics  - Awaiting placement to rehab  - Continue pain management      Acute blood loss as cause of postoperative anemia: Hemoglobin drop postoperatively.  Does not require transfusion.  - Will monitor    Active Problems:    HTN (hypertension): Stable on Propranolol, clonidine    Hypercholesteremia: On statin    Benign essential tremor: Stable on propranolol      Resolved Problems:    Unable to ambulate    Hyponatremia    Hypokalemia        Medications:  Reviewed  Infusion Medications    sodium chloride Stopped (03/12/24 0643)    sodium chloride       Scheduled Medications    cloNIDine  0.2 mg Oral Nightly    atorvastatin  20 mg Oral Nightly    aspirin  81 mg Oral  bilaterally       Intake/Output Summary (Last 24 hours) at 3/12/2024 1129  Last data filed at 3/11/2024 2000  Gross per 24 hour   Intake 430 ml   Output 1400 ml   Net -970 ml       Labs:   Recent Labs     03/10/24  0749 03/11/24  1629   WBC 5.0 6.0   HGB 8.9* 8.8*   HCT 26.0* 25.8*    170      Recent Labs     03/10/24  0749 03/11/24  1629   * 136   K 3.5 3.9   CL 98* 103   CO2 25 26   BUN 15 14   CREATININE 0.6 0.6   CALCIUM 7.9* 7.8*     No results for input(s): \"CKTOTAL\", \"TROPONINI\" in the last 72 hours.    Urinalysis:    Lab Results   Component Value Date/Time    NITRU NEGATIVE 02/14/2013 01:24 PM    BLOODU NEGATIVE 02/14/2013 01:24 PM    SPECGRAV 1.010 02/14/2013 01:24 PM    GLUCOSEU NEGATIVE 02/14/2013 01:24 PM    GLUCOSEU NEGATIVE 01/15/2010 05:28 PM       Radiology:  XR CHEST PORTABLE   Final Result   No acute cardiopulmonary process.         XR HIP LEFT (1 VIEW)   Final Result      CT HIP LEFT WO CONTRAST   Final Result   1. Mildly impacted left femoral neck fracture.         XR HIP BILATERAL W AP PELVIS (2 VIEWS)   Final Result   Limited exam due to the internal rotation of the hips and diffuse osteopenia   with a questionable slightly impacted transverse fracture along the base of   the left femoral neck which is difficult to further characterize.  Recommend   either obtaining follow-up AP and frogleg views of the left hip or CT   evaluation of the hip.      Moderate osteoarthritic changes in both hips and diffuse osteopenia         CT LUMBAR SPINE WO CONTRAST   Final Result   No acute fracture                 Alex Green MD      Please excuse brevity and/or typos. This report was transcribed using voice recognition software. Every effort was made to ensure accuracy, however, inadvertent computerized transcription errors may be present.

## 2024-03-12 NOTE — PROGRESS NOTES
[facility] - Inpatient Rehabilitation Department   Phone: [phone]    Physical Therapy    [] Initial Evaluation            [x] Daily Treatment Note         [] Discharge Summary      Patient: Susan Kenney   : 1940   MRN: [id number]   Date of Service:  3/12/2024  Admitting Diagnosis: Closed fracture of neck of left femur (HCC)  Current Admission Summary: Susan Kenney is a 83 y.o. female who presents for evaluation after mechanical fall. Patient states that she only walks with a walker and was trying to get around the kitchen without it when she lost her balance and fell down landing on her bottom. She is complaining of some low back pain but states that she has had this for several days. She also reports pain shooting down her right leg that she has also had for several days. She is scheduled to see her PCP for this next week. She denies hitting her head or any loss of consciousness. She is not anticoagulated. She denies numbness tingling or weakness distally. No saddle anesthesia, bladder retention or bowel incontinence. No other injuries or complaints at this time.     *Pt is s/p L hip percutaneous pinning on 3/8/24.  Past Medical History:  has a past medical history of Arthritis, Easy bruising, Hyperlipidemia, Hypertension, and Nausea & vomiting.  Past Surgical History:  has a past surgical history that includes Tonsillectomy; Hysterectomy; joint replacement (); joint replacement (1/10); Bunionectomy; Toe Surgery; laminectomy (12/16/10); and hip surgery (Left, 3/8/2024).    Discharge Recommendations: Susan Kenney scored a 17/24 on the AM-PAC short mobility form. Current research shows that an AM-PAC score of 17 or less is typically not associated with a discharge to the patient's home setting. Based on the patient's AM-PAC score and their current functional mobility deficits, it is recommended that the patient have 3-5 sessions per week of Physical Therapy at d/c to increase the

## 2024-03-12 NOTE — PLAN OF CARE
Problem: Safety - Adult  Goal: Free from fall injury  3/12/2024 0801 by Barbara Pearson RN  Outcome: Progressing  3/12/2024 0643 by Wade Killian RN  Outcome: Progressing     Problem: Discharge Planning  Goal: Discharge to home or other facility with appropriate resources  3/12/2024 0801 by Barbara Pearson RN  Outcome: Progressing  3/12/2024 0643 by Wade Killian RN  Outcome: Progressing     Problem: Skin/Tissue Integrity  Goal: Absence of new skin breakdown  Description: 1.  Monitor for areas of redness and/or skin breakdown  2.  Assess vascular access sites hourly  3.  Every 4-6 hours minimum:  Change oxygen saturation probe site  4.  Every 4-6 hours:  If on nasal continuous positive airway pressure, respiratory therapy assess nares and determine need for appliance change or resting period.  3/12/2024 0801 by Barbara Pearson RN  Outcome: Progressing  3/12/2024 0643 by Wade Killian RN  Outcome: Progressing     Problem: ABCDS Injury Assessment  Goal: Absence of physical injury  3/12/2024 0801 by Barbara Pearson RN  Outcome: Progressing  3/12/2024 0643 by Wade Killian RN  Outcome: Progressing     Problem: Pain  Goal: Verbalizes/displays adequate comfort level or baseline comfort level  3/12/2024 0801 by Barbara Pearson RN  Outcome: Progressing  3/12/2024 0643 by Wade Killian RN  Outcome: Progressing

## 2024-03-12 NOTE — CARE COORDINATION
Discharge Planning Note:    Update:    Met with the patient. The patient stated she has made her decision to use ARU for rehabilitation services:    - ARU - ACCEPTED    - Pre-Cert - STARTED on 03/12/2024    Will continue to follow.    PEDRO Echols RN    [facility]  Phone: [phone]

## 2024-03-13 PROCEDURE — 6370000000 HC RX 637 (ALT 250 FOR IP): Performed by: INTERNAL MEDICINE

## 2024-03-13 PROCEDURE — 1200000000 HC SEMI PRIVATE

## 2024-03-13 PROCEDURE — 6370000000 HC RX 637 (ALT 250 FOR IP): Performed by: NURSE PRACTITIONER

## 2024-03-13 PROCEDURE — 97116 GAIT TRAINING THERAPY: CPT

## 2024-03-13 PROCEDURE — 97530 THERAPEUTIC ACTIVITIES: CPT

## 2024-03-13 PROCEDURE — 97535 SELF CARE MNGMENT TRAINING: CPT

## 2024-03-13 PROCEDURE — 6360000002 HC RX W HCPCS: Performed by: NURSE PRACTITIONER

## 2024-03-13 PROCEDURE — 6370000000 HC RX 637 (ALT 250 FOR IP): Performed by: HOSPITALIST

## 2024-03-13 PROCEDURE — 2580000003 HC RX 258: Performed by: NURSE PRACTITIONER

## 2024-03-13 RX ORDER — MAG HYDROX/ALUMINUM HYD/SIMETH 400-400-40
1 SUSPENSION, ORAL (FINAL DOSE FORM) ORAL ONCE
Status: COMPLETED | OUTPATIENT
Start: 2024-03-13 | End: 2024-03-13

## 2024-03-13 RX ADMIN — POLYETHYLENE GLYCOL 3350 17 G: 17 POWDER, FOR SOLUTION ORAL at 20:48

## 2024-03-13 RX ADMIN — CELECOXIB 200 MG: 200 CAPSULE ORAL at 20:48

## 2024-03-13 RX ADMIN — SODIUM CHLORIDE, PRESERVATIVE FREE 10 ML: 5 INJECTION INTRAVENOUS at 09:25

## 2024-03-13 RX ADMIN — ASPIRIN 81 MG 81 MG: 81 TABLET ORAL at 09:25

## 2024-03-13 RX ADMIN — ACETAMINOPHEN 650 MG: 325 TABLET ORAL at 09:24

## 2024-03-13 RX ADMIN — CELECOXIB 200 MG: 200 CAPSULE ORAL at 09:25

## 2024-03-13 RX ADMIN — SODIUM CHLORIDE, PRESERVATIVE FREE 10 ML: 5 INJECTION INTRAVENOUS at 20:49

## 2024-03-13 RX ADMIN — GLYCERIN 2 G: 2 SUPPOSITORY RECTAL at 14:50

## 2024-03-13 RX ADMIN — CLONIDINE HYDROCHLORIDE 0.2 MG: 0.1 TABLET ORAL at 20:47

## 2024-03-13 RX ADMIN — GABAPENTIN 100 MG: 100 CAPSULE ORAL at 14:50

## 2024-03-13 RX ADMIN — PANTOPRAZOLE SODIUM 40 MG: 40 TABLET, DELAYED RELEASE ORAL at 05:00

## 2024-03-13 RX ADMIN — CETIRIZINE HYDROCHLORIDE 5 MG: 10 TABLET, FILM COATED ORAL at 09:24

## 2024-03-13 RX ADMIN — ENOXAPARIN SODIUM 40 MG: 100 INJECTION SUBCUTANEOUS at 09:24

## 2024-03-13 RX ADMIN — ACETAMINOPHEN 650 MG: 325 TABLET ORAL at 14:49

## 2024-03-13 RX ADMIN — GABAPENTIN 300 MG: 300 CAPSULE ORAL at 20:48

## 2024-03-13 RX ADMIN — ATORVASTATIN CALCIUM 20 MG: 20 TABLET, FILM COATED ORAL at 20:48

## 2024-03-13 RX ADMIN — PRIMIDONE 50 MG: 50 TABLET ORAL at 20:48

## 2024-03-13 RX ADMIN — PRIMIDONE 50 MG: 50 TABLET ORAL at 09:25

## 2024-03-13 RX ADMIN — PROPRANOLOL HYDROCHLORIDE 160 MG: 80 CAPSULE, EXTENDED RELEASE ORAL at 09:25

## 2024-03-13 RX ADMIN — GABAPENTIN 100 MG: 100 CAPSULE ORAL at 09:24

## 2024-03-13 RX ADMIN — ACETAMINOPHEN 650 MG: 325 TABLET ORAL at 20:48

## 2024-03-13 ASSESSMENT — PAIN DESCRIPTION - LOCATION
LOCATION: NECK
LOCATION: HIP

## 2024-03-13 ASSESSMENT — PAIN DESCRIPTION - ORIENTATION: ORIENTATION: LEFT

## 2024-03-13 ASSESSMENT — PAIN SCALES - GENERAL
PAINLEVEL_OUTOF10: 2
PAINLEVEL_OUTOF10: 4

## 2024-03-13 ASSESSMENT — PAIN DESCRIPTION - DESCRIPTORS: DESCRIPTORS: ACHING

## 2024-03-13 NOTE — PROGRESS NOTES
[facility] - Inpatient Rehabilitation Department   Phone: [phone]    Physical Therapy    [] Initial Evaluation            [x] Daily Treatment Note         [] Discharge Summary      Patient: Susan Kenney   : 1940   MRN: [id number]   Date of Service:  3/13/2024  Admitting Diagnosis: Closed fracture of neck of left femur (HCC)  Current Admission Summary: Susan Kenney is a 83 y.o. female who presents for evaluation after mechanical fall. Patient states that she only walks with a walker and was trying to get around the kitchen without it when she lost her balance and fell down landing on her bottom. She is complaining of some low back pain but states that she has had this for several days. She also reports pain shooting down her right leg that she has also had for several days. She is scheduled to see her PCP for this next week. She denies hitting her head or any loss of consciousness. She is not anticoagulated. She denies numbness tingling or weakness distally. No saddle anesthesia, bladder retention or bowel incontinence. No other injuries or complaints at this time.     *Pt is s/p L hip percutaneous pinning on 3/8/24.  Past Medical History:  has a past medical history of Arthritis, Easy bruising, Hyperlipidemia, Hypertension, and Nausea & vomiting.  Past Surgical History:  has a past surgical history that includes Tonsillectomy; Hysterectomy; joint replacement (); joint replacement (1/10); Bunionectomy; Toe Surgery; laminectomy (12/16/10); and hip surgery (Left, 3/8/2024).    Discharge Recommendations: Susan Kenney scored a 17/ on the AM-PAC short mobility form. Current research shows that an AM-PAC score of 17 or less is typically not associated with a discharge to the patient's home setting. Based on the patient's AM-PAC score and their current functional mobility deficits, it is recommended that the patient have 3-5 sessions per week of Physical Therapy at d/c to increase the  date.  Comments:   Transfers:  Sit to stand transfer: contact guard assistance  Stand to sit transfer: contact guard assistance  Comments:   Ambulation:  Surface:level surface  Assistive Device: rolling walker  Assistance: contact guard assistance  Distance: 34'  Gait Mechanics: forward flexion, increased weight through B UE, significant decreased trung, limited B step height and length  Comments: VC/TC for upright posture  Stair Mobility:  Stair mobility not completed on this date.  Comments:  Wheelchair Mobility:  No w/c mobility completed on this date.  Comments:  Balance:  Static Sitting Balance: good: independent with functional balance in unsupported position  Dynamic Sitting Balance: fair (+): maintains balance at SBA/supervision without use of UE support  Static Standing Balance: fair: maintains balance at CGA without use of UE support  Dynamic Standing Balance: fair (-): maintains balance at CGA with use of UE support  Comments:    Other Therapeutic Interventions  Seated x10 B: ankle pumps, LAQ.   Pt performed self buster-care CGA with assistance from PTA to hank/brenton pendleton.   Educated pt to change position for edema control and sore prevention.    Functional Outcomes  AM-PAC Inpatient Mobility Raw Score : 17              Cognition  WFL  Orientation:    alert and oriented x 4  Command Following:   WFL    Education  Barriers To Learning: hearing  Patient Education: patient educated on goals, PT role and benefits, plan of care, weight-bearing education, general safety, functional mobility training, proper use of assistive device/equipment, disease specific education, transfer training  Learning Assessment:  patient verbalizes understanding, would benefit from continued reinforcement    Assessment  Activity Tolerance: decreased endurance  Impairments Requiring Therapeutic Intervention: decreased functional mobility, decreased strength, decreased endurance, decreased balance, increased pain  Prognosis:

## 2024-03-13 NOTE — PROGRESS NOTES
Hospitalist Progress Note      PCP: Margareth Pelaez DO    Date of Admission: 3/6/2024    LOS: 7    Chief Complaint:   Chief Complaint   Patient presents with    Fall     Pt arrives from home by [facility]. Pt had a fall in her hitchen. Pt states she was walking in her kitchen and lost her balance. Pt states she uses a walker and didn't used her walker when in the kitchen. Pt rates her pain a 5 out of 10. Pt C/O buttock and bilateral leg pain       Case Summary:   83-year-old lady with history of hypertension, hyperlipidemia, lumbar stenosis status post spinal stimulator, benign essential tremors who presented following a fall sustaining left hip fracture status post percutaneous pinning 3/8/2024 complicated by acute blood loss anemia      Active Hospital Problems    Diagnosis Date Noted    Acute blood loss as cause of postoperative anemia [D62] 03/12/2024    Closed fracture of neck of left femur (HCC) [S72.002A] 03/07/2024    Benign essential tremor [G25.0] 05/04/2015    Hypercholesteremia [E78.00] 12/08/2010    HTN (hypertension) [I10] 12/08/2010         Principal Problem:    Closed fracture of neck of left femur (HCC): Due to a fall status post percutaneous pinning 3/8/2024.  Remained stable.  Awaiting placement to rehab.  - Continue pain management  - Weightbearing, PT OT as per orthopedics  - Outpatient follow-up with orthopedics at discharge      Acute blood loss as cause of postoperative anemia: Hemoglobin drop postoperatively.  Does not require transfusion.  Will monitor      Constipation: Continue bowel regimen and will give glycerin suppository today    Active Problems:    HTN (hypertension): Stable on Propranolol, clonidine    Hypercholesteremia: On statin    Benign essential tremor: Stable on propranolol      Resolved Problems:    Unable to ambulate    Hyponatremia    Hypokalemia        Medications:  Reviewed  Infusion Medications    sodium chloride Stopped (03/12/24 0643)    sodium chloride

## 2024-03-13 NOTE — PLAN OF CARE
Problem: Safety - Adult  Goal: Free from fall injury  Outcome: Progressing     Problem: Discharge Planning  Goal: Discharge to home or other facility with appropriate resources  Outcome: Progressing  Flowsheets (Taken 3/12/2024 2015)  Discharge to home or other facility with appropriate resources: Refer to discharge planning if patient needs post-hospital services based on physician order or complex needs related to functional status, cognitive ability or social support system     Problem: Skin/Tissue Integrity  Goal: Absence of new skin breakdown  Description: 1.  Monitor for areas of redness and/or skin breakdown  2.  Assess vascular access sites hourly  3.  Every 4-6 hours minimum:  Change oxygen saturation probe site  4.  Every 4-6 hours:  If on nasal continuous positive airway pressure, respiratory therapy assess nares and determine need for appliance change or resting period.  Outcome: Progressing     Problem: ABCDS Injury Assessment  Goal: Absence of physical injury  Outcome: Progressing     Problem: Pain  Goal: Verbalizes/displays adequate comfort level or baseline comfort level  Outcome: Progressing

## 2024-03-13 NOTE — CARE COORDINATION
Discharge Planning Note:    - ARU - ACCEPTED    - Pre-Cert - STARTED    Note: [facility] has the intent to deny and is offering [id number]:    The MD will need the following information:    - Patient's Name: Susan Kenney  - : 1940  -Member ID Number:  [id number]   - Insurance: [employer] Medicare Complete    Phone: [phone] Option 5    Vcol-[id number] to completed by 1500 on 2024    - ARU has been  informed of the above information.    Will continue to follow.    PEDRO Echols RN    [facility]  Phone: [phone]

## 2024-03-13 NOTE — CARE COORDINATION
Avoidable Day       03/13/24 1557   Avoidable Days   [facility] [facility]  (waiting on pre-cert)     PEDRO Echols RN    [facility]  Phone: [phone]

## 2024-03-13 NOTE — PLAN OF CARE
Problem: Safety - Adult  Goal: Free from fall injury  3/13/2024 0753 by Barbara Pearson RN  Outcome: Progressing  3/13/2024 0340 by Sadie Kelly RN  Outcome: Progressing     Problem: Discharge Planning  Goal: Discharge to home or other facility with appropriate resources  3/13/2024 0753 by Barbara Pearson RN  Outcome: Progressing  3/13/2024 0340 by Sadie Kelly RN  Outcome: Progressing  Flowsheets (Taken 3/12/2024 2015)  Discharge to home or other facility with appropriate resources: Refer to discharge planning if patient needs post-hospital services based on physician order or complex needs related to functional status, cognitive ability or social support system     Problem: Skin/Tissue Integrity  Goal: Absence of new skin breakdown  Description: 1.  Monitor for areas of redness and/or skin breakdown  2.  Assess vascular access sites hourly  3.  Every 4-6 hours minimum:  Change oxygen saturation probe site  4.  Every 4-6 hours:  If on nasal continuous positive airway pressure, respiratory therapy assess nares and determine need for appliance change or resting period.  3/13/2024 0753 by Barbara Pearson RN  Outcome: Progressing  3/13/2024 0340 by Sadie Kelly RN  Outcome: Progressing     Problem: ABCDS Injury Assessment  Goal: Absence of physical injury  3/13/2024 0753 by Barbara Pearson RN  Outcome: Progressing  3/13/2024 0340 by Sadie Kelly RN  Outcome: Progressing     Problem: Pain  Goal: Verbalizes/displays adequate comfort level or baseline comfort level  3/13/2024 0753 by Barbara Pearson RN  Outcome: Progressing  3/13/2024 0340 by Sadie Kelly RN  Outcome: Progressing

## 2024-03-13 NOTE — PROGRESS NOTES
PM assessment complete, vitals stable on room air, medications given per MAR, neuro check charted, pain controlled at this time, purewick in place.

## 2024-03-13 NOTE — PROGRESS NOTES
[facility] - Inpatient Rehabilitation Department   Phone: [phone]    Occupational Therapy    [] Initial Evaluation            [x] Daily Treatment Note         [] Discharge Summary      Patient: Susan Kenney   : 1940   MRN: [id number]   Date of Service:  3/13/2024    Admitting Diagnosis:  Closed fracture of neck of left femur (HCC)  Current Admission Summary: Susan Kenney is a 83 y.o. female who presents for evaluation after mechanical fall. Patient states that she only walks with a walker and was trying to get around the kitchen without it when she lost her balance and fell down landing on her bottom. She is complaining of some low back pain but states that she has had this for several days. She also reports pain shooting down her right leg that she has also had for several days. She is scheduled to see her PCP for this next week. She denies hitting her head or any loss of consciousness. She is not anticoagulated. She denies numbness tingling or weakness distally. No saddle anesthesia, bladder retention or bowel incontinence. No other injuries or complaints at this time     3/  LEFT HIP PERCUTANEOUS PINNING  - fracture caused by a mechanical fall at home   Past Medical History:  has a past medical history of Arthritis, Easy bruising, Hyperlipidemia, Hypertension, and Nausea & vomiting.  Past Surgical History:  has a past surgical history that includes Tonsillectomy; Hysterectomy; joint replacement (); joint replacement (1/10); Bunionectomy; Toe Surgery; laminectomy (12/16/10); and hip surgery (Left, 3/8/2024).    Discharge Recommendations: Susan Kenney scored a 16/24 on the AM-PAC ADL Inpatient form. Current research shows that an AM-PAC score of 17 or less is typically not associated with a discharge to the patient's home setting. Based on the patient's AM-PAC score and their current ADL deficits, it is recommended that the patient have 3-5 sessions per week of Occupational Therapy  surgical leg. Typically mod I for mobility with rolling walker and mod I with ADLs. Patient requiring CGA/Windy for functional transfers, CGA for short-distance mobility with RW and rest break required. Pt Windy UB ADLs and Max/DEP for LB ADLs. Pt would benefit from LB dressing AE intro at future session.  Patient primarily limited by decreased activity tolerance. Patient presents with the above deficits and would benefit from continued skilled OT to address return to PLOF.   Safety Interventions: patient left in chair, chair alarm in place, call light within reach, gait belt, patient at risk for falls, telesitter in use, and nurse notified    Plan  Frequency: 7 x/week  Current Treatment Recommendations: strengthening, balance training, functional mobility training, transfer training, endurance training, patient/caregiver education, ADL/self-care training, home exercise program, and safety education    Goals  Patient Goals: to be able to walk    Short Term Goals:  Time Frame: discharge   Patient will complete upper body ADL at supervision   Patient will complete lower body ADL at maximum assistance   Patient will complete toileting at maximum assistance  Met 3/13 Setup, upgrade to SUP  Patient will complete grooming at set up assistance   Patient will complete functional transfers at minimal assistance - goal met 3/11   Patient will complete functional mobility at minimal assistance - goal met 3/11    New goals:  Patient will complete transfers at SBA  Patient will complete mobility at SBA and LRAD     Above goals reviewed on 3/13/2024.  All goals are ongoing at this time unless indicated above.       Therapy Session Time     Individual Group Co-treatment   Time In 0822     Time Out 0918     Minutes 56          Timed Code Treatment Minutes:  56 Minutes  Total Treatment Minutes:  56 minutes        Electronically Signed By: BRIAN Celment/[id number]

## 2024-03-14 PROCEDURE — 1200000000 HC SEMI PRIVATE

## 2024-03-14 PROCEDURE — 2580000003 HC RX 258: Performed by: NURSE PRACTITIONER

## 2024-03-14 PROCEDURE — 6370000000 HC RX 637 (ALT 250 FOR IP): Performed by: NURSE PRACTITIONER

## 2024-03-14 PROCEDURE — 97530 THERAPEUTIC ACTIVITIES: CPT

## 2024-03-14 PROCEDURE — 94760 N-INVAS EAR/PLS OXIMETRY 1: CPT

## 2024-03-14 PROCEDURE — 97110 THERAPEUTIC EXERCISES: CPT

## 2024-03-14 PROCEDURE — 6370000000 HC RX 637 (ALT 250 FOR IP): Performed by: HOSPITALIST

## 2024-03-14 PROCEDURE — 97535 SELF CARE MNGMENT TRAINING: CPT

## 2024-03-14 PROCEDURE — 6360000002 HC RX W HCPCS: Performed by: NURSE PRACTITIONER

## 2024-03-14 PROCEDURE — 97116 GAIT TRAINING THERAPY: CPT

## 2024-03-14 RX ADMIN — ATORVASTATIN CALCIUM 20 MG: 20 TABLET, FILM COATED ORAL at 20:14

## 2024-03-14 RX ADMIN — ACETAMINOPHEN 650 MG: 325 TABLET ORAL at 08:32

## 2024-03-14 RX ADMIN — PROPRANOLOL HYDROCHLORIDE 160 MG: 80 CAPSULE, EXTENDED RELEASE ORAL at 08:38

## 2024-03-14 RX ADMIN — CETIRIZINE HYDROCHLORIDE 5 MG: 10 TABLET, FILM COATED ORAL at 08:31

## 2024-03-14 RX ADMIN — ASPIRIN 81 MG 81 MG: 81 TABLET ORAL at 08:33

## 2024-03-14 RX ADMIN — CLONIDINE HYDROCHLORIDE 0.2 MG: 0.1 TABLET ORAL at 20:14

## 2024-03-14 RX ADMIN — CELECOXIB 200 MG: 200 CAPSULE ORAL at 08:32

## 2024-03-14 RX ADMIN — PRIMIDONE 50 MG: 50 TABLET ORAL at 20:17

## 2024-03-14 RX ADMIN — GABAPENTIN 300 MG: 300 CAPSULE ORAL at 20:14

## 2024-03-14 RX ADMIN — PRIMIDONE 50 MG: 50 TABLET ORAL at 08:33

## 2024-03-14 RX ADMIN — GABAPENTIN 100 MG: 100 CAPSULE ORAL at 08:33

## 2024-03-14 RX ADMIN — ACETAMINOPHEN 650 MG: 325 TABLET ORAL at 17:05

## 2024-03-14 RX ADMIN — ACETAMINOPHEN 650 MG: 325 TABLET ORAL at 20:13

## 2024-03-14 RX ADMIN — ACETAMINOPHEN 650 MG: 325 TABLET ORAL at 12:50

## 2024-03-14 RX ADMIN — CELECOXIB 200 MG: 200 CAPSULE ORAL at 20:13

## 2024-03-14 RX ADMIN — SODIUM CHLORIDE, PRESERVATIVE FREE 10 ML: 5 INJECTION INTRAVENOUS at 08:34

## 2024-03-14 RX ADMIN — GABAPENTIN 100 MG: 100 CAPSULE ORAL at 15:03

## 2024-03-14 RX ADMIN — ENOXAPARIN SODIUM 40 MG: 100 INJECTION SUBCUTANEOUS at 08:34

## 2024-03-14 RX ADMIN — SODIUM CHLORIDE, PRESERVATIVE FREE 10 ML: 5 INJECTION INTRAVENOUS at 20:14

## 2024-03-14 RX ADMIN — PANTOPRAZOLE SODIUM 40 MG: 40 TABLET, DELAYED RELEASE ORAL at 06:16

## 2024-03-14 ASSESSMENT — PAIN DESCRIPTION - ORIENTATION
ORIENTATION: LEFT
ORIENTATION: LEFT

## 2024-03-14 ASSESSMENT — PAIN SCALES - GENERAL
PAINLEVEL_OUTOF10: 4
PAINLEVEL_OUTOF10: 0
PAINLEVEL_OUTOF10: 2

## 2024-03-14 ASSESSMENT — PAIN DESCRIPTION - LOCATION
LOCATION: HIP
LOCATION: HIP

## 2024-03-14 ASSESSMENT — PAIN DESCRIPTION - DESCRIPTORS: DESCRIPTORS: SQUEEZING

## 2024-03-14 NOTE — PLAN OF CARE
Problem: Safety - Adult  Goal: Free from fall injury  3/14/2024 0842 by Sharmin Culver, RN  Outcome: Progressing     Problem: Discharge Planning  Goal: Discharge to home or other facility with appropriate resources  3/14/2024 0842 by Sharmin Culver, RN  Outcome: Progressing     Problem: Skin/Tissue Integrity  Goal: Absence of new skin breakdown  Description: 1.  Monitor for areas of redness and/or skin breakdown  2.  Assess vascular access sites hourly  3.  Every 4-6 hours minimum:  Change oxygen saturation probe site  4.  Every 4-6 hours:  If on nasal continuous positive airway pressure, respiratory therapy assess nares and determine need for appliance change or resting period.  3/14/2024 0842 by Sharmin Culver, RN  Outcome: Progressing     Problem: ABCDS Injury Assessment  Goal: Absence of physical injury  3/14/2024 0842 by Sharmin Culver, RN  Outcome: Progressing     Problem: Pain  Goal: Verbalizes/displays adequate comfort level or baseline comfort level  3/14/2024 0842 by Sharmin Culver, RN  Outcome: Progressing

## 2024-03-14 NOTE — FLOWSHEET NOTE
03/14/24 1138   IMM Letter   IMM Letter given to Patient/Family/Significant other/Guardian/POA/by: Given to patient by Social Work/Case Management student Franklin   IMM Letter date given: 03/14/24   IMM Letter time given: 1126     Electronically signed by FRANKLIN MORE on 3/14/2024 at 11:39 AM

## 2024-03-14 NOTE — PROGRESS NOTES
Hospitalist Progress Note      PCP: Margareth Pelaez DO    Date of Admission: 3/6/2024    LOS: 8    Chief Complaint:   Chief Complaint   Patient presents with    Fall     Pt arrives from home by [facility]. Pt had a fall in her hitchen. Pt states she was walking in her kitchen and lost her balance. Pt states she uses a walker and didn't used her walker when in the kitchen. Pt rates her pain a 5 out of 10. Pt C/O buttock and bilateral leg pain       Case Summary:   83-year-old lady with history of hypertension, hyperlipidemia, lumbar stenosis status post spinal stimulator, benign essential tremors who presented following a fall sustaining left hip fracture status post percutaneous pinning 3/8/2024 complicated by acute blood loss anemia      Active Hospital Problems    Diagnosis Date Noted    Acute blood loss as cause of postoperative anemia [D62] 03/12/2024    Closed fracture of neck of left femur (HCC) [S72.002A] 03/07/2024    Benign essential tremor [G25.0] 05/04/2015    Hypercholesteremia [E78.00] 12/08/2010    HTN (hypertension) [I10] 12/08/2010         Principal Problem:    Closed fracture of neck of left femur (HCC): Due to a fall status post percutaneous pinning 3/8/2024.  Remained stable.  Awaiting placement.  Adequate pain control  - Weightbearing, PT OT as per orthopedics  - Outpatient follow-up with orthopedics at discharge  - Continue pain management      Acute blood loss as cause of postoperative anemia: Hemoglobin drop postoperatively.  Does not require transfusion.  Stable.      Constipation: Improved bowel habits with current regimen    Active Problems:    HTN (hypertension): Stable on Propranolol, clonidine    Hypercholesteremia: On statin    Benign essential tremor: Stable on propranolol      Resolved Problems:    Unable to ambulate    Hyponatremia    Hypokalemia        Medications:  Reviewed  Infusion Medications    sodium chloride Stopped (03/12/24 0643)    sodium chloride       Scheduled  at 3/14/2024 1306  Last data filed at 3/14/2024 1139  Gross per 24 hour   Intake 870 ml   Output 3400 ml   Net -2530 ml         Labs:   Recent Labs     03/11/24  1629   WBC 6.0   HGB 8.8*   HCT 25.8*           Recent Labs     03/11/24  1629      K 3.9      CO2 26   BUN 14   CREATININE 0.6   CALCIUM 7.8*       No results for input(s): \"CKTOTAL\", \"TROPONINI\" in the last 72 hours.    Urinalysis:    Lab Results   Component Value Date/Time    NITRU NEGATIVE 02/14/2013 01:24 PM    BLOODU NEGATIVE 02/14/2013 01:24 PM    SPECGRAV 1.010 02/14/2013 01:24 PM    GLUCOSEU NEGATIVE 02/14/2013 01:24 PM    GLUCOSEU NEGATIVE 01/15/2010 05:28 PM       Radiology:  XR CHEST PORTABLE   Final Result   No acute cardiopulmonary process.         XR HIP LEFT (1 VIEW)   Final Result      CT HIP LEFT WO CONTRAST   Final Result   1. Mildly impacted left femoral neck fracture.         XR HIP BILATERAL W AP PELVIS (2 VIEWS)   Final Result   Limited exam due to the internal rotation of the hips and diffuse osteopenia   with a questionable slightly impacted transverse fracture along the base of   the left femoral neck which is difficult to further characterize.  Recommend   either obtaining follow-up AP and frogleg views of the left hip or CT   evaluation of the hip.      Moderate osteoarthritic changes in both hips and diffuse osteopenia         CT LUMBAR SPINE WO CONTRAST   Final Result   No acute fracture                 Alex Green MD      Please excuse brevity and/or typos. This report was transcribed using voice recognition software. Every effort was made to ensure accuracy, however, inadvertent computerized transcription errors may be present.

## 2024-03-14 NOTE — PROGRESS NOTES
Shift assessment complete, patient is alert and oriented X4, VSS, neuro check WDL, voiding Miralax administered, BM X1 large formed. Dressing to left femur intact. Possible D/C to ARU, precert pending. All safety precautions in place.  The care plan and education has been reviewed and mutually agreed upon with the patient.

## 2024-03-14 NOTE — PROGRESS NOTES
[facility] - Inpatient Rehabilitation Department   Phone: [phone]    Occupational Therapy    [] Initial Evaluation            [x] Daily Treatment Note         [] Discharge Summary      Patient: Susan Kenney   : 1940   MRN: [id number]   Date of Service:  3/14/2024    Admitting Diagnosis:  Closed fracture of neck of left femur (HCC)  Current Admission Summary: Susan Kenney is a 83 y.o. female who presents for evaluation after mechanical fall. Patient states that she only walks with a walker and was trying to get around the kitchen without it when she lost her balance and fell down landing on her bottom. She is complaining of some low back pain but states that she has had this for several days. She also reports pain shooting down her right leg that she has also had for several days. She is scheduled to see her PCP for this next week. She denies hitting her head or any loss of consciousness. She is not anticoagulated. She denies numbness tingling or weakness distally. No saddle anesthesia, bladder retention or bowel incontinence. No other injuries or complaints at this time     3/  LEFT HIP PERCUTANEOUS PINNING  - fracture caused by a mechanical fall at home   Past Medical History:  has a past medical history of Arthritis, Easy bruising, Hyperlipidemia, Hypertension, and Nausea & vomiting.  Past Surgical History:  has a past surgical history that includes Tonsillectomy; Hysterectomy; joint replacement (); joint replacement (1/10); Bunionectomy; Toe Surgery; laminectomy (12/16/10); and hip surgery (Left, 3/8/2024).    Discharge Recommendations: Susan Kenney scored a 16/24 on the AM-PAC ADL Inpatient form. Current research shows that an AM-PAC score of 17 or less is typically not associated with a discharge to the patient's home setting. Based on the patient's AM-PAC score and their current ADL deficits, it is recommended that the patient have 3-5 sessions per week of Occupational Therapy  rest break required. Pt Windy UB ADLs and Max/DEP for LB ADLs. Pt would benefit from LB dressing AE intro at future sessions.  Patient primarily limited by decreased standing and activity tolerance. Patient presents with the above deficits and would benefit from continued skilled OT to address return to PLOF.   Safety Interventions: patient left in chair, chair alarm in place, call light within reach, gait belt, patient at risk for falls, telesitter in use, and nurse notified    Plan  Frequency: 7 x/week  Current Treatment Recommendations: strengthening, balance training, functional mobility training, transfer training, endurance training, patient/caregiver education, ADL/self-care training, home exercise program, and safety education    Goals  Patient Goals: to be able to walk    Short Term Goals:  Time Frame: discharge   Patient will complete upper body ADL at supervision   Patient will complete lower body ADL at maximum assistance   Patient will complete toileting at maximum assistance  Met 3/13 Setup, upgrade to SUP  Patient will complete grooming at set up assistance   Patient will complete functional transfers at minimal assistance - goal met 3/11   Patient will complete functional mobility at minimal assistance - goal met 3/11    New goals:  Patient will complete transfers at SBA  Patient will complete mobility at SBA and LRAD     Above goals reviewed on 3/14/2024.  All goals are ongoing at this time unless indicated above.       Therapy Session Time     Individual Group Co-treatment   Time In 0847     Time Out 1018     Minutes 91          Timed Code Treatment Minutes: 91  Minutes  Total Treatment Minutes:  91 minutes        Electronically Signed By: BRIAN Clement/[id number]

## 2024-03-15 VITALS
WEIGHT: 183 LBS | OXYGEN SATURATION: 95 % | TEMPERATURE: 98.2 F | DIASTOLIC BLOOD PRESSURE: 64 MMHG | RESPIRATION RATE: 17 BRPM | SYSTOLIC BLOOD PRESSURE: 146 MMHG | HEIGHT: 59 IN | BODY MASS INDEX: 36.89 KG/M2 | HEART RATE: 76 BPM

## 2024-03-15 PROCEDURE — 97116 GAIT TRAINING THERAPY: CPT

## 2024-03-15 PROCEDURE — 6370000000 HC RX 637 (ALT 250 FOR IP): Performed by: NURSE PRACTITIONER

## 2024-03-15 PROCEDURE — 6360000002 HC RX W HCPCS: Performed by: NURSE PRACTITIONER

## 2024-03-15 PROCEDURE — 97110 THERAPEUTIC EXERCISES: CPT

## 2024-03-15 PROCEDURE — 2580000003 HC RX 258: Performed by: NURSE PRACTITIONER

## 2024-03-15 PROCEDURE — 97535 SELF CARE MNGMENT TRAINING: CPT

## 2024-03-15 PROCEDURE — 97530 THERAPEUTIC ACTIVITIES: CPT

## 2024-03-15 RX ADMIN — PANTOPRAZOLE SODIUM 40 MG: 40 TABLET, DELAYED RELEASE ORAL at 06:26

## 2024-03-15 RX ADMIN — PRIMIDONE 50 MG: 50 TABLET ORAL at 08:35

## 2024-03-15 RX ADMIN — ACETAMINOPHEN 650 MG: 325 TABLET ORAL at 13:53

## 2024-03-15 RX ADMIN — PROPRANOLOL HYDROCHLORIDE 160 MG: 80 CAPSULE, EXTENDED RELEASE ORAL at 08:34

## 2024-03-15 RX ADMIN — ASPIRIN 81 MG 81 MG: 81 TABLET ORAL at 08:35

## 2024-03-15 RX ADMIN — CETIRIZINE HYDROCHLORIDE 5 MG: 10 TABLET, FILM COATED ORAL at 08:35

## 2024-03-15 RX ADMIN — GABAPENTIN 100 MG: 100 CAPSULE ORAL at 13:53

## 2024-03-15 RX ADMIN — ENOXAPARIN SODIUM 40 MG: 100 INJECTION SUBCUTANEOUS at 08:34

## 2024-03-15 RX ADMIN — CELECOXIB 200 MG: 200 CAPSULE ORAL at 08:35

## 2024-03-15 RX ADMIN — ACETAMINOPHEN 650 MG: 325 TABLET ORAL at 08:34

## 2024-03-15 RX ADMIN — GABAPENTIN 100 MG: 100 CAPSULE ORAL at 08:35

## 2024-03-15 RX ADMIN — SODIUM CHLORIDE, PRESERVATIVE FREE 10 ML: 5 INJECTION INTRAVENOUS at 08:40

## 2024-03-15 ASSESSMENT — PAIN SCALES - GENERAL
PAINLEVEL_OUTOF10: 4
PAINLEVEL_OUTOF10: 0

## 2024-03-15 ASSESSMENT — PAIN DESCRIPTION - ORIENTATION: ORIENTATION: LEFT;RIGHT

## 2024-03-15 ASSESSMENT — PAIN DESCRIPTION - DESCRIPTORS: DESCRIPTORS: ACHING

## 2024-03-15 ASSESSMENT — PAIN DESCRIPTION - LOCATION: LOCATION: HIP

## 2024-03-15 NOTE — PLAN OF CARE
Problem: Safety - Adult  Goal: Free from fall injury  3/15/2024 1436 by Sharmin Culver, RN  Outcome: Completed     Problem: Discharge Planning  Goal: Discharge to home or other facility with appropriate resources  3/15/2024 1436 by Sharmin Culver, RN  Outcome: Completed     Problem: Skin/Tissue Integrity  Goal: Absence of new skin breakdown  Description: 1.  Monitor for areas of redness and/or skin breakdown  2.  Assess vascular access sites hourly  3.  Every 4-6 hours minimum:  Change oxygen saturation probe site  4.  Every 4-6 hours:  If on nasal continuous positive airway pressure, respiratory therapy assess nares and determine need for appliance change or resting period.  3/15/2024 1436 by Sharmin Culver, RN  Outcome: Completed     Problem: ABCDS Injury Assessment  Goal: Absence of physical injury  3/15/2024 1436 by Sharmin Culver, RN  Outcome: Completed     Problem: Pain  Goal: Verbalizes/displays adequate comfort level or baseline comfort level  3/15/2024 1436 by Sharmin Culver, RN  Outcome: Completed

## 2024-03-15 NOTE — PLAN OF CARE
Problem: Safety - Adult  Goal: Free from fall injury  3/15/2024 1010 by Sharmin Culver, RN  Outcome: Progressing     Problem: Discharge Planning  Goal: Discharge to home or other facility with appropriate resources  3/15/2024 1010 by Sharmin Culver, RN  Outcome: Progressing     Problem: Skin/Tissue Integrity  Goal: Absence of new skin breakdown  Description: 1.  Monitor for areas of redness and/or skin breakdown  2.  Assess vascular access sites hourly  3.  Every 4-6 hours minimum:  Change oxygen saturation probe site  4.  Every 4-6 hours:  If on nasal continuous positive airway pressure, respiratory therapy assess nares and determine need for appliance change or resting period.  3/15/2024 1010 by Sharmin Culver, RN  Outcome: Progressing     Problem: ABCDS Injury Assessment  Goal: Absence of physical injury  3/15/2024 1010 by Sharmin Culver, RN  Outcome: Progressing     Problem: Pain  Goal: Verbalizes/displays adequate comfort level or baseline comfort level  3/15/2024 1010 by Sharmin Culver, RN  Outcome: Progressing

## 2024-03-15 NOTE — DISCHARGE SUMMARY
Hospital Medicine Discharge Summary    Patient ID: Susan Kenney      Patient's PCP: Margareth Pelaez DO    Admit Date: 3/6/2024     Discharge Date:   3/15/2024     Admitting Provider: Shane Brennan MD     Discharge Provider: Alex Green MD     Discharge Diagnoses:       Active Hospital Problems    Diagnosis     Acute blood loss as cause of postoperative anemia [D62]     Closed fracture of neck of left femur (HCC) [S72.002A]     Benign essential tremor [G25.0]     Hypercholesteremia [E78.00]     HTN (hypertension) [I10]        The patient was seen and examined on day of discharge and this discharge summary is in conjunction with any daily progress note from day of discharge.    Hospital Course:   The patient is a 83-year-old lady with history of hypertension, hyperlipidemia, lumbar stenosis status post spinal stimulator, benign essential tremors who presented following a fall sustaining left hip fracture status post percutaneous pinning 3/8/2024 complicated by acute blood loss anemia        Closed fracture of neck of left femur (HCC): Due to a fall status post percutaneous pinning 3/8/2024.  Remained stable.  She was denied by ARU but accepted to SNF.  Will discharge to SNF today.  Outpatient follow-up with orthopedic at discharge.  Continue pain management at SNF.  PT OT at SNF       Acute blood loss as cause of postoperative anemia: Hemoglobin drop postoperatively and did not require transfusion.  Stable.       Constipation: Improved bowel habits with current regimen     Active Problems:    HTN (hypertension): Stable on Propranolol, clonidine    Hypercholesteremia: On statin    Benign essential tremor: Stable on propranolol        Resolved Problems:    Unable to ambulate    Hyponatremia    Hypokalemia          Physical Exam Performed:     BP (!) 149/74   Pulse 80   Temp 98.4 °F (36.9 °C) (Oral)   Resp 18   Ht 1.499 m (4' 11\")   Wt 83 kg (183 lb)   SpO2 94%   BMI 36.96 kg/m²  lunch and 300 mg at bedtime      omeprazole (PRILOSEC) 20 MG delayed release capsule Take 1 capsule by mouth daily      celecoxib (CELEBREX) 200 MG capsule Take 1 capsule by mouth      aspirin 81 MG tablet Take 1 tablet by mouth      mometasone (NASONEX) 50 MCG/ACT nasal spray 1 spray by Nasal route daily  Qty: 1 Inhaler, Refills: 1    Associated Diagnoses: Sinus pressure      propranolol (INDERAL LA) 160 MG ER capsule       furosemide (LASIX) 20 MG tablet Take 0.5 tablets by mouth daily      Multiple Vitamins-Minerals (ICAPS MV) TABS Take 1 capsule by mouth 2 times daily.      fluticasone (FLONASE) 50 MCG/ACT nasal spray 2 sprays by Nasal route daily.      loratadine (CLARITIN) 10 MG tablet Take 1 tablet by mouth daily      atorvastatin (LIPITOR) 10 MG tablet Take 1 tablet by mouth daily      !! gabapentin (NEURONTIN) 100 MG capsule 1 tab in am, 1 tab in evening, 3 tabs at hs       !! - Potential duplicate medications found. Please discuss with provider.          Time Spent on discharge: 35 minutes in the examination, evaluation, counseling and review of medications and discharge plan.      Signed:    Alex Green MD   3/15/2024      Thank you Margareth Pelaez DO for the opportunity to be involved in this patient's care. If you have any questions or concerns, please feel free to contact me at [phone].

## 2024-03-15 NOTE — PROGRESS NOTES
unless indicated above.      Therapy Session Time      Individual Group Co-treatment   Time In 1351       Time Out 1429       Minutes 38         Timed Code Treatment Minutes: 38 Minutes  Total Treatment Minutes: 38 Minutes        Electronically Signed By: MAXIMINO SIMEON, PT [id number]

## 2024-03-15 NOTE — OP NOTE
Operative Note      Patient: Susan Kenney  YOB: 1940  MRN: [id number]    Date of Procedure: 3/8/2024    Pre-Op Diagnosis Codes:     * Closed fracture of left hip, initial encounter (formerly [facility]) [S72.002A]    Post-Op Diagnosis: Same       Procedure(s):  LEFT HIP PERCUTANEOUS PINNING - SYNTHES    Surgeon(s):  Olman Rogers MD    Assistant:   First Assistant: Dominguez Parkinson RN  Physician Assistant: Deniz George PA-C    Anesthesia: General    Estimated Blood Loss (mL): Minimal    Complications: None    Specimens:   * No specimens in log *    Implants:  Implant Name Type Inv. Item Serial No.  Lot No. LRB No. Used Action   SCREW BNE L75MM DIA7.3MM THRD L16MM CANC S STL ST SELF DRL - [id number]  SCREW BNE L75MM DIA7.3MM THRD L16MM CANC S STL ST SELF DRL  DEPU[employer]NTHES [employer]  Left 1 Implanted   SCREW BNE L80MM DIA7.3MM THRD L16MM CANC S STL SELF DRL ST - [id number]  SCREW BNE L80MM DIA7.3MM THRD L16MM CANC S STL SELF DRL ST  DEPUY [employer]NTHES [employer]-WD  Left 2 Implanted         Drains:   External Urinary Catheter (Active)   Site Assessment Clean,dry & intact 03/15/24 0907   Placement Replaced 03/15/24 0907   Securement Method Securing device (Describe) 03/15/24 0700   Catheter Care Catheter/Wick replaced 03/15/24 0907   Perineal Care Yes 03/15/24 0907   Suction 40 mmgHg continuous 03/15/24 0907   Urine Color Yellow 03/15/24 0700   Urine Appearance Clear 03/15/24 0700   Output (mL) 1200 mL 03/15/24 0700       Findings: per dictation        Detailed Description of Procedure:   The patient was met in the preoperative holding area.  Surgical site was identified marked.  Informed consent was obtained.  She was taken back to the operative room and general anesthesia was performed.  She was then transferred to the [place] table.  The hip was prepped and draped using sterile technique.  A formal timeout was performed in which the correct patient, surgical site and procedure was reaffirmed.  Preoperative

## 2024-03-15 NOTE — PROGRESS NOTES
[facility] - Inpatient Rehabilitation Department   Phone: [phone]    Occupational Therapy    [] Initial Evaluation            [x] Daily Treatment Note         [] Discharge Summary      Patient: Susan Kenney   : 1940   MRN: [id number]   Date of Service:  3/15/2024    Admitting Diagnosis:  Closed fracture of neck of left femur (HCC)  Current Admission Summary: Susan Kenney is a 83 y.o. female who presents for evaluation after mechanical fall. Patient states that she only walks with a walker and was trying to get around the kitchen without it when she lost her balance and fell down landing on her bottom. She is complaining of some low back pain but states that she has had this for several days. She also reports pain shooting down her right leg that she has also had for several days. She is scheduled to see her PCP for this next week. She denies hitting her head or any loss of consciousness. She is not anticoagulated. She denies numbness tingling or weakness distally. No saddle anesthesia, bladder retention or bowel incontinence. No other injuries or complaints at this time     3/ 6 LEFT HIP PERCUTANEOUS PINNING  - fracture caused by a mechanical fall at home   Past Medical History:  has a past medical history of Arthritis, Easy bruising, Hyperlipidemia, Hypertension, and Nausea & vomiting.  Past Surgical History:  has a past surgical history that includes Tonsillectomy; Hysterectomy; joint replacement (); joint replacement (1/10); Bunionectomy; Toe Surgery; laminectomy (12/16/10); and hip surgery (Left, 3/8/2024).    Discharge Recommendations: Susan Kenney scored a 16/24 on the AM-PAC ADL Inpatient form. Current research shows that an AM-PAC score of 17 or less is typically not associated with a discharge to the patient's home setting. Based on the patient's AM-PAC score and their current ADL deficits, it is recommended that the patient have 3-5 sessions per week of Occupational Therapy

## 2024-03-15 NOTE — CARE COORDINATION
Discharge Planning Note:    Update:    [facility] - ACCEPTED    Pre-Cert - APPROVED    - [facility] Auth ID: [id number]    - Dates: 03/15/2024 to 03/19/2024    Will continue to follow.    PEDRO Echols RN    [facility]  Phone: [phone]

## 2024-03-15 NOTE — PROGRESS NOTES
Shift assessment complete, alert and oriented X4, VSS, purewick in place, voiding, all night meds administered. All safety precautions in place.    The care plan and education has been reviewed and mutually agreed upon with the patient.

## 2024-03-15 NOTE — PROGRESS NOTES
Susan Kenney  3/14/2024  [id number]    Chief Complaint: Closed fracture of neck of left femur (HCC)    Subjective   Patient reports that she is doing well. She feels like she is making a little bit of progress day by day. Pain is adequately controlled. Was previously constipated, but was able to have a BM. Tolerating working with therapies.          Objective   Objective:  Patient Vitals for the past 24 hrs:   BP Temp Temp src Pulse Resp SpO2   03/14/24 2000 (!) 164/72 98.6 °F (37 °C) Oral 82 18 94 %   03/14/24 1630 138/65 98.7 °F (37.1 °C) Oral 75 17 97 %   03/14/24 1131 128/73 98.1 °F (36.7 °C) Oral 70 17 97 %   03/14/24 0833 -- -- -- 78 18 96 %   03/14/24 0815 (!) 136/56 98.1 °F (36.7 °C) Oral 78 18 96 %   03/14/24 0600 (!) 157/73 97.7 °F (36.5 °C) Oral 77 18 94 %   03/13/24 2349 112/69 97.9 °F (36.6 °C) Oral 65 18 90 %     Gen: No distress, pleasant. Seated at bedside.   HEENT: Normocephalic, atraumatic.   CV: No audible murmurs, well perfused extremities.  Resp: No respiratory distress. No increased WOB.  Abd: Soft, nontender nondistended.  Ext: No edema.   Neuro: Alert, oriented, appropriately interactive.     Laboratory data: Available via EMR.     Therapy progress:    PT    Rolling: Level of difficulty - A Little   Sit to Stand from a Chair: Level of difficulty - A Little  Supine to Sit: Level of difficulty - A Lot     Bed to Chair: Physical Assistance Required - A Little  Ambulate Across Room: Physical Assistance Required - A Little  Ascend 3-5 Steps With HR: Physical Assistance Required - A Little    OT    Eating: Physical Assistance Required - None  Grooming: Physical Assistance Required - A Little  LB Dressing: Physical Assistance Required - A Lot  UB Dressing: Physical Assistance Required - A Little  Bathing: Physical Assistance Required - A Lot  Toileting: Physical Assistance Required - A Lot    SLP         Body mass index is 36.96 kg/m².       Assessment:  Patient Active Problem List   Diagnosis     Spinal stenosis    Disc disorder    HTN (hypertension)    OA (osteoarthritis)    Hypercholesteremia    Osteoarthritis of left shoulder    Postoperative anemia    Decubitus ulcer of buttock, stage 1    Benign essential tremor    TMJ capsulitis    Closed fracture of neck of left femur (HCC)    Acute blood loss as cause of postoperative anemia       Plan:   Insurance authorization was denied, and appeal upheld. Based upon patient's lack of active medical complexity (electrolyte abnormalities resolved, anemia stable, pain controlled w/ Tylenol, BP generally well controlled on PO regimen, normal bowel function), do not feel she meets criteria for acute inpatient rehabilitation any longer. If unable to safely return home, her rehabilitation needs may be adequately met at SNF.     Cuong Wren MD 3/14/2024, 9:31 PM    * This document was created using dictation software.  While all precautions were taken to ensure accuracy, errors may have occurred.  Please disregard any typographical errors.

## 2024-03-15 NOTE — CARE COORDINATION
Discharge Plan:     [facility]  [address] [place] [address]    SW/DC Planner faxed, MARLA and AVS to: [phone]    Narcotic Prescriptions faxed were: yes    RN: will call report to: [phone]    Medical Transport with: [facility] [phone]     time: 1430    Family advised of discharge?: yes, Robby Kenney, Son    ESTEVAN Submitted?:  yes    All discharge needs met per case management.      MISTY EcholsN RN    [facility]  Phone: [phone]

## 2024-03-15 NOTE — PLAN OF CARE
Problem: Safety - Adult  Goal: Free from fall injury  3/14/2024 2050 by Danna Gar RN  Outcome: Progressing  3/14/2024 0842 by Sharmin Culver RN  Outcome: Progressing     Problem: Discharge Planning  Goal: Discharge to home or other facility with appropriate resources  3/14/2024 2050 by Danna Gar RN  Outcome: Progressing  3/14/2024 0842 by Sharmin Culver RN  Outcome: Progressing     Problem: Skin/Tissue Integrity  Goal: Absence of new skin breakdown  Description: 1.  Monitor for areas of redness and/or skin breakdown  2.  Assess vascular access sites hourly  3.  Every 4-6 hours minimum:  Change oxygen saturation probe site  4.  Every 4-6 hours:  If on nasal continuous positive airway pressure, respiratory therapy assess nares and determine need for appliance change or resting period.  3/14/2024 2050 by Danna Gar RN  Outcome: Progressing  3/14/2024 0842 by Sharmin Culver RN  Outcome: Progressing     Problem: ABCDS Injury Assessment  Goal: Absence of physical injury  3/14/2024 2050 by Danna Gar RN  Outcome: Progressing  3/14/2024 0842 by Sharmin Culver RN  Outcome: Progressing     Problem: Pain  Goal: Verbalizes/displays adequate comfort level or baseline comfort level  3/14/2024 2050 by Danna Gar RN  Outcome: Progressing  3/14/2024 0842 by Sharmin Culver, RN  Outcome: Progressing

## 2024-03-15 NOTE — CARE COORDINATION
Discharge Planning Note:    Update:    - insurance denied ARU    Met with the patient. An approved SNF was reviewed and the following referral was placed at the request of the patient:    - [facility] - waiting on response    Will continue to follow.    PEDRO Echols RN    [facility]  Phone: [phone]'

## 2024-03-26 ENCOUNTER — OFFICE VISIT (OUTPATIENT)
Dept: ORTHOPEDIC SURGERY | Age: 84
End: 2024-03-26

## 2024-03-26 VITALS — BODY MASS INDEX: 36.89 KG/M2 | HEIGHT: 59 IN | WEIGHT: 183 LBS

## 2024-03-26 DIAGNOSIS — S72.002D CLOSED FRACTURE OF NECK OF LEFT FEMUR WITH ROUTINE HEALING, SUBSEQUENT ENCOUNTER: ICD-10-CM

## 2024-03-26 DIAGNOSIS — M25.552 LEFT HIP PAIN: Primary | ICD-10-CM

## 2024-03-26 PROCEDURE — 99024 POSTOP FOLLOW-UP VISIT: CPT | Performed by: ORTHOPAEDIC SURGERY

## 2024-03-26 NOTE — PROGRESS NOTES
3/26/2024     Reason for visit:  Status post left hip percutaneous pinning on 3/8/2024    History of Present Illness:  Patient returns for postoperative visit.  Overall she is doing well.  Her pain has been well-controlled.  She is currently in the facility.  She is weightbearing as tolerated on a walker.  She denies any fever or chills today.  She denies any numbness or tingling down her leg.      Objective:  Ht 1.499 m (4' 11\")   Wt 83 kg (183 lb)   BMI 36.96 kg/m²      Physical Exam:  The patient is well-appearing and in no apparent distress  Examination of the left hip  Skin is clean, dry, intact. Incisions healing well.   5 out of 5 strength throughout distal muscle groups  Sensation is intact to light touch throughout all distributions  There is no calf swelling or tenderness, negative Yunior's  Palpable DP pulse, brisk cap refill, 2+ symmetric reflexes      Imaging:  AP pelvis and 2 view radiographs of the left hip were ordered and reviewed in clinic today 3/26/2024 demonstrating postoperative changes consistent with percutaneous hip pinning.  No evidence of hardware complication or failure.    Assessment:  Status post left hip percutaneous pinning on 3/8/2024    Plan:  Patient is doing very well postoperatively.  She may continue weightbearing as tolerated with a walker.  Her pain has been well-controlled.  Will have her follow-up in 4 weeks for repeat evaluation, and new x-rays.    Electronically signed by Deniz George PA-C on 3/26/24     Disclaimer:  This note was dictated with voice recognition software.  Though review and correction are routine, we apologize for any errors.

## 2024-03-27 ENCOUNTER — TELEPHONE (OUTPATIENT)
Dept: ORTHOPEDIC SURGERY | Age: 84
End: 2024-03-27

## 2024-03-27 NOTE — TELEPHONE ENCOUNTER
General Question     Subject: L HIP OFFICE VISIT 3-26-24 FAX   Patient and /or Facility Request: Susan Kenney   Contact Number: [phone]     JOSELINE FROM [facility] CALLED IN TO SEE IF SHE CAN GET THE OFFICE NOTES FROM 3-.. [phone]...    PLEASE ADVISE

## 2024-04-25 ENCOUNTER — OFFICE VISIT (OUTPATIENT)
Dept: ORTHOPEDIC SURGERY | Age: 84
End: 2024-04-25

## 2024-04-25 VITALS — WEIGHT: 183 LBS | HEIGHT: 59 IN | BODY MASS INDEX: 36.89 KG/M2

## 2024-04-25 DIAGNOSIS — Z98.890 STATUS POST HIP SURGERY: Primary | ICD-10-CM

## 2024-04-25 PROCEDURE — 99024 POSTOP FOLLOW-UP VISIT: CPT | Performed by: PHYSICIAN ASSISTANT

## 2024-04-25 NOTE — PROGRESS NOTES
4/25/2024     Reason for visit:  Status post left hip percutaneous pinning on 3/8/2024    History of Present Illness:  Patient returns for postoperative visit.  Overall she is doing well.  Her pain has been well-controlled.  She was discharged from her facility 2 days ago.  She is weightbearing as tolerated on a walker.  She denies any fever or chills today.  She denies any numbness or tingling down her leg. She does complain of some ongoing weakness.       Objective:  Ht 1.499 m (4' 11\")   Wt 83 kg (183 lb)   BMI 36.96 kg/m²      Physical Exam:  The patient is well-appearing and in no apparent distress  Examination of the left hip  Skin is clean, dry, intact. Incisions healing well.   Hip ROM: 110 degrees flexion, 20 degrees int/ext rotation  No pain with active ROM  5 out of 5 strength throughout distal muscle groups  Sensation is intact to light touch throughout all distributions  There is no calf swelling or tenderness, negative Yunior's  Palpable DP pulse, brisk cap refill, 2+ symmetric reflexes      Imaging:  AP pelvis and 2 view radiographs of the left hip were ordered and reviewed in clinic today 4/25/2024 demonstrating postoperative changes consistent with percutaneous hip pinning.  No evidence of hardware complication or failure.    Assessment:  Status post left hip percutaneous pinning on 3/8/2024    Plan:  Patient is doing very well postoperatively.  She may continue weightbearing as tolerated with a walker.  She does complain of some ongoing weakness which we discussed is normal at this point.  I recommended that she continue with physical therapy.  It sounds like she is getting home physical therapy at the moment.  We may consider transitioning her into outpatient therapy at some point in the future.  She will follow-up in 6 weeks for repeat evaluation, and new x-rays.      Electronically signed by Deniz George PA-C on 3/26/24     Disclaimer:  This note was dictated with voice recognition

## 2024-05-15 ENCOUNTER — PROCEDURE VISIT (OUTPATIENT)
Dept: AUDIOLOGY | Age: 84
End: 2024-05-15
Payer: MEDICARE

## 2024-05-15 DIAGNOSIS — H90.3 SENSORINEURAL HEARING LOSS, BILATERAL: Primary | ICD-10-CM

## 2024-05-15 PROCEDURE — 92593 PR HEARING AID CHECK BINAURAL: CPT

## 2024-05-15 NOTE — PROGRESS NOTES
Susan Kenney  1940.84 y.o. female   [id number]    HEARING AID CHECK    DEVICE & WARRANTY INFORMATION:     RIGHT EAR: /MODEL: Oticon More 2  SN: [id number]  WIRE/DOME: 85G 8mm double bass dome    LEFT EAR: /MODEL: Oticon More 2 SN: [id number]  WIRE/DOME: 85G 8mm double bass dome     ACCESSORIES: (SN: [id number])  HAF: 12/7/2022  WARRANTY: 12/28/25  TRIAL PERIOD ENDS:01/07/2023  L&D ELIGIBLE: Yes     BUTTONS: DISABLED  PROGRAMS: DISABLED  PHONE CONNECTIVITY: DISABLED    PROCEDURES:     Susan Kenney was seen today, 5/15/2024, for a hearing aid check appointment.    Otoscopy revealed: Non-occluding cerumen in the right ear, clear ear canal in the left    Patient noted left device keeps popping out and her devices are not as loud as they used to be. She broke her hip in March, so she did not wear them while she was on the hospital. Her son attempted to change the wax guards, but insisted that the domes do not come off during this process. Patient was instructed on how to change the wax guards during today's appointment.    .Hearing aids were cleaned and checked.  A listening check revealed diminished function of the devices.  Microphone and  ports were suctioned, domes and wax traps were changed, and devices completed a desiccant cycle through [employer]. Patient was taught how to change wax guards and advised to change them whenever the sound diminishes or every 2-3 months as needed. Post-cleaning listening check revealed excellent function of the devices.       Connected devices to fitting software. Added volume control capability.  Datalogging revealed 7.5 hours of use per day.    PATIENT EDUCATION:     - Verbally and visually reviewed importance of consistent use and care and maintenance of devices.      Information was verbally shared with patient during appointment.        RECOMMENDATIONS:     Continue consistent hearing aid use  Return for hearing aid checks as needed.

## 2024-06-11 ENCOUNTER — OFFICE VISIT (OUTPATIENT)
Dept: ORTHOPEDIC SURGERY | Age: 84
End: 2024-06-11
Payer: MEDICARE

## 2024-06-11 VITALS — BODY MASS INDEX: 36.89 KG/M2 | HEIGHT: 59 IN | WEIGHT: 183 LBS

## 2024-06-11 DIAGNOSIS — Z98.890 STATUS POST HIP SURGERY: Primary | ICD-10-CM

## 2024-06-11 PROCEDURE — 1090F PRES/ABSN URINE INCON ASSESS: CPT | Performed by: ORTHOPAEDIC SURGERY

## 2024-06-11 PROCEDURE — 99213 OFFICE O/P EST LOW 20 MIN: CPT | Performed by: ORTHOPAEDIC SURGERY

## 2024-06-11 PROCEDURE — 1123F ACP DISCUSS/DSCN MKR DOCD: CPT | Performed by: ORTHOPAEDIC SURGERY

## 2024-06-11 PROCEDURE — G8417 CALC BMI ABV UP PARAM F/U: HCPCS | Performed by: ORTHOPAEDIC SURGERY

## 2024-06-11 PROCEDURE — G8427 DOCREV CUR MEDS BY ELIG CLIN: HCPCS | Performed by: ORTHOPAEDIC SURGERY

## 2024-06-11 PROCEDURE — 1036F TOBACCO NON-USER: CPT | Performed by: ORTHOPAEDIC SURGERY

## 2024-06-25 NOTE — PROGRESS NOTES
6/11/2024     Reason for visit:  Status post left hip percutaneous pinning on 3/8/2024    History of Present Illness:  Patient returns for postoperative visit.  Overall she is doing well.  She does think that she may have overdone it recently but overall is still happy with her progress and outcome so far.      Objective:  Ht 1.499 m (4' 11\")   Wt 83 kg (183 lb)   BMI 36.96 kg/m²      Physical Exam:  The patient is well-appearing and in no apparent distress  Examination of the left hip  Skin is clean, dry, intact. Incisions healing well.   Hip ROM: 110 degrees flexion, 20 degrees int/ext rotation  No pain with active ROM  5 out of 5 strength throughout distal muscle groups  Sensation is intact to light touch throughout all distributions  There is no calf swelling or tenderness, negative Yunior's  Palpable DP pulse, brisk cap refill, 2+ symmetric reflexes      Imaging:  AP pelvis and 2 view radiographs of the left hip were ordered and reviewed in clinic today 6/11/2024 demonstrating postoperative changes consistent with percutaneous hip pinning.  No evidence of hardware complication or failure.  Interval healing has occurred.    Assessment:  Status post left hip percutaneous pinning on 3/8/2024    Plan:  Patient is doing very well postoperatively.  Continue weight-bear as tolerated.  Patient will return to see me as needed in future.    Greater than 20 minutes were spent with this encounter.  Time spent included evaluating the patient's chart prior to arrival.  Evaluating the patient in the office including history, physical examination, imaging reviewing, and counseling on next steps.  Lastly, time was spent discussing orders with my staff as well as providing documentation in the chart.     Disclaimer:  This note was dictated with voice recognition software.  Though review and correction are routine, we apologize for any errors.

## 2025-03-12 ENCOUNTER — PROCEDURE VISIT (OUTPATIENT)
Dept: AUDIOLOGY | Age: 85
End: 2025-03-12

## 2025-03-12 DIAGNOSIS — H90.3 SENSORINEURAL HEARING LOSS, BILATERAL: Primary | ICD-10-CM

## 2025-03-12 PROCEDURE — 92593 PR HEARING AID CHECK BINAURAL: CPT

## 2025-03-12 NOTE — PROGRESS NOTES
medically indicated and/or sooner if a change in hearing is noted.  Contact audiologist with questions/concerns as needed    **Patient paid $115 for today's appointment.    Susanna Jay  Audiologist    Portions of this note were dictated using Dragon. There may be linguistic errors secondary to the use of this program.

## 2025-07-16 ENCOUNTER — PROCEDURE VISIT (OUTPATIENT)
Dept: AUDIOLOGY | Age: 85
End: 2025-07-16

## 2025-07-16 DIAGNOSIS — H90.3 SENSORINEURAL HEARING LOSS, BILATERAL: Primary | ICD-10-CM

## 2025-07-16 PROCEDURE — 92593 PR HEARING AID CHECK BINAURAL: CPT

## 2025-07-16 NOTE — PROGRESS NOTES
Susan Kenney  1940.85 y.o. female   [id number]    HEARING AID CHECK    DEVICE & WARRANTY INFORMATION:     RIGHT EAR: /MODEL: Oticon More 2  SN: [id number]  WIRE/DOME: 85G 8mm double bass dome    LEFT EAR: /MODEL: Oticon More 2 SN: [id number]  WIRE/DOME: 85G 8mm double bass dome     ACCESSORIES: (SN: [id number])  HAF: 12/7/2022  WARRANTY: 12/28/25  TRIAL PERIOD ENDS:01/07/2023  L&D ELIGIBLE: Yes     BUTTONS: DISABLED  PROGRAMS: DISABLED  PHONE CONNECTIVITY: DISABLED    PROCEDURES:     Susan Kenney was seen today, 7/16/2025, for a hearing aid check appointment.    Otoscopy revealed: Non-occluding cerumen in the right, clear in the left. Attempted removal with lighted curette, however, cleaning was discontinued after minimal blood was observed. Advised patient to leave her right device out of her ear until tomorrow to avoid irritation.    Patient noted she has been having her son clean the wax filters. She has noticed the hearing aids have not been staying in her ears as well since her last visit.      Hearing aids were cleaned and checked.  A listening check revealed good function of the devices.  Microphone and  ports were suctioned, domes and wax traps were changed, and devices completed a desiccant cycle through [employer]. Post-cleaning listening check revealed excellent function of the devices.  Advised patient to utilize cleaning brush to clean the microphone ports as there was a large amount of debris that was removed today.    Connected devices to fitting software. Firmware update completed.  Datalogging revealed 11.5 hours of use per day.    PATIENT EDUCATION:     - Verbally and visually reviewed importance of consistent use and care and maintenance of devices.      Information was verbally shared with patient during appointment.        RECOMMENDATIONS:     Continue consistent hearing aid use  Return for hearing aid checks as needed  Retest hearing as medically

## (undated) DEVICE — ELECTRODE PT RET AD L9FT HI MOIST COND ADH HYDRGEL CORDED

## (undated) DEVICE — SYRINGE, LUER LOCK, 10ML: Brand: MEDLINE

## (undated) DEVICE — GOWN SIRUS NONREIN XL W/TWL: Brand: MEDLINE INDUSTRIES, INC.

## (undated) DEVICE — BIT DRL L300MM DIA5MM CANN QUIK CPL ADJ STP REUSE

## (undated) DEVICE — APPLICATOR MEDICATED 26 CC SOLUTION HI LT ORNG CHLORAPREP

## (undated) DEVICE — SOLUTION IRRIG 1000ML 0.9% SOD CHL USP POUR PLAS BTL

## (undated) DEVICE — PAD ABSRB W8XL10IN ABD HYDROPHOBIC NONWOVEN THCK LAYR CELOS

## (undated) DEVICE — SHEET,DRAPE,53X77,STERILE: Brand: MEDLINE

## (undated) DEVICE — DRESSING,GAUZE,XEROFORM,CURAD,5"X9",ST: Brand: CURAD

## (undated) DEVICE — SYRINGE 60ML BULB IRRIG ST LF

## (undated) DEVICE — GOWN,AURORA,NONREINF,RAGLAN,XXL,STERILE: Brand: MEDLINE

## (undated) DEVICE — MAJOR SET UP PK

## (undated) DEVICE — GUIDEWIRE ORTH L300MM DIA2.8MM S STL THRD SHRP TRCR TIP FOR

## (undated) DEVICE — 3M™ TEGADERM™ TRANSPARENT FILM DRESSING FRAME STYLE, 1628, 6 IN X 8 IN (15 CM X 20 CM), 10/CT 8CT/CASE: Brand: 3M™ TEGADERM™

## (undated) DEVICE — MERCY FAIRFIELD TURNOVER KIT: Brand: MEDLINE INDUSTRIES, INC.

## (undated) DEVICE — LAMINECTOMY ARM CRADLES - COMPRESSED: Brand: SOULE MEDICAL

## (undated) DEVICE — GAUZE,SPONGE,4"X4",8PLY,STRL,LF,10/TRAY: Brand: MEDLINE

## (undated) DEVICE — SUTURE VCRL + SZ 2-0 L18IN ABSRB UD CT1 L36MM 1/2 CIR VCP839D

## (undated) DEVICE — STANDARD HYPODERMIC NEEDLE,POLYPROPYLENE HUB: Brand: MONOJECT

## (undated) DEVICE — RESTRAINT EXT ANK WRST LT BLU FOAM 2 STRP SIDE BCKL HK AND

## (undated) DEVICE — LEGGINGS, PAIR, 31X48, STERILE: Brand: MEDLINE

## (undated) DEVICE — Device: Brand: COVER, PERINEAL POST, 12 PK

## (undated) DEVICE — C-ARM: Brand: UNBRANDED

## (undated) DEVICE — 6619 2 PTNT ISO SYS INCISE AREA&LT;(&GT;&&LT;)&GT;P: Brand: STERI-DRAPE™ IOBAN™ 2

## (undated) DEVICE — SPONGE LAP W18XL18IN WHT COT 4 PLY FLD STRUNG RADPQ DISP ST 2 PER PACK

## (undated) DEVICE — MAT FLR W28XL40IN STD GRN 60% RECYCL MAT LO ABSRB SGL LAYR

## (undated) DEVICE — SUTURE VCRL + SZ 0 L18IN ABSRB UD L36MM CT-1 1/2 CIR VCP840D

## (undated) DEVICE — PADDING UNDERCAST W4INXL4YD 100% COT CRIMPED FINISH WBRL II

## (undated) DEVICE — SOLUTION IRRIG 1000ML STRL H2O USP PLAS POUR BTL

## (undated) DEVICE — STAPLER SKIN H3.9MM WIRE DIA0.58MM CRWN 6.9MM 35 STPL ROT

## (undated) DEVICE — BANDAGE COBAN 4 IN COMPR W4INXL5YD FOAM COHESIVE QUIK STK SELF ADH SFT

## (undated) DEVICE — COVADERM: Brand: DEROYAL